# Patient Record
Sex: FEMALE | Race: OTHER | HISPANIC OR LATINO | Employment: FULL TIME | URBAN - METROPOLITAN AREA
[De-identification: names, ages, dates, MRNs, and addresses within clinical notes are randomized per-mention and may not be internally consistent; named-entity substitution may affect disease eponyms.]

---

## 2017-01-11 ENCOUNTER — HOSPITAL ENCOUNTER (OUTPATIENT)
Dept: RADIOLOGY | Facility: HOSPITAL | Age: 67
Discharge: HOME | End: 2017-01-11
Attending: INTERNAL MEDICINE | Admitting: RADIOLOGY
Payer: COMMERCIAL

## 2017-01-11 DIAGNOSIS — E04.1 NONTOXIC SINGLE THYROID NODULE: ICD-10-CM

## 2017-01-11 PROCEDURE — 76942 ECHO GUIDE FOR BIOPSY: CPT

## 2017-01-11 PROCEDURE — 88172 CYTP DX EVAL FNA 1ST EA SITE: CPT | Performed by: INTERNAL MEDICINE

## 2017-01-11 PROCEDURE — 88173 CYTOPATH EVAL FNA REPORT: CPT | Performed by: INTERNAL MEDICINE

## 2017-01-11 PROCEDURE — 10022 HB FNA W/IMAGE: CPT

## 2017-01-11 RX ORDER — LIDOCAINE HYDROCHLORIDE 10 MG/ML
2 INJECTION, SOLUTION INFILTRATION; PERINEURAL ONCE
Status: COMPLETED | OUTPATIENT
Start: 2017-01-11 | End: 2017-01-11

## 2017-01-11 RX ADMIN — LIDOCAINE HYDROCHLORIDE 2 ML: 10 INJECTION, SOLUTION INFILTRATION; PERINEURAL at 09:25

## 2017-01-13 ENCOUNTER — GENERIC CONVERSION - ENCOUNTER (OUTPATIENT)
Dept: OTHER | Facility: OTHER | Age: 67
End: 2017-01-13

## 2017-01-18 ENCOUNTER — HOSPITAL ENCOUNTER (OUTPATIENT)
Dept: RADIOLOGY | Facility: HOSPITAL | Age: 67
Discharge: HOME | End: 2017-01-18
Attending: INTERNAL MEDICINE
Payer: COMMERCIAL

## 2017-01-18 DIAGNOSIS — Z12.31 VISIT FOR SCREENING MAMMOGRAM: ICD-10-CM

## 2017-01-18 PROCEDURE — G0202 SCR MAMMO BI INCL CAD: HCPCS

## 2017-02-02 ENCOUNTER — ANESTHESIA (OUTPATIENT)
Dept: GASTROENTEROLOGY | Facility: HOSPITAL | Age: 67
End: 2017-02-02
Payer: COMMERCIAL

## 2017-02-02 ENCOUNTER — ANESTHESIA EVENT (OUTPATIENT)
Dept: GASTROENTEROLOGY | Facility: HOSPITAL | Age: 67
End: 2017-02-02
Payer: COMMERCIAL

## 2017-02-02 ENCOUNTER — HOSPITAL ENCOUNTER (OUTPATIENT)
Facility: HOSPITAL | Age: 67
Setting detail: OUTPATIENT SURGERY
Discharge: HOME | End: 2017-02-02
Attending: INTERNAL MEDICINE | Admitting: INTERNAL MEDICINE
Payer: COMMERCIAL

## 2017-02-02 VITALS
OXYGEN SATURATION: 96 % | RESPIRATION RATE: 18 BRPM | HEART RATE: 67 BPM | SYSTOLIC BLOOD PRESSURE: 99 MMHG | DIASTOLIC BLOOD PRESSURE: 51 MMHG | TEMPERATURE: 97.9 F

## 2017-02-02 DIAGNOSIS — E34.2 ECTOPIC HORMONE SECRETION, NOT ELSEWHERE CLASSIFIED: ICD-10-CM

## 2017-02-02 PROCEDURE — 88305 TISSUE EXAM BY PATHOLOGIST: CPT | Performed by: INTERNAL MEDICINE

## 2017-02-02 PROCEDURE — 88342 IMHCHEM/IMCYTCHM 1ST ANTB: CPT | Performed by: INTERNAL MEDICINE

## 2017-02-02 RX ORDER — ALBUTEROL SULFATE 2.5 MG/3ML
2.5 SOLUTION RESPIRATORY (INHALATION) ONCE AS NEEDED
Status: CANCELLED | OUTPATIENT
Start: 2017-02-02

## 2017-02-02 RX ORDER — SODIUM CHLORIDE 9 MG/ML
50 INJECTION, SOLUTION INTRAVENOUS CONTINUOUS
Status: DISCONTINUED | OUTPATIENT
Start: 2017-02-02 | End: 2017-02-02 | Stop reason: HOSPADM

## 2017-02-02 RX ORDER — PROPOFOL 10 MG/ML
INJECTION, EMULSION INTRAVENOUS CONTINUOUS PRN
Status: DISCONTINUED | OUTPATIENT
Start: 2017-02-02 | End: 2017-02-02 | Stop reason: SURG

## 2017-02-02 RX ORDER — MEPERIDINE HYDROCHLORIDE 25 MG/ML
12.5 INJECTION INTRAMUSCULAR; INTRAVENOUS; SUBCUTANEOUS AS NEEDED
Status: CANCELLED | OUTPATIENT
Start: 2017-02-02

## 2017-02-02 RX ORDER — SODIUM CHLORIDE, SODIUM LACTATE, POTASSIUM CHLORIDE, CALCIUM CHLORIDE 600; 310; 30; 20 MG/100ML; MG/100ML; MG/100ML; MG/100ML
125 INJECTION, SOLUTION INTRAVENOUS CONTINUOUS
Status: CANCELLED | OUTPATIENT
Start: 2017-02-02

## 2017-02-02 RX ADMIN — SODIUM CHLORIDE 50 ML/HR: 0.9 INJECTION, SOLUTION INTRAVENOUS at 13:44

## 2017-02-02 RX ADMIN — PROPOFOL 160 MCG/KG/MIN: 10 INJECTION, EMULSION INTRAVENOUS at 14:04

## 2017-02-16 ENCOUNTER — GENERIC CONVERSION - ENCOUNTER (OUTPATIENT)
Dept: OTHER | Facility: OTHER | Age: 67
End: 2017-02-16

## 2017-04-23 ENCOUNTER — HOSPITAL ENCOUNTER (OUTPATIENT)
Dept: RADIOLOGY | Age: 67
Discharge: HOME | End: 2017-04-23
Attending: PHYSICIAN ASSISTANT | Admitting: FAMILY MEDICINE
Payer: COMMERCIAL

## 2017-04-23 ENCOUNTER — APPOINTMENT (OUTPATIENT)
Dept: URGENT CARE | Age: 67
End: 2017-04-23
Payer: COMMERCIAL

## 2017-04-23 ENCOUNTER — TRANSCRIBE ORDERS (OUTPATIENT)
Dept: URGENT CARE | Age: 67
End: 2017-04-23

## 2017-04-23 DIAGNOSIS — T14.90XA INJURY: Primary | ICD-10-CM

## 2017-04-23 DIAGNOSIS — T14.90XA INJURY: ICD-10-CM

## 2017-04-23 PROCEDURE — S9088 SERVICES PROVIDED IN URGENT: HCPCS

## 2017-04-23 PROCEDURE — 99213 OFFICE O/P EST LOW 20 MIN: CPT

## 2017-04-23 PROCEDURE — S9088 SERVICES PROVIDED IN URGENT: HCPCS | Performed by: FAMILY MEDICINE

## 2017-04-23 PROCEDURE — 73590 X-RAY EXAM OF LOWER LEG: CPT

## 2017-04-23 PROCEDURE — 73610 X-RAY EXAM OF ANKLE: CPT

## 2017-04-23 PROCEDURE — 73630 X-RAY EXAM OF FOOT: CPT

## 2017-07-03 ENCOUNTER — ALLSCRIPTS OFFICE VISIT (OUTPATIENT)
Dept: OTHER | Facility: OTHER | Age: 67
End: 2017-07-03

## 2017-07-03 DIAGNOSIS — Z12.31 ENCOUNTER FOR SCREENING MAMMOGRAM FOR MALIGNANT NEOPLASM OF BREAST: ICD-10-CM

## 2017-07-26 ENCOUNTER — APPOINTMENT (OUTPATIENT)
Dept: LAB | Facility: HOSPITAL | Age: 67
End: 2017-07-26
Payer: COMMERCIAL

## 2017-07-26 ENCOUNTER — APPOINTMENT (OUTPATIENT)
Dept: URGENT CARE | Age: 67
End: 2017-07-26
Payer: OTHER MISCELLANEOUS

## 2017-07-26 ENCOUNTER — TRANSCRIBE ORDERS (OUTPATIENT)
Dept: LAB | Facility: HOSPITAL | Age: 67
End: 2017-07-26

## 2017-07-26 DIAGNOSIS — Z00.8 HEALTH EXAMINATION IN POPULATION SURVEY: ICD-10-CM

## 2017-07-26 DIAGNOSIS — Z00.8 HEALTH EXAMINATION IN POPULATION SURVEY: Primary | ICD-10-CM

## 2017-07-26 LAB
CHOLEST SERPL-MCNC: 192 MG/DL (ref 50–200)
EST. AVERAGE GLUCOSE BLD GHB EST-MCNC: 114 MG/DL
HBA1C MFR BLD: 5.6 % (ref 4.2–6.3)
HDLC SERPL-MCNC: 61 MG/DL (ref 40–60)
LDLC SERPL CALC-MCNC: 103 MG/DL (ref 0–100)
TRIGL SERPL-MCNC: 138 MG/DL

## 2017-07-26 PROCEDURE — G0382 LEV 3 HOSP TYPE B ED VISIT: HCPCS | Performed by: PREVENTIVE MEDICINE

## 2017-07-26 PROCEDURE — 80061 LIPID PANEL: CPT

## 2017-07-26 PROCEDURE — 99283 EMERGENCY DEPT VISIT LOW MDM: CPT | Performed by: PREVENTIVE MEDICINE

## 2017-07-26 PROCEDURE — 83036 HEMOGLOBIN GLYCOSYLATED A1C: CPT

## 2017-07-26 PROCEDURE — 36415 COLL VENOUS BLD VENIPUNCTURE: CPT

## 2017-07-31 ENCOUNTER — APPOINTMENT (OUTPATIENT)
Dept: URGENT CARE | Age: 67
End: 2017-07-31
Payer: OTHER MISCELLANEOUS

## 2017-07-31 PROCEDURE — 99213 OFFICE O/P EST LOW 20 MIN: CPT | Performed by: PREVENTIVE MEDICINE

## 2017-12-14 ENCOUNTER — ALLSCRIPTS OFFICE VISIT (OUTPATIENT)
Dept: OTHER | Facility: OTHER | Age: 67
End: 2017-12-14

## 2017-12-14 DIAGNOSIS — E04.1 NONTOXIC SINGLE THYROID NODULE: ICD-10-CM

## 2017-12-16 NOTE — PROGRESS NOTES
Assessment  1  Thyroid nodule (241 0) (E04 1)    Plan  Thyroid nodule    · (1) T4, FREE; Status:Active; Requested for:69Ndk2817;    Perform:[facility]; Due:69Rnn4998; Ordered; For:Thyroid nodule; Ordered By:James Mckeon;   · (1) TSH; Status:Active; Requested for:82Ytc2595;    Perform:[facility]; Due:50Rjh4421; Ordered; For:Thyroid nodule; Ordered By:James Mckeon;   · US THYROID; Status:Active; Requested for:67Aad1217;    Perform:[facility]; Due:64Hip2734; Last Updated By:Tabatha Carter; 12/14/2017 1:30:13 PM;Ordered; For:Thyroid nodule; Ordered By:James Mckeon;   · Follow-up visit in 1 year Evaluation and Treatment  Follow-up  Status: Complete  Done:70Qvd9126   Ordered; For: Thyroid nodule; Ordered By: Raffi Stoddard Performed:  Due: 63NOD8673; Last Updated By: Coreen Seth; 12/14/2017 1:26:32 PM    Discussion/Summary  repeat thyroid ultrasound to monitor for any changes in size or characteristics of thyroid nodule , repeat thyroid function tests Counseling Documentation With Imm: The patient was counseled regarding diagnostic results,-- instructions for management,-- risk factor reductions,-- impressions,-- risks and benefits of treatment options,-- importance of compliance with treatment  Patient's Capacity to Self-Care: Patient is able to Self-Care  Medication SE Review and Pt Understands Tx: The treatment plan was reviewed with the patient/guardian  The patient/guardian understands and agrees with the treatment plan      Chief Complaint  Chief Complaint Free Text Note Form: FOllow up  History of Present Illness  Thyroid Nodule: The patient is being seen for a follow-up of a thyroid nodule  Nodule characteristics: located in the right lobe, benign  Symptoms:  weight gain, but-- no neck mass,-- no neck pain,-- no dysphonia,-- no dysphagia,-- no dyspnea,-- no fatigue,-- no palpitations,-- no anxiety,-- no irritability-- and-- no tremor   The patient is not currently being treated for this problem  Pertinent medical history:  no radiation exposure  Family history:  no thyroid cancer  Review of Systems  ROS Reviewed:   ROS reviewed  Endo Adult ROS Female Established v2 Update - [facility]:  Constitutional/General: recent weight gain,-- no recent weight loss,-- no poor energy/fatigue,-- no increased energy level,-- no insomnia/sleep problems,-- no fever-- and-- no feeling weak  Breasts: no nipple discharge  Heart: no high blood pressure,-- no chest pain/tightness,-- no rapid/racing heart rate-- and-- no palpitations  Genitourinary - Urinary: no frequent urination,-- no excess urination-- and-- no urinating during the night  Eyes: no blurred vision,-- no double vision,-- no bulging eyes,-- no gritty/scratchy eyes-- and-- no excessive tearing  Mouth / Throat: no hoarseness-- and-- no difficulty swallowing  Neck: no lumps,-- no swollen glands,-- no neck pain,-- no neck stiffness-- and-- no enlarged thyroid  Respiratory: no wheezing,-- no asthma-- and-- no persistent cough  Musculoskeletal: no muscle aches/pain,-- joint aches/pain-- and-- no muscle weakness  Skin & Hair: no dry skin,-- no acne,-- the hair texture was not oily,-- no hair loss-- and-- no excessive hair growth  Gastrointestinal: no constipation,-- no diarrhea,-- no waking at night to drink-- and-- no stomach ache  Neurological: no blackouts,-- no weakness-- and-- no tremors  Reproductive:  frequency of period is not applicable  -- duration of period is not applicable  -- Date of last menstruation is not applicable  -- regular periods are not applicable  -- discomfort with periods is not applicable  -- excessive bleeding during period is not applicable  -- mood swings are not applicable    Endocrine: no feeling hot frequently,-- feeling cold frequently,-- no shifts between feeling hot and cold,-- cold hands or feet,-- no excessive sweating,-- no thyroid problems,-- no blood sugar problems,-- no excessive thirst,-- no excessive hunger,-- no change in shoe size,-- no nausea or vomiting-- and-- no shaky hands  Active Problems  1  Abdominal pain (789 00) (R10 9)   2  Abnormal weight loss (783 21) (R63 4)   3  Elevated LFTs (790 6) (R79 89)   4  Encounter for annual routine gynecological examination (V72 31) (Z01 419)   5  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)   6  Nausea (787 02) (R11 0)   7  Orthopedic aftercare (V54 9) (Z47 89)   8  Thyroid nodule (241 0) (E04 1)   9  Trigger thumb of left hand (727 03) (M65 312)   10  Visit for screening mammogram (V76 12) (Z12 31)    Past Medical History  1  History of Arthritis (V13 4)   2  History of gastroesophageal reflux (GERD) (V12 79) (Z87 19)   3  Orthopedic aftercare (V54 9) (Z47 89)  Active Problems And Past Medical History Reviewed: The active problems and past medical history were reviewed and updated today  Surgical History  1  History of Hysterectomy  Surgical History Reviewed: The surgical history was reviewed and updated today  Family History  Father    1  Family history of malignant neoplasm of prostate (V16 42) (Z80 45)  Sister    2  Family history of malignant neoplasm of breast (V16 3) (Z80 3)  Family History    3  Family history of Arthritis (V17 7)   4  Family history of Brain Cancer (V16 8)   5  Family history of Breast Cancer (V16 3)   6  Family history of Osteoporosis (V17 81)   7  Family history of Prostate Cancer (T09 17)  Family History Reviewed: The family history was reviewed and updated today  Social History   · Denied: History of Being A Social Drinker   · Daily Tea Consumption (1  Cups/Day)   · Former smoker (V15 82) (X12 333)   · Denied: History of Tobacco Use  Social History Reviewed: The social history was reviewed and updated today  Current Meds   1  Omega 3-6-9 Complex Oral Capsule Recorded  Medication List Reviewed: The medication list was reviewed and updated today  Allergies  1  Latex Gloves MISC   2  Shellfish-derived Products  3  Seafood    Vitals  Vital Signs    Recorded: 23Pjy3693 01:03PM   Heart Rate 76   Systolic 681   Diastolic 70   Height 5 ft    Weight 196 lb 3 04 oz   BMI Calculated 38 32   BSA Calculated 1 84     Physical Exam   Constitutional  General appearance: No acute distress, well appearing and well nourished  Eyes  Conjunctiva and lids: No swelling, erythema, or discharge  Pupils: Equal, round and reactive to light  The sclera are anicteric  Extraocular movements are intact  Ears, Nose, Mouth, and Throat  External inspection of ears, nose and lips: Normal    Exam of Head: The head is atraumatic and normocephalic  Neck: The neck is supple  The thyroid is normal in size with no palpable nodules  Pulmonary  Auscultation of lungs: Clear to auscultation bilaterally with normal chest expansion  Cardiovascular  Examination of extremities for edema and/or varicosities: Normal    Examination of pulses: Dorsalis pedal pulses are +2 and equal bilaterally  Examination of carotids: No bruit   Abdomen  Abdomen: Abdomen is soft, non-tender with normal bowel sounds  Lymphatic  Palpation of lymph nodes: No supraclavicular or suboccipital lymphadenopathy  Musculoskeletal  Gait and station: Normal    Inspection/palpation of joints, bones, and muscles: Muscle bulk and tone is normal    Skin  Skin and subcutaneous tissue: Normal skin temperature and color  Neurologic  Motor Strength: Strength is 5/5 bilaterally     Psychiatric  Orientation to person, place and time: Normal    Mood and affect: Affect and attention span are normal        Future Appointments    Date/Time Provider Specialty Site   12/17/2018 01:00 PM Barry Garzon, [facility]     Signatures   Electronically signed by : HUEY Lewis ; Dec 14 2017  9:24PM EST                       (Author)

## 2018-01-03 ENCOUNTER — APPOINTMENT (OUTPATIENT)
Dept: LAB | Facility: HOSPITAL | Age: 68
End: 2018-01-03
Attending: INTERNAL MEDICINE
Payer: COMMERCIAL

## 2018-01-03 ENCOUNTER — GENERIC CONVERSION - ENCOUNTER (OUTPATIENT)
Dept: OTHER | Facility: OTHER | Age: 68
End: 2018-01-03

## 2018-01-03 ENCOUNTER — HOSPITAL ENCOUNTER (OUTPATIENT)
Dept: RADIOLOGY | Facility: HOSPITAL | Age: 68
Discharge: HOME | End: 2018-01-03
Attending: INTERNAL MEDICINE
Payer: COMMERCIAL

## 2018-01-03 ENCOUNTER — TRANSCRIBE ORDERS (OUTPATIENT)
Dept: LAB | Facility: HOSPITAL | Age: 68
End: 2018-01-03

## 2018-01-03 DIAGNOSIS — E04.1 NONTOXIC SINGLE THYROID NODULE: ICD-10-CM

## 2018-01-03 LAB
T4 FREE SERPL-MCNC: 0.97 NG/DL (ref 0.76–1.46)
TSH SERPL DL<=0.05 MIU/L-ACNC: 2.14 UIU/ML (ref 0.36–3.74)

## 2018-01-03 PROCEDURE — 76536 US EXAM OF HEAD AND NECK: CPT

## 2018-01-03 PROCEDURE — 84443 ASSAY THYROID STIM HORMONE: CPT

## 2018-01-03 PROCEDURE — 84439 ASSAY OF FREE THYROXINE: CPT

## 2018-01-03 PROCEDURE — 36415 COLL VENOUS BLD VENIPUNCTURE: CPT

## 2018-01-05 ENCOUNTER — GENERIC CONVERSION - ENCOUNTER (OUTPATIENT)
Dept: OTHER | Facility: OTHER | Age: 68
End: 2018-01-05

## 2018-01-10 NOTE — RESULT NOTES
Verified Results  (1) TISSUE EXAM 06ZSN3783 02:34PM Natasha Sage     Test Name Result Flag Reference   LAB AP CASE REPORT (Report)     Surgical Pathology Report             Case: [id number]                   Authorizing Provider: Richelle Pearson MD       Collected:      02/02/2017 1434        Ordering Location:   [address] [place] [address]   Received:      02/02/2017 [facility]                               Pathologist:      Kandice Manning DO                               Specimen:  Stomach, Gastric body; gastritis   LAB AP FINAL DIAGNOSIS (Report)     A  Stomach, body, biopsy:  - Chronic inactive antral-oxyntic gastritis  - No H  pylori organisms identified on H&E and immunohistochemical stains  Appropriate positive and negative controls obtained  Interpretation performed at [facility]  Electronically signed by Kandice Manning DO on 2/6/2017 at 10:07 AM   LAB AP SURGICAL ADDITIONAL INFORMATION (Report)     These tests were developed and their performance characteristics   determined by [facility] or [employer]  They may not be cleared or approved by the [place]  Food and   Drug Administration  The FDA has determined that such clearance or   approval is not necessary  These tests are used for clinical purposes  They should not be regarded as investigational or for research  This   laboratory has been approved by [facility] 88, designated as a high-complexity   laboratory and is qualified to perform these tests  LAB AP GROSS DESCRIPTION (Report)     A  The specimen is received in formalin, labeled with the patient's name   and hospital number, and is designated gastric body, gastritis   The   specimen consists of 3 rubbery tan tissue fragments measuring from 0 1 up   to 0 2 cm in greatest dimension  Entirely submitted  One cassette      Note: The estimated total formalin fixation time based upon information provided by the submitting clinician and the standard processing schedule   is 14 0 hours  SRS       Discussion/Summary   Biopsies were unremarkable  Follow up with us in office if pain is persistent  thanks

## 2018-01-12 NOTE — RESULT NOTES
Message   thyroid nodule negative for malignancy - repeat thyroid ultraosund in 6 months      Verified Results  (1) FINE NEEDLE ASPIRATION 98TDJ0602 08:58AM Francesca Monte     Test Name Result Flag Reference   LAB AP CASE REPORT (Report)     Non-gynecologic Cytology             Case: [id number]                  Authorizing Provider: Lisa Galvan MD     Collected:      01/11/2017 0858        Ordering Location:   [address] [place] [address]   [facility]      [facility]                              Pathologist:      Paco Quiñonez MD                              Specimens:  A) - Thyroid, Right, mid pole                                     B) - Thyroid, Right, mid pole slides   LAB AP CYTO FINAL DIAGNOSIS (Report)     A & B  Thyroid, right, mid pole (Thin prep and smear preparations):  Negative for malignancy (Cammal Category II) - See note  Benign follicular epithelial cells, some with oncocytic change (Hurthle   cells), accompanied by histiocytes and abundant colloid, consistent with a   benign follicular nodule  Satisfactory for evaluation  Note: As reported in the [facility] for Reporting Thyroid   Cytopathology*, the diagnostic category of benign/negative for   malignancy carries 0% to 3% risk of malignancy being found in subsequent   resections (in the few studies of patients with benign FNA results that   were followed long-term, a falsenegative rate of <1% was reported), with   the usual management being clinical follow-up supplemented by   ultrasonography (US) as indicated  Repeat FNA may be indicated for nodules   showing significant growth or developing US abnormalities  Ultimately,   clinical/imaging correlation for this patient is needed in arriving at the   actual management plan    *The Cammal System for Reporting Thyroid Cytopathology, Rob Kate, 2010 (1st ed )    Interpretation performed at [facility] [address] [facility]   [id number]  Electronically signed by Elvis Camarena MD on 1/12/2017 at 1:37 PM   LAB AP INTRAOPERATIVE CONSULTATION      Thyroid right mid: Adequate, defer    Dr Olegario Grant   LAB AP GROSS DESCRIPTION      A  Thyroid, Right, right mid pole: 20 ml slightly bloody received in   CytoLyt    B  Thyroid, Right mid pole, : 6 slides received (3 diff quik/ 3 alcohol   fixed)   LAB AP NONGYN ADDITIONAL INFORMATION (Report)     [employer] FDA approved ,  and ThinPrep Imaging System are   utilized with strict adherence to the 's instruction manual to   prepare gynecologic and non-gynecologic cytology specimens for the   production of ThinPrep slides as well as for gynecologic ThinPrep imaging  These processes have been validated by our laboratory and/or by the     These tests were developed and their performance characteristics   determined by [facility] or [employer]  They may not be cleared or approved by the [place]  Food and   Drug Administration  The FDA has determined that such clearance or   approval is not necessary  These tests are used for clinical purposes  They should not be regarded as investigational or for research  This   laboratory has been approved by CLIA 88, designated as a high-complexity   laboratory and is qualified to perform these tests     LAB AP CLINICAL INFORMATION      Thyroid right mid: Size: 2 34 x 1 5 x 1 94 cm Margins: smooth Echogenicity: solid/cystic Microcalcs: absent Flow: intranodular/peripheral Size change: minimal Suspicion level: N/A Hx of Hashimoto's Thyroiditis: no

## 2018-01-13 VITALS
BODY MASS INDEX: 36.32 KG/M2 | DIASTOLIC BLOOD PRESSURE: 70 MMHG | HEIGHT: 60 IN | WEIGHT: 185 LBS | SYSTOLIC BLOOD PRESSURE: 110 MMHG

## 2018-01-15 NOTE — RESULT NOTES
Verified Results  (1) HEPATIC FUNCTION PANEL [id number] 08:26AM Belkis Frazier Order Number: [id number]     Test Name Result Flag Reference   ALBUMIN 3 4 g/dL L 3 5-5 0   - Patient Instructions: This is a non fasting blood test  Please drink two glasses of water the morning of test     - Patient Instructions:  This is a non fasting blood test  Please drink two glasses of water the morning of test    ALK PHOSPHATAS 90 U/L     ALT (SGPT) 26 U/L  12-78   AST(SGOT) 18 U/L  5-45   BILI, DIRECT 0 09 mg/dL  0 00-0 20   BILI, TOTAL 0 42 mg/dL  0 20-1 00   TOTAL PROTEIN 7 3 g/dL  6 4-8 2       Discussion/Summary   liver enzymes normal

## 2018-01-16 NOTE — RESULT NOTES
Message   Please schedule for EGD an endoscopic ultrasound  The patient is scheduled with Dr Ezekiel Siegel in February however I would like to see the patient on my schedule if it is okay with the patient  Thank you     Verified Results  (1) ROSHAN SCREEN W/REFLEX TO TITER/PATTERN 28LEZ6487 08:27AM Maximilian Diaz Order Number: [id number]     Test Name Result Flag Reference   ROSHAN SCREEN  Negative  Negative     (1) VASOACTIVE INTESTINAL PEPTIDE (VIP) 78SMJ5219 08:27AM Maximilian Diaz Order Number: [id number]     Test Name Result Flag Reference   VASOACTIVE INTESTINAL POLYPEPTIDE PLASMA 144 7 pg/mL H 0 0 - 58 8   Results for this test are for research purposes only by the assay's    The performance characteristics of this product have  not been established  Results should not be used as a diagnostic  procedure without confirmation of the diagnosis by another medically  established diagnostic product or procedure  Performed at:  [address]  [id number]  : Tammi Lind MD, Phone:  [phone]     (1) CHROMOGRANIN A 57KMQ8828 08:27AM Maximilian Diaz Order Number: [id number]     Test Name Result Flag Reference   CHROMOGRANIN A 2 nmol/L  0 - 5   Chromogranin A performed by [employer] methodology  Results for this test are designated to be for research purposes  only by the assay's   The performance characteristics  of this product have not been established  Results for this test  should not be used as absolute evidence of presence or absence of  malignant disease without confirmation of the diagnosis by another  medically established diagnostic product or procedure  Values  obtained with different assay methods or kits cannot be used  interchangeably      Performed at:  [address]  [id number]  : Tammi Lind MD, Phone:  [phone]     (1) SMOOTH MUSCLE ANTIBODY 50OSB8533 08:26AM Melisa Garcia Order Number: [id number]     Test Name Result Flag Reference   SMOOTH MUS AB 5 Units  0 - 19   Negative                     0 - 19                   Weak positive               20 - 30                   Moderate to strong positive     >30   Actin Antibodies are found in 52-85% of patients with   autoimmune hepatitis or chronic active hepatitis and   in 22% of patients with primary biliary cirrhosis  Performed at:  [facility] [place] [address]  [id number]  : Gustavo Fierro MD, Phone:  [phone]     [address] [place] [address] 20ZPJ5925 08:01AM Melisa Kalallen Order Number: [id number]    - Patient Instructions: To schedule this appointment, please contact Central Scheduling at [phone]  Test Name Result Flag Reference   US ABDOMEN LIMITED (Report)     RIGHT UPPER QUADRANT ULTRASOUND     INDICATION: Abdominal pain for 1 month  COMPARISON: Ultrasound 10/24/2016, CT chest, abdomen and pelvis 10/24/2016     TECHNIQUE:  Real-time ultrasound of the right upper quadrant was performed with a curvilinear transducer with both volumetric sweeps and still imaging techniques  FINDINGS:     PANCREAS: Somewhat difficult to visualize and heterogeneous although no focal lesions detected  AORTA AND IVC: Visualized portions are normal for patient age  LIVER:   Size: Within normal range  The liver measures 13 9 cm in the midclavicular line  Contour: Surface contour is smooth  Parenchyma: Echogenicity and echotexture are within normal limits  No evidence of suspicious mass  The main portal vein is patent and hepatopetal       BILIARY:   The gallbladder is normal in caliber  No wall thickening or pericholecystic fluid  No stones or sludge identified  No sonographic Ruiz's sign  No intrahepatic biliary dilatation  CBD measures 5 mm  No choledocholithiasis       KIDNEY:    Right kidney measures 11 4 x 4 5 cm  Within normal limits  ASCITES:  None  IMPRESSION:     Limited evaluation of the pancreas  Otherwise, unremarkable right upper quadrant ultrasound  Workstation performed: [id number]     Signed by:   Gil Husain DO   11/19/16       Plan  Excessive VIP secretion    · U/S Endoscopic, limited to Esophagus; Status:Hold For - Scheduling; Requested  for:26Woc2103;     Discussion/Summary   Essentially all blood work from November was normal except for one of the hormone levels which has been high in the past as well  She had an MRI done in 2014 in this regard which was negative  However due to the persistent elevation I would also recommend doing an endoscopy and endoscopic ultrasound to visualize the pancreas and make sure there is no abnormality seen over there  We will follow-up with her in the office after the procedure

## 2018-01-17 NOTE — RESULT NOTES
Message   she has 3 nodule - one of then rt sided meets criteria for Biopsy   please let pt know and set up     Verified Results  Benny SLADE  56  01:13PM Rosskatherine Lemus Order Number: [id number]    - Patient Instructions: To schedule this appointment, please contact Central Scheduling at [phone]  Test Name Result Flag Reference   US THYROID (Report)     THYROID ULTRASOUND     INDICATION: Nontoxic single thyroid nodule  Thyroid nodules seen on previous CT imaging  COMPARISON: No prior ultrasound, CT chest and abdomen 10/24/2016     TECHNIQUE:  Ultrasound of the thyroid was performed with a high frequency linear transducer in transverse and sagittal planes including volumetric imaging sweeps as well as traditional still imaging technique  FINDINGS:   Mildly heterogeneous parenchymal echotexture  Right gland: 4 3 x 1 9 x 1 8 cm  Complex solid and cystic midpole nodule measuring 2 2 x 1 4 x 1 6 cm  Left gland: 4 4 x 1 3 x 1 5 cm  6 x 3 x 4 mm upper pole colloid cyst    In the lower pole there is a predominantly cystic nodule measuring 1 0 x 0 4 x 0 8 cm with a small soft tissue component  Isthmus: The isthmus is 0 2 cm in AP dimension  IMPRESSION:      Complex solid and cystic right midpole nodule measuring 2 2 cm for which needle sampling is recommended  ##sigslh##sigslh       Workstation performed: [id number]     Signed by:   Natali Bonds DO   12/21/16       Plan  Thyroid nodule    · Biopsy Thyroid FNA Using Ultrasound Guidance, 69446; Status:Active;  Requested  for:85Ond9303;

## 2018-01-22 VITALS
BODY MASS INDEX: 38.52 KG/M2 | HEART RATE: 76 BPM | DIASTOLIC BLOOD PRESSURE: 70 MMHG | WEIGHT: 196.19 LBS | SYSTOLIC BLOOD PRESSURE: 108 MMHG | HEIGHT: 60 IN

## 2018-01-23 NOTE — RESULT NOTES
Discussion/Summary   Normal thyroid function tests     Verified Results  (1) TSH [id number] 08:21AM Ernestine Sean Order Number: [id number]     Test Name Result Flag Reference   TSH 2 140 uIU/mL  0 358-3 740   Patients undergoing fluorescein dye angiography may retain small amounts of fluorescein in the body for 48-72 hours post procedure  Samples containing fluorescein can produce falsely depressed TSH values  If the patient had this procedure,a specimen should be resubmitted post fluorescein clearance  The recommended reference ranges for TSH during pregnancy are as follows:  First trimester 0 1 to 2 5 uIU/mL  Second trimester  0 2 to 3 0 uIU/mL  Third trimester 0 3 to 3 0 uIU/m     (1) T4, FREE 32UVO5925 08:21AM Sanjay Joss    Order Number: [id number]     Test Name Result Flag Reference   T4,FREE 0 97 ng/dL  0 76-1 46   Specimen collection should occur prior to Sulfasalazine administration due to the potential for falsely elevated results

## 2018-01-23 NOTE — RESULT NOTES
Discussion/Summary   Stable     Verified Results  [address] [place] [address] [id number] 12:36PM Santiago Carl Order Number: [id number]    - Patient Instructions: To schedule this appointment, please contact Central Scheduling at [phone]  Test Name Result Flag Reference   US THYROID (Report)     THYROID ULTRASOUND     INDICATION: Follow-up thyroid nodules  COMPARISON: 1/11/2017, 12/19/2016  TECHNIQUE:  Ultrasound of the thyroid was performed with a high frequency linear transducer in transverse and sagittal planes including volumetric imaging sweeps as well as traditional still imaging technique  FINDINGS: Normal homogeneous smooth echotexture  Right lobe: 4 1 x 1 8 x 1 7 cm  Left lobe: 4 2 x 1 2 x 1 7 cm  Isthmus: 0 2 cm  Nodule #1   Right midpole nodule measuring 1 9 x 1 1 x 1 7 cm  Given differences in measuring technique, no significant change from prior  COMPOSITION: 1 point, mixed cystic and solid  ECHOGENICITY: 1 point, hyperechoic or isoechoic  SHAPE: 0 points, wider-than-tall  MARGIN: 0 points, ill-defined  ECHOGENIC FOCI: 0 points, none or large comet-tail artifacts  TI-RADS Score: TR 2 (2 points), Not suspious  No FNA  This nodule has had a previous benign biopsy, as it is stable, no further sampling recommended at this time  Further surveillance at 2 year intervals could be considered to confirm continued    stability for a period of greater than 5 years  Nodule #2   RIGHT upper pole nodule measuring 0 5 x 0 3 x 0 4 cm  Unchanged from prior  COMPOSITION: 2 points, solid or almost completely solid   ECHOGENICITY: 2 points, hypoechoic  SHAPE: 0 points, wider-than-tall  MARGIN: 0 points, ill-defined  ECHOGENIC FOCI: 0 points, none or large comet-tail artifacts  TI-RADS Score: TR 4 (4-6 points), Moderately suspicious  FNA if > 1 5 cm  Follow if > 1cm  Nodule #3   LEFT upper pole nodule measuring 0 7 x 0 3 x 0 5 cm  Unchanged from prior  COMPOSITION: 0 points, cystic or almost completely cystic  ECHOGENICITY: 0 points, anechoic  SHAPE: 0 points, wider-than-tall  MARGIN: 0 points, smooth  ECHOGENIC FOCI: 0 points, none or large comet-tail artifacts  TI-RADS Score: TR 1 (0 points), Benign  No FNA  Nodule #4   LEFT lower pole nodule measuring 1 0 x 0 5 x 0 8 cm  Unchanged from prior  COMPOSITION: 0 points, cystic or almost completely cystic  ECHOGENICITY: 0 points, anechoic  SHAPE: 0 points, wider-than-tall  MARGIN: 0 points, smooth  ECHOGENIC FOCI: 0 points, none or large comet-tail artifacts  TI-RADS Score: TR 1 (0 points), Benign  No FNA  IMPRESSION:   Stable thyroid nodules  No nodule meets current ACR criteria for requiring biopsy but followup ultrasound is recommended in 1 year  Reference: ACR Thyroid Imaging, Reporting and Data System (TI-RADS): White Paper of the [employer]   J AM Galdino Radiol 4400;[id number]  (additional recommendations based on American Thyroid Association 2015 guidelines )       Workstation performed: [id number]     Signed by:   Austin Gentile MD   1/5/18

## 2018-02-12 ENCOUNTER — OFFICE VISIT (OUTPATIENT)
Dept: GASTROENTEROLOGY | Facility: CLINIC | Age: 68
End: 2018-02-12
Payer: COMMERCIAL

## 2018-02-12 VITALS
HEIGHT: 65 IN | SYSTOLIC BLOOD PRESSURE: 122 MMHG | TEMPERATURE: 97.5 F | DIASTOLIC BLOOD PRESSURE: 77 MMHG | WEIGHT: 200.8 LBS | BODY MASS INDEX: 33.45 KG/M2 | HEART RATE: 76 BPM

## 2018-02-12 DIAGNOSIS — R10.13 EPIGASTRIC PAIN: Primary | ICD-10-CM

## 2018-02-12 DIAGNOSIS — R79.89 ELEVATED LFTS: ICD-10-CM

## 2018-02-12 DIAGNOSIS — E34.2 EXCESSIVE VIP SECRETION: ICD-10-CM

## 2018-02-12 DIAGNOSIS — K21.9 GASTROESOPHAGEAL REFLUX DISEASE WITHOUT ESOPHAGITIS: ICD-10-CM

## 2018-02-12 PROCEDURE — 99214 OFFICE O/P EST MOD 30 MIN: CPT | Performed by: PHYSICIAN ASSISTANT

## 2018-02-12 RX ORDER — ERGOCALCIFEROL 1.25 MG/1
50000 CAPSULE ORAL
COMMUNITY
Start: 2018-01-26 | End: 2019-09-25 | Stop reason: ALTCHOICE

## 2018-02-12 RX ORDER — RANITIDINE 150 MG/1
150 CAPSULE ORAL EVERY EVENING
Qty: 30 CAPSULE | Refills: 5 | Status: SHIPPED | OUTPATIENT
Start: 2018-02-12 | End: 2019-09-25 | Stop reason: ALTCHOICE

## 2018-02-12 RX ORDER — CLOBETASOL PROPIONATE 0.5 MG/G
OINTMENT TOPICAL
COMMUNITY
Start: 2018-01-26 | End: 2019-09-25 | Stop reason: ALTCHOICE

## 2018-02-12 RX ORDER — LORAZEPAM 0.5 MG
2 TABLET ORAL DAILY
COMMUNITY
End: 2019-02-09 | Stop reason: HOSPADM

## 2018-02-12 RX ORDER — DICYCLOMINE HYDROCHLORIDE 10 MG/1
10 CAPSULE ORAL 4 TIMES DAILY PRN
Qty: 30 CAPSULE | Refills: 0 | Status: SHIPPED | OUTPATIENT
Start: 2018-02-12 | End: 2018-12-17 | Stop reason: ALTCHOICE

## 2018-02-12 NOTE — PROGRESS NOTES
[facility] - Outpatient Follow-up Note  Mora Calixto 74 y o  female MRN: [id number]  Encounter: [id number]          ASSESSMENT AND PLAN:      1  Epigastric/chest pain   -She reports a tight/pressure-like chest and epigastric pain that mostly seems to happen in the evenings and resolves with a GI cocktail  -s/p EGD and EUS which were largely unremarkable, biopsies were negative for H  Pylori    -Her symptoms are most consistent with reflux  Recommend antireflux precautions including dietary changes, reviewed this with her today    -She has not yet tried Zantac, recommend trial of this    -Follow up in 3 months  2  Elevated LFTs   -Unclear etiology, autoimmune markers negative  -RUQ u/s was unremarkable    -Her LFTs are now WNL  Recheck in 3 months  3  Excessive VIP secretion   -She denies any symptoms of diarrhea or flushing at this time    -Recent EGD was unremarkable    -Will discuss plan with Dr Martin Hawkins    4  Abdominal pain   -she reports that she continues to have intermittent abdominal pain unrelated to food or a bowel movement, less than once per week  -RUQ U/S unremarkable    -We discussed the risks and benefits of a colonoscopy to evaluate for source of her pain however she would prefer to hold off at this time    -Trial of Bentyl   -Recommend follow up in 3 months to see how she is doing     ______________________________________________________________________    SUBJECTIVE:    abd pain moves unrelated to food or bms  Feels tight/pressure  Chest tightness occ too  About once per week  No change in bms, no bleeding  Took omeprazole in the past, felt this gave her abdominal cramping/diarrhea  Zantac no change in pain        REVIEW OF SYSTEMS IS OTHERWISE NEGATIVE        Historical Information   Past Medical History:   Diagnosis Date    Abdominal pain     right mid abd    GERD (gastroesophageal reflux disease)     Plantar fasciitis     right foot    Thyroid nodule enlarged- biopsy benign     Past Surgical History:   Procedure Laterality Date    HYSTERECTOMY      MT EDG US EXAM SURGICAL ALTER STOM DUODENUM/JEJUNUM N/A 2/2/2017    Procedure: RADIAL ENDOSCOPIC U/S;  Surgeon: Jair Solitario MD;  [facility]; Service: Gastroenterology    MT PART EXCIS PLANTAR FASCIA Right 10/20/2016    Procedure:  OPEN RELEASE FASCIA PLANTAR ,CUTTING BOTTOM OF ARCH,INSTEP ;  Surgeon: Terrance Bender DPM;  [facility];  Service: Podiatry    THUMB ARTHROSCOPY       Social History   History   Alcohol Use No     History   Drug Use No     History   Smoking Status    Never Smoker   Smokeless Tobacco    Never Used     History reviewed  No pertinent family history  Meds/Allergies       Current Outpatient Prescriptions:     clobetasol (TEMOVATE) 0 05 % ointment    ergocalciferol (VITAMIN D2) 50,000 units    Omega-3-6-9 CAPS    Allergies   Allergen Reactions    Celebrex [Celecoxib]     Latex     Shellfish Allergy     Shellfish-Derived Products            Objective     Blood pressure 122/77, pulse 76, temperature 97 5 °F (36 4 °C), temperature source Tympanic, height 5' 5" (1 651 m), weight 91 1 kg (200 lb 12 8 oz)  PHYSICAL EXAM:      General Appearance:   Alert, cooperative, no distress   HEENT:   Normocephalic, atraumatic, anicteric      Neck:  Supple, symmetrical, trachea midline   Lungs:   Clear to auscultation bilaterally; no rales, rhonchi or wheezing; respirations unlabored    Heart[de-identified]   Regular rate and rhythm; no murmur, rub, or gallop  Abdomen:   Soft, non-tender, non-distended; normal bowel sounds; no masses, no organomegaly    Genitalia:   Deferred    Rectal:   Deferred    Extremities:  No cyanosis, clubbing or edema    Pulses:  2+ and symmetric    Skin:  No jaundice, rashes, or lesions    Lymph nodes:  No palpable cervical lymphadenopathy        Lab Results:   No visits with results within 1 Day(s) from this visit     Latest known visit with results is: Appointment on 01/03/2018   Component Date Value    TSH 3RD GENERATON 01/03/2018 2 140     Free T4 01/03/2018 0 97

## 2018-03-29 ENCOUNTER — EVALUATION (OUTPATIENT)
Dept: PHYSICAL THERAPY | Facility: CLINIC | Age: 68
End: 2018-03-29
Payer: COMMERCIAL

## 2018-03-29 DIAGNOSIS — H83.2X3 VESTIBULAR HYPOFUNCTION, BILATERAL: Primary | ICD-10-CM

## 2018-03-29 PROCEDURE — G8979 MOBILITY GOAL STATUS: HCPCS | Performed by: PHYSICAL THERAPIST

## 2018-03-29 PROCEDURE — G8978 MOBILITY CURRENT STATUS: HCPCS | Performed by: PHYSICAL THERAPIST

## 2018-03-29 PROCEDURE — 97112 NEUROMUSCULAR REEDUCATION: CPT | Performed by: PHYSICAL THERAPIST

## 2018-03-29 PROCEDURE — 97162 PT EVAL MOD COMPLEX 30 MIN: CPT | Performed by: PHYSICAL THERAPIST

## 2018-03-29 NOTE — PROGRESS NOTES
PT Evaluation     Today's date: 3/30/2018  Patient name: Sil Mcmahon  : 1950  MRN: [id number]  Referring provider: Lee Villa, PT  Dx:   Encounter Diagnosis   Name Primary?  Vestibular hypofunction, bilateral Yes                  Subjective Evaluation    History of Present Illness  Mechanism of injury: Has been feeling a little off for the past few months, believes she may have gotten water in her ear  dizziness with laying down, sometimes with nausea, yesterday felt like she was drunk  Didn't go to the doctor advised by co-worker who is PT to come to PT for direct access  Every once and while gets "woozy" when looking up and reaching for something  Time constraints - shifts will be changing in the hospital     Continues to drive and work  Pain  No pain reported          Objective  PT/OT Neuro Exam      Dysequilibrium: Yes  Lightheadedness: No  Vertigo: No  Rocking or Swaying: Yes         Oscillopsia: No  Diplopia: No  Motion sickness: No  Floating, Swimming, Disconnected: No    Exacerbation Factors:  Bending over: No  Turning Head: No  Rolling in bed: Yes  Walking: No  Looking up: Yes  Supine to/from sitting: Yes - had been an issue, not as much now  Optokinetic movement: No  Walking in busy environment: No    Duration of Symptoms: Seconds  Frequency of symptoms: 3x/week     Concurrent Complaints:  Tinnitus:No  Aural Fullness: Yes  Known hearing loss:No  Nausea, Vomiting: Yes - nausea only, no vomiting  Altered Vision: No  Poor Concentration: No  Memory Loss: No  Peripheral Neuropathy:No  Cervical Pain: No   Headache: No      PHYSICAL FINDINGS:  Cervical ROM: WFL t/o all planes of motion  Flexion:  Extension:  Right rotation:  Left rotation:  Right lateral flexion:  Left lateral flexion:    MVBI: Denied symptoms B/L    Oculomotor ROM : WNL  Resting nystagmus: No  Gaze holding nystagmus No   Smooth pursuit Normal    Vertical Saccades:Normal  Horizontal Saccades:Normal  Convergence: Normal    Cover/Uncover/Crosscover Test: NT    Head thrust (room light): B/L abnormal  VOR x 1 - WNL  VOR cx -WNL    Dynamic Visual Acuity: NT    MCTSIB  30s eyes open firm surface   30s eyes closed form surface  30s eyes open foam surface  30s eyes closed foam surface      DHI: 8  0-30 mild , 30-60 moderate,  severe disability      Positional testing: Right Left   [facility] (-) (-)   Roll test: (-) (-)         Assessment    Assessment details: Demonstrates decreased ability to maintain static balance with use of vestibular cues  With VOR insufficiency bilaterally as per head thrust  Negative testing for positional vertigo at this time  Patient issued hep for VOR and balance activities, demonstrate good understanding, assess at NV and progress as able  Goals  ST  Patient will report a reduction in frequency of symptoms to 1x/week or less within 4 weeks  2  Patient will report increased ability to look up without dizziness symptoms within 4 weeks   3  Patient will demonstrate DGI of 24/24 within 4 weeks  4   Patient will be able to maintain static balance during 4th condition of mCTSIB for 30s within 4 weeks     Plan  Patient would benefit from: skilled PT  Planned therapy interventions: balance, neuromuscular re-education, graded exercise, home exercise program and patient education  Other planned therapy interventions: VBRT  Frequency: 1x week  Duration in weeks: 4  Treatment plan discussed with: patient  Plan details: 1x/week due to busy work schedule           Precautions: (-)  Daily Treatment Diary     Manual                                                     Exercise Diary  18       VOR x 1  Standing  H 30" ea  V 30" ea       FTEO - level  HT  HN  30" ea       FTEC - level  30"                                                                                                                                                   Modalities

## 2018-03-31 DIAGNOSIS — R10.9 ABDOMINAL PAIN, UNSPECIFIED ABDOMINAL LOCATION: Primary | ICD-10-CM

## 2018-04-02 ENCOUNTER — APPOINTMENT (OUTPATIENT)
Dept: PHYSICAL THERAPY | Facility: CLINIC | Age: 68
End: 2018-04-02
Payer: COMMERCIAL

## 2018-04-05 ENCOUNTER — APPOINTMENT (OUTPATIENT)
Dept: PHYSICAL THERAPY | Facility: CLINIC | Age: 68
End: 2018-04-05
Payer: COMMERCIAL

## 2018-04-11 ENCOUNTER — OFFICE VISIT (OUTPATIENT)
Dept: PHYSICAL THERAPY | Facility: CLINIC | Age: 68
End: 2018-04-11
Payer: COMMERCIAL

## 2018-04-11 DIAGNOSIS — H83.2X3 VESTIBULAR HYPOFUNCTION, BILATERAL: Primary | ICD-10-CM

## 2018-04-11 PROCEDURE — 97112 NEUROMUSCULAR REEDUCATION: CPT | Performed by: PHYSICAL THERAPIST

## 2018-04-12 NOTE — PROGRESS NOTES
Daily Note     Today's date: 2018  Patient name: Yazmin Longo  : 1950  MRN: [id number]  Referring provider: Mary Kate Walters PT  Dx:   Encounter Diagnosis     ICD-10-CM    1  Vestibular hypofunction, bilateral H83 2X3                   Subjective: Pt reports having no dizziness or unsteadiness at this time  Has done exercises t home  Wants to make today last visit  Objective: See treatment diary below      Assessment: Re-tested balance and positional testing this session with no abnormalities  Pt will be placed on a 30 day hold as all goals have been met  ST  Patient will report a reduction in frequency of symptoms to 1x/week or less within 4 weeks - MET  2  Patient will report increased ability to look up without dizziness symptoms within 4 weeks - MET  3  Patient will demonstrate DGI of 24/24 within 4 weeks - MET  4   Patient will be able to maintain static balance during 4th condition of mCTSIB for 30s within 4 weeks - MET    DGI 24/24  MCTSIB - 30 sec condition 4  Plan: On hold

## 2018-04-21 ENCOUNTER — OFFICE VISIT (OUTPATIENT)
Dept: URGENT CARE | Facility: MEDICAL CENTER | Age: 68
End: 2018-04-21
Payer: COMMERCIAL

## 2018-04-21 VITALS
OXYGEN SATURATION: 98 % | TEMPERATURE: 97.8 F | DIASTOLIC BLOOD PRESSURE: 70 MMHG | HEIGHT: 65 IN | WEIGHT: 190 LBS | BODY MASS INDEX: 31.65 KG/M2 | SYSTOLIC BLOOD PRESSURE: 112 MMHG | RESPIRATION RATE: 18 BRPM | HEART RATE: 78 BPM

## 2018-04-21 DIAGNOSIS — J20.9 ACUTE BRONCHITIS, UNSPECIFIED ORGANISM: Primary | ICD-10-CM

## 2018-04-21 PROCEDURE — S9088 SERVICES PROVIDED IN URGENT: HCPCS | Performed by: PHYSICIAN ASSISTANT

## 2018-04-21 PROCEDURE — 94640 AIRWAY INHALATION TREATMENT: CPT | Performed by: PHYSICIAN ASSISTANT

## 2018-04-21 PROCEDURE — 99213 OFFICE O/P EST LOW 20 MIN: CPT | Performed by: PHYSICIAN ASSISTANT

## 2018-04-21 RX ORDER — PREDNISONE 10 MG/1
TABLET ORAL
Qty: 18 TABLET | Refills: 0 | Status: SHIPPED | OUTPATIENT
Start: 2018-04-21 | End: 2018-12-17 | Stop reason: ALTCHOICE

## 2018-04-21 RX ORDER — BENZONATATE 200 MG/1
200 CAPSULE ORAL 3 TIMES DAILY PRN
Qty: 20 CAPSULE | Refills: 0 | Status: SHIPPED | OUTPATIENT
Start: 2018-04-21 | End: 2018-12-17 | Stop reason: ALTCHOICE

## 2018-04-21 RX ORDER — ALBUTEROL SULFATE 90 UG/1
2 AEROSOL, METERED RESPIRATORY (INHALATION) EVERY 6 HOURS PRN
Qty: 1 INHALER | Refills: 0 | Status: SHIPPED | OUTPATIENT
Start: 2018-04-21 | End: 2018-12-17 | Stop reason: ALTCHOICE

## 2018-04-21 RX ORDER — ALBUTEROL SULFATE 2.5 MG/3ML
2.5 SOLUTION RESPIRATORY (INHALATION) ONCE
Status: COMPLETED | OUTPATIENT
Start: 2018-04-21 | End: 2018-04-21

## 2018-04-21 RX ADMIN — ALBUTEROL SULFATE 2.5 MG: 2.5 SOLUTION RESPIRATORY (INHALATION) at 14:06

## 2018-04-21 RX ADMIN — Medication 0.5 MG: at 14:07

## 2018-04-21 NOTE — PROGRESS NOTES
[facility] [facility]        NAME: Geronimo Smith is a 74 y o  female  : 1950    MRN: [id number]  DATE: 2018  TIME: 5:10 PM    Assessment and Plan   Acute bronchitis, unspecified organism [J20 9]  1  Acute bronchitis, unspecified organism  predniSONE 10 mg tablet    benzonatate (TESSALON) 200 MG capsule    albuterol (PROVENTIL HFA,VENTOLIN HFA) 90 mcg/act inhaler    albuterol inhalation solution 2 5 mg    ipratropium (ATROVENT) 0 02 % inhalation solution 0 5 mg         Patient Instructions       Follow up with PCP in 3-5 days  Proceed to  ER if symptoms worsen  Chief Complaint     Chief Complaint   Patient presents with    Cold Like Symptoms     Pt c/o cough, burning chest pain when coughing, and body aches  Pt is concerned because she is now experiencing SOB upon exertion since yesterday  History of Present Illness       Cough   This is a new problem  The current episode started in the past 7 days  The problem has been gradually worsening  The problem occurs every few minutes  The cough is non-productive  Associated symptoms include chest pain, shortness of breath and wheezing  Pertinent negatives include no chills, ear congestion, ear pain, fever, headaches, heartburn, hemoptysis, myalgias, nasal congestion, postnasal drip, rash, rhinorrhea, sore throat, sweats or weight loss  Nothing aggravates the symptoms  She has tried a beta-agonist inhaler for the symptoms  Patient States that her breathing improved after the DuoNeb  Review of Systems   Review of Systems   Constitutional: Negative for chills, fever and weight loss  HENT: Negative for ear pain, postnasal drip, rhinorrhea and sore throat  Respiratory: Positive for cough, shortness of breath and wheezing  Negative for hemoptysis  Cardiovascular: Positive for chest pain  Gastrointestinal: Negative for heartburn  Musculoskeletal: Negative for myalgias  Skin: Negative for rash     Neurological: Negative for headaches  All other systems reviewed and are negative          Current Medications       Current Outpatient Prescriptions:     albuterol (PROVENTIL HFA,VENTOLIN HFA) 90 mcg/act inhaler, Inhale 2 puffs every 6 (six) hours as needed for wheezing, Disp: 1 Inhaler, Rfl: 0    benzonatate (TESSALON) 200 MG capsule, Take 1 capsule (200 mg total) by mouth 3 (three) times a day as needed for cough, Disp: 20 capsule, Rfl: 0    clobetasol (TEMOVATE) 0 05 % ointment, , Disp: , Rfl:     dicyclomine (BENTYL) 10 mg capsule, Take 1 capsule (10 mg total) by mouth 4 (four) times a day as needed (abdominal pain), Disp: 30 capsule, Rfl: 0    ergocalciferol (VITAMIN D2) 50,000 units, , Disp: , Rfl:     Omega-3-6-9 CAPS, Take by mouth, Disp: , Rfl:     predniSONE 10 mg tablet, 4 x 3 days, 3 x 1, 2 x 1, 1 x 1, Disp: 18 tablet, Rfl: 0    ranitidine (ZANTAC) 150 MG capsule, Take 1 capsule (150 mg total) by mouth every evening, Disp: 30 capsule, Rfl: 5    Current Facility-Administered Medications:     ipratropium (ATROVENT) 0 02 % inhalation solution 0 5 mg, 0 5 mg, Nebulization, 4x Daily, Sunitha Augusta PA-JUAN, 0 5 mg at 04/21/18 1407    Current Allergies     Allergies as of 04/21/2018 - Reviewed 04/21/2018   Allergen Reaction Noted    Celebrex [celecoxib]  10/19/2016    Latex  07/17/2013    Shellfish allergy  10/31/2013    Shellfish-derived products  07/17/2013            The following portions of the patient's history were reviewed and updated as appropriate: allergies, current medications, past family history, past medical history, past social history, past surgical history and problem list      Past Medical History:   Diagnosis Date    Abdominal pain     right mid abd    GERD (gastroesophageal reflux disease)     Plantar fasciitis     right foot    Thyroid nodule     enlarged- biopsy benign       Past Surgical History:   Procedure Laterality Date    HYSTERECTOMY      NE EDG US EXAM SURGICAL ALTER STOM DUODENUM/JEJUNUM N/A 2/2/2017    Procedure: RADIAL ENDOSCOPIC U/S;  Surgeon: Niles Osborn MD;  [facility]; Service: Gastroenterology    OR PART EXCIS PLANTAR FASCIA Right 10/20/2016    Procedure:  OPEN RELEASE FASCIA PLANTAR ,CUTTING BOTTOM OF ARCH,INSTEP ;  Surgeon: Regulo Lyles DPM;  [facility];  Service: Podiatry    THUMB ARTHROSCOPY         Family History   Problem Relation Age of Onset    Prostate cancer Father     Breast cancer Sister          Medications have been verified  Objective   /70   Pulse 78   Temp 97 8 °F (36 6 °C)   Resp 18   Ht 5' 5" (1 651 m)   Wt 86 2 kg (190 lb)   SpO2 98%   BMI 31 62 kg/m²        Physical Exam     Physical Exam   Constitutional: She appears well-developed and well-nourished  HENT:   Head: Normocephalic and atraumatic  Right Ear: External ear normal    Left Ear: External ear normal    Nose: Nose normal    Cardiovascular: Normal rate, regular rhythm and normal heart sounds  Pulmonary/Chest: Effort normal    Lymphadenopathy:     She has no cervical adenopathy  Nursing note and vitals reviewed  Lungs scattered wheezes movement way up on both sides with scattered rhonchi at right base

## 2018-04-21 NOTE — PATIENT INSTRUCTIONS
Acute Bronchitis   WHAT YOU NEED TO KNOW:   Acute bronchitis is swelling and irritation in the air passages of your lungs  This irritation may cause you to cough or have other breathing problems  Acute bronchitis often starts because of another illness, such as a cold or the flu  The illness spreads from your nose and throat to your windpipe and airways  Bronchitis is often called a chest cold  Acute bronchitis lasts about 3 to 6 weeks and is usually not a serious illness  Your cough can last for several weeks  DISCHARGE INSTRUCTIONS:   Return to the emergency department if:   · You cough up blood  · Your lips or fingernails turn blue  · You feel like you are not getting enough air when you breathe  Contact your healthcare provider if:   · You have a fever  · Your breathing problems do not go away or get worse  · Your cough does not get better within 4 weeks  · You have questions or concerns about your condition or care  Self-care:   · Get more rest   Rest helps your body to heal  Slowly start to do more each day  Rest when you feel it is needed  · Avoid irritants in the air  Avoid chemicals, fumes, and dust  Wear a face mask if you must work around dust or fumes  Stay inside on days when air pollution levels are high  If you have allergies, stay inside when pollen counts are high  Do not use aerosol products, such as spray-on deodorant, bug spray, and hair spray  · Do not smoke or be around others who smoke  Nicotine and other chemicals in cigarettes and cigars damages the cilia that move mucus out of your lungs  Ask your healthcare provider for information if you currently smoke and need help to quit  E-cigarettes or smokeless tobacco still contain nicotine  Talk to your healthcare provider before you use these products  · Drink liquids as directed  Liquids help keep your air passages moist and help you cough up mucus   You may need to drink more liquids when you have acute bronchitis  Ask how much liquid to drink each day and which liquids are best for you  · Use a humidifier or vaporizer  Use a cool mist humidifier or a vaporizer to increase air moisture in your home  This may make it easier for you to breathe and help decrease your cough  Decrease risk for acute bronchitis:   · Get the vaccinations you need  Ask your healthcare provider if you should get vaccinated against the flu or pneumonia  · Prevent the spread of germs  You can decrease your risk of acute bronchitis and other illnesses by doing the following:     [facility] your hands often with soap and water  Carry germ-killing hand lotion or gel with you  You can use the lotion or gel to clean your hands when soap and water are not available  ¨ Do not touch your eyes, nose, or mouth unless you have washed your hands first     ¨ Always cover your mouth when you cough to prevent the spread of germs  It is best to cough into a tissue or your shirt sleeve instead of into your hand  Ask those around you cover their mouths when they cough  ¨ Try to avoid people who have a cold or the flu  If you are sick, stay away from others as much as possible  Medicines: Your healthcare provider may  give you any of the following:  · Ibuprofen or acetaminophen  are medicines that help lower your fever  They are available without a doctor's order  Ask your healthcare provider which medicine is right for you  Ask how much to take and how often to take it  Follow directions  These medicines can cause stomach bleeding if not taken correctly  Ibuprofen can cause kidney damage  Do not take ibuprofen if you have kidney disease, an ulcer, or allergies to aspirin  Acetaminophen can cause liver damage  Do not take more than 4,000 milligrams in 24 hours  · Decongestants  help loosen mucus in your lungs and make it easier to cough up  This can help you breathe easier  · Cough suppressants  decrease your urge to cough   If your cough produces mucus, do not take a cough suppressant unless your healthcare provider tells you to  Your healthcare provider may suggest that you take a cough suppressant at night so you can rest     · Inhalers  may be given  Your healthcare provider may give you one or more inhalers to help you breathe easier and cough less  An inhaler gives your medicine to open your airways  Ask your healthcare provider to show you how to use your inhaler correctly  · Take your medicine as directed  Contact your healthcare provider if you think your medicine is not helping or if you have side effects  Tell him of her if you are allergic to any medicine  Keep a list of the medicines, vitamins, and herbs you take  Include the amounts, and when and why you take them  Bring the list or the pill bottles to follow-up visits  Carry your medicine list with you in case of an emergency  Follow up with your healthcare provider as directed:  Write down questions you have so you will remember to ask them during your follow-up visits  © 2017 [employer] Information is for End User's use only and may not be sold, redistributed or otherwise used for commercial purposes  All illustrations and images included in CareNotes® are the copyrighted property of [employer]  or Frank Ackerman  The above information is an  only  It is not intended as medical advice for individual conditions or treatments  Talk to your doctor, nurse or pharmacist before following any medical regimen to see if it is safe and effective for you

## 2018-05-06 ENCOUNTER — OFFICE VISIT (OUTPATIENT)
Dept: URGENT CARE | Age: 68
End: 2018-05-06
Payer: COMMERCIAL

## 2018-05-06 VITALS
OXYGEN SATURATION: 98 % | HEART RATE: 80 BPM | BODY MASS INDEX: 28.14 KG/M2 | DIASTOLIC BLOOD PRESSURE: 88 MMHG | RESPIRATION RATE: 20 BRPM | TEMPERATURE: 97.4 F | WEIGHT: 190 LBS | HEIGHT: 69 IN | SYSTOLIC BLOOD PRESSURE: 130 MMHG

## 2018-05-06 DIAGNOSIS — S39.012A STRAIN OF LUMBAR REGION, INITIAL ENCOUNTER: Primary | ICD-10-CM

## 2018-05-06 LAB
SL AMB  POCT GLUCOSE, UA: NORMAL
SL AMB LEUKOCYTE ESTERASE,UA: NORMAL
SL AMB POCT BILIRUBIN,UA: NORMAL
SL AMB POCT BLOOD,UA: NORMAL
SL AMB POCT CLARITY,UA: CLEAR
SL AMB POCT COLOR,UA: YELLOW
SL AMB POCT KETONES,UA: NORMAL
SL AMB POCT NITRITE,UA: NORMAL
SL AMB POCT PH,UA: 7
SL AMB POCT SPECIFIC GRAVITY,UA: 1.01
SL AMB POCT URINE PROTEIN: NORMAL
SL AMB POCT UROBILINOGEN: 0.2

## 2018-05-06 PROCEDURE — S9088 SERVICES PROVIDED IN URGENT: HCPCS | Performed by: NURSE PRACTITIONER

## 2018-05-06 PROCEDURE — 99213 OFFICE O/P EST LOW 20 MIN: CPT | Performed by: NURSE PRACTITIONER

## 2018-05-06 RX ORDER — LIDOCAINE 50 MG/G
1 PATCH TOPICAL DAILY
Qty: 10 PATCH | Refills: 0 | Status: SHIPPED | OUTPATIENT
Start: 2018-05-06 | End: 2018-12-17 | Stop reason: ALTCHOICE

## 2018-05-06 RX ORDER — CYCLOBENZAPRINE HCL 10 MG
10 TABLET ORAL 3 TIMES DAILY PRN
Qty: 15 TABLET | Refills: 0 | Status: SHIPPED | OUTPATIENT
Start: 2018-05-06 | End: 2019-01-10 | Stop reason: HOSPADM

## 2018-05-06 NOTE — PATIENT INSTRUCTIONS
Rest   Warm compresses 3 times per day for 20 minutes  Flexeril as prescribed, do not use prior to work or driving  Lidocaine patches on 12 hours and off 12 hours  You can also continue Motrin  Alternate with Tylenol  Follow up with PCP if no improvement  Go to ER with worsening symptoms or no improvement  Urine dip negative for blood, small leukocytes likely contaminated sample

## 2018-05-06 NOTE — PROGRESS NOTES
[address]        NAME: Kamilla Finn is a 74 y o  female  : 1950    MRN: [id number]  DATE: May 6, 2018  TIME: 12:26 PM    Assessment and Plan   Strain of lumbar region, initial encounter [S39 012A]  1  Strain of lumbar region, initial encounter  lidocaine (LIDODERM) 5 %    cyclobenzaprine (FLEXERIL) 10 mg tablet    POCT urine dip         Patient Instructions     Patient Instructions   Rest   Warm compresses 3 times per day for 20 minutes  Flexeril as prescribed, do not use prior to work or driving  Lidocaine patches on 12 hours and off 12 hours  You can also continue Motrin  Alternate with Tylenol  Follow up with PCP if no improvement  Go to ER with worsening symptoms or no improvement  Chief Complaint     Chief Complaint   Patient presents with    Back Pain     Been having issues with lower back pain for about 3 week saw family doctor who gave her muscle relaxant which is not helping         History of Present Illness   Kamilla Finn presents to the clinic c/o    This is a 74year old female here today with low back pain  She states pain has started about 2-3 weeks ago and now has gradually got worse  She was seen by her PCP on 2018  She was started on skelaxon which she has gotten no relief from  Pain is located on the left side of back  She has some cramping in her abd  No urinary symptoms  No fever, body aches or chills  She has been using Motrin for pain  She denies numbness or tingling down her leg  No loss of bowel or bladder  Review of Systems   Review of Systems   Constitutional: Negative  HENT: Negative  Respiratory: Negative  Cardiovascular: Negative  Gastrointestinal: Negative  Genitourinary: Negative  Musculoskeletal: Positive for arthralgias and back pain  Neurological: Negative for numbness  Psychiatric/Behavioral: Negative            Current Medications     Long-Term Prescriptions   Medication Sig Dispense Refill    clobetasol (TEMOVATE) 0 05 % ointment       cyclobenzaprine (FLEXERIL) 10 mg tablet Take 1 tablet (10 mg total) by mouth 3 (three) times a day as needed for muscle spasms for up to 5 days 15 tablet 0    dicyclomine (BENTYL) 10 mg capsule Take 1 capsule (10 mg total) by mouth 4 (four) times a day as needed (abdominal pain) 30 capsule 0    ergocalciferol (VITAMIN D2) 50,000 units       lidocaine (LIDODERM) 5 % Place 1 patch on the skin daily Remove & Discard patch within 12 hours or as directed by MD 10 patch 0    ranitidine (ZANTAC) 150 MG capsule Take 1 capsule (150 mg total) by mouth every evening 30 capsule 5       Current Allergies     Allergies as of 05/06/2018 - Reviewed 05/06/2018   Allergen Reaction Noted    Celebrex [celecoxib]  10/19/2016    Latex  07/17/2013    Shellfish allergy  10/31/2013    Shellfish-derived products  07/17/2013            The following portions of the patient's history were reviewed and updated as appropriate: allergies, current medications, past family history, past medical history, past social history, past surgical history and problem list     Objective   /88 (BP Location: Left arm, Patient Position: Sitting, Cuff Size: Standard)   Pulse 80   Temp (!) 97 4 °F (36 3 °C) (Temporal)   Resp 20   Ht 5' 9" (1 753 m)   Wt 86 2 kg (190 lb)   SpO2 98%   BMI 28 06 kg/m²        Physical Exam     Physical Exam   Constitutional: She appears well-developed and well-nourished  Neck: Normal range of motion  Neck supple  Cardiovascular: Normal rate, regular rhythm and normal heart sounds  Pulmonary/Chest: Effort normal and breath sounds normal    Abdominal:   Negative CVA   Musculoskeletal:   TTP over the left para lumbar region  Decreased ROM due to pain  Neurological: She is alert  Skin: Skin is warm  Psychiatric: She has a normal mood and affect  Her behavior is normal    Nursing note and vitals reviewed      Urine dipstick shows negative for all components, negative for nitrites, red blood cells, bacteria, glucose, protein, ketones, urobilinogen   Small leukocytes

## 2018-06-19 ENCOUNTER — APPOINTMENT (OUTPATIENT)
Dept: LAB | Facility: HOSPITAL | Age: 68
End: 2018-06-19
Payer: COMMERCIAL

## 2018-06-19 ENCOUNTER — TRANSCRIBE ORDERS (OUTPATIENT)
Dept: LAB | Facility: HOSPITAL | Age: 68
End: 2018-06-19

## 2018-06-19 DIAGNOSIS — Z00.8 HEALTH EXAMINATION IN POPULATION SURVEY: ICD-10-CM

## 2018-06-19 DIAGNOSIS — Z11.1 SCREENING EXAMINATION FOR PULMONARY TUBERCULOSIS: ICD-10-CM

## 2018-06-19 DIAGNOSIS — Z11.1 SCREENING EXAMINATION FOR PULMONARY TUBERCULOSIS: Primary | ICD-10-CM

## 2018-06-19 LAB
CHOLEST SERPL-MCNC: 184 MG/DL (ref 50–200)
EST. AVERAGE GLUCOSE BLD GHB EST-MCNC: 117 MG/DL
HBA1C MFR BLD: 5.7 % (ref 4.2–6.3)
HDLC SERPL-MCNC: 53 MG/DL (ref 40–60)
LDLC SERPL CALC-MCNC: 98 MG/DL (ref 0–100)
NONHDLC SERPL-MCNC: 131 MG/DL
TRIGL SERPL-MCNC: 163 MG/DL

## 2018-06-19 PROCEDURE — 36415 COLL VENOUS BLD VENIPUNCTURE: CPT

## 2018-06-19 PROCEDURE — 83036 HEMOGLOBIN GLYCOSYLATED A1C: CPT

## 2018-06-19 PROCEDURE — 86480 TB TEST CELL IMMUN MEASURE: CPT

## 2018-06-19 PROCEDURE — 80061 LIPID PANEL: CPT

## 2018-06-21 LAB
ANNOTATION COMMENT IMP: NORMAL
GAMMA INTERFERON BACKGROUND BLD IA-ACNC: 0.16 IU/ML
M TB IFN-G BLD-IMP: NEGATIVE
M TB IFN-G CD4+ BCKGRND COR BLD-ACNC: 0.01 IU/ML
M TB IFN-G CD4+ T-CELLS BLD-ACNC: 0.17 IU/ML
MITOGEN IGNF BLD-ACNC: 7.38 IU/ML
QUANTIFERON-TB GOLD IN TUBE: NORMAL
SERVICE CMNT-IMP: NORMAL

## 2018-09-11 ENCOUNTER — TRANSCRIBE ORDERS (OUTPATIENT)
Dept: LAB | Facility: HOSPITAL | Age: 68
End: 2018-09-11

## 2018-09-11 ENCOUNTER — APPOINTMENT (OUTPATIENT)
Dept: LAB | Facility: HOSPITAL | Age: 68
End: 2018-09-11
Payer: COMMERCIAL

## 2018-09-11 DIAGNOSIS — Z11.1 SCREENING EXAMINATION FOR PULMONARY TUBERCULOSIS: ICD-10-CM

## 2018-09-11 DIAGNOSIS — Z11.1 SCREENING EXAMINATION FOR PULMONARY TUBERCULOSIS: Primary | ICD-10-CM

## 2018-09-11 PROCEDURE — 36415 COLL VENOUS BLD VENIPUNCTURE: CPT

## 2018-09-11 PROCEDURE — 86480 TB TEST CELL IMMUN MEASURE: CPT

## 2018-09-13 LAB
ANNOTATION COMMENT IMP: NORMAL
GAMMA INTERFERON BACKGROUND BLD IA-ACNC: 0.08 IU/ML
M TB IFN-G BLD-IMP: NEGATIVE
M TB IFN-G CD4+ BCKGRND COR BLD-ACNC: <0.01 IU/ML
M TB IFN-G CD4+ T-CELLS BLD-ACNC: 0.07 IU/ML
MITOGEN IGNF BLD-ACNC: 8.28 IU/ML
QUANTIFERON-TB GOLD IN TUBE: NORMAL
SERVICE CMNT-IMP: NORMAL

## 2018-10-23 ENCOUNTER — TRANSCRIBE ORDERS (OUTPATIENT)
Dept: ADMINISTRATIVE | Facility: HOSPITAL | Age: 68
End: 2018-10-23

## 2018-10-23 DIAGNOSIS — Z12.31 VISIT FOR SCREENING MAMMOGRAM: Primary | ICD-10-CM

## 2018-10-23 DIAGNOSIS — M85.80 OSTEOPENIA, UNSPECIFIED LOCATION: ICD-10-CM

## 2018-12-17 ENCOUNTER — OFFICE VISIT (OUTPATIENT)
Dept: ENDOCRINOLOGY | Facility: CLINIC | Age: 68
End: 2018-12-17
Payer: COMMERCIAL

## 2018-12-17 VITALS
HEART RATE: 78 BPM | DIASTOLIC BLOOD PRESSURE: 76 MMHG | SYSTOLIC BLOOD PRESSURE: 112 MMHG | WEIGHT: 201 LBS | BODY MASS INDEX: 33.49 KG/M2 | HEIGHT: 65 IN

## 2018-12-17 DIAGNOSIS — E04.1 THYROID NODULE: Primary | ICD-10-CM

## 2018-12-17 PROCEDURE — 99214 OFFICE O/P EST MOD 30 MIN: CPT | Performed by: PHYSICIAN ASSISTANT

## 2018-12-17 NOTE — PROGRESS NOTES
Established Patient Progress Note       Chief Complaint   Patient presents with    Thyroid Problem        History of Present Illness:     Heather Snow is a 74 y o  female with a history of thyroid nodules diagnosed on 2016 incidentally on CT Scan  She denies dysphagia  Occasionally gets hoarse  No FH thyroid CA  No personal history Radiation therapy to head/neck  She reports having a hard time losing weight  Will lose a few pounds but then gains it back  Patient Active Problem List   Diagnosis    Plantar fasciitis of right foot    Atypical chest pain    Epigastric pain    Elevated LFTs    Nausea    Excessive VIP secretion    Thyroid nodule      Past Medical History:   Diagnosis Date    Abdominal pain     right mid abd    GERD (gastroesophageal reflux disease)     Plantar fasciitis     right foot    Thyroid nodule     enlarged- biopsy benign      Past Surgical History:   Procedure Laterality Date    HYSTERECTOMY      GA EDG US EXAM SURGICAL ALTER STOM DUODENUM/JEJUNUM N/A 2/2/2017    Procedure: RADIAL ENDOSCOPIC U/S;  Surgeon: Sebastien Joiner MD;  [facility];   Service: Gastroenterology    GA PART EXCIS PLANTAR FASCIA Right 10/20/2016    Procedure:  OPEN RELEASE FASCIA PLANTAR ,CUTTING BOTTOM OF ARCH,INSTEP ;  Surgeon: Bruno Pak DPM;  [facility];  Service: Podiatry    THUMB ARTHROSCOPY        Family History   Problem Relation Age of Onset    Prostate cancer Father     Breast cancer Sister      Social History   Substance Use Topics    Smoking status: Never Smoker    Smokeless tobacco: Never Used    Alcohol use No     Allergies   Allergen Reactions    Celebrex [Celecoxib]      Patient denies, unsure why this is in her file    Latex     Shellfish Allergy     Shellfish-Derived Products        Current Outpatient Prescriptions:     clobetasol (TEMOVATE) 0 05 % ointment, , Disp: , Rfl:     cyclobenzaprine (FLEXERIL) 10 mg tablet, Take 1 tablet (10 mg total) by mouth 3 (three) times a day as needed for muscle spasms for up to 5 days, Disp: 15 tablet, Rfl: 0    ergocalciferol (VITAMIN D2) 50,000 units, , Disp: , Rfl:     Omega-3-6-9 CAPS, Take by mouth, Disp: , Rfl:     ranitidine (ZANTAC) 150 MG capsule, Take 1 capsule (150 mg total) by mouth every evening, Disp: 30 capsule, Rfl: 5    Current Facility-Administered Medications:     ipratropium (ATROVENT) 0 02 % inhalation solution 0 5 mg, 0 5 mg, Nebulization, 4x Daily, Berna Padgett PA-C, 0 5 mg at 04/21/18 1407    Review of Systems   Constitutional: Negative for activity change, appetite change, chills, diaphoresis, fatigue, fever and unexpected weight change  HENT: Negative for trouble swallowing and voice change  Eyes: Negative for visual disturbance  Respiratory: Negative for shortness of breath  Cardiovascular: Negative for chest pain and palpitations  Gastrointestinal: Negative for abdominal pain, constipation and diarrhea  Endocrine: Negative for cold intolerance, heat intolerance, polydipsia, polyphagia and polyuria  Genitourinary: Negative for frequency and menstrual problem  Musculoskeletal: Negative for arthralgias and myalgias  Skin: Negative for rash  Allergic/Immunologic: Negative for food allergies  Neurological: Negative for dizziness and tremors  Hematological: Negative for adenopathy  Psychiatric/Behavioral: Negative for sleep disturbance  All other systems reviewed and are negative  Physical Exam:  Body mass index is 33 45 kg/m²  /76   Pulse 78   Ht 5' 5" (1 651 m)   Wt 91 2 kg (201 lb)   BMI 33 45 kg/m²    Wt Readings from Last 3 Encounters:   12/17/18 91 2 kg (201 lb)   05/06/18 86 2 kg (190 lb)   04/21/18 86 2 kg (190 lb)       Physical Exam   Constitutional: She is oriented to person, place, and time  She appears well-developed and well-nourished  No distress  HENT:   Head: Normocephalic and atraumatic     Eyes: Pupils are equal, round, and reactive to light  Conjunctivae are normal    Neck: Normal range of motion  Neck supple  No thyromegaly present  Cardiovascular: Normal rate, regular rhythm and normal heart sounds  Pulmonary/Chest: Effort normal and breath sounds normal  No respiratory distress  She has no wheezes  She has no rales  Abdominal: Soft  Bowel sounds are normal  She exhibits no distension  There is no tenderness  Musculoskeletal: Normal range of motion  She exhibits no edema  Neurological: She is alert and oriented to person, place, and time  Skin: Skin is warm and dry  Psychiatric: She has a normal mood and affect  Vitals reviewed  Labs:       Lab Results   Component Value Date    CREATININE 0 67 10/25/2016    CREATININE 0 83 10/24/2016    CREATININE 0 68 10/07/2016    BUN 7 10/25/2016     07/10/2014    K 4 0 10/25/2016     10/25/2016    CO2 28 10/25/2016     eGFR   Date Value Ref Range Status   10/25/2016 >60 0 ml/min/1 73sq m Final       Lab Results   Component Value Date    CHOL 196 08/11/2015    HDL 53 06/19/2018    TRIG 163 (H) 06/19/2018       Lab Results   Component Value Date    ALT 26 11/17/2016    AST 18 11/17/2016    ALKPHOS 90 11/17/2016    BILITOT 0 54 07/10/2014       Lab Results   Component Value Date    FREET4 0 97 01/03/2018         Impression & Plan:    Problem List Items Addressed This Visit     Thyroid nodule - Primary     Ordered ultrasound to monitor nodules for stability and TSH/Free T4 to re-eval thyroid function  Relevant Orders    TSH, 3rd generation    T4, free    US thyroid          Orders Placed This Encounter   Procedures    US thyroid     Standing Status:   Future     Standing Expiration Date:   12/17/2019     Scheduling Instructions:      No prep required  Please bring your physician order, insurance cards, a form of photo ID and a list of your medications with you  Arrive 15 minutes prior to your appointment time in order to register              To schedule this appointment, please contact central scheduling at [phone]     Order Specific Question:   Reason for Exam:     Answer:   Thyroid disorder    TSH, 3rd generation     This is a patient instruction: This test is non-fasting  Please drink two glasses of water morning of bloodwork  Standing Status:   Future     Standing Expiration Date:   6/17/2019    T4, free     Standing Status:   Future     Standing Expiration Date:   6/17/2019       There are no Patient Instructions on file for this visit  Discussed with the patient and all questioned fully answered  She will call me if any problems arise  Follow-up appointment in 12 months       Counseled patient on diagnostic results, prognosis, risk and benefit of treatment options, instruction for management, importance of treatment compliance, Risk  factor reduction and impressions      Martinez Vazquez PA-C

## 2018-12-18 ENCOUNTER — HOSPITAL ENCOUNTER (OUTPATIENT)
Dept: RADIOLOGY | Age: 68
Discharge: HOME | End: 2018-12-18
Payer: COMMERCIAL

## 2018-12-18 VITALS — HEIGHT: 65 IN | BODY MASS INDEX: 33.49 KG/M2 | WEIGHT: 201 LBS

## 2018-12-18 DIAGNOSIS — M85.80 OSTEOPENIA, UNSPECIFIED LOCATION: ICD-10-CM

## 2018-12-18 DIAGNOSIS — Z12.31 VISIT FOR SCREENING MAMMOGRAM: ICD-10-CM

## 2018-12-18 PROCEDURE — 77067 SCR MAMMO BI INCL CAD: CPT

## 2018-12-18 PROCEDURE — 77080 DXA BONE DENSITY AXIAL: CPT

## 2018-12-26 ENCOUNTER — HOSPITAL ENCOUNTER (OUTPATIENT)
Dept: MAMMOGRAPHY | Facility: CLINIC | Age: 68
Discharge: HOME | End: 2018-12-26
Payer: COMMERCIAL

## 2018-12-26 ENCOUNTER — HOSPITAL ENCOUNTER (OUTPATIENT)
Dept: ULTRASOUND IMAGING | Facility: CLINIC | Age: 68
Discharge: HOME | End: 2018-12-26
Payer: COMMERCIAL

## 2018-12-26 DIAGNOSIS — R92.8 ABNORMAL MAMMOGRAM: ICD-10-CM

## 2018-12-26 PROCEDURE — 76642 ULTRASOUND BREAST LIMITED: CPT

## 2018-12-26 PROCEDURE — G0279 TOMOSYNTHESIS, MAMMO: HCPCS

## 2018-12-26 PROCEDURE — 77065 DX MAMMO INCL CAD UNI: CPT

## 2018-12-26 NOTE — PROGRESS NOTES
Met with patient and Dr Reid Damon regarding recommendation for;      _____ RIGHT ___x___LEFT      __x___Ultrasound guided  ______Stereotactic  Breast biopsy  __x___Verbalized understanding        Blood thinners:  _____yes __x___no    Date stopped: ____n/a_______    Biopsy teaching sheet given:  ____x___yes ______no

## 2019-01-02 ENCOUNTER — HOSPITAL ENCOUNTER (OUTPATIENT)
Dept: ULTRASOUND IMAGING | Facility: CLINIC | Age: 69
Discharge: HOME | End: 2019-01-02
Admitting: RADIOLOGY
Payer: COMMERCIAL

## 2019-01-02 ENCOUNTER — HOSPITAL ENCOUNTER (OUTPATIENT)
Dept: MAMMOGRAPHY | Facility: CLINIC | Age: 69
Discharge: HOME | End: 2019-01-02

## 2019-01-02 VITALS — HEART RATE: 60 BPM | SYSTOLIC BLOOD PRESSURE: 108 MMHG | DIASTOLIC BLOOD PRESSURE: 60 MMHG

## 2019-01-02 DIAGNOSIS — R92.8 ABNORMAL ULTRASOUND OF BREAST: ICD-10-CM

## 2019-01-02 DIAGNOSIS — R92.8 ABNORMAL MAMMOGRAM: ICD-10-CM

## 2019-01-02 PROCEDURE — 88361 TUMOR IMMUNOHISTOCHEM/COMPUT: CPT | Performed by: PATHOLOGY

## 2019-01-02 PROCEDURE — 88342 IMHCHEM/IMCYTCHM 1ST ANTB: CPT | Performed by: PATHOLOGY

## 2019-01-02 PROCEDURE — 88305 TISSUE EXAM BY PATHOLOGIST: CPT | Performed by: PATHOLOGY

## 2019-01-02 PROCEDURE — 19083 BX BREAST 1ST LESION US IMAG: CPT

## 2019-01-02 PROCEDURE — 88341 IMHCHEM/IMCYTCHM EA ADD ANTB: CPT | Performed by: PATHOLOGY

## 2019-01-02 RX ORDER — LIDOCAINE HYDROCHLORIDE 10 MG/ML
4 INJECTION, SOLUTION INFILTRATION; PERINEURAL ONCE
Status: COMPLETED | OUTPATIENT
Start: 2019-01-02 | End: 2019-01-02

## 2019-01-02 RX ADMIN — LIDOCAINE HYDROCHLORIDE 4 ML: 10 INJECTION, SOLUTION INFILTRATION; PERINEURAL at 11:34

## 2019-01-02 NOTE — PROGRESS NOTES
Procedure type:    __x___ultrasound guided _____stereotactic    Breast:    __x___Left _____Right    Location: 8:00 12cm from nipple    Needle: 12g Kannan    # of passes: 3    Clip: Ultraclip-heart    Performed by: Dr Mcdermott Single held for 5 minutes by: Kendal Morales Strips:    __x___yes _____no    Band aid:    __x___yes_____no    Tape and guaze:    _____yes __x___no    Tolerated procedure:    __x___yes _____no

## 2019-01-02 NOTE — DISCHARGE INSTR - OTHER ORDERS
POST LARGE CORE BREAST BIOPSY PATIENT INFORMATION      1  Place an ice pack inside your bra over the top of the dressing every hour for 20 minutes (20 minutes on, 60 minutes off)  Do this until bedtime  2  Do not shower or bathe until the following morning  3  You may bathe your breast carefully with the steri-strips in place  Be careful    Not to loosen them  The steri-strips will fall off in 3-5 days  4  You may have mild discomfort, and you may have some bruising where the   Needle entered the skin  This should clear within 5-7 days  5  If you need medicine for discomfort, take acetaminophen products such as   Tylenol  You may also take Advil or Motrin products  6  Do not participate in strenuous activities such as-tennis, aerobics, skiing,  Weight lifting, etc  for 24 hours  Refrain from swimming/soaking for 72 hours  7  Wearing a bra for sleeping may be more comfortable for the first 24-48 hours  8  Watch for continued bleeding, pain or fever over 101; please call with any questions or concerns  For procedures done at the [facility]  [address] call:  Daniele Richardson RN at [phone]  Elis Cardenas RN at [phone]                    *After 4 PM call the Interventional Radiology Department                    [phone] and ask to speak with the nurse on call  For procedures done at the [address] [place] [address] call:         Kaitlyn Preston RN at   *After 4 PM call the Interventional Radiology Department   [phone] and ask to speak with the nurse on call  For procedures done at [address] [place] [address] call: The Radiology Nurse at [phone]  *After 4 PM call your physician, or go to the Emergency Department  9          The final results of your biopsy are usually available within one week

## 2019-01-03 ENCOUNTER — APPOINTMENT (OUTPATIENT)
Dept: LAB | Facility: HOSPITAL | Age: 69
End: 2019-01-03
Payer: COMMERCIAL

## 2019-01-03 ENCOUNTER — HOSPITAL ENCOUNTER (OUTPATIENT)
Dept: ULTRASOUND IMAGING | Facility: HOSPITAL | Age: 69
Discharge: HOME | End: 2019-01-03
Payer: COMMERCIAL

## 2019-01-03 DIAGNOSIS — E04.1 THYROID NODULE: ICD-10-CM

## 2019-01-03 LAB
T4 FREE SERPL-MCNC: 0.92 NG/DL (ref 0.76–1.46)
TSH SERPL DL<=0.05 MIU/L-ACNC: 1.95 UIU/ML (ref 0.36–3.74)

## 2019-01-03 PROCEDURE — 84439 ASSAY OF FREE THYROXINE: CPT

## 2019-01-03 PROCEDURE — 84443 ASSAY THYROID STIM HORMONE: CPT

## 2019-01-03 PROCEDURE — 36415 COLL VENOUS BLD VENIPUNCTURE: CPT

## 2019-01-03 PROCEDURE — 76536 US EXAM OF HEAD AND NECK: CPT

## 2019-01-03 NOTE — PROGRESS NOTES
Post procedure call completed on 1/3/19 at 1024    Bleeding: _____yes __x___no    Pain: _____yes ___x___no    Redness/Swelling: ______yes ___x___no    Band aid removed: ___x__yes _____no    Steri-Strips intact: ___x___yes _____no

## 2019-01-04 ENCOUNTER — TELEPHONE (OUTPATIENT)
Dept: ENDOCRINOLOGY | Facility: CLINIC | Age: 69
End: 2019-01-04

## 2019-01-04 ENCOUNTER — TELEPHONE (OUTPATIENT)
Dept: MAMMOGRAPHY | Facility: CLINIC | Age: 69
End: 2019-01-04

## 2019-01-04 NOTE — TELEPHONE ENCOUNTER
----- Message from Osmel Augustin PA-C sent at 1/3/2019  2:05 PM EST -----  Thyroid function is normal

## 2019-01-07 NOTE — PROGRESS NOTES
Patient Past Medical History:  Eczema          Allergies:  Celebrex, Latex, Shellfish        Past Breast History:     Previous Biopsy:   Yes___x___ No________      Breast: Right____ Left__x____       Malignant______ Benign___x____      Treatments if applicable        Present Breast History:     Right__________    Left______x_______     Density____x_____    Calcifications____________   Palpable______________      Patient notified of results on:  1/4/19 by Dr Ezekiel Marcelino    Date of Appointment with Surgeon 1/10/19 with Dr Doron Gupta

## 2019-01-08 ENCOUNTER — TELEPHONE (OUTPATIENT)
Dept: ENDOCRINOLOGY | Facility: CLINIC | Age: 69
End: 2019-01-08

## 2019-01-08 NOTE — TELEPHONE ENCOUNTER
----- Message from Radha Baird PA-C sent at 1/7/2019  3:18 PM EST -----  Thyroid nodule is smaller than on prior ultrasound

## 2019-01-09 ENCOUNTER — TELEPHONE (OUTPATIENT)
Dept: HEMATOLOGY ONCOLOGY | Facility: CLINIC | Age: 69
End: 2019-01-09

## 2019-01-10 ENCOUNTER — CONSULT (OUTPATIENT)
Dept: SURGICAL ONCOLOGY | Facility: CLINIC | Age: 69
End: 2019-01-10
Payer: COMMERCIAL

## 2019-01-10 ENCOUNTER — DOCUMENTATION (OUTPATIENT)
Dept: HEMATOLOGY ONCOLOGY | Facility: CLINIC | Age: 69
End: 2019-01-10

## 2019-01-10 VITALS
HEART RATE: 67 BPM | SYSTOLIC BLOOD PRESSURE: 108 MMHG | TEMPERATURE: 98.2 F | WEIGHT: 202 LBS | HEIGHT: 65 IN | RESPIRATION RATE: 14 BRPM | DIASTOLIC BLOOD PRESSURE: 70 MMHG | BODY MASS INDEX: 33.66 KG/M2

## 2019-01-10 DIAGNOSIS — Z80.3 FAMILY HISTORY OF BREAST CANCER IN SISTER: ICD-10-CM

## 2019-01-10 DIAGNOSIS — C50.312 MALIGNANT NEOPLASM OF LOWER-INNER QUADRANT OF LEFT BREAST IN FEMALE, ESTROGEN RECEPTOR POSITIVE (HCC): Primary | ICD-10-CM

## 2019-01-10 DIAGNOSIS — Z17.0 MALIGNANT NEOPLASM OF LOWER-INNER QUADRANT OF LEFT BREAST IN FEMALE, ESTROGEN RECEPTOR POSITIVE (HCC): Primary | ICD-10-CM

## 2019-01-10 PROBLEM — C50.919 BREAST CANCER (HCC): Status: ACTIVE | Noted: 2019-01-02

## 2019-01-10 PROCEDURE — 99245 OFF/OP CONSLTJ NEW/EST HI 55: CPT | Performed by: SURGERY

## 2019-01-10 NOTE — TELEPHONE ENCOUNTER
Breast Nurse Navigator    Called patient, no answer, left voicemail message with my reason for call and role of Breast Nurse Navigator  Left my contact information and requested return call  Awaiting return call

## 2019-01-10 NOTE — TELEPHONE ENCOUNTER
Received return call from patient, discussed reason for call as well as my role as breast nurse navigator throughout treatment into survivorship  Made aware of additional cancer support services available  Patient will be coming to see Dr Emperatriz Dacosta today accompanied by family  Patient does have my contact information and availability  Patient aware and agreeable to having me present during todays visit with Dr Emperatriz Dacosta

## 2019-01-10 NOTE — PROGRESS NOTES
Oncology Navigator Visit  Breast Nurse Navigator    Met patient during scheduled office visit with Dr Lashell Wan  Spoke to patient yesterday  During visit explained Breast Nurse Navigator role in more depth as well as cancer support services available  Patient feeling overwhelmed with information received today and needing to make a decision on type of surgery   Daughter asking questions and supportive  Patient would like MSW support  Informed that I will reach out to  Markel Burger, who will follow up with her  Patient denies any additional needs at this time  Provided with contact information card and availability  Patient agreeable to navigation and will call me as needed  I will follow up with patient once she has made a treatment decision and scheduled for surgery

## 2019-01-10 NOTE — LETTER
January 10, 2019     Sanjay Caldwell  [address] [place] [address]    Patient: Evelyn Avalos   YOB: 1950   Date of Visit: 1/10/2019       Dear Dr Otto Dickerson:    Thank you for referring Evelyn Avalos to me for evaluation  Below are my notes for this consultation  If you have questions, please do not hesitate to call me  I look forward to following your patient along with you  Sincerely,        Navin Norton MD        CC: No Recipients  Navin Norton MD  1/10/2019  4:12 PM  Sign at close encounter    Breast Consultation-Surgical Oncology     [address]    Name:  Evelyn Avalos  YOB: 1950  MRN:  [id number]    Assessment/Plan   Diagnoses and all orders for this visit:    Malignant neoplasm of lower-inner quadrant of left breast in female, estrogen receptor positive ([address] )    Family history of breast cancer in sister          HPI: Evelyn Avalos is a 79y o  year old female who presents with a left breast cancer  Pt denies any breast symptoms  Pt had her screening mammogram which led to diagnostic breast imaging  She then had a left breast biopsy on 19 whichrevealed a left breast cancer  Pt reports her biopsy site is dry and intact  Surgical treatment to date consisted of n/a  Oncology History:     No history exists         Pertinent reproductive history:  Age at menarche:  17  OB History      Para Term  AB Living    2 2 2          SAB TAB Ectopic Multiple Live Births                     Obstetric Comments    Menarche : 17  Age at first pregnancy 24  Hx of BCP   Hysterectomy        Age at first live birth:  24  Age at menopause:  unk  Hysterectomy/Oophrectomy:  Yes  Hormone replacement therapy:  No  Birth control pills:  Yes    Problem List:   Patient Active Problem List   Diagnosis    Plantar fasciitis of right foot    Atypical chest pain    Epigastric pain    Elevated LFTs    Nausea    Excessive VIP secretion    Thyroid nodule     Past Medical History:   Diagnosis Date    Abdominal pain     right mid abd    Breast cancer ([address] ) 01/02/2019    left breast invasive ductal carcinoma    GERD (gastroesophageal reflux disease)     Plantar fasciitis     right foot    Thyroid nodule     enlarged- biopsy benign     Past Surgical History:   Procedure Laterality Date    BREAST BIOPSY Left 01/02/2019    invasive ductal carcinoma    HYSTERECTOMY      age 41    OOPHORECTOMY Bilateral     age 41    MD [address] N/A 2/2/2017    Procedure: RADIAL ENDOSCOPIC U/S;  Surgeon: Rob Ortiz MD;  [facility]; Service: Gastroenterology    MD PART EXCIS PLANTAR FASCIA Right 10/20/2016    Procedure:  OPEN RELEASE FASCIA PLANTAR ,CUTTING BOTTOM OF ARCH,INSTEP ;  Surgeon: Garret Amaya DPM;  [facility];  Service: Podiatry    THUMB ARTHROSCOPY      US GUIDED BREAST BIOPSY LEFT COMPLETE Left 1/2/2019     Family History   Problem Relation Age of Onset    Brain cancer Mother 67    Prostate cancer Father 66    Breast cancer Sister 54    Breast cancer additional onset Sister 72    Breast cancer Sister 72    Prostate cancer Brother 62     Social History     Social History    Marital status: Single     Spouse name: N/A    Number of children: N/A    Years of education: N/A     Occupational History    Not on file       Social History Main Topics    Smoking status: Never Smoker    Smokeless tobacco: Never Used    Alcohol use Yes      Comment: social    Drug use: No    Sexual activity: Not on file     Other Topics Concern    Not on file     Social History Narrative    No narrative on file     Current Outpatient Prescriptions   Medication Sig Dispense Refill    clobetasol (TEMOVATE) 0 05 % ointment       ergocalciferol (VITAMIN D2) 50,000 units       Multiple Vitamins-Minerals (MULTIVITAL PO) Take by mouth      Omega-3-6-9 CAPS Take by mouth      ranitidine (ZANTAC) 150 MG capsule Take 1 capsule (150 mg total) by mouth every evening 30 capsule 5     Current Facility-Administered Medications   Medication Dose Route Frequency Provider Last Rate Last Dose    ipratropium (ATROVENT) 0 02 % inhalation solution 0 5 mg  0 5 mg Nebulization 4x Daily Han Hard, PA-C   0 5 mg at 04/21/18 1407     Allergies   Allergen Reactions    Latex Anaphylaxis    Shellfish Allergy Anaphylaxis    Shellfish-Derived Products Anaphylaxis         The following portions of the patient's history were reviewed and updated as appropriate: allergies, current medications, past family history, past medical history, past social history, past surgical history and problem list     Review of Systems:  Review of Systems   Constitutional: Negative  Negative for appetite change and fever  Eyes: Negative  Respiratory: Negative for shortness of breath  Cardiovascular: Negative  Gastrointestinal: Negative  Endocrine: Negative  Genitourinary: Negative  Musculoskeletal: Negative  Negative for arthralgias and myalgias  Skin: Negative  Allergic/Immunologic: Negative  Neurological: Negative  Hematological: Negative  Negative for adenopathy  Does not bruise/bleed easily  Psychiatric/Behavioral: Negative  Physical Exam:  Physical Exam   Constitutional: She is oriented to person, place, and time  She appears well-developed and well-nourished  HENT:   Head: Normocephalic and atraumatic  Cardiovascular: Normal heart sounds  Pulmonary/Chest: Breath sounds normal  Right breast exhibits no inverted nipple, no mass, no nipple discharge, no skin change and no tenderness  Left breast exhibits skin change (resolving ecchymosis lower inner left breast)  Left breast exhibits no inverted nipple, no mass, no nipple discharge and no tenderness  Abdominal: Soft     Lymphadenopathy:        Right axillary: No pectoral and no lateral adenopathy present  Left axillary: No pectoral and no lateral adenopathy present  Right: No supraclavicular adenopathy present  Left: No supraclavicular adenopathy present  Neurological: She is alert and oriented to person, place, and time  Psychiatric: She has a normal mood and affect  Laboratory:  19 left breast core    Pathology revealed: invasive ductal carcinoma    Histologic grade: high grade     Angiolymphatic invasion:  absent    Tumor node status: clinically Negative    Hormone receptor status:  Estrogen/progesterone receptor positive, HER2 Keo negative      Imagin18  Bilateral screening mammogram  B0 (2) left breast asymmetry, right breast was benign  18  3D left diagnostic mammogram/US  B4  (2) 12 mm solid mass at eight o'clock of the left breast  19  Left breast biopsy          Discussion/Summary:  72-year-old female who presents with a newly diagnosed carcinoma of the left breast   This is ER/AK positive and HER2 Keo negative  I discussed the multimodality treatment of breast cancer with her to include surgery, radiation and medical therapy  She is a good candidate for breast conservation  I reviewed the procedure of a needle localized lumpectomy of the left breast along with lymphatic mapping and sentinel node biopsy  She would require adjuvant radiation therapy as well as medical therapy  At a minimum, she would need anti hormone therapy  The patient asked about doing bilateral mastectomy  She states that her one sister had breast cancer and had a lumpectomy  The cancer later returned and she was counseled on and had a bilateral mastectomy  I inquired if the sister had any genetic testing performed  Seng Monreal is not aware of this  She will discuss this further with her sister  If her sister was a gene carrier, then Seng Monreal should have testing as well   If her sister did not have testing, the patient would still like to pursue this herself  She will call us once she has further conversation with her family  Further recommendations will be made at that time

## 2019-01-10 NOTE — PROGRESS NOTES
Breast Consultation-Surgical Oncology     [facility]  [facility] [address] [address]    Name:  Trenton Chung  YOB: 1950  MRN:  [id number]    Assessment/Plan   Diagnoses and all orders for this visit:    Malignant neoplasm of lower-inner quadrant of left breast in female, estrogen receptor positive ([address] )    Family history of breast cancer in sister          HPI: Trenton Chung is a 79y o  year old female who presents with a left breast cancer  Pt denies any breast symptoms  Pt had her screening mammogram which led to diagnostic breast imaging  She then had a left breast biopsy on 19 whichrevealed a left breast cancer  Pt reports her biopsy site is dry and intact  Surgical treatment to date consisted of n/a  Oncology History:     No history exists         Pertinent reproductive history:  Age at menarche:  17  OB History      Para Term  AB Living    2 2 2          SAB TAB Ectopic Multiple Live Births                     Obstetric Comments    Menarche : 17  Age at first pregnancy 24  Hx of BCP   Hysterectomy        Age at first live birth:  24  Age at menopause:  unk  Hysterectomy/Oophrectomy:  Yes  Hormone replacement therapy:  No  Birth control pills:  Yes    Problem List:   Patient Active Problem List   Diagnosis    Plantar fasciitis of right foot    Atypical chest pain    Epigastric pain    Elevated LFTs    Nausea    Excessive VIP secretion    Thyroid nodule     Past Medical History:   Diagnosis Date    Abdominal pain     right mid abd    Breast cancer ([address] ) 2019    left breast invasive ductal carcinoma    GERD (gastroesophageal reflux disease)     Plantar fasciitis     right foot    Thyroid nodule     enlarged- biopsy benign     Past Surgical History:   Procedure Laterality Date    BREAST BIOPSY Left 2019    invasive ductal carcinoma    HYSTERECTOMY      age 39    OOPHORECTOMY Bilateral     age 41    [place] N/A 2/2/2017    Procedure: RADIAL ENDOSCOPIC U/S;  Surgeon: Franklyn Lang MD;  [facility]; Service: Gastroenterology    GA PART EXCIS PLANTAR FASCIA Right 10/20/2016    Procedure:  OPEN RELEASE FASCIA PLANTAR ,CUTTING BOTTOM OF ARCH,INSTEP ;  Surgeon: Migdalia Mena DPM;  Location: [facility];  Service: Podiatry    THUMB ARTHROSCOPY      US GUIDED BREAST BIOPSY LEFT COMPLETE Left 1/2/2019     Family History   Problem Relation Age of Onset    Brain cancer Mother 67    Prostate cancer Father 66    Breast cancer Sister 54    Breast cancer additional onset Sister 72    Breast cancer Sister 72    Prostate cancer Brother 62     Social History     Social History    Marital status: Single     Spouse name: N/A    Number of children: N/A    Years of education: N/A     Occupational History    Not on file       Social History Main Topics    Smoking status: Never Smoker    Smokeless tobacco: Never Used    Alcohol use Yes      Comment: social    Drug use: No    Sexual activity: Not on file     Other Topics Concern    Not on file     Social History Narrative    No narrative on file     Current Outpatient Prescriptions   Medication Sig Dispense Refill    clobetasol (TEMOVATE) 0 05 % ointment       ergocalciferol (VITAMIN D2) 50,000 units       Multiple Vitamins-Minerals (MULTIVITAL PO) Take by mouth      Omega-3-6-9 CAPS Take by mouth      ranitidine (ZANTAC) 150 MG capsule Take 1 capsule (150 mg total) by mouth every evening 30 capsule 5     Current Facility-Administered Medications   Medication Dose Route Frequency Provider Last Rate Last Dose    ipratropium (ATROVENT) 0 02 % inhalation solution 0 5 mg  0 5 mg Nebulization 4x Daily Niles Cavanaugh PA-C   0 5 mg at 04/21/18 4807     Allergies   Allergen Reactions    Latex Anaphylaxis    Shellfish Allergy Anaphylaxis    Shellfish-Derived Products Anaphylaxis The following portions of the patient's history were reviewed and updated as appropriate: allergies, current medications, past family history, past medical history, past social history, past surgical history and problem list     Review of Systems:  Review of Systems   Constitutional: Negative  Negative for appetite change and fever  Eyes: Negative  Respiratory: Negative for shortness of breath  Cardiovascular: Negative  Gastrointestinal: Negative  Endocrine: Negative  Genitourinary: Negative  Musculoskeletal: Negative  Negative for arthralgias and myalgias  Skin: Negative  Allergic/Immunologic: Negative  Neurological: Negative  Hematological: Negative  Negative for adenopathy  Does not bruise/bleed easily  Psychiatric/Behavioral: Negative  Physical Exam:  Physical Exam   Constitutional: She is oriented to person, place, and time  She appears well-developed and well-nourished  HENT:   Head: Normocephalic and atraumatic  Cardiovascular: Normal heart sounds  Pulmonary/Chest: Breath sounds normal  Right breast exhibits no inverted nipple, no mass, no nipple discharge, no skin change and no tenderness  Left breast exhibits skin change (resolving ecchymosis lower inner left breast)  Left breast exhibits no inverted nipple, no mass, no nipple discharge and no tenderness  Abdominal: Soft  Lymphadenopathy:        Right axillary: No pectoral and no lateral adenopathy present  Left axillary: No pectoral and no lateral adenopathy present  Right: No supraclavicular adenopathy present  Left: No supraclavicular adenopathy present  Neurological: She is alert and oriented to person, place, and time  Psychiatric: She has a normal mood and affect         Laboratory:  1/2/19 left breast core    Pathology revealed: invasive ductal carcinoma    Histologic grade: high grade     Angiolymphatic invasion:  absent    Tumor node status: clinically Negative    Hormone receptor status:  Estrogen/progesterone receptor positive, HER2 Keo negative      Imagin18  Bilateral screening mammogram  B0 (2) left breast asymmetry, right breast was benign  18  3D left diagnostic mammogram/US  B4  (2) 12 mm solid mass at eight o'clock of the left breast  19  Left breast biopsy          Discussion/Summary:  63-year-old female who presents with a newly diagnosed carcinoma of the left breast   This is ER/ND positive and HER2 Keo negative  I discussed the multimodality treatment of breast cancer with her to include surgery, radiation and medical therapy  She is a good candidate for breast conservation  I reviewed the procedure of a needle localized lumpectomy of the left breast along with lymphatic mapping and sentinel node biopsy  She would require adjuvant radiation therapy as well as medical therapy  At a minimum, she would need anti hormone therapy  The patient asked about doing bilateral mastectomy  She states that her one sister had breast cancer and had a lumpectomy  The cancer later returned and she was counseled on and had a bilateral mastectomy  I inquired if the sister had any genetic testing performed  Melhcor Leonard is not aware of this  She will discuss this further with her sister  If her sister was a gene carrier, then Melchor Leonard should have testing as well  If her sister did not have testing, the patient would still like to pursue this herself  She will call us once she has further conversation with her family  Further recommendations will be made at that time

## 2019-01-11 ENCOUNTER — TELEPHONE (OUTPATIENT)
Dept: INFUSION CENTER | Facility: CLINIC | Age: 69
End: 2019-01-11

## 2019-01-11 ENCOUNTER — TELEPHONE (OUTPATIENT)
Dept: HEMATOLOGY ONCOLOGY | Facility: CLINIC | Age: 69
End: 2019-01-11

## 2019-01-11 DIAGNOSIS — Z17.0 MALIGNANT NEOPLASM OF LOWER-INNER QUADRANT OF LEFT BREAST IN FEMALE, ESTROGEN RECEPTOR POSITIVE (HCC): Primary | ICD-10-CM

## 2019-01-11 DIAGNOSIS — C50.312 MALIGNANT NEOPLASM OF LOWER-INNER QUADRANT OF LEFT BREAST IN FEMALE, ESTROGEN RECEPTOR POSITIVE (HCC): Primary | ICD-10-CM

## 2019-01-11 DIAGNOSIS — Z80.3 FAMILY HISTORY OF BREAST CANCER IN SISTER: ICD-10-CM

## 2019-01-11 NOTE — TELEPHONE ENCOUNTER
Received a call from patient reporting that she spoke to her sister, who did have genetic testing done, and testing was negative  Patient would like to be set up to get genetic testing done as she and her two sisters have been diagnosed with breast cancer and she is concerned for her daughter and it would give her peace of mind  Advised patient that she would need to be scheduled to come into the [place] office as Dr Yulisa Molina explained yesterday to watch genetics video, ask any questions she may have to genetic counselor, check for insurance approval then proceed with testing  Patient agreeable to come to [facility] office  Patient also feels she would like a mastectomy versus lumpectomy and would like to be set up to see a plastic surgeon to discuss reconstruction, she asked to be scheduled with Dr Miya Mcclellan, stating a good friend recommended him  Informed patient that I will update Dr Yulisa Molina and Marin Sipmson on her requests, and someone will return call with appointment dates and times

## 2019-01-14 ENCOUNTER — TELEPHONE (OUTPATIENT)
Dept: SURGICAL ONCOLOGY | Facility: CLINIC | Age: 69
End: 2019-01-14

## 2019-01-23 ENCOUNTER — OFFICE VISIT (OUTPATIENT)
Dept: SURGICAL ONCOLOGY | Facility: CLINIC | Age: 69
End: 2019-01-23
Payer: COMMERCIAL

## 2019-01-23 VITALS
WEIGHT: 199.4 LBS | HEIGHT: 65 IN | HEART RATE: 71 BPM | TEMPERATURE: 97.6 F | BODY MASS INDEX: 33.22 KG/M2 | SYSTOLIC BLOOD PRESSURE: 110 MMHG | DIASTOLIC BLOOD PRESSURE: 70 MMHG

## 2019-01-23 DIAGNOSIS — Z17.0 MALIGNANT NEOPLASM OF LOWER-INNER QUADRANT OF LEFT BREAST IN FEMALE, ESTROGEN RECEPTOR POSITIVE (HCC): ICD-10-CM

## 2019-01-23 DIAGNOSIS — C50.312 MALIGNANT NEOPLASM OF LOWER-INNER QUADRANT OF LEFT BREAST IN FEMALE, ESTROGEN RECEPTOR POSITIVE (HCC): ICD-10-CM

## 2019-01-23 DIAGNOSIS — Z13.71 BRCA NEGATIVE: Primary | ICD-10-CM

## 2019-01-23 PROCEDURE — 99215 OFFICE O/P EST HI 40 MIN: CPT | Performed by: SURGERY

## 2019-01-23 NOTE — PROGRESS NOTES
Surgical Oncology Follow Up       [address] [facility]  [facility] Cecy Núñez  [address] [place] [id number]    Ariana Elena  1950  [id number]  [address]  [facility]  [facility] [address]    Chief Complaint   Patient presents with    Follow-up     discuss surgery        Assessment/Plan   Diagnoses and all orders for this visit:    BRCA negative    Malignant neoplasm of lower-inner quadrant of left breast in female, estrogen receptor positive ([facility] )        Advance Care Planning/Advance Directives:  Discussed disease status, cancer treatment plans and/or cancer treatment goals with the patient  Oncology History:     No history exists  History of Present Illness:   Left breast cancer  -Interval History:  Genetic testing and consult with Plastic surgery    Review of Systems:  Review of Systems   Constitutional: Negative  Negative for appetite change and fever  Eyes: Negative  Respiratory: Negative for shortness of breath  Cardiovascular: Negative  Gastrointestinal: Negative  Endocrine: Negative  Genitourinary: Negative  Musculoskeletal: Negative  Negative for arthralgias and myalgias  Skin: Negative  Allergic/Immunologic: Negative  Neurological: Negative  Hematological: Negative  Negative for adenopathy  Does not bruise/bleed easily  Psychiatric/Behavioral: Negative          Patient Active Problem List   Diagnosis    Plantar fasciitis of right foot    Atypical chest pain    Epigastric pain    Elevated LFTs    Nausea    Excessive VIP secretion    Thyroid nodule    Malignant neoplasm of lower-inner quadrant of left breast in female, estrogen receptor positive ([facility] )    Family history of breast cancer in sister    BRCA negative     Past Medical History:   Diagnosis Date    Abdominal pain     right mid abd    Breast cancer ([facility] ) 01/02/2019    left breast invasive ductal carcinoma    GERD (gastroesophageal reflux disease)     Plantar fasciitis     right foot    Thyroid nodule     enlarged- biopsy benign     Past Surgical History:   Procedure Laterality Date    BREAST BIOPSY Left 01/02/2019    invasive ductal carcinoma    HYSTERECTOMY      age 41    OOPHORECTOMY Bilateral     age 41     [address] STOM DUODENUM/JEJUNUM N/A 2/2/2017    Procedure: RADIAL ENDOSCOPIC U/S;  Surgeon: Iman Burnette MD;  [facility]; Service: Gastroenterology    CA PART EXCIS PLANTAR FASCIA Right 10/20/2016    Procedure:  OPEN RELEASE FASCIA PLANTAR ,CUTTING BOTTOM OF ARCH,INSTEP ;  Surgeon: Debbie Maldonado DPM;  [facility];  Service: Podiatry    THUMB ARTHROSCOPY      US GUIDED BREAST BIOPSY LEFT COMPLETE Left 1/2/2019     Family History   Problem Relation Age of Onset    Brain cancer Mother 67    Prostate cancer Father 66    Breast cancer Sister 54    Breast cancer additional onset Sister 72    Breast cancer Sister 72    Prostate cancer Brother 62     Social History     Social History    Marital status: Single     Spouse name: N/A    Number of children: N/A    Years of education: N/A     Occupational History    Not on file       Social History Main Topics    Smoking status: Never Smoker    Smokeless tobacco: Never Used    Alcohol use Yes      Comment: social    Drug use: No    Sexual activity: Not on file     Other Topics Concern    Not on file     Social History Narrative    No narrative on file       Current Outpatient Prescriptions:     clobetasol (TEMOVATE) 0 05 % ointment, , Disp: , Rfl:     ergocalciferol (VITAMIN D2) 50,000 units, , Disp: , Rfl:     Multiple Vitamins-Minerals (MULTIVITAL PO), Take by mouth, Disp: , Rfl:     Omega-3-6-9 CAPS, Take by mouth, Disp: , Rfl:     ranitidine (ZANTAC) 150 MG capsule, Take 1 capsule (150 mg total) by mouth every evening, Disp: 30 capsule, Rfl: 5    Current Facility-Administered Medications:    ipratropium (ATROVENT) 0 02 % inhalation solution 0 5 mg, 0 5 mg, Nebulization, 4x Daily, Han Del Angel PA-C, 0 5 mg at 04/21/18 1407  Allergies   Allergen Reactions    Latex Anaphylaxis    Shellfish Allergy Anaphylaxis    Shellfish-Derived Products Anaphylaxis       The following portions of the patient's history were reviewed and updated as appropriate: allergies, current medications, past family history, past medical history, past social history, past surgical history and problem list         Vitals:    01/23/19 1043   BP: 110/70   Pulse: 71   Temp: 97 6 °F (36 4 °C)       Physical Exam   Constitutional: She is oriented to person, place, and time  She appears well-developed and well-nourished  Cardiovascular: Normal heart sounds  Pulmonary/Chest: Breath sounds normal    Neurological: She is alert and oriented to person, place, and time  Psychiatric: She has a normal mood and affect  Results:  Labs:  Stat genetic panel is negative; additional testing pending      I reviewed the above laboratory data  Discussion/Summary:  53-year-old female with a left-sided breast carcinoma  Her stat genetic panel was negative  She met with Dr Ashish Meyer from Plastic surgery  She would like to proceed with a double mastectomy  She is concerned about recurrence given her family history and experience  I did inform her that based on the current information this does not and appear to be inherited  She does feel strongly about doing the double mastectomy  I therefore counseled her on left mastectomy with lymphatic mapping and sentinel node biopsy as well as a prophylactic mastectomy of the right breast   She will plan for expander/implant reconstruction  All of her questions were answered today  Consent was signed in the office

## 2019-01-24 ENCOUNTER — ANESTHESIA EVENT (OUTPATIENT)
Dept: PERIOP | Facility: HOSPITAL | Age: 69
DRG: 581 | End: 2019-01-24
Payer: COMMERCIAL

## 2019-01-24 ENCOUNTER — APPOINTMENT (OUTPATIENT)
Dept: LAB | Facility: HOSPITAL | Age: 69
End: 2019-01-24
Attending: SURGERY
Payer: COMMERCIAL

## 2019-01-24 ENCOUNTER — HOSPITAL ENCOUNTER (OUTPATIENT)
Dept: NON INVASIVE DIAGNOSTICS | Facility: HOSPITAL | Age: 69
Discharge: HOME | End: 2019-01-24
Attending: SURGERY
Payer: COMMERCIAL

## 2019-01-24 ENCOUNTER — HOSPITAL ENCOUNTER (OUTPATIENT)
Dept: RADIOLOGY | Facility: HOSPITAL | Age: 69
Discharge: HOME | End: 2019-01-24
Attending: SURGERY
Payer: COMMERCIAL

## 2019-01-24 ENCOUNTER — APPOINTMENT (OUTPATIENT)
Dept: PREADMISSION TESTING | Facility: HOSPITAL | Age: 69
End: 2019-01-24
Payer: COMMERCIAL

## 2019-01-24 DIAGNOSIS — Z17.0 MALIGNANT NEOPLASM OF LOWER-INNER QUADRANT OF LEFT BREAST IN FEMALE, ESTROGEN RECEPTOR POSITIVE (HCC): ICD-10-CM

## 2019-01-24 DIAGNOSIS — Z13.71 BRCA NEGATIVE: ICD-10-CM

## 2019-01-24 DIAGNOSIS — C50.312 MALIGNANT NEOPLASM OF LOWER-INNER QUADRANT OF LEFT BREAST IN FEMALE, ESTROGEN RECEPTOR POSITIVE (HCC): ICD-10-CM

## 2019-01-24 LAB
ALBUMIN SERPL BCP-MCNC: 3.6 G/DL (ref 3.5–5)
ALP SERPL-CCNC: 81 U/L (ref 46–116)
ALT SERPL W P-5'-P-CCNC: 24 U/L (ref 12–78)
ANION GAP SERPL CALCULATED.3IONS-SCNC: 9 MMOL/L (ref 4–13)
APTT PPP: 33 SECONDS (ref 26–38)
AST SERPL W P-5'-P-CCNC: 18 U/L (ref 5–45)
ATRIAL RATE: 65 BPM
BASOPHILS # BLD AUTO: 0.05 THOUSANDS/ΜL (ref 0–0.1)
BASOPHILS NFR BLD AUTO: 1 % (ref 0–1)
BILIRUB SERPL-MCNC: 0.39 MG/DL (ref 0.2–1)
BILIRUB UR QL STRIP: NEGATIVE
BUN SERPL-MCNC: 14 MG/DL (ref 5–25)
CALCIUM SERPL-MCNC: 9.3 MG/DL (ref 8.3–10.1)
CHLORIDE SERPL-SCNC: 105 MMOL/L (ref 100–108)
CLARITY UR: CLEAR
CO2 SERPL-SCNC: 27 MMOL/L (ref 21–32)
COLOR UR: YELLOW
CREAT SERPL-MCNC: 0.75 MG/DL (ref 0.6–1.3)
EOSINOPHIL # BLD AUTO: 0.14 THOUSAND/ΜL (ref 0–0.61)
EOSINOPHIL NFR BLD AUTO: 3 % (ref 0–6)
ERYTHROCYTE [DISTWIDTH] IN BLOOD BY AUTOMATED COUNT: 12.9 % (ref 11.6–15.1)
GFR SERPL CREATININE-BSD FRML MDRD: 83 ML/MIN/1.73SQ M
GLUCOSE SERPL-MCNC: 91 MG/DL (ref 65–140)
GLUCOSE UR STRIP-MCNC: NEGATIVE MG/DL
HCT VFR BLD AUTO: 47.3 % (ref 34.8–46.1)
HGB BLD-MCNC: 14.8 G/DL (ref 11.5–15.4)
HGB UR QL STRIP.AUTO: NEGATIVE
IMM GRANULOCYTES # BLD AUTO: 0.01 THOUSAND/UL (ref 0–0.2)
IMM GRANULOCYTES NFR BLD AUTO: 0 % (ref 0–2)
INR PPP: 0.96 (ref 0.86–1.17)
KETONES UR STRIP-MCNC: NEGATIVE MG/DL
LEUKOCYTE ESTERASE UR QL STRIP: NEGATIVE
LYMPHOCYTES # BLD AUTO: 2.02 THOUSANDS/ΜL (ref 0.6–4.47)
LYMPHOCYTES NFR BLD AUTO: 42 % (ref 14–44)
MCH RBC QN AUTO: 27.6 PG (ref 26.8–34.3)
MCHC RBC AUTO-ENTMCNC: 31.3 G/DL (ref 31.4–37.4)
MCV RBC AUTO: 88 FL (ref 82–98)
MONOCYTES # BLD AUTO: 0.36 THOUSAND/ΜL (ref 0.17–1.22)
MONOCYTES NFR BLD AUTO: 8 % (ref 4–12)
NEUTROPHILS # BLD AUTO: 2.23 THOUSANDS/ΜL (ref 1.85–7.62)
NEUTS SEG NFR BLD AUTO: 46 % (ref 43–75)
NITRITE UR QL STRIP: NEGATIVE
NRBC BLD AUTO-RTO: 0 /100 WBCS
P AXIS: 25 DEGREES
PH UR STRIP.AUTO: 5.5 [PH] (ref 4.5–8)
PLATELET # BLD AUTO: 211 THOUSANDS/UL (ref 149–390)
PMV BLD AUTO: 10.3 FL (ref 8.9–12.7)
POTASSIUM SERPL-SCNC: 4 MMOL/L (ref 3.5–5.3)
PR INTERVAL: 176 MS
PROT SERPL-MCNC: 7.4 G/DL (ref 6.4–8.2)
PROT UR STRIP-MCNC: NEGATIVE MG/DL
PROTHROMBIN TIME: 12.9 SECONDS (ref 11.8–14.2)
QRS AXIS: 14 DEGREES
QRSD INTERVAL: 78 MS
QT INTERVAL: 392 MS
QTC INTERVAL: 407 MS
RBC # BLD AUTO: 5.37 MILLION/UL (ref 3.81–5.12)
SODIUM SERPL-SCNC: 141 MMOL/L (ref 136–145)
SP GR UR STRIP.AUTO: 1.02 (ref 1–1.03)
T WAVE AXIS: 6 DEGREES
UROBILINOGEN UR QL STRIP.AUTO: 0.2 E.U./DL
VENTRICULAR RATE: 65 BPM
WBC # BLD AUTO: 4.81 THOUSAND/UL (ref 4.31–10.16)

## 2019-01-24 PROCEDURE — 71046 X-RAY EXAM CHEST 2 VIEWS: CPT

## 2019-01-24 PROCEDURE — 36415 COLL VENOUS BLD VENIPUNCTURE: CPT

## 2019-01-24 PROCEDURE — 93010 ELECTROCARDIOGRAM REPORT: CPT | Performed by: INTERNAL MEDICINE

## 2019-01-24 PROCEDURE — 85025 COMPLETE CBC W/AUTO DIFF WBC: CPT

## 2019-01-24 PROCEDURE — 93005 ELECTROCARDIOGRAM TRACING: CPT

## 2019-01-24 PROCEDURE — 85610 PROTHROMBIN TIME: CPT

## 2019-01-24 PROCEDURE — 85730 THROMBOPLASTIN TIME PARTIAL: CPT

## 2019-01-24 PROCEDURE — 80053 COMPREHEN METABOLIC PANEL: CPT

## 2019-01-24 PROCEDURE — 81003 URINALYSIS AUTO W/O SCOPE: CPT | Performed by: SURGERY

## 2019-01-24 NOTE — PRE-PROCEDURE INSTRUCTIONS
Pre-Surgery Instructions:   Medication Instructions    clobetasol (TEMOVATE) 0 05 % ointment Patient was instructed by Physician and understands   ergocalciferol (VITAMIN D2) 50,000 units Patient was instructed by Physician and understands   Multiple Vitamins-Minerals (MULTIVITAL PO) Patient was instructed by Physician and understands   Omega-3-6-9 CAPS Patient was instructed by Physician and understands   ranitidine (ZANTAC) 150 MG capsule Patient was instructed by Physician and understands  Seen by Dr Camilla Figueredo  Instructed has no medications to be taken morning of surgery  Stop NSAIDs, vitamins, and supplements 1 week before surgery  instructed re chlorhexidine showers per hospital policy

## 2019-01-24 NOTE — ANESTHESIA PREPROCEDURE EVALUATION
Review of Systems/Medical History  Patient summary reviewed    No history of anesthetic complications     Cardiovascular  Negative cardio ROS Exercise tolerance (METS): >4,     Pulmonary  Negative pulmonary ROS        GI/Hepatic    GERD well controlled,             Endo/Other  History of thyroid disease (thyroid nodule) ,      GYN    Hysterectomy, Breast cancer        Hematology  Negative hematology ROS      Musculoskeletal  Negative musculoskeletal ROS        Neurology  Negative neurology ROS      Psychology   Negative psychology ROS              Physical Exam    Airway    Mallampati score: II  TM Distance: >3 FB  Neck ROM: full     Dental       Cardiovascular  Comment: Negative ROS, Rhythm: regular, Rate: normal,     Pulmonary  Breath sounds clear to auscultation,     Other Findings  Missing upper and lower back teeth      Anesthesia Plan  ASA Score- 2     Anesthesia Type- general with ASA Monitors  Additional Monitors:   Airway Plan: ETT  Plan Factors-  Patient did not smoke on day of surgery  Induction- intravenous  Postoperative Plan- Plan for postoperative opioid use  Informed Consent- Anesthetic plan and risks discussed with patient

## 2019-02-05 ENCOUNTER — TELEPHONE (OUTPATIENT)
Dept: HEMATOLOGY ONCOLOGY | Facility: CLINIC | Age: 69
End: 2019-02-05

## 2019-02-05 NOTE — TELEPHONE ENCOUNTER
Breast Nurse Navigator    Follow up call prior to surgery scheduled for 02/08/2019  Patient reporting she is as ready she is going to be  Has concerns with finances and how she will pay as she has only 2 full weeks of PTO then short term disability and is unsure how it will affect her as far as paying bills later  Discussed available support available if and when needed with bill pay assistance, Smiley Ellis can assist, patient stated well lets see how it goes  Otherwise patient with no questions, or additional concerns or needs at this time  Patient is aware to call me as needed and that I will call her for post op follow up    Will reassess for support needs ongoing

## 2019-02-08 ENCOUNTER — HOSPITAL ENCOUNTER (INPATIENT)
Facility: HOSPITAL | Age: 69
LOS: 1 days | Discharge: HOME HEALTH | DRG: 581 | End: 2019-02-09
Attending: SURGERY | Admitting: PLASTIC SURGERY
Payer: COMMERCIAL

## 2019-02-08 ENCOUNTER — HOSPITAL ENCOUNTER (OUTPATIENT)
Dept: NUCLEAR MEDICINE | Facility: HOSPITAL | Age: 69
Discharge: HOME | End: 2019-02-08
Attending: SURGERY
Payer: COMMERCIAL

## 2019-02-08 ENCOUNTER — ANESTHESIA (OUTPATIENT)
Dept: PERIOP | Facility: HOSPITAL | Age: 69
DRG: 581 | End: 2019-02-08
Payer: COMMERCIAL

## 2019-02-08 DIAGNOSIS — Z17.0 MALIGNANT NEOPLASM OF LOWER-INNER QUADRANT OF LEFT BREAST IN FEMALE, ESTROGEN RECEPTOR POSITIVE (HCC): ICD-10-CM

## 2019-02-08 DIAGNOSIS — J20.9 ACUTE BRONCHITIS, UNSPECIFIED ORGANISM: ICD-10-CM

## 2019-02-08 DIAGNOSIS — C50.312 MALIGNANT NEOPLASM OF LOWER-INNER QUADRANT OF LEFT BREAST IN FEMALE, ESTROGEN RECEPTOR POSITIVE (HCC): ICD-10-CM

## 2019-02-08 DIAGNOSIS — Z13.71 BRCA NEGATIVE: ICD-10-CM

## 2019-02-08 PROCEDURE — 88363 XM ARCHIVE TISSUE MOLEC ANAL: CPT | Performed by: PATHOLOGY

## 2019-02-08 PROCEDURE — 88307 TISSUE EXAM BY PATHOLOGIST: CPT | Performed by: PATHOLOGY

## 2019-02-08 PROCEDURE — A9541 TC99M SULFUR COLLOID: HCPCS

## 2019-02-08 PROCEDURE — 88342 IMHCHEM/IMCYTCHM 1ST ANTB: CPT | Performed by: PATHOLOGY

## 2019-02-08 PROCEDURE — 19303 MAST SIMPLE COMPLETE: CPT | Performed by: SURGERY

## 2019-02-08 PROCEDURE — 38525 BIOPSY/REMOVAL LYMPH NODES: CPT | Performed by: SURGERY

## 2019-02-08 PROCEDURE — 19303 MAST SIMPLE COMPLETE: CPT | Performed by: PHYSICIAN ASSISTANT

## 2019-02-08 PROCEDURE — 0HHV0NZ INSERTION OF TISSUE EXPANDER INTO BILATERAL BREAST, OPEN APPROACH: ICD-10-PCS | Performed by: SURGERY

## 2019-02-08 PROCEDURE — 78195 LYMPH SYSTEM IMAGING: CPT

## 2019-02-08 PROCEDURE — C1781 MESH (IMPLANTABLE): HCPCS | Performed by: SURGERY

## 2019-02-08 PROCEDURE — 38900 IO MAP OF SENT LYMPH NODE: CPT | Performed by: SURGERY

## 2019-02-08 PROCEDURE — 0HTV0ZZ RESECTION OF BILATERAL BREAST, OPEN APPROACH: ICD-10-PCS | Performed by: PLASTIC SURGERY

## 2019-02-08 PROCEDURE — 07B60ZX EXCISION OF LEFT AXILLARY LYMPHATIC, OPEN APPROACH, DIAGNOSTIC: ICD-10-PCS | Performed by: PLASTIC SURGERY

## 2019-02-08 PROCEDURE — C1789 PROSTHESIS, BREAST, IMP: HCPCS | Performed by: SURGERY

## 2019-02-08 DEVICE — SMOOTH, ULTRA HIGH PROFILE, SUTURE TABS, INTEGRAL INJECTION DOME, 650CC
Type: IMPLANTABLE DEVICE | Site: BREAST | Status: FUNCTIONAL
Brand: ARTOURA BREAST TISSUE EXPANDER

## 2019-02-08 DEVICE — IMPLANTABLE DEVICE: Type: IMPLANTABLE DEVICE | Site: BREAST | Status: FUNCTIONAL

## 2019-02-08 RX ORDER — BUPIVACAINE HYDROCHLORIDE AND EPINEPHRINE 2.5; 5 MG/ML; UG/ML
INJECTION, SOLUTION EPIDURAL; INFILTRATION; INTRACAUDAL; PERINEURAL AS NEEDED
Status: DISCONTINUED | OUTPATIENT
Start: 2019-02-08 | End: 2019-02-08 | Stop reason: HOSPADM

## 2019-02-08 RX ORDER — SODIUM CHLORIDE 9 MG/ML
125 INJECTION, SOLUTION INTRAVENOUS CONTINUOUS
Status: DISCONTINUED | OUTPATIENT
Start: 2019-02-08 | End: 2019-02-09 | Stop reason: HOSPADM

## 2019-02-08 RX ORDER — SODIUM CHLORIDE, SODIUM LACTATE, POTASSIUM CHLORIDE, CALCIUM CHLORIDE 600; 310; 30; 20 MG/100ML; MG/100ML; MG/100ML; MG/100ML
100 INJECTION, SOLUTION INTRAVENOUS CONTINUOUS
Status: DISCONTINUED | OUTPATIENT
Start: 2019-02-08 | End: 2019-02-09 | Stop reason: HOSPADM

## 2019-02-08 RX ORDER — SCOLOPAMINE TRANSDERMAL SYSTEM 1 MG/1
1 PATCH, EXTENDED RELEASE TRANSDERMAL ONCE AS NEEDED
Status: DISCONTINUED | OUTPATIENT
Start: 2019-02-08 | End: 2019-02-08

## 2019-02-08 RX ORDER — PROPOFOL 10 MG/ML
INJECTION, EMULSION INTRAVENOUS AS NEEDED
Status: DISCONTINUED | OUTPATIENT
Start: 2019-02-08 | End: 2019-02-08 | Stop reason: SURG

## 2019-02-08 RX ORDER — HYDROMORPHONE HYDROCHLORIDE 2 MG/ML
INJECTION, SOLUTION INTRAMUSCULAR; INTRAVENOUS; SUBCUTANEOUS AS NEEDED
Status: DISCONTINUED | OUTPATIENT
Start: 2019-02-08 | End: 2019-02-08 | Stop reason: SURG

## 2019-02-08 RX ORDER — MIDAZOLAM HYDROCHLORIDE 1 MG/ML
INJECTION INTRAMUSCULAR; INTRAVENOUS AS NEEDED
Status: DISCONTINUED | OUTPATIENT
Start: 2019-02-08 | End: 2019-02-08 | Stop reason: SURG

## 2019-02-08 RX ORDER — FENTANYL CITRATE 50 UG/ML
INJECTION, SOLUTION INTRAMUSCULAR; INTRAVENOUS AS NEEDED
Status: DISCONTINUED | OUTPATIENT
Start: 2019-02-08 | End: 2019-02-08 | Stop reason: SURG

## 2019-02-08 RX ORDER — DIPHENHYDRAMINE HCL 25 MG
50 TABLET ORAL EVERY 6 HOURS PRN
Status: DISCONTINUED | OUTPATIENT
Start: 2019-02-08 | End: 2019-02-09 | Stop reason: HOSPADM

## 2019-02-08 RX ORDER — NEOSTIGMINE METHYLSULFATE 1 MG/ML
INJECTION INTRAVENOUS AS NEEDED
Status: DISCONTINUED | OUTPATIENT
Start: 2019-02-08 | End: 2019-02-08 | Stop reason: SURG

## 2019-02-08 RX ORDER — ONDANSETRON 2 MG/ML
4 INJECTION INTRAMUSCULAR; INTRAVENOUS ONCE AS NEEDED
Status: DISCONTINUED | OUTPATIENT
Start: 2019-02-08 | End: 2019-02-08 | Stop reason: HOSPADM

## 2019-02-08 RX ORDER — DOCUSATE SODIUM 100 MG/1
100 CAPSULE, LIQUID FILLED ORAL 2 TIMES DAILY
Status: DISCONTINUED | OUTPATIENT
Start: 2019-02-08 | End: 2019-02-08 | Stop reason: SDUPTHER

## 2019-02-08 RX ORDER — GLYCOPYRROLATE 0.2 MG/ML
INJECTION INTRAMUSCULAR; INTRAVENOUS AS NEEDED
Status: DISCONTINUED | OUTPATIENT
Start: 2019-02-08 | End: 2019-02-08 | Stop reason: SURG

## 2019-02-08 RX ORDER — ONDANSETRON 2 MG/ML
INJECTION INTRAMUSCULAR; INTRAVENOUS AS NEEDED
Status: DISCONTINUED | OUTPATIENT
Start: 2019-02-08 | End: 2019-02-08 | Stop reason: SURG

## 2019-02-08 RX ORDER — MORPHINE SULFATE 4 MG/ML
3 INJECTION, SOLUTION INTRAMUSCULAR; INTRAVENOUS
Status: DISCONTINUED | OUTPATIENT
Start: 2019-02-08 | End: 2019-02-09

## 2019-02-08 RX ORDER — ROCURONIUM BROMIDE 10 MG/ML
INJECTION, SOLUTION INTRAVENOUS AS NEEDED
Status: DISCONTINUED | OUTPATIENT
Start: 2019-02-08 | End: 2019-02-08 | Stop reason: SURG

## 2019-02-08 RX ORDER — DIPHENHYDRAMINE HYDROCHLORIDE 50 MG/ML
INJECTION INTRAMUSCULAR; INTRAVENOUS AS NEEDED
Status: DISCONTINUED | OUTPATIENT
Start: 2019-02-08 | End: 2019-02-08 | Stop reason: SURG

## 2019-02-08 RX ORDER — ACETAMINOPHEN 325 MG/1
650 TABLET ORAL EVERY 4 HOURS PRN
Status: DISCONTINUED | OUTPATIENT
Start: 2019-02-08 | End: 2019-02-09 | Stop reason: HOSPADM

## 2019-02-08 RX ORDER — DEXAMETHASONE SODIUM PHOSPHATE 4 MG/ML
INJECTION, SOLUTION INTRA-ARTICULAR; INTRALESIONAL; INTRAMUSCULAR; INTRAVENOUS; SOFT TISSUE AS NEEDED
Status: DISCONTINUED | OUTPATIENT
Start: 2019-02-08 | End: 2019-02-08 | Stop reason: SURG

## 2019-02-08 RX ORDER — FAMOTIDINE 20 MG/1
40 TABLET, FILM COATED ORAL
Status: DISCONTINUED | OUTPATIENT
Start: 2019-02-08 | End: 2019-02-09 | Stop reason: HOSPADM

## 2019-02-08 RX ORDER — PROMETHAZINE HYDROCHLORIDE 25 MG/1
25 TABLET ORAL EVERY 6 HOURS PRN
Status: DISCONTINUED | OUTPATIENT
Start: 2019-02-08 | End: 2019-02-09 | Stop reason: HOSPADM

## 2019-02-08 RX ORDER — ISOSULFAN BLUE 50 MG/5ML
INJECTION, SOLUTION SUBCUTANEOUS AS NEEDED
Status: DISCONTINUED | OUTPATIENT
Start: 2019-02-08 | End: 2019-02-08 | Stop reason: HOSPADM

## 2019-02-08 RX ORDER — ONDANSETRON 2 MG/ML
4 INJECTION INTRAMUSCULAR; INTRAVENOUS EVERY 8 HOURS PRN
Status: DISCONTINUED | OUTPATIENT
Start: 2019-02-08 | End: 2019-02-09 | Stop reason: HOSPADM

## 2019-02-08 RX ORDER — OXYCODONE HYDROCHLORIDE AND ACETAMINOPHEN 5; 325 MG/1; MG/1
2 TABLET ORAL EVERY 4 HOURS PRN
Status: DISCONTINUED | OUTPATIENT
Start: 2019-02-08 | End: 2019-02-09 | Stop reason: HOSPADM

## 2019-02-08 RX ORDER — SODIUM CHLORIDE, SODIUM LACTATE, POTASSIUM CHLORIDE, CALCIUM CHLORIDE 600; 310; 30; 20 MG/100ML; MG/100ML; MG/100ML; MG/100ML
INJECTION, SOLUTION INTRAVENOUS CONTINUOUS PRN
Status: DISCONTINUED | OUTPATIENT
Start: 2019-02-08 | End: 2019-02-08 | Stop reason: SURG

## 2019-02-08 RX ORDER — DOCUSATE SODIUM 100 MG/1
100 CAPSULE, LIQUID FILLED ORAL 2 TIMES DAILY
Status: DISCONTINUED | OUTPATIENT
Start: 2019-02-08 | End: 2019-02-09 | Stop reason: HOSPADM

## 2019-02-08 RX ORDER — FENTANYL CITRATE/PF 50 MCG/ML
50 SYRINGE (ML) INJECTION
Status: DISCONTINUED | OUTPATIENT
Start: 2019-02-08 | End: 2019-02-08 | Stop reason: HOSPADM

## 2019-02-08 RX ADMIN — SODIUM CHLORIDE: 0.9 INJECTION, SOLUTION INTRAVENOUS at 14:00

## 2019-02-08 RX ADMIN — DOCUSATE SODIUM 100 MG: 100 CAPSULE, LIQUID FILLED ORAL at 17:32

## 2019-02-08 RX ADMIN — Medication 2000 MG: at 13:12

## 2019-02-08 RX ADMIN — ONDANSETRON 4 MG: 2 INJECTION INTRAMUSCULAR; INTRAVENOUS at 09:10

## 2019-02-08 RX ADMIN — ROCURONIUM BROMIDE 50 MG: 10 INJECTION INTRAVENOUS at 09:07

## 2019-02-08 RX ADMIN — FENTANYL CITRATE 50 MCG: 50 INJECTION, SOLUTION INTRAMUSCULAR; INTRAVENOUS at 15:00

## 2019-02-08 RX ADMIN — SODIUM CHLORIDE 125 ML/HR: 0.9 INJECTION, SOLUTION INTRAVENOUS at 06:51

## 2019-02-08 RX ADMIN — FAMOTIDINE 40 MG: 20 TABLET ORAL at 23:14

## 2019-02-08 RX ADMIN — ROCURONIUM BROMIDE 10 MG: 10 INJECTION INTRAVENOUS at 11:10

## 2019-02-08 RX ADMIN — HYDROMORPHONE HYDROCHLORIDE 0.5 MG: 2 INJECTION, SOLUTION INTRAMUSCULAR; INTRAVENOUS; SUBCUTANEOUS at 10:40

## 2019-02-08 RX ADMIN — NEOSTIGMINE METHYLSULFATE 3 MG: 1 INJECTION INTRAVENOUS at 14:10

## 2019-02-08 RX ADMIN — MIDAZOLAM 2 MG: 1 INJECTION INTRAMUSCULAR; INTRAVENOUS at 08:53

## 2019-02-08 RX ADMIN — SODIUM CHLORIDE, SODIUM LACTATE, POTASSIUM CHLORIDE, AND CALCIUM CHLORIDE: .6; .31; .03; .02 INJECTION, SOLUTION INTRAVENOUS at 11:26

## 2019-02-08 RX ADMIN — SODIUM CHLORIDE, SODIUM LACTATE, POTASSIUM CHLORIDE, AND CALCIUM CHLORIDE 100 ML/HR: .6; .31; .03; .02 INJECTION, SOLUTION INTRAVENOUS at 16:22

## 2019-02-08 RX ADMIN — ACETAMINOPHEN 650 MG: 325 TABLET, FILM COATED ORAL at 17:32

## 2019-02-08 RX ADMIN — ROCURONIUM BROMIDE 10 MG: 10 INJECTION INTRAVENOUS at 11:40

## 2019-02-08 RX ADMIN — FENTANYL CITRATE 100 MCG: 50 INJECTION, SOLUTION INTRAMUSCULAR; INTRAVENOUS at 09:07

## 2019-02-08 RX ADMIN — LIDOCAINE HYDROCHLORIDE 50 MG: 20 INJECTION, SOLUTION INTRAVENOUS at 09:07

## 2019-02-08 RX ADMIN — DEXAMETHASONE SODIUM PHOSPHATE 4 MG: 4 INJECTION, SOLUTION INTRAMUSCULAR; INTRAVENOUS at 09:10

## 2019-02-08 RX ADMIN — HYDROMORPHONE HYDROCHLORIDE 0.5 MG: 2 INJECTION, SOLUTION INTRAMUSCULAR; INTRAVENOUS; SUBCUTANEOUS at 11:21

## 2019-02-08 RX ADMIN — ROCURONIUM BROMIDE 10 MG: 10 INJECTION INTRAVENOUS at 11:26

## 2019-02-08 RX ADMIN — Medication 2000 MG: at 09:12

## 2019-02-08 RX ADMIN — FENTANYL CITRATE 50 MCG: 50 INJECTION, SOLUTION INTRAMUSCULAR; INTRAVENOUS at 10:27

## 2019-02-08 RX ADMIN — ROCURONIUM BROMIDE 10 MG: 10 INJECTION INTRAVENOUS at 10:44

## 2019-02-08 RX ADMIN — CEFAZOLIN SODIUM 2000 MG: 10 INJECTION, POWDER, FOR SOLUTION INTRAVENOUS at 20:10

## 2019-02-08 RX ADMIN — HYDROMORPHONE HYDROCHLORIDE 0.5 MG: 2 INJECTION, SOLUTION INTRAMUSCULAR; INTRAVENOUS; SUBCUTANEOUS at 12:33

## 2019-02-08 RX ADMIN — DIPHENHYDRAMINE HYDROCHLORIDE 12.5 MG: 50 INJECTION INTRAMUSCULAR; INTRAVENOUS at 09:19

## 2019-02-08 RX ADMIN — ROCURONIUM BROMIDE 10 MG: 10 INJECTION INTRAVENOUS at 10:27

## 2019-02-08 RX ADMIN — PROPOFOL 200 MG: 10 INJECTION, EMULSION INTRAVENOUS at 09:07

## 2019-02-08 RX ADMIN — FENTANYL CITRATE 50 MCG: 50 INJECTION, SOLUTION INTRAMUSCULAR; INTRAVENOUS at 14:21

## 2019-02-08 RX ADMIN — ONDANSETRON 4 MG: 2 INJECTION INTRAMUSCULAR; INTRAVENOUS at 20:06

## 2019-02-08 RX ADMIN — GLYCOPYRROLATE 0.6 MG: 0.2 INJECTION, SOLUTION INTRAMUSCULAR; INTRAVENOUS at 14:10

## 2019-02-08 RX ADMIN — FENTANYL CITRATE 50 MCG: 50 INJECTION, SOLUTION INTRAMUSCULAR; INTRAVENOUS at 13:03

## 2019-02-08 RX ADMIN — HYDROMORPHONE HYDROCHLORIDE 0.5 MG: 2 INJECTION, SOLUTION INTRAMUSCULAR; INTRAVENOUS; SUBCUTANEOUS at 09:55

## 2019-02-08 RX ADMIN — SCOPALAMINE 1 PATCH: 1 PATCH, EXTENDED RELEASE TRANSDERMAL at 08:41

## 2019-02-08 RX ADMIN — SODIUM CHLORIDE, SODIUM LACTATE, POTASSIUM CHLORIDE, AND CALCIUM CHLORIDE: .6; .31; .03; .02 INJECTION, SOLUTION INTRAVENOUS at 09:12

## 2019-02-08 RX ADMIN — OXYCODONE HYDROCHLORIDE AND ACETAMINOPHEN 2 TABLET: 5; 325 TABLET ORAL at 23:14

## 2019-02-08 RX ADMIN — FENTANYL CITRATE 50 MCG: 50 INJECTION, SOLUTION INTRAMUSCULAR; INTRAVENOUS at 10:51

## 2019-02-08 RX ADMIN — ONDANSETRON 4 MG: 2 INJECTION INTRAMUSCULAR; INTRAVENOUS at 13:40

## 2019-02-08 RX ADMIN — FENTANYL CITRATE 50 MCG: 50 INJECTION, SOLUTION INTRAMUSCULAR; INTRAVENOUS at 14:50

## 2019-02-08 RX ADMIN — HYDROMORPHONE HYDROCHLORIDE 0.5 MG: 2 INJECTION, SOLUTION INTRAMUSCULAR; INTRAVENOUS; SUBCUTANEOUS at 14:22

## 2019-02-08 RX ADMIN — ENOXAPARIN SODIUM 40 MG: 40 INJECTION SUBCUTANEOUS at 07:44

## 2019-02-08 NOTE — OP NOTE
OPERATIVE REPORT  PATIENT NAME: Mora Calixto    :  1950  MRN: [id number]  Pt Location: [facility]    SURGERY DATE: 2019    Surgeon(s) and Role:  Panel 1:     * Adali Lee MD - Primary     * Juan Barrett PA-C - Assisting    Panel 2:     * Raphael Song MD - Primary    Preop Diagnosis:  BRCA negative [Z13 71]  Malignant neoplasm of lower-inner quadrant of left breast in female, estrogen receptor positive ([address] ) [C50 312, Z17 0]    Post-Op Diagnosis Codes:     * BRCA negative [Z13 71]     * Malignant neoplasm of lower-inner quadrant of left breast in female, estrogen receptor positive ([address] ) [C50 312, Z17 0]    Procedure(s) (LRB):  LEFT breast mastectomy; RIGHT breast prophylactic mastectomy (N/A)  BIOPSY LYMPH NODE SENTINEL (Left)  INSERTION/PLACEMENT TISSUE EXPANDER (EXCHANGE) (Bilateral)  RECONSTRUCTION BREAST W/ IMPLANT (Bilateral)    Specimen(s):  ID Type Source Tests Collected by Time Destination   1 : RIGHT BREAST TISSUE  SUTURE  SHORT  SUPERIOR  LONG  LATERAL Tissue Breast, Right TISSUE EXAM Adali Lee MD 2019 1019    2 : LOWER INNER MARGIN SUTURE TRUE MARGIN Tissue Breast, Left TISSUE EXAM Adali Lee MD 2019 1113    3 : LEFT BREAST MASS Tissue Breast, Left TISSUE EXAM Adali Lee MD 2019 1114    4 : LEFT BREAST TISSUE SHORT STITCH - SUPERIOR  LONG SUTRE LATERAL Tissue Breast, Left TISSUE EXAM Adali Lee MD 2019 1129    5 : LEFT SENTINEL NODE # 1 Tissue Axillary lymph node TISSUE EXAM Adali Lee MD 2019 1130    6 : LEFT AXILLARY SENTINEL NODE # 2 Tissue Axillary lymph node TISSUE EXAM Adali Lee MD 2019 1134    7 : left sentinel node # 3 Tissue Axillary lymph node TISSUE EXAM Adali Lee MD 2019 1137        Estimated Blood Loss:   50 mL    Drains:  Closed/Suction Drain Right;Medial Breast 15 Fr  (Active)   Number of days: 0       Closed/Suction Drain Right;Lateral Breast 15 Fr   (Active)   Number of days: 0       Closed/Suction Drain Left;Medial Breast 15 Fr  (Active)   Number of days: 0       Closed/Suction Drain Left;Lateral Breast Bulb 15 Fr  (Active)   Number of days: 0       [REMOVED] Urethral Catheter Non-latex 16 Fr  (Removed)   Number of days: 0       Anesthesia Type:   General    Operative Indications:  BRCA negative [Z13 71]  Malignant neoplasm of lower-inner quadrant of left breast in female, estrogen receptor positive ([address] ) [C50 312, Z17 0]      Operative Findings:      Complications:   None    Procedure and Technique:  The patient was marked while standing prior to surgery  The patient was brought to the operating room and placed supine on the operating room table  Time out procedure was performed, SCDs were applied and IV antibiotics were given  The patient underwent bilateral mastectomy and will be dictated separately  The chest was re-prepped and draped using standard surgical technique  Attention was turned to the right breast  The wound was examined and excellent hemostasis was obtained  The Inframammary fold was reconstructed using an internal jonatan flap  A 2-0 PDS suture was used to suture the deep dermis of the marked area of the inframammary fold  The skin was elevated 1 cm and secured to rib periosteum, this was performed across the length of the inframammary fold for and total flap plus elevation of 14cmsq  The superior extent of the subpectoral dissection was determined by the height of the expander  A piece of flexhd was rehydrated according to protocol  This was cut to the shape of the inframammary fold  The dermal element side was placed facing the skin  The flexhd was inset to the inframammary fold using 2-0 PDS in hoizontal mattress technique  A 15 Greek round lakeisha drain was tunneled laterally and brought out through the lateral chest  This was secured with a 2-0 nylon suture  Excellent hemostasis was achieved   The pocket was irrigated with antibiotic solution and allowed to dwell for 5 minutes  Attention was turned to the left breast  The wound was examined and excellent hemostasis was obtained  The Inframammary fold was reconstructed using an internal jonatan flap  A 2-0 PDS suture was used to suture the deep dermis of the marked area of the inframammary fold  The skin was elevated 1 cm and secured to rib periosteum, this was performed across the length of the inframammary fold for and total flap plus elevation of 14cmsq  The superior extent of the subpectoral dissection was determined by the height of the expander  A piece of flexhd was rehydrated according to protocol  This was cut to the shape of the inframammary fold  The dermal element side was placed facing the skin  The flexhd was inset to the inframammary fold using 2-0 PDS in hoizontal mattress technique  2 15 Indonesian round lakeisha drains were tunneled laterally and brought out through the lateral chest  These were secured with a 3-0 nylon suture  Excellent hemostasis was achieved  The pocket was irrigated with antibiotic solution and allowed to dwell for 5 minutes  The skin was re prepped, all surgical team members changed their gloves  [place] artour ultra high profile 650ml expanders were prepared  All air was removed from the devices  350 ml of saline was injected into each device  The expanders were placed into the breast pockets  The expander tabs were inset to the inframammary folds using 2-0 PDS suture  Flex HD was placed to cover the entire anterior surface of the implants  The flex HD was inset using 2-0 and 3-0 PDS suture in interrupted technique  The skin was closed using 3-0 vicryl and 3-0 PDS suture in interrupted deep dermal technique  The superficial skin was closed using 3-0 monocryl suture in running subcuticular technique  The wounds were cleaned and dried  Benzoin, steri strips and skin glue were applied  Biopatches were placed around the drain sites  Sterile gauze and a surgical bra was placed      The patient tolerated the procedure well and was taken to the recovery room in stable condition         I was present for the entire procedure and A qualified resident physician was not available    Patient Disposition:  hemodynamically stable and extubated and stable    SIGNATURE: Rad Randolph MD  DATE: February 8, 2019  TIME: 2:24 PM

## 2019-02-08 NOTE — ADDENDUM NOTE
Addendum  created 02/08/19 1450 by Trell Koo, DO    Anesthesia Attestations filed, Anesthesia Staff edited

## 2019-02-08 NOTE — H&P (VIEW-ONLY)
Surgical Oncology Follow Up       [facility] [address] [address]    Halley Tavares  1950  [id number]  [facility] [address] [address]    Chief Complaint   Patient presents with    Follow-up     discuss surgery        Assessment/Plan   Diagnoses and all orders for this visit:    BRCA negative    Malignant neoplasm of lower-inner quadrant of left breast in female, estrogen receptor positive ([facility] [address] )        Advance Care Planning/Advance Directives:  Discussed disease status, cancer treatment plans and/or cancer treatment goals with the patient  Oncology History:     No history exists  History of Present Illness:   Left breast cancer  -Interval History:  Genetic testing and consult with Plastic surgery    Review of Systems:  Review of Systems   Constitutional: Negative  Negative for appetite change and fever  Eyes: Negative  Respiratory: Negative for shortness of breath  Cardiovascular: Negative  Gastrointestinal: Negative  Endocrine: Negative  Genitourinary: Negative  Musculoskeletal: Negative  Negative for arthralgias and myalgias  Skin: Negative  Allergic/Immunologic: Negative  Neurological: Negative  Hematological: Negative  Negative for adenopathy  Does not bruise/bleed easily  Psychiatric/Behavioral: Negative          Patient Active Problem List   Diagnosis    Plantar fasciitis of right foot    Atypical chest pain    Epigastric pain    Elevated LFTs    Nausea    Excessive VIP secretion    Thyroid nodule    Malignant neoplasm of lower-inner quadrant of left breast in female, estrogen receptor positive ([facility] [address] )    Family history of breast cancer in sister    BRCA negative     Past Medical History:   Diagnosis Date    Abdominal pain     right mid abd    Breast cancer ([address] ) 01/02/2019    left breast invasive ductal carcinoma    GERD (gastroesophageal reflux disease)     Plantar fasciitis     right foot    Thyroid nodule     enlarged- biopsy benign     Past Surgical History:   Procedure Laterality Date    BREAST BIOPSY Left 01/02/2019    invasive ductal carcinoma    HYSTERECTOMY      age 41    OOPHORECTOMY Bilateral     age 41     [address] STOM DUODENUM/JEJUNUM N/A 2/2/2017    Procedure: RADIAL ENDOSCOPIC U/S;  Surgeon: Michelle Johnson MD;  [facility]: [facility]; Service: Gastroenterology    ID PART EXCIS PLANTAR FASCIA Right 10/20/2016    Procedure:  OPEN RELEASE FASCIA PLANTAR ,CUTTING BOTTOM OF ARCH,INSTEP ;  Surgeon: Leyda Ibrahim DPM;  Location: [facility];  Service: Podiatry    THUMB ARTHROSCOPY      US GUIDED BREAST BIOPSY LEFT COMPLETE Left 1/2/2019     Family History   Problem Relation Age of Onset    Brain cancer Mother 67    Prostate cancer Father 66    Breast cancer Sister 54    Breast cancer additional onset Sister 72    Breast cancer Sister 72    Prostate cancer Brother 62     Social History     Social History    Marital status: Single     Spouse name: N/A    Number of children: N/A    Years of education: N/A     Occupational History    Not on file       Social History Main Topics    Smoking status: Never Smoker    Smokeless tobacco: Never Used    Alcohol use Yes      Comment: social    Drug use: No    Sexual activity: Not on file     Other Topics Concern    Not on file     Social History Narrative    No narrative on file       Current Outpatient Prescriptions:     clobetasol (TEMOVATE) 0 05 % ointment, , Disp: , Rfl:     ergocalciferol (VITAMIN D2) 50,000 units, , Disp: , Rfl:     Multiple Vitamins-Minerals (MULTIVITAL PO), Take by mouth, Disp: , Rfl:     Omega-3-6-9 CAPS, Take by mouth, Disp: , Rfl:     ranitidine (ZANTAC) 150 MG capsule, Take 1 capsule (150 mg total) by mouth every evening, Disp: 30 capsule, Rfl: 5    Current Facility-Administered Medications:    ipratropium (ATROVENT) 0 02 % inhalation solution 0 5 mg, 0 5 mg, Nebulization, 4x Daily, Buddy Mccarthy PA-C, 0 5 mg at 04/21/18 1407  Allergies   Allergen Reactions    Latex Anaphylaxis    Shellfish Allergy Anaphylaxis    Shellfish-Derived Products Anaphylaxis       The following portions of the patient's history were reviewed and updated as appropriate: allergies, current medications, past family history, past medical history, past social history, past surgical history and problem list         Vitals:    01/23/19 1043   BP: 110/70   Pulse: 71   Temp: 97 6 °F (36 4 °C)       Physical Exam   Constitutional: She is oriented to person, place, and time  She appears well-developed and well-nourished  Cardiovascular: Normal heart sounds  Pulmonary/Chest: Breath sounds normal    Neurological: She is alert and oriented to person, place, and time  Psychiatric: She has a normal mood and affect  Results:  Labs:  Stat genetic panel is negative; additional testing pending      I reviewed the above laboratory data  Discussion/Summary:  73-year-old female with a left-sided breast carcinoma  Her stat genetic panel was negative  She met with Dr Joycie Nyhan from Plastic surgery  She would like to proceed with a double mastectomy  She is concerned about recurrence given her family history and experience  I did inform her that based on the current information this does not and appear to be inherited  She does feel strongly about doing the double mastectomy  I therefore counseled her on left mastectomy with lymphatic mapping and sentinel node biopsy as well as a prophylactic mastectomy of the right breast   She will plan for expander/implant reconstruction  All of her questions were answered today  Consent was signed in the office

## 2019-02-08 NOTE — ANESTHESIA POSTPROCEDURE EVALUATION
Post-Op Assessment Note      CV Status:  Stable    Mental Status:  Alert and awake    Hydration Status:  Euvolemic    PONV Controlled:  Controlled    Airway Patency:  Patent    Post Op Vitals Reviewed: Yes          Staff: Anesthesiologist           /76 (02/08/19 1445)    Temp      Pulse 88 (02/08/19 1445)   Resp 20 (02/08/19 1445)    SpO2 98 % (02/08/19 1445)

## 2019-02-08 NOTE — OP NOTE
OPERATIVE REPORT  PATIENT NAME: Mavis Beavers    :  1950  MRN: [id number]  Pt Location: [facility] ROOM 07    SURGERY DATE: 2019    Surgeon(s) and Role:  Panel 1:     * Tyrell James MD - Primary     * Chanel Ramsay PA-C - Assisting        Preop Diagnosis:  BRCA negative [Z13 71]  Malignant neoplasm of lower-inner quadrant of left breast in female, estrogen receptor positive ([address] ) [C50 312, Z17 0]    Post-Op Diagnosis Codes:     * BRCA negative [Z13 71]     * Malignant neoplasm of lower-inner quadrant of left breast in female, estrogen receptor positive ([address] ) [C50 312, Z17 0]    Procedure(s) (LRB):  LEFT breast mastectomy; RIGHT breast prophylactic mastectomy (N/A)  BIOPSY LYMPH NODE SENTINEL (Left)  Injection of blue dye   Use of gamma probe    Specimen(s):  ID Type Source Tests Collected by Time Destination   1 : RIGHT BREAST TISSUE  SUTURE  SHORT  SUPERIOR  LONG  LATERAL Tissue Breast, Right TISSUE EXAM Tyrell James MD 2019 1019    2 : lower inner margin Tissue Breast, Left TISSUE EXAM Tyrell James MD 2019 1113    3 : LEFT BREAST MASS Tissue Breast, Left TISSUE EXAM Tyrell James MD 2019 1114    4 : left breast Tissue Breast, Left TISSUE EXAM Tyrell James MD 2019 1129    5 : LEFT SENTINEL NODE # 1 Tissue Axillary lymph node TISSUE EXAM Tyrell James MD 2019 1130    6 : LEFT AXILLARY SENTINEL NODE # 2 Tissue Axillary lymph node TISSUE EXAM Tyrell James MD 2019 1134    7 : left sentinel node # 3 Tissue Axillary lymph node TISSUE EXAM Tyrell James MD 2019 1137        Estimated Blood Loss:   Minimal    Drains:  Urethral Catheter Non-latex 16 Fr   (Active)   Number of days: 0       Anesthesia Type:   General    Operative Indications:  BRCA negative [Z13 71]  Malignant neoplasm of lower-inner quadrant of left breast in female, estrogen receptor positive ([address] ) [C50 312, Z17 0]      Operative Findings:  n/a    Complications:   None    Procedure and Technique:  Mavis denise a 65-year-old female who presented to my office with a carcinoma of the left breast   This was a unifocal lesion in the lower inner left breast   She had genetic testing secondary to her family history  This was negative  She opted for bilateral mastectomy  She was counseled on the sentinel node on the left side  She met with Dr Britt Aponte to discuss reconstruction and decided to proceed with expander/implant reconstruction  She presented the day of surgery to the Radiology suite and underwent lymphoscintigraphy of the left breast   From there she went to the operating room  She was given general anesthesia  She had preoperative antibiotics  5 cc of Lymphazurin blue dye was injected into the left subareolar plexus for lymphatic mapping  She was prepped and draped in the usual standard fashion  Time-out was performed  Attention was turned to the right breast   An elliptical incision was created in the central portion of the breast to include the nipple-areolar complex  Electrocautery was used to dissect her flaps to the level of the clavicle superiorly, to the medial sternal border medially, to the inframammary fold inferior, and to the edge of the pectoralis and serratus muscles lateral   The breast was excised from the underlying pectoralis including the pectoralis fascia  A hemoclip was used within the axillary tail  The specimen was marked with a short stitch superior and a long stitch lateral and submitted to pathology in formalin  Her wound was irrigated and hemostasis was achieved  Attention was then turned to the left breast   A similar elliptical incision was created in the central portion of the breast to include the nipple-areolar complex    Electrocautery was again used to dissect our flaps to the level of the clavicle superiorly, to the medial sternal border medial, to the inframammary fold inferior, and to the edge of the pectoralis and serratus muscles lateral   The breast was then excised from the underlying pectoralis including the pectoralis fascia  This was marked with a short stitch superior and a long stitch lateral and submitted to pathology in formalin  In the medial aspect in the lower portion of the flap, was an an area of palpable nodularity  This could be biopsy site reaction  However given the location of the tumor, this was excised as well and submitted with a suture on the true margin  Also given the location, an additional margin was excised in the entire lower inner aspect of the flap overlying the area of the palpable nodularity  At the pectoralis edge, the clavipectoral fascia was incised  There was a blue lymphatic channel that traced to an adjacent lymph node that was radioactive in nature  This was excised and submitted to pathology in formalin as sentinel node 1, left axilla  Deep in the mid axilla, there were also 2 adjacent radioactive nodes  These were both excised as well and submitted to pathology in formalin as sentinel nodes 2 and 3, left axilla  Following the removal of the additional lymph nodes, there were no remaining blue-stained, radioactive or palpable nodes  Her wound was then irrigated and hemostasis was achieved  Patient then remained intubated in the operating room under stable condition for the remaining portion of her procedure which will be dictated by Dr Nicolas Ferreira     A physician assistant was required during the procedure for retraction, tissue handling,dissection and suturing      Patient Disposition:  hemodynamically stable    SIGNATURE: Livan Christina MD  DATE: February 8, 2019  TIME: 11:44 AM

## 2019-02-08 NOTE — PLAN OF CARE
Problem: Potential for Falls  Goal: Patient will remain free of falls  INTERVENTIONS:  - Assess patient frequently for physical needs  -  Identify cognitive and physical deficits and behaviors that affect risk of falls    -  [place] fall precautions as indicated by assessment   - Educate patient/family on patient safety including physical limitations  - Instruct patient to call for assistance with activity based on assessment  - Modify environment to reduce risk of injury  - Consider OT/PT consult to assist with strengthening/mobility   Outcome: Progressing

## 2019-02-09 VITALS
TEMPERATURE: 98.1 F | SYSTOLIC BLOOD PRESSURE: 94 MMHG | OXYGEN SATURATION: 97 % | RESPIRATION RATE: 18 BRPM | HEIGHT: 65 IN | BODY MASS INDEX: 32.89 KG/M2 | WEIGHT: 197.4 LBS | DIASTOLIC BLOOD PRESSURE: 51 MMHG | HEART RATE: 58 BPM

## 2019-02-09 RX ORDER — HYDROMORPHONE HCL/PF 1 MG/ML
0.5 SYRINGE (ML) INJECTION
Status: DISCONTINUED | OUTPATIENT
Start: 2019-02-09 | End: 2019-02-09 | Stop reason: HOSPADM

## 2019-02-09 RX ADMIN — OXYCODONE HYDROCHLORIDE AND ACETAMINOPHEN 2 TABLET: 5; 325 TABLET ORAL at 09:24

## 2019-02-09 RX ADMIN — HYDROMORPHONE HYDROCHLORIDE 0.5 MG: 1 INJECTION, SOLUTION INTRAMUSCULAR; INTRAVENOUS; SUBCUTANEOUS at 01:41

## 2019-02-09 RX ADMIN — HYDROMORPHONE HYDROCHLORIDE 0.5 MG: 1 INJECTION, SOLUTION INTRAMUSCULAR; INTRAVENOUS; SUBCUTANEOUS at 05:36

## 2019-02-09 RX ADMIN — SODIUM CHLORIDE, SODIUM LACTATE, POTASSIUM CHLORIDE, AND CALCIUM CHLORIDE 100 ML/HR: .6; .31; .03; .02 INJECTION, SOLUTION INTRAVENOUS at 01:45

## 2019-02-09 RX ADMIN — PROMETHAZINE HYDROCHLORIDE 25 MG: 25 TABLET ORAL at 13:19

## 2019-02-09 RX ADMIN — SODIUM CHLORIDE, SODIUM LACTATE, POTASSIUM CHLORIDE, AND CALCIUM CHLORIDE 100 ML/HR: .6; .31; .03; .02 INJECTION, SOLUTION INTRAVENOUS at 11:26

## 2019-02-09 RX ADMIN — CEFAZOLIN SODIUM 2000 MG: 10 INJECTION, POWDER, FOR SOLUTION INTRAVENOUS at 05:37

## 2019-02-09 RX ADMIN — CEFAZOLIN SODIUM 2000 MG: 10 INJECTION, POWDER, FOR SOLUTION INTRAVENOUS at 12:00

## 2019-02-09 RX ADMIN — ONDANSETRON 4 MG: 2 INJECTION INTRAMUSCULAR; INTRAVENOUS at 01:42

## 2019-02-09 RX ADMIN — ENOXAPARIN SODIUM 40 MG: 40 INJECTION SUBCUTANEOUS at 09:25

## 2019-02-09 RX ADMIN — ACETAMINOPHEN 650 MG: 325 TABLET, FILM COATED ORAL at 05:36

## 2019-02-09 RX ADMIN — ACETAMINOPHEN 650 MG: 325 TABLET, FILM COATED ORAL at 01:26

## 2019-02-09 RX ADMIN — ONDANSETRON 4 MG: 2 INJECTION INTRAMUSCULAR; INTRAVENOUS at 09:25

## 2019-02-09 RX ADMIN — ACETAMINOPHEN 650 MG: 325 TABLET, FILM COATED ORAL at 09:25

## 2019-02-09 NOTE — RESPIRATORY THERAPY NOTE
Went to administer scheduled breathing treatment for patient  Patient stated that she does not take breathing treatments and does not want any during her hospital stay  Will discontinue treatments       Audrey Dallas, RT

## 2019-02-09 NOTE — PROGRESS NOTES
Progress Note - General Surgery   Monica Flaherty 79 y o  female MRN: [id number]  Unit/Bed#: E2 -01 Encounter: [id number]        Subjective/Objective   Chief Complaint:     Subjective: Pain controlled, ambulating, voiding, tolerating po    Objective: incisions cdi, skin well perfused  No hematoma, or seroma    Assessment:  B/l mastectomy / te recon    Plan:  Instructions explained  F/u as outpatient        Blood pressure 94/51, pulse 58, temperature 98 1 °F (36 7 °C), temperature source Tympanic, resp  rate 18, height 5' 5" (1 651 m), weight 89 5 kg (197 lb 6 4 oz), SpO2 97 %  ,Body mass index is 32 85 kg/m²        Intake/Output Summary (Last 24 hours) at 02/09/19 1440  Last data filed at 02/09/19 1301   Gross per 24 hour   Intake                0 ml   Output             1905 ml   Net            -1905 ml       Invasive Devices     Peripheral Intravenous Line            Peripheral IV 10/24/16 Right Antecubital 838 days    Peripheral IV 02/08/19 Right Hand 1 day          Drain            Closed/Suction Drain Left;Lateral Breast Bulb 15 Fr  1 day    Closed/Suction Drain Left;Medial Breast 15 Fr  1 day    Closed/Suction Drain Right;Lateral Breast 15 Fr  1 day    Closed/Suction Drain Right;Medial Breast 15 Fr  1 day                      Mao Jorgensen MD  2/9/2019  2:40 PM

## 2019-02-09 NOTE — UTILIZATION REVIEW
Initial Clinical Review    Age/Sex: 79 y o  female     Surgery Date:    2/8/2019    Procedure: Preop Diagnosis:  BRCA negative [Z13 71]  Malignant neoplasm of lower-inner quadrant of left breast in female, estrogen receptor positive ([facility] [address] ) [C50 312, Z17 0]     Post-Op Diagnosis Codes:     * BRCA negative [Z13 71]     * Malignant neoplasm of lower-inner quadrant of left breast in female, estrogen receptor positive ([facility] [address] ) [C50 312, Z17 0]     Procedure(s) (LRB):  LEFT breast mastectomy; RIGHT breast prophylactic mastectomy (N/A)  BIOPSY LYMPH NODE SENTINEL (Left)  Injection of blue dye   Use of gamma probe  INSERTION/PLACEMENT TISSUE EXPANDER (EXCHANGE) (Bilateral)  RECONSTRUCTION BREAST W/ IMPLANT (Bilateral)       Anesthesia:    general    Admission Orders: Date/Time/Statement: 2/8/19 @ 1437   Orders Placed This Encounter   Procedures    Inpatient Admission     Standing Status:   Standing     Number of Occurrences:   1     Order Specific Question:   Admitting Physician     Answer:   Devante Pearson [[id number]]     Order Specific Question:   Level of Care     Answer:   Med Surg [16]     Order Specific Question:   Estimated length of stay     Answer:   Inpatient Only Surgery     Vital Signs: BP 94/51 (BP Location: Right arm)   Pulse 58   Temp 98 1 °F (36 7 °C) (Tympanic)   Resp 18   Ht 5' 5" (1 651 m)   Wt 89 5 kg (197 lb 6 4 oz)   SpO2 97%   BMI 32 85 kg/m²   Diet:        Diet Orders            Start     Ordered    02/08/19 9655  Diet Hector/CHO Controlled; Consistent Carbohydrate Diet Level 2 (5 carb servings/75 grams CHO/meal)  Diet effective now     Question Answer Comment   Diet Type Hector/CHO Controlled    Hector/CHO Controlled Consistent Carbohydrate Diet Level 2 (5 carb servings/75 grams CHO/meal)    RD to adjust diet per protocol?  Yes        02/08/19 4106        Mobility:   As sara    DVT Prophylaxis:    SCD'S    Pain Control:      SQ lovenox  Daily  IV  Ancef  Q 8 hrs  pepcid  Daily  atrovent inhalation  QID  IVF 100/hr  IV  Dilaudid  Prn  (  x2  2/9 thus far)  IV  zofran PRN   (  x1  2/8 and  X 2  2/9  Thus far)      Network Utilization Review Department  Phone: [phone]; Fax [phone]  [email or web address]  ATTENTION: Please call with any questions or concerns to [phone]  and carefully listen to the prompts so that you are directed to the right person  Send all requests for admission clinical reviews, approved or denied determinations and any other requests to fax [phone]   All voicemails are confidential

## 2019-02-09 NOTE — SOCIAL WORK
CM notified that pt needing VNA  Referral sent to [facility] and was accepted  RN notified

## 2019-02-09 NOTE — DISCHARGE INSTRUCTIONS
[address], [address] [facility], [address] [facility], [facility], [address] [facility] /[facility]  [facility]       No heavy lifting >20 pounds    Do not raise hands above head    Consider using button up shirts so you don't have to raise your hands above your head to put the shirt on    Wear the surgical bra at all times except the shower   If the bra is tight and is leaving marks on the skin unclasp the bottom clasps of the bra    No ice or heating pack to chest    Ok to shower    Measure drain output three times per day    Keep the drains secured below the level of the breast  Securing at the waist level is best    No bathing    Leave the transparent and [place] dressing over the drain site in place until you're seen in the office    Apply antibiotic ointment to drain site three times/day if the transparent and [place] dressing falls off    Do not lay on stomach    Use a necklace or lanyard in the shower to support drains    No smoking    No pushing or pulling    Call the office for an appointment in 5-10 days - [phone]

## 2019-02-09 NOTE — DISCHARGE SUMMARY
Discharge Summary - Medical Gladstone Litten 79 y o  female MRN: [id number]    [facility] [place] [address] MED SURG Room / Bed: [facility] /E2 -* Encounter: [id number]    BRIEF OVERVIEW  Admitting Provider: Marcell Penny MD  Discharge Provider: Mary Kate Barfield MD  Primary Care Physician at Discharge: Jeffery Ang [phone]    Discharge To: Home      Admission Date: 2/8/2019     Discharge Date: No discharge date for patient encounter  Code Status: Level 1 - Full Code  Advance Directive and Living Will: <no information>  Power of :        Primary Discharge Diagnosis  Principal Problem:    Malignant neoplasm of lower-inner quadrant of left breast in female, estrogen receptor positive ([facility] [address] )  Active Problems:    BRCA negative  Resolved Problems:    * No resolved hospital problems   *        Discharge Disposition: [address] [place] [address]    Presenting Problem/History of Present Illness  Malignant neoplasm of lower-inner quadrant of left breast in female, estrogen receptor positive [facility])      Discharge Condition: stable    Patient tolerated the procedure well, recovered and was discharged home in stable condition    Marcell Penny MD  2/9/2019  2:43 PM

## 2019-02-09 NOTE — NURSING NOTE
Pt discharged home with VNA  Has 4 RADAMES drains  Educated on how to use and empty these drains  IV discontinued and intact  No prescriptions were given to the pt, because they were already filled presurgery  No further questions at this time

## 2019-02-13 ENCOUNTER — TELEPHONE (OUTPATIENT)
Dept: SURGICAL ONCOLOGY | Facility: CLINIC | Age: 69
End: 2019-02-13

## 2019-02-13 NOTE — UTILIZATION REVIEW
Notification of Discharge  This is a Notification of Discharge from our facility [address]  Please be advised that this patient has been discharge from our facility  Below you will find the admission and discharge date and time including the patients disposition  PRESENTATION DATE: 2/8/2019  6:18 AM  IP ADMISSION DATE: 2/8/19 1437  DISCHARGE DATE: 2/9/2019  3:47 PM  DISPOSITION: Home with [address] Utilization Review Department  Phone: [phone]; Fax [phone]  [email or web address]  ATTENTION: Please call with any questions or concerns to [phone]  and carefully listen to the prompts so that you are directed to the right person  Send all requests for admission clinical reviews, approved or denied determinations and any other requests to fax [phone]   All voicemails are confidential

## 2019-02-13 NOTE — TELEPHONE ENCOUNTER
Breast Nurse Navigator    Called patient to follow up on post op status and assess for any questions concerns or needs, no answer, left voicemail message requesting return call  Left contact information and availability, awaiting return call

## 2019-02-13 NOTE — TELEPHONE ENCOUNTER
Breast Nurse Navigator       Spoke to patient, she is reporting surgical sites and drains x 4 with no issues or concerns  Is having moderate to sometimes severe pain to mid to upper rib cage area, well controlled overall with pain medication (Vicodin) and rest, also took Celebrex x 1  Patient also reporting constipation which she is managing with stool softener twice a day  Had a small bowel movement today  Instructed if stool softener not effective if using 2 tabs twice a day can add in a dose of Miralax with stool softener and maintain bowel regimen as needed until no longer taking narcotic pain med, eating better or bowel movements return to normal frequency and consistency  Patient reports VNA coming again on Friday to check incision and drains  Patient asking question about reconstruction notes on discharge instruction regarding using donated skin graft that she is unsure about, per patient VNA nurse has already call Dr Sylvia Coffman to request he clarify this with patient  Patient does have upcoming appointment with Dr Sylvia Coffman, advised to discuss during office visit  Scheduled for and aware of post op appointment with Dr Marquez Robins on 02/26/2019  Pt denies any needs or additional questions at this time  She does have my contact information and aware to call me as needed  I will follow up with patient as needed

## 2019-02-26 ENCOUNTER — OFFICE VISIT (OUTPATIENT)
Dept: SURGICAL ONCOLOGY | Facility: CLINIC | Age: 69
End: 2019-02-26

## 2019-02-26 VITALS
DIASTOLIC BLOOD PRESSURE: 70 MMHG | SYSTOLIC BLOOD PRESSURE: 100 MMHG | HEART RATE: 80 BPM | RESPIRATION RATE: 14 BRPM | TEMPERATURE: 97.8 F | WEIGHT: 190.6 LBS | HEIGHT: 65 IN | BODY MASS INDEX: 31.75 KG/M2

## 2019-02-26 DIAGNOSIS — Z17.0 MALIGNANT NEOPLASM OF LOWER-INNER QUADRANT OF LEFT BREAST IN FEMALE, ESTROGEN RECEPTOR POSITIVE (HCC): Primary | ICD-10-CM

## 2019-02-26 DIAGNOSIS — C50.312 MALIGNANT NEOPLASM OF LOWER-INNER QUADRANT OF LEFT BREAST IN FEMALE, ESTROGEN RECEPTOR POSITIVE (HCC): Primary | ICD-10-CM

## 2019-02-26 PROCEDURE — 99024 POSTOP FOLLOW-UP VISIT: CPT | Performed by: SURGERY

## 2019-02-26 RX ORDER — HYDROCODONE BITARTRATE AND ACETAMINOPHEN 5; 325 MG/1; MG/1
TABLET ORAL AS NEEDED
Status: ON HOLD | COMMUNITY
Start: 2019-01-31 | End: 2019-09-27 | Stop reason: ALTCHOICE

## 2019-02-26 RX ORDER — CELECOXIB 200 MG/1
CAPSULE ORAL DAILY PRN
COMMUNITY
Start: 2019-01-31 | End: 2020-07-31

## 2019-02-26 NOTE — PROGRESS NOTES
79 y o  female is here today s/p bilateral mastectomy  She reports no concerns  Physical Exam   Constitutional: She is oriented to person, place, and time  She appears well-developed and well-nourished  Pulmonary/Chest:   Bilateral mastectomy sites healing well with no signs of infection       Neurological: She is alert and oriented to person, place, and time  Psychiatric: She has a normal mood and affect  Data:       Stagin 2 cm invasive ductal  Tumor grade three  LVI absent  Margins clean  Estrogen receptor and progesterone receptor status positive  HER2 status and test method negative    Lymph node assessment/status 0/3      Neoadjuvant therapy: n/a  Stage: IA      Diagnoses and all orders for this visit:    Malignant neoplasm of lower-inner quadrant of left breast in female, estrogen receptor positive [facility]  -     Ambulatory referral to Hematology / Oncology; Future  -     MISCELLANEOUS LAB TEST; Future    Other orders  -     celecoxib (CeleBREX) 200 mg capsule  -     HYDROcodone-acetaminophen (NORCO) 5-325 mg per tablet  -     mupirocin (BACTROBAN) 2 % ointment        Assessment/Plan:  51-year-old female status post bilateral mastectomy for a left-sided carcinoma  This is a stage IA  I discussed these findings with her  We also discussed the role of adjuvant medical therapy  She is requesting to see Dr Jonnie Goel from Medical Oncology  I did review the role of genomic testing  She would like to proceed with this  I will order an Oncotype Dx for her  I will make the arrangements for her consult with Dr Jonnie Goel  I will otherwise see her again in three months for a clinical exam or sooner should the need arise  She continues to follow with Dr Teddy Hodge from plastics as well

## 2019-03-28 ENCOUNTER — DOCUMENTATION (OUTPATIENT)
Dept: INFUSION CENTER | Facility: HOSPITAL | Age: 69
End: 2019-03-28

## 2019-03-28 ENCOUNTER — CONSULT (OUTPATIENT)
Dept: HEMATOLOGY ONCOLOGY | Facility: CLINIC | Age: 69
End: 2019-03-28
Payer: COMMERCIAL

## 2019-03-28 VITALS
TEMPERATURE: 98.7 F | SYSTOLIC BLOOD PRESSURE: 112 MMHG | BODY MASS INDEX: 32.22 KG/M2 | RESPIRATION RATE: 18 BRPM | DIASTOLIC BLOOD PRESSURE: 74 MMHG | OXYGEN SATURATION: 97 % | WEIGHT: 193.4 LBS | HEART RATE: 72 BPM | HEIGHT: 65 IN

## 2019-03-28 DIAGNOSIS — C50.312 MALIGNANT NEOPLASM OF LOWER-INNER QUADRANT OF LEFT BREAST IN FEMALE, ESTROGEN RECEPTOR POSITIVE (HCC): ICD-10-CM

## 2019-03-28 DIAGNOSIS — Z17.0 MALIGNANT NEOPLASM OF LOWER-INNER QUADRANT OF LEFT BREAST IN FEMALE, ESTROGEN RECEPTOR POSITIVE (HCC): Primary | ICD-10-CM

## 2019-03-28 DIAGNOSIS — T45.1X5A CHEMOTHERAPY-INDUCED NAUSEA: ICD-10-CM

## 2019-03-28 DIAGNOSIS — Z17.0 MALIGNANT NEOPLASM OF LOWER-INNER QUADRANT OF LEFT BREAST IN FEMALE, ESTROGEN RECEPTOR POSITIVE (HCC): ICD-10-CM

## 2019-03-28 DIAGNOSIS — R11.0 CHEMOTHERAPY-INDUCED NAUSEA: ICD-10-CM

## 2019-03-28 DIAGNOSIS — C50.312 MALIGNANT NEOPLASM OF LOWER-INNER QUADRANT OF LEFT BREAST IN FEMALE, ESTROGEN RECEPTOR POSITIVE (HCC): Primary | ICD-10-CM

## 2019-03-28 PROCEDURE — 99245 OFF/OP CONSLTJ NEW/EST HI 55: CPT | Performed by: INTERNAL MEDICINE

## 2019-03-28 RX ORDER — DEXAMETHASONE 4 MG/1
4 TABLET ORAL 2 TIMES DAILY WITH MEALS
Qty: 40 TABLET | Refills: 1 | Status: SHIPPED | OUTPATIENT
Start: 2019-03-28 | End: 2019-09-25 | Stop reason: ALTCHOICE

## 2019-03-28 RX ORDER — ONDANSETRON HYDROCHLORIDE 8 MG/1
8 TABLET, FILM COATED ORAL EVERY 8 HOURS PRN
Qty: 30 TABLET | Refills: 2 | Status: SHIPPED | OUTPATIENT
Start: 2019-03-28 | End: 2019-09-25 | Stop reason: ALTCHOICE

## 2019-03-28 NOTE — SOCIAL WORK
MSW met with pt and her daughter in the Med Onc office on this day  The pt completed a Distress Thermometer and self scored a 5 to indicate her level of stress  The pt marked off insurance/financial, fears, nervousness and sadness as her psychosocial stressors  The pt marked off 3 out of 21 physical stressors  The pt is currently on short term disability through her work and plans to receive 2 chemo treatments while still out on disability  The pt will have to return to work when she has her remaining two treatments and she is hopeful that this will go well for her  The pt lives with her daughter and grandson and they appear to have a close, loving relationship  The pt shared that her 10year old grandson has had a positive effect on her emotional status through this journey  Pt did become tearful as she spoke about her impending hair loss  MSW offered significant emotional support and acknowledged the emotional journey this can and will be for her  MSW provided resources for [employer] and the [facility]  [facility]  Pt was planning on going to the [facility] on this day  MSW provided contact information and encouraged the pt to call as needed  This MSW will plan to meet the pt at her first chemo treatment and answered her questions with regards to what she can bring on her first day of chemo

## 2019-03-28 NOTE — LETTER
March 28, 2019     Kenyetta Schneider MD  [address]  [address] [facility],[address]    Patient: Magy Sharif   YOB: 1950   Date of Visit: 3/28/2019       Dear Dr Gutierrez Im: Thank you for referring Magy Sharif to me for evaluation  Below are my notes for this consultation  If you have questions, please do not hesitate to call me  I look forward to following your patient along with you  Sincerely,        Jumana Mei MD        CC: MD Jumana Brink MD  3/28/2019 11:11 AM  Sign at close encounter  Consultation - [facility] 79 y o  female MRN: [id number]  Unit/Bed#:  Encounter: [id number]    Referring physician:  Dr Matt Gil  Reason for Consult:  Left breast cancer  HPI: Magy Sharif is a 79y o  year old female  She is here with her daughter   She has no symptoms  On routine mammography she was found to have abnormality in left breast   On 01/02/2019 patient had ultrasound-guided biopsy of left breast abnormality in the lower inner quadrant of left breast that showed invasive left breast cancer, ER 90-95%, WI 40-45% and HER-2 0 (negative)  On 02/08/2019 patient had bilateral mastectomies and sentinel lymph node sampling  Tumor size 1 2 cm  Grade 3  Positive lymphovascular invasion  Neg  3 sentinel lymph nodes  Oncotype DX 25  Patient is recovering from reconstruction surgery  Patient's father and 2 sisters had breast cancers  Patient states she tested negative for genetic mutation, BRCA  Patient already had GRETCHEN and BSO several years ago for fibroids      ROS:  03/28/19 Reviewed 13 systems:  Presently no headaches, seizures, dizziness, diplopia, dysphagia, hoarseness, chest pain, palpitations, shortness of breath, cough, hemoptysis, abdominal pain, nausea, vomiting, change in bowel habits, melena, hematuria, fever, chills, bleeding, bone pains, skin rash, weight loss, arthritic symptoms,  tiredness , weakness, numbness, claudication and gait problem  No frequent infections  Not unusually sensitive to heat or cold  No swelling of the ankles  No swollen glands  Patient is anxious  No GYN symptoms Other symptoms are in HPI        Historical Information   Past Medical History:   Diagnosis Date    Abdominal pain     right mid abd    Breast cancer ([address] ) 01/02/2019    left breast invasive ductal carcinoma    GERD (gastroesophageal reflux disease)     Plantar fasciitis     right foot    Thyroid nodule     enlarged- biopsy benign    Vitamin D deficiency      Past Surgical History:   Procedure Laterality Date    BREAST BIOPSY Left 01/02/2019    invasive ductal carcinoma    COLONOSCOPY      HYSTERECTOMY      age 41    OOPHORECTOMY Bilateral     age 41    MT BIOPSY/EXCISION, LYMPH NODE(S) Left 2/8/2019    Procedure: BIOPSY LYMPH NODE SENTINEL;  Surgeon: Nanette Calvillo MD;  [facility];  Service: Surgical Oncology    MT BREAST RECONSTRUC W TISS EXPANDR Bilateral 2/8/2019    Procedure: INSERTION/PLACEMENT TISSUE EXPANDER (EXCHANGE); Surgeon: Veronica Dorsey MD;  [facility];  Service: Plastics    MT EDG US EXAM SURGICAL ALTER STOM DUODENUM/JEJUNUM N/A 2/2/2017    Procedure: RADIAL ENDOSCOPIC U/S;  Surgeon: Evelin Poole MD;  [facility];   Service: Gastroenterology    MT IMPLNT BIO IMPLNT FOR SOFT TISSUE REINFORCEMENT Bilateral 2/8/2019    Procedure: RECONSTRUCTION BREAST W/ IMPLANT;  Surgeon: Veronica Dorsey MD;  [facility];  Service: Plastics    MT MASTECTOMY, SIMPLE, COMPLETE N/A 2/8/2019    Procedure: LEFT breast mastectomy; RIGHT breast prophylactic mastectomy;  Surgeon: Nanette Calvillo MD;  [facility];  Service: Surgical Oncology    MT PART EXCIS PLANTAR FASCIA Right 10/20/2016    Procedure:  OPEN RELEASE FASCIA PLANTAR ,CUTTING BOTTOM OF ARCH,INSTEP ;  Surgeon: Sydnee Lara DPM;  [facility];  Service: Podiatry    THUMB ARTHROSCOPY      US GUIDED BREAST BIOPSY LEFT COMPLETE Left 1/2/2019     Social History   Social History     Substance and Sexual Activity   Alcohol Use Yes    Comment: social     Social History     Substance and Sexual Activity   Drug Use No     Social History     Tobacco Use   Smoking Status Never Smoker   Smokeless Tobacco Never Used     Family History:   Family History   Problem Relation Age of Onset    Brain cancer Mother 67    Prostate cancer Father 66    Breast cancer Sister 54    Breast cancer additional onset Sister 72    Breast cancer Sister 72    Prostate cancer Brother 62         Current Outpatient Medications:     celecoxib (CeleBREX) 200 mg capsule, daily as needed , Disp: , Rfl:     clobetasol (TEMOVATE) 0 05 % ointment, , Disp: , Rfl:     ergocalciferol (VITAMIN D2) 50,000 units, 50,000 Units every 28 days  , Disp: , Rfl:     HYDROcodone-acetaminophen (NORCO) 5-325 mg per tablet, as needed , Disp: , Rfl:     Multiple Vitamins-Minerals (MULTIVITAL PO), Take by mouth, Disp: , Rfl:     mupirocin (BACTROBAN) 2 % ointment, , Disp: , Rfl:     ranitidine (ZANTAC) 150 MG capsule, Take 1 capsule (150 mg total) by mouth every evening, Disp: 30 capsule, Rfl: 5    Current Facility-Administered Medications:     ipratropium (ATROVENT) 0 02 % inhalation solution 0 5 mg, 0 5 mg, Nebulization, 4x Daily, Radha Andrade PA-C, 0 5 mg at 04/21/18 1407    Allergies   Allergen Reactions    Latex Anaphylaxis    Shellfish Allergy Anaphylaxis    Shellfish-Derived Products Anaphylaxis     @ ROS@  Physical Exam:  Vitals:    03/28/19 0951   BP: 112/74   BP Location: Right arm   Patient Position: Sitting   Cuff Size: Adult   Pulse: 72   Resp: 18   Temp: 98 7 °F (37 1 °C)   SpO2: 97%   Weight: 87 7 kg (193 lb 6 4 oz)   Height: 5' 5" (1 651 m)     Alert, oriented, not in distress, no icterus, no oral thrush, no palpable neck mass, clear lung fields, regular heart rate, abdomen  soft and non tender, no palpable abdominal mass, no ascites, no edema of ankles, no calf tenderness, no focal neurological deficit, no skin rash, no palpable lymphadenopathy in the neck and axillary areas, good arterial pulses, no clubbing  Patient is anxious  Reconstructed both breasts  No lymphedema  Performance status 0  Lab Results: I have reviewed all pertinent labs  LABS:  Results for orders placed or performed during the hospital encounter of 02/08/19   Tissue Exam   Result Value Ref Range    Case Report       Surgical Pathology Report                         Case: [id number]                                   Authorizing Provider:  Bello Cade MD           Collected:           02/08/2019 1019              Ordering Location:     [address] [place] [address]        Received:            02/08/2019 [address] [place] [address] Operating Room                                                     Pathologist:           Rae Freeman MD                                                              Specimens:   A) - Breast, Right, RIGHT BREAST TISSUE  SUTURE  SHORT  SUPERIOR  LONG  LATERAL                     B) - Breast, Left, LOWER INNER MARGIN SUTURE TRUE MARGIN                                            C) - Breast, Left, LEFT BREAST MASS                                                                 D) - Breast, Left, LEFT BREAST TISSUE SHORT STITCH - SUPERIOR  LONG SUTRE LATERAL                   E) - Axillary lymph node, LEFT SENTINEL NODE # 1                                                     F) - Axillary lymph node, LEFT AXILLARY SENTINEL NODE # 2                                           G) - Axillary lymph node, left sentinel node # 3                                           Final Diagnosis       A  Breast, Right, right breast tissue  , suture short superior , long lateral , mastectomy  -Fibrocystic changes with microcalcifications  -Sclerosing adenosis and sclerotic ducts  -Benign overlying skin  -No evidence of malignancy seen    B   Breast, Left, left breast lower inner margin , suture true margin ;excision:  -Few foci of ductal carcinoma in situ, solid type, range from 2- 5 mm  -The final inked surgical margin is negative for tumor by 2 mm  C  Breast, Left, LEFT BREAST MASS ,resection:  - Invasive mammary carcinoma, NOS/ invasive ductal carcinoma,1 2 x 0 8 x 0 7 cm    -Few foci of ductal carcinoma in situ, low to intermediate nuclear grade, cribriform and solid types with calcifications, represent ~10% of total tumor  -Changes consistent with previous biopsy site    D  Breast, Left, LEFT BREAST TISSUE SHORT STITCH - SUPERIOR  LONG SUTRE LATERAL:  -Fibrocystic changes with microcalcifications  -Sclerosing adenosis and sclerotic ducts with microcalcifications  -Benign overlying skin and  Nipple:  -No evidence of malignancy seen    E  Axillary lymph node,  "left sentinel node 1 lymphadenectomy:    -Single lymph node, negative for metastatic carcinoma by three -leveled H& E and Pankeratin (MCK) stained slides (0/1)  F  Axillary lymph node, LEFT AXILLARY SENTINEL NODE # 2 lymphadenectomy:    -Single lymph node, negative for metastatic carcinoma by three -leveled H& E and Pankeratin (MCK) stained slides (0/1)  G  Axillary lymph node, left sentinel node # 3 lymphadenectomy:    -Single lymph node, negative for metastatic carcinoma by three -leveled H& E and Pankeratin (MCK) stained slides (0/1)  Tumor staging protocol:  INVASIVE CARCINOMA OF THE BREAST TUMOR STAGING SUMMARY (includes this case and prior biopsy [id number])  1  Specimen Identification     - Procedure:       Left mastectomy and separately submitted additional margin      - Lymph Node Sampling:  three lymph nodes submitted in specimens E-G     - Specimen Laterality:  Left Breast     - Tumor Site:               Lower Medial area of left female breast   2  Invasive Tumor:            - Histologic Type:       Invasive mammary carcinoma, NOS/ ductal carcinoma     - Tumor Size (pT1c)  tumor measures  1 2 x 0 8 x 0 7 cm      -  Grade (G3): Warren Histologic Score; Grade  III of III  * Glandular (Acinar) / Tubular Differentiation: 3 of 3       * Nuclear Pleomorphism:                                3 of 3       * Mitotic Rate:                                                 2 of 3     - Tumor Focality: single tumor mass     - Macroscopic and Microscopic Extent of Tumor:       -- Skin:                   not involved by tumor       -- Nipple:               not involved by tumor       -- Skeletal muscle: not submitted for evaluation  3  Lymph-vascular invasion:   present  4  Dermal lymph-vascular invasion:  not identified  5  In situ tumor     - Ductal carcinoma in Situ (DCIS):  present       * Size (extent):  Multiple foci of ductal carcinoma in situ, around 10% of total tumor       * Architectural Pattern(s):  cribriform and solid types       * Nuclear Grade:              low to intermediate nuclear grade       * Necrosis:                       Present, focal     - Lobular Carcinoma In Situ (LCIS): not identified   6  Margins:     - Invasive Carcinoma:         - All surgical resection margins are negative for  Invasive Carcinoma by more than 1 0 cm        -The final separately submitted additional margin ( specimen B)  is negative for  Invasive Carcinoma  - DCIS:         -All surgical resection margins are negative for  DCIS by 2 mm (specimen B)   7  Lymph nodes (pN0):     - Number of lymph nodes with macrometastases (>2 mm): 0     - Number of lymph nodes with micrometastases (>0 2 mm to 2 mm and/or >200 cells): 0     - Number of lymph nodes with isolated tumor cells (<0 2 mm and <200 cells): 0     - Size of largest metastatic deposit (mm):  N/A     - Extranodal extension:  N/A     - Number of lymph nodes examined: 3     - Number of sentinel lymph nodes examined: 3     - Method of evaluation of sentinel lymph nodes:  routine   8   Treatment Effect, Response to Presurgical (Neoadjuvant) Therapy:     - In the Breast: N/A     - In the Lymph nodes: N/A   9  Additional Pathologic Findings:      -Fibrocystic changes with microcalcifications      -Sclerosing adenosis and sclerotic ducts with microcalcifications  10  Microcalcifications: present, associated with DCIS and foci of  fibrocystic changes   11  Ancillary Studies:        -  ER/OK/Her-2/kamran: reported in the patient previous core biopsy ( [id number]) as follows:        ER:           90-95%         Positive        OK:           40-45%         Positive       Her-2/kamran   0                  Negative     - Repeat HER2 testing (2013 ASCO/CAP Recommendations): Not indicated  - Best representative tumor block: C2       -- Sufficient tumor present for          Agendia Mammaprint/Blueprint (1 cm2 of invasive tumor in aggregate): Yes  MI Profile/Foundation One (at least 5 x 5 mm of tumor): Yes  12  Clinical History: Invasive mammary carcinoma, NOS/ invasive ductal carcinoma  ([id number])   13  8th ED AJCC Stage:  at least Stage IA ( pT1c, pN0 , pMx, G3)  Note       Intradepartmental consultation is in agreement  Case discussed with Dr Meena Lopez on 2/13/2019 at 2:20 PM and a copy of this report was faxed to his office at the same time  Interpretation performed at [address]      Additional Information       All controls performed with the immunohistochemical stains reported above reacted appropriately  These tests were developed and their performance characteristics determined by [facility] or [facility]  They may not be cleared or approved by the [place]  Food and Drug Administration  The FDA has determined that such clearance or approval is not necessary  These tests are used for clinical purposes  They should not be regarded as investigational or for research   This laboratory has been approved by CLIA 88, designated as a high-complexity laboratory and is qualified to perform these tests         Gross Description       A  The specimen is received in formalin, labeled with the patient's name and hospital number, and is designated "right breast tissue  The specimen consists of a mastectomy measuring 21 x 18 x 6 cm  The specimen is marked by 2 sutures which according to the requisition slip short is superior and long is lateral  The specimen is inked as follows:  upper outer quadrant-yellow, upper inner quadrant-blue, lower inner quadrant-orange, lower outer quadrant-green, posterior-black  An unremarkable tan ellipse of skin measuring 11 x 3 cm is present which contains a 3 2 cm nipple-areolar complex containing a 1 4 cm not inverted nipple  The breast is sectioned revealing yellow fatty and white fibrous cut surfaces  No discrete lesions are seen or palpated  No lymph nodes are identified  Representative sections  Ten cassettes  One:  Random section of skin  Two:  Nipple/areola  3-4:  Random sections, upper outer quadrant  5-6:  Random sections, lower outer quadrant  7-8:  Random sections, upper inner quadrant  9-10:  Random sections, lower inner quadrant    B  The specimen is received in formalin, labeled with the patient's name and hospital number, and is designated "left breast lower inner margin  The specimen consists of a fibrofatty tissue fragment measuring 8 x 7 4 x 1 6 cm in greatest dimension  The specimen is marked by 1 suture which according to the requisition slip indicates the true margin  The true margin is inked blue and the remaining surfaces are inked black  The specimen is sectioned revealing unremarkable yellow fatty cut surfaces throughout the entire specimen  Entirely submitted  Thirty-nine cassettes  Sequentially submitted  One piece in each cassette  C  The specimen is received in formalin, labeled with the patient's name and hospital number, and is designated "left breast mass    The specimen consists of a fibrofatty tissue fragment measuring 4 8 x 3 7 x 1 5 cm  The specimen is marked by 1 suture which according to the requisition slip indicates the true margin  The true margin is inked blue and the remaining surfaces are inked black  The specimen is sectioned revealing a well-circumscribed tan lesion measuring 1 2 x 0 8 x 0 7 cm  A gray metallic V shaped biopsy marker is identified within the lesion  The lesion extends to within less than 1 mm of the true margin  The lesion is entirely submitted for histologic examination in 4 cassettes, sequentially, with both inked surfaces present in each cassette  D  The specimen is received in formalin, labeled with the patient's name and hospital number, and is designated "left breast tissue  The specimen consists of a mastectomy measuring 24 x 19 x 6 cm  The specimen is marked by 2 sutures which according to the requisition slip short is superior and long is lateral  The specimen is inked as follows:  upper outer quadrant-yellow, upper inner quadrant-blue, lower inner quadrant-orange, lower outer quadrant-green, posterior-black  An unremarkable tan ellipse of skin measuring 10 3 x 3 4 cm is present which contains a 3 2 cm nipple-areolar complex containing a 1 2 cm not inverted nipple  The breast is sectioned revealing yellow fatty and white fibrous cut surfaces  Focal areas of vague nodularity are identified  No distinct/discrete measurable lesions are seen  Representative sections  Thirteen cassettes  One:  Random section of skin  Two:  Nipple/areola  3-4:  Random sections, upper outer quadrant  5-7:  Random sections, lower outer quadrant to include areas of vague nodularity  8-9:  Random sections, upper inner quadrant  10-13:  Random sections, lower inner quadrant to include areas of vague nodularity    E  The specimen is received in formalin, labeled with the patient's name and hospital number, and is designated "left sentinel node 1    The specimen consists of a fibrofatty tissue fragment measuring 1 6 cm  Examination of the fragment reveals a single tan density suggestive of a lymph node measuring 0 6 cm  The density is bisected and submitted for histologic examination in 1 cassette  F  The specimen is received in formalin, labeled with the patient's name and hospital number, and is designated "left axillary sentinel node 2  The specimen consists of a fibrofatty tissue fragment measuring 4 7 cm  Examination of the fragment reveals 2 densities suggestive of lymph nodes measuring 1 2 and 2 3 cm  The densities are submitted for histologic examination in 3 cassettes as follows:  1-2:  1 bisected  3:1 bisected    G  The specimen is received in formalin, labeled with the patient's name and hospital number, and is designated "left sentinel node 3  The specimen consists of a fibrofatty tissue fragment measuring 3 2 cm  Examination of the fragment reveals a single tan density suggestive of a lymph node measuring 1 6 cm  The density is bisected and submitted for histologic examination in 2 cassettes  Note: The estimated total formalin fixation time based upon information provided by the submitting clinician and the standard processing schedule is 57 0 hours  MCrites                         Imaging Studies: I have personally reviewed pertinent reports  Pathology, and Other Studies: I have personally reviewed pertinent reports  Assessment and Plan:  Invasive Left breast cancer, 1 2 cm, grade 3, strongly positive ER, positive NE, negative her 2, positive lymphovascular invasion, negative 3 sentinel lymph nodes, and Oncotype score 25  Patient would need adjuvant hormonal therapy aromatase inhibitor  Question of chemotherapy  We discussed patient charateristics  We discussed cancer characteristics  Disturbing features are grade 3, lymphovascular invasion and right at the border Oncotype score of 25   Oncotype score of 26 and higher are generally considered candidate for adjuvant chemotherapy  Her benefit from chemotherapy will be weak but the inclination is to give her 4 cycles of Taxotere plus Cytoxan with Neulasta because of above worrisome features  They know that I did communicate with a breast cancer expert and he felt the same way, inclination to give TC with patient's full understanding that  benefit is weak  Patient and her daughter understood all that  Patient would like to have chemotherapy  Myself and my nurse discussed chemotherapy medications with them in very much detail especially toxicities  Patient is planning to sign informed consent and get started  All discussed in very much detail  Questions answered  Discussed the importance of eating healthy foods, staying active and health screening tests  Goal is cure from breast cancer  Patient is capable of self-care  Patient voiced understanding and agreement in the discussion  Counseling / Coordination of Care:  85 minutes    Greater than 50% of total time was spent with the patient and / or family counseling and / or coordination of care

## 2019-03-28 NOTE — PROGRESS NOTES
Consultation - [address] [facility] David Lawrence 79 y o  female MRN: [id number]  Unit/Bed#:  Encounter: [id number]    Referring physician:  Dr Adam Wayne  Reason for Consult:  Left breast cancer  HPI: Jacqueline Goodman is a 79y o  year old female  She is here with her daughter   She has no symptoms  On routine mammography she was found to have abnormality in left breast   On 01/02/2019 patient had ultrasound-guided biopsy of left breast abnormality in the lower inner quadrant of left breast that showed invasive left breast cancer, ER 90-95%, ID 40-45% and HER-2 0 (negative)  On 02/08/2019 patient had bilateral mastectomies and sentinel lymph node sampling  Tumor size 1 2 cm  Grade 3  Positive lymphovascular invasion  Neg  3 sentinel lymph nodes  Oncotype DX 25  Patient is recovering from reconstruction surgery  Patient's father and 2 sisters had breast cancers  Patient states she tested negative for genetic mutation, BRCA  Patient already had GRETCHEN and BSO several years ago for fibroids  ROS:  03/28/19 Reviewed 13 systems:  Presently no headaches, seizures, dizziness, diplopia, dysphagia, hoarseness, chest pain, palpitations, shortness of breath, cough, hemoptysis, abdominal pain, nausea, vomiting, change in bowel habits, melena, hematuria, fever, chills, bleeding, bone pains, skin rash, weight loss, arthritic symptoms,  tiredness , weakness, numbness, claudication and gait problem  No frequent infections  Not unusually sensitive to heat or cold  No swelling of the ankles  No swollen glands  Patient is anxious    No GYN symptoms Other symptoms are in HPI        Historical Information   Past Medical History:   Diagnosis Date    Abdominal pain     right mid abd    Breast cancer ([address] ) 01/02/2019    left breast invasive ductal carcinoma    GERD (gastroesophageal reflux disease)     Plantar fasciitis     right foot    Thyroid nodule     enlarged- biopsy benign    Vitamin D deficiency      Past Surgical History:   Procedure Laterality Date    BREAST BIOPSY Left 01/02/2019    invasive ductal carcinoma    COLONOSCOPY      HYSTERECTOMY      age 41    OOPHORECTOMY Bilateral     age 41    DE BIOPSY/EXCISION, LYMPH NODE(S) Left 2/8/2019    Procedure: BIOPSY LYMPH NODE SENTINEL;  Surgeon: Mary Kate Barfield MD;  [facility];  Service: Surgical Oncology    DE BREAST RECONSTRUC W TISS EXPANDR Bilateral 2/8/2019    Procedure: INSERTION/PLACEMENT TISSUE EXPANDER (EXCHANGE); Surgeon: Marcell Penny MD;  [facility];  Service: Plastics    DE EDG US EXAM SURGICAL ALTER STOM DUODENUM/JEJUNUM N/A 2/2/2017    Procedure: RADIAL ENDOSCOPIC U/S;  Surgeon: Shobha Rm MD;  [facility]: [facility];   Service: Gastroenterology    DE IMPLNT BIO IMPLNT FOR SOFT TISSUE REINFORCEMENT Bilateral 2/8/2019    Procedure: RECONSTRUCTION BREAST W/ IMPLANT;  Surgeon: Marcell Penny MD;  [facility];  Service: Plastics    DE MASTECTOMY, SIMPLE, COMPLETE N/A 2/8/2019    Procedure: LEFT breast mastectomy; RIGHT breast prophylactic mastectomy;  Surgeon: Mary Kate Barfield MD;  [facility];  Service: Surgical Oncology    DE PART EXCIS PLANTAR FASCIA Right 10/20/2016    Procedure:  OPEN RELEASE FASCIA PLANTAR ,CUTTING BOTTOM OF ARCH,INSTEP ;  Surgeon: Parth Kinsey DPM;  [facility];  Service: Podiatry    THUMB ARTHROSCOPY      US GUIDED BREAST BIOPSY LEFT COMPLETE Left 1/2/2019     Social History   Social History     Substance and Sexual Activity   Alcohol Use Yes    Comment: social     Social History     Substance and Sexual Activity   Drug Use No     Social History     Tobacco Use   Smoking Status Never Smoker   Smokeless Tobacco Never Used     Family History:   Family History   Problem Relation Age of Onset    Brain cancer Mother 67    Prostate cancer Father 66    Breast cancer Sister 54    Breast cancer additional onset Sister 72    Breast cancer Sister 72    Prostate cancer Brother 62         Current Outpatient Medications:     celecoxib (CeleBREX) 200 mg capsule, daily as needed , Disp: , Rfl:     clobetasol (TEMOVATE) 0 05 % ointment, , Disp: , Rfl:     ergocalciferol (VITAMIN D2) 50,000 units, 50,000 Units every 28 days  , Disp: , Rfl:     HYDROcodone-acetaminophen (NORCO) 5-325 mg per tablet, as needed , Disp: , Rfl:     Multiple Vitamins-Minerals (MULTIVITAL PO), Take by mouth, Disp: , Rfl:     mupirocin (BACTROBAN) 2 % ointment, , Disp: , Rfl:     ranitidine (ZANTAC) 150 MG capsule, Take 1 capsule (150 mg total) by mouth every evening, Disp: 30 capsule, Rfl: 5    Current Facility-Administered Medications:     ipratropium (ATROVENT) 0 02 % inhalation solution 0 5 mg, 0 5 mg, Nebulization, 4x Daily, Jared Leventhal, PA-C, 0 5 mg at 04/21/18 1407    Allergies   Allergen Reactions    Latex Anaphylaxis    Shellfish Allergy Anaphylaxis    Shellfish-Derived Products Anaphylaxis     @ ROS@  Physical Exam:  Vitals:    03/28/19 0951   BP: 112/74   BP Location: Right arm   Patient Position: Sitting   Cuff Size: Adult   Pulse: 72   Resp: 18   Temp: 98 7 °F (37 1 °C)   SpO2: 97%   Weight: 87 7 kg (193 lb 6 4 oz)   Height: 5' 5" (1 651 m)     Alert, oriented, not in distress, no icterus, no oral thrush, no palpable neck mass, clear lung fields, regular heart rate, abdomen  soft and non tender, no palpable abdominal mass, no ascites, no edema of ankles, no calf tenderness, no focal neurological deficit, no skin rash, no palpable lymphadenopathy in the neck and axillary areas, good arterial pulses, no clubbing  Patient is anxious  Reconstructed both breasts  No lymphedema  Performance status 0  Lab Results: I have reviewed all pertinent labs    LABS:  Results for orders placed or performed during the hospital encounter of 02/08/19   Tissue Exam   Result Value Ref Range    Case Report       Surgical Pathology Report                         Case: [id number]                                   Authorizing Provider:  Lisa Rodriguez MD           Collected:           02/08/2019 1019              Ordering Location:     [facility]        Received:            02/08/2019 1001 [facility] Operating Room                                                     Pathologist:           Erik Salter MD                                                              Specimens:   A) - Breast, Right, RIGHT BREAST TISSUE  SUTURE  SHORT  SUPERIOR  LONG  LATERAL                     B) - Breast, Left, LOWER INNER MARGIN SUTURE TRUE MARGIN                                            C) - Breast, Left, LEFT BREAST MASS                                                                 D) - Breast, Left, LEFT BREAST TISSUE SHORT STITCH - SUPERIOR  LONG SUTRE LATERAL                   E) - Axillary lymph node, LEFT SENTINEL NODE # 1                                                     F) - Axillary lymph node, LEFT AXILLARY SENTINEL NODE # 2                                           G) - Axillary lymph node, left sentinel node # 3                                           Final Diagnosis       A  Breast, Right, right breast tissue  , suture short superior , long lateral , mastectomy  -Fibrocystic changes with microcalcifications  -Sclerosing adenosis and sclerotic ducts  -Benign overlying skin  -No evidence of malignancy seen    B  Breast, Left, left breast lower inner margin , suture true margin ;excision:  -Few foci of ductal carcinoma in situ, solid type, range from 2- 5 mm  -The final inked surgical margin is negative for tumor by 2 mm  C  Breast, Left, LEFT BREAST MASS ,resection:  - Invasive mammary carcinoma, NOS/ invasive ductal carcinoma,1 2 x 0 8 x 0 7 cm    -Few foci of ductal carcinoma in situ, low to intermediate nuclear grade, cribriform and solid types with calcifications, represent ~10% of total tumor  -Changes consistent with previous biopsy site    D   Breast, Left, LEFT BREAST TISSUE SHORT STITCH - SUPERIOR  LONG SUTRE LATERAL:  -Fibrocystic changes with microcalcifications  -Sclerosing adenosis and sclerotic ducts with microcalcifications  -Benign overlying skin and  Nipple:  -No evidence of malignancy seen    E  Axillary lymph node,  "left sentinel node 1 lymphadenectomy:    -Single lymph node, negative for metastatic carcinoma by three -leveled H& E and Pankeratin (MCK) stained slides (0/1)  F  Axillary lymph node, LEFT AXILLARY SENTINEL NODE # 2 lymphadenectomy:    -Single lymph node, negative for metastatic carcinoma by three -leveled H& E and Pankeratin (MCK) stained slides (0/1)  G  Axillary lymph node, left sentinel node # 3 lymphadenectomy:    -Single lymph node, negative for metastatic carcinoma by three -leveled H& E and Pankeratin (MCK) stained slides (0/1)  Tumor staging protocol:  INVASIVE CARCINOMA OF THE BREAST TUMOR STAGING SUMMARY (includes this case and prior biopsy [id number])  1  Specimen Identification     - Procedure:       Left mastectomy and separately submitted additional margin      - Lymph Node Sampling:  three lymph nodes submitted in specimens E-G     - Specimen Laterality:  Left Breast     - Tumor Site:               Lower Medial area of left female breast   2  Invasive Tumor:            - Histologic Type: Invasive mammary carcinoma, NOS/ ductal carcinoma     - Tumor Size (pT1c)  tumor measures  1 2 x 0 8 x 0 7 cm      -  Grade (G3): Whitharral Histologic Score; Grade  III of III  * Glandular (Acinar) / Tubular Differentiation: 3 of 3       * Nuclear Pleomorphism:                                3 of 3       * Mitotic Rate:                                                 2 of 3     - Tumor Focality: single tumor mass     - Macroscopic and Microscopic Extent of Tumor:       -- Skin:                   not involved by tumor       -- Nipple:               not involved by tumor       -- Skeletal muscle: not submitted for evaluation  3   Lymph-vascular invasion:   present  4  Dermal lymph-vascular invasion:  not identified  5  In situ tumor     - Ductal carcinoma in Situ (DCIS):  present       * Size (extent):  Multiple foci of ductal carcinoma in situ, around 10% of total tumor       * Architectural Pattern(s):  cribriform and solid types       * Nuclear Grade:              low to intermediate nuclear grade       * Necrosis:                       Present, focal     - Lobular Carcinoma In Situ (LCIS): not identified   6  Margins:     - Invasive Carcinoma:         - All surgical resection margins are negative for  Invasive Carcinoma by more than 1 0 cm        -The final separately submitted additional margin ( specimen B)  is negative for  Invasive Carcinoma  - DCIS:         -All surgical resection margins are negative for  DCIS by 2 mm (specimen B)   7  Lymph nodes (pN0):     - Number of lymph nodes with macrometastases (>2 mm): 0     - Number of lymph nodes with micrometastases (>0 2 mm to 2 mm and/or >200 cells): 0     - Number of lymph nodes with isolated tumor cells (<0 2 mm and <200 cells): 0     - Size of largest metastatic deposit (mm):  N/A     - Extranodal extension:  N/A     - Number of lymph nodes examined: 3     - Number of sentinel lymph nodes examined: 3     - Method of evaluation of sentinel lymph nodes:  routine   8  Treatment Effect, Response to Presurgical (Neoadjuvant) Therapy:     - In the Breast: N/A     - In the Lymph nodes: N/A   9  Additional Pathologic Findings:      -Fibrocystic changes with microcalcifications      -Sclerosing adenosis and sclerotic ducts with microcalcifications  10  Microcalcifications: present, associated with DCIS and foci of  fibrocystic changes   11   Ancillary Studies:        -  ER/IN/Her-2/kamran: reported in the patient previous core biopsy ( [id number]) as follows:        ER:           90-95%         Positive        IN:           40-45%         Positive       Her-2/kamran   0                  Negative     - Repeat HER2 testing (2013 ASCO/CAP Recommendations): Not indicated  - Best representative tumor block: C2       -- Sufficient tumor present for          Agendia Mammaprint/Blueprint (1 cm2 of invasive tumor in aggregate): Yes  MI Profile/Foundation One (at least 5 x 5 mm of tumor): Yes  12  Clinical History: Invasive mammary carcinoma, NOS/ invasive ductal carcinoma  ([id number])   13  8th ED AJCC Stage:  at least Stage IA ( pT1c, pN0 , pMx, G3)  Note       Intradepartmental consultation is in agreement  Case discussed with Dr César Bejarano on 2/13/2019 at 2:20 PM and a copy of this report was faxed to his office at the same time  Interpretation performed at [address] [facility]      Additional Information       All controls performed with the immunohistochemical stains reported above reacted appropriately  These tests were developed and their performance characteristics determined by [facility] or [facility]  They may not be cleared or approved by the [place]  Food and Drug Administration  The FDA has determined that such clearance or approval is not necessary  These tests are used for clinical purposes  They should not be regarded as investigational or for research  This laboratory has been approved by [facility], designated as a high-complexity laboratory and is qualified to perform these tests  Gross Description       A  The specimen is received in formalin, labeled with the patient's name and hospital number, and is designated "right breast tissue  The specimen consists of a mastectomy measuring 21 x 18 x 6 cm  The specimen is marked by 2 sutures which according to the requisition slip short is superior and long is lateral  The specimen is inked as follows:  upper outer quadrant-yellow, upper inner quadrant-blue, lower inner quadrant-orange, lower outer quadrant-green, posterior-black    An unremarkable tan ellipse of skin measuring 11 x 3 cm is present which contains a 3 2 cm nipple-areolar complex containing a 1 4 cm not inverted nipple  The breast is sectioned revealing yellow fatty and white fibrous cut surfaces  No discrete lesions are seen or palpated  No lymph nodes are identified  Representative sections  Ten cassettes  One:  Random section of skin  Two:  Nipple/areola  3-4:  Random sections, upper outer quadrant  5-6:  Random sections, lower outer quadrant  7-8:  Random sections, upper inner quadrant  9-10:  Random sections, lower inner quadrant    B  The specimen is received in formalin, labeled with the patient's name and hospital number, and is designated "left breast lower inner margin  The specimen consists of a fibrofatty tissue fragment measuring 8 x 7 4 x 1 6 cm in greatest dimension  The specimen is marked by 1 suture which according to the requisition slip indicates the true margin  The true margin is inked blue and the remaining surfaces are inked black  The specimen is sectioned revealing unremarkable yellow fatty cut surfaces throughout the entire specimen  Entirely submitted  Thirty-nine cassettes  Sequentially submitted  One piece in each cassette  C  The specimen is received in formalin, labeled with the patient's name and hospital number, and is designated "left breast mass  The specimen consists of a fibrofatty tissue fragment measuring 4 8 x 3 7 x 1 5 cm  The specimen is marked by 1 suture which according to the requisition slip indicates the true margin  The true margin is inked blue and the remaining surfaces are inked black  The specimen is sectioned revealing a well-circumscribed tan lesion measuring 1 2 x 0 8 x 0 7 cm  A gray metallic V shaped biopsy marker is identified within the lesion  The lesion extends to within less than 1 mm of the true margin    The lesion is entirely submitted for histologic examination in 4 cassettes, sequentially, with both inked surfaces present in each cassette  D  The specimen is received in formalin, labeled with the patient's name and hospital number, and is designated "left breast tissue  The specimen consists of a mastectomy measuring 24 x 19 x 6 cm  The specimen is marked by 2 sutures which according to the requisition slip short is superior and long is lateral  The specimen is inked as follows:  upper outer quadrant-yellow, upper inner quadrant-blue, lower inner quadrant-orange, lower outer quadrant-green, posterior-black  An unremarkable tan ellipse of skin measuring 10 3 x 3 4 cm is present which contains a 3 2 cm nipple-areolar complex containing a 1 2 cm not inverted nipple  The breast is sectioned revealing yellow fatty and white fibrous cut surfaces  Focal areas of vague nodularity are identified  No distinct/discrete measurable lesions are seen  Representative sections  Thirteen cassettes  One:  Random section of skin  Two:  Nipple/areola  3-4:  Random sections, upper outer quadrant  5-7:  Random sections, lower outer quadrant to include areas of vague nodularity  8-9:  Random sections, upper inner quadrant  10-13:  Random sections, lower inner quadrant to include areas of vague nodularity    E  The specimen is received in formalin, labeled with the patient's name and hospital number, and is designated "left sentinel node 1  The specimen consists of a fibrofatty tissue fragment measuring 1 6 cm  Examination of the fragment reveals a single tan density suggestive of a lymph node measuring 0 6 cm  The density is bisected and submitted for histologic examination in 1 cassette  F  The specimen is received in formalin, labeled with the patient's name and hospital number, and is designated "left axillary sentinel node 2  The specimen consists of a fibrofatty tissue fragment measuring 4 7 cm  Examination of the fragment reveals 2 densities suggestive of lymph nodes measuring 1 2 and 2 3 cm    The densities are submitted for histologic examination in 3 cassettes as follows:  1-2:  1 bisected  3:1 bisected    G  The specimen is received in formalin, labeled with the patient's name and hospital number, and is designated "left sentinel node 3  The specimen consists of a fibrofatty tissue fragment measuring 3 2 cm  Examination of the fragment reveals a single tan density suggestive of a lymph node measuring 1 6 cm  The density is bisected and submitted for histologic examination in 2 cassettes  Note: The estimated total formalin fixation time based upon information provided by the submitting clinician and the standard processing schedule is 57 0 hours  MCrites                         Imaging Studies: I have personally reviewed pertinent reports  Pathology, and Other Studies: I have personally reviewed pertinent reports  Assessment and Plan:  Invasive Left breast cancer, 1 2 cm, grade 3, strongly positive ER, positive CT, negative her 2, positive lymphovascular invasion, negative 3 sentinel lymph nodes, and Oncotype score 25  Patient would need adjuvant hormonal therapy aromatase inhibitor  Question of chemotherapy  We discussed patient charateristics  We discussed cancer characteristics  Disturbing features are grade 3, lymphovascular invasion and right at the border Oncotype score of 25  Oncotype score of 26 and higher are generally considered candidate for adjuvant chemotherapy  Her benefit from chemotherapy will be weak but the inclination is to give her 4 cycles of Taxotere plus Cytoxan with Neulasta because of above worrisome features  They know that I did communicate with a breast cancer expert and he felt the same way, inclination to give TC with patient's full understanding that  benefit is weak  Patient and her daughter understood all that  Patient would like to have chemotherapy  Myself and my nurse discussed chemotherapy medications with them in very much detail especially toxicities    Patient is planning to sign informed consent and get started  All discussed in very much detail  Questions answered  Discussed the importance of eating healthy foods, staying active and health screening tests  Goal is cure from breast cancer  Patient is capable of self-care  Patient voiced understanding and agreement in the discussion  Counseling / Coordination of Care:  85 minutes    Greater than 50% of total time was spent with the patient and / or family counseling and / or coordination of care

## 2019-04-02 ENCOUNTER — TELEPHONE (OUTPATIENT)
Dept: SURGICAL ONCOLOGY | Facility: CLINIC | Age: 69
End: 2019-04-02

## 2019-04-02 ENCOUNTER — TELEPHONE (OUTPATIENT)
Dept: HEMATOLOGY ONCOLOGY | Facility: CLINIC | Age: 69
End: 2019-04-02

## 2019-04-08 ENCOUNTER — LAB REQUISITION (OUTPATIENT)
Dept: LAB | Facility: HOSPITAL | Age: 69
End: 2019-04-08
Payer: COMMERCIAL

## 2019-04-08 ENCOUNTER — APPOINTMENT (OUTPATIENT)
Dept: LAB | Facility: HOSPITAL | Age: 69
End: 2019-04-08
Attending: INTERNAL MEDICINE
Payer: COMMERCIAL

## 2019-04-08 DIAGNOSIS — C50.312 MALIGNANT NEOPLASM OF LOWER-INNER QUADRANT OF LEFT FEMALE BREAST (HCC): ICD-10-CM

## 2019-04-08 LAB
ALBUMIN SERPL BCP-MCNC: 3.4 G/DL (ref 3.5–5)
ALP SERPL-CCNC: 92 U/L (ref 46–116)
ALT SERPL W P-5'-P-CCNC: 22 U/L (ref 12–78)
ANION GAP SERPL CALCULATED.3IONS-SCNC: 5 MMOL/L (ref 4–13)
AST SERPL W P-5'-P-CCNC: 17 U/L (ref 5–45)
BASOPHILS # BLD AUTO: 0.03 THOUSANDS/ΜL (ref 0–0.1)
BASOPHILS NFR BLD AUTO: 1 % (ref 0–1)
BILIRUB SERPL-MCNC: 0.48 MG/DL (ref 0.2–1)
BUN SERPL-MCNC: 12 MG/DL (ref 5–25)
CALCIUM SERPL-MCNC: 9.1 MG/DL (ref 8.3–10.1)
CHLORIDE SERPL-SCNC: 103 MMOL/L (ref 100–108)
CO2 SERPL-SCNC: 29 MMOL/L (ref 21–32)
CREAT SERPL-MCNC: 0.68 MG/DL (ref 0.6–1.3)
EOSINOPHIL # BLD AUTO: 0.22 THOUSAND/ΜL (ref 0–0.61)
EOSINOPHIL NFR BLD AUTO: 4 % (ref 0–6)
ERYTHROCYTE [DISTWIDTH] IN BLOOD BY AUTOMATED COUNT: 12.8 % (ref 11.6–15.1)
GFR SERPL CREATININE-BSD FRML MDRD: 91 ML/MIN/1.73SQ M
GLUCOSE P FAST SERPL-MCNC: 83 MG/DL (ref 65–99)
HCT VFR BLD AUTO: 45.1 % (ref 34.8–46.1)
HGB BLD-MCNC: 14.4 G/DL (ref 11.5–15.4)
IMM GRANULOCYTES # BLD AUTO: 0.01 THOUSAND/UL (ref 0–0.2)
IMM GRANULOCYTES NFR BLD AUTO: 0 % (ref 0–2)
LYMPHOCYTES # BLD AUTO: 2.27 THOUSANDS/ΜL (ref 0.6–4.47)
LYMPHOCYTES NFR BLD AUTO: 45 % (ref 14–44)
MCH RBC QN AUTO: 27.9 PG (ref 26.8–34.3)
MCHC RBC AUTO-ENTMCNC: 31.9 G/DL (ref 31.4–37.4)
MCV RBC AUTO: 87 FL (ref 82–98)
MONOCYTES # BLD AUTO: 0.42 THOUSAND/ΜL (ref 0.17–1.22)
MONOCYTES NFR BLD AUTO: 8 % (ref 4–12)
NEUTROPHILS # BLD AUTO: 2.16 THOUSANDS/ΜL (ref 1.85–7.62)
NEUTS SEG NFR BLD AUTO: 42 % (ref 43–75)
NRBC BLD AUTO-RTO: 0 /100 WBCS
PLATELET # BLD AUTO: 213 THOUSANDS/UL (ref 149–390)
PMV BLD AUTO: 10.3 FL (ref 8.9–12.7)
POTASSIUM SERPL-SCNC: 3.9 MMOL/L (ref 3.5–5.3)
PROT SERPL-MCNC: 7.4 G/DL (ref 6.4–8.2)
RBC # BLD AUTO: 5.17 MILLION/UL (ref 3.81–5.12)
SCAN RESULT: NORMAL
SODIUM SERPL-SCNC: 137 MMOL/L (ref 136–145)
WBC # BLD AUTO: 5.11 THOUSAND/UL (ref 4.31–10.16)

## 2019-04-08 PROCEDURE — 36415 COLL VENOUS BLD VENIPUNCTURE: CPT | Performed by: INTERNAL MEDICINE

## 2019-04-08 PROCEDURE — 80053 COMPREHEN METABOLIC PANEL: CPT | Performed by: INTERNAL MEDICINE

## 2019-04-08 PROCEDURE — 85025 COMPLETE CBC W/AUTO DIFF WBC: CPT | Performed by: INTERNAL MEDICINE

## 2019-04-10 RX ORDER — SODIUM CHLORIDE 9 MG/ML
20 INJECTION, SOLUTION INTRAVENOUS ONCE
Status: COMPLETED | OUTPATIENT
Start: 2019-04-11 | End: 2019-04-11

## 2019-04-11 ENCOUNTER — HOSPITAL ENCOUNTER (OUTPATIENT)
Dept: INFUSION CENTER | Facility: HOSPITAL | Age: 69
Discharge: HOME | End: 2019-04-11
Payer: COMMERCIAL

## 2019-04-11 ENCOUNTER — TELEPHONE (OUTPATIENT)
Dept: HEMATOLOGY ONCOLOGY | Facility: CLINIC | Age: 69
End: 2019-04-11

## 2019-04-11 VITALS
DIASTOLIC BLOOD PRESSURE: 62 MMHG | BODY MASS INDEX: 32.54 KG/M2 | TEMPERATURE: 97 F | WEIGHT: 195.33 LBS | HEIGHT: 65 IN | SYSTOLIC BLOOD PRESSURE: 108 MMHG | HEART RATE: 72 BPM | RESPIRATION RATE: 18 BRPM

## 2019-04-11 PROCEDURE — 96417 CHEMO IV INFUS EACH ADDL SEQ: CPT

## 2019-04-11 PROCEDURE — 96375 TX/PRO/DX INJ NEW DRUG ADDON: CPT

## 2019-04-11 PROCEDURE — 96377 APPLICATON ON-BODY INJECTOR: CPT

## 2019-04-11 PROCEDURE — 96413 CHEMO IV INFUSION 1 HR: CPT

## 2019-04-11 PROCEDURE — 96367 TX/PROPH/DG ADDL SEQ IV INF: CPT

## 2019-04-11 RX ORDER — DIPHENHYDRAMINE HYDROCHLORIDE 50 MG/ML
50 INJECTION INTRAMUSCULAR; INTRAVENOUS ONCE
Status: COMPLETED | OUTPATIENT
Start: 2019-04-11 | End: 2019-04-11

## 2019-04-11 RX ORDER — RANITIDINE 25 MG/ML
50 INJECTION, SOLUTION INTRAMUSCULAR; INTRAVENOUS ONCE
Status: COMPLETED | OUTPATIENT
Start: 2019-04-11 | End: 2019-04-11

## 2019-04-11 RX ADMIN — CYCLOPHOSPHAMIDE 1170 MG: 1 INJECTION, POWDER, FOR SOLUTION INTRAVENOUS; ORAL at 14:19

## 2019-04-11 RX ADMIN — ONDANSETRON 16 MG: 2 INJECTION INTRAMUSCULAR; INTRAVENOUS at 11:52

## 2019-04-11 RX ADMIN — SODIUM CHLORIDE 20 ML/HR: 0.9 INJECTION, SOLUTION INTRAVENOUS at 11:42

## 2019-04-11 RX ADMIN — DIPHENHYDRAMINE HYDROCHLORIDE 50 MG: 50 INJECTION, SOLUTION INTRAMUSCULAR; INTRAVENOUS at 12:41

## 2019-04-11 RX ADMIN — DOCETAXEL 146 MG: 20 INJECTION, SOLUTION, CONCENTRATE INTRAVENOUS at 12:22

## 2019-04-11 RX ADMIN — HYDROCORTISONE SODIUM SUCCINATE 100 MG: 100 INJECTION, POWDER, FOR SOLUTION INTRAMUSCULAR; INTRAVENOUS at 12:39

## 2019-04-11 RX ADMIN — PEGFILGRASTIM 6 MG: KIT SUBCUTANEOUS at 15:08

## 2019-04-11 RX ADMIN — RANITIDINE HYDROCHLORIDE 50 MG: 25 INJECTION INTRAMUSCULAR; INTRAVENOUS at 12:43

## 2019-04-11 NOTE — PROGRESS NOTES
Neulasta ON PRo reviewed with pt --- appropriate questions asked and answered  Pt verb understanding of indications and when to remove the cassette  Neulasta On Pro applied to pts YADIEL    AVs provided  Pt dcd stable and ambulatory return appts confirmed

## 2019-04-11 NOTE — PROGRESS NOTES
Pt offers no c/o -- feels well  Labs are within parameters to proceed  Call bell within reach    Will continue to monitor

## 2019-04-11 NOTE — PROGRESS NOTES
1238 - pt complained of " my lower back is hurting" - Taxoter infusion stopped immediately, NSS open wide - vs obtained  Hypersensitivity protocol initiated   See [facility]

## 2019-04-11 NOTE — PROGRESS NOTES
Call to Dr Jaime [facility] to report pt reaction - orders to follow -- pt states sx have resolved

## 2019-04-12 ENCOUNTER — TELEPHONE (OUTPATIENT)
Dept: HEMATOLOGY ONCOLOGY | Facility: CLINIC | Age: 69
End: 2019-04-12

## 2019-04-15 ENCOUNTER — TELEPHONE (OUTPATIENT)
Dept: HEMATOLOGY ONCOLOGY | Facility: CLINIC | Age: 69
End: 2019-04-15

## 2019-04-16 ENCOUNTER — TELEPHONE (OUTPATIENT)
Dept: HEMATOLOGY ONCOLOGY | Facility: CLINIC | Age: 69
End: 2019-04-16

## 2019-04-17 ENCOUNTER — APPOINTMENT (EMERGENCY)
Dept: CT IMAGING | Facility: HOSPITAL | Age: 69
End: 2019-04-17
Payer: COMMERCIAL

## 2019-04-17 ENCOUNTER — APPOINTMENT (EMERGENCY)
Dept: RADIOLOGY | Facility: HOSPITAL | Age: 69
End: 2019-04-17
Payer: COMMERCIAL

## 2019-04-17 ENCOUNTER — HOSPITAL ENCOUNTER (EMERGENCY)
Facility: HOSPITAL | Age: 69
Discharge: HOME | End: 2019-04-17
Attending: EMERGENCY MEDICINE | Admitting: EMERGENCY MEDICINE
Payer: COMMERCIAL

## 2019-04-17 VITALS
SYSTOLIC BLOOD PRESSURE: 112 MMHG | HEART RATE: 89 BPM | RESPIRATION RATE: 15 BRPM | OXYGEN SATURATION: 96 % | TEMPERATURE: 98.1 F | DIASTOLIC BLOOD PRESSURE: 64 MMHG

## 2019-04-17 DIAGNOSIS — R51.9 SINUS HEADACHE: ICD-10-CM

## 2019-04-17 DIAGNOSIS — K59.00 CONSTIPATION: ICD-10-CM

## 2019-04-17 DIAGNOSIS — J01.90 ACUTE SINUSITIS: ICD-10-CM

## 2019-04-17 DIAGNOSIS — G89.3 CANCER ASSOCIATED PAIN: Primary | ICD-10-CM

## 2019-04-17 LAB
ALBUMIN SERPL BCP-MCNC: 3.2 G/DL (ref 3.5–5)
ALP SERPL-CCNC: 80 U/L (ref 46–116)
ALT SERPL W P-5'-P-CCNC: 33 U/L (ref 12–78)
ANION GAP SERPL CALCULATED.3IONS-SCNC: 8 MMOL/L (ref 4–13)
APTT PPP: 30 SECONDS (ref 26–38)
AST SERPL W P-5'-P-CCNC: 44 U/L (ref 5–45)
BACTERIA UR QL AUTO: ABNORMAL /HPF
BASOPHILS # BLD MANUAL: 0 THOUSAND/UL (ref 0–0.1)
BASOPHILS NFR MAR MANUAL: 0 % (ref 0–1)
BILIRUB SERPL-MCNC: 0.51 MG/DL (ref 0.2–1)
BILIRUB UR QL STRIP: NEGATIVE
BUN SERPL-MCNC: 11 MG/DL (ref 5–25)
CALCIUM SERPL-MCNC: 9.6 MG/DL (ref 8.3–10.1)
CHLORIDE SERPL-SCNC: 103 MMOL/L (ref 100–108)
CLARITY UR: CLEAR
CO2 SERPL-SCNC: 26 MMOL/L (ref 21–32)
COLOR UR: YELLOW
CREAT SERPL-MCNC: 0.63 MG/DL (ref 0.6–1.3)
EOSINOPHIL # BLD MANUAL: 0.28 THOUSAND/UL (ref 0–0.4)
EOSINOPHIL NFR BLD MANUAL: 4 % (ref 0–6)
ERYTHROCYTE [DISTWIDTH] IN BLOOD BY AUTOMATED COUNT: 12.6 % (ref 11.6–15.1)
GFR SERPL CREATININE-BSD FRML MDRD: 94 ML/MIN/1.73SQ M
GLUCOSE SERPL-MCNC: 94 MG/DL (ref 65–140)
GLUCOSE UR STRIP-MCNC: NEGATIVE MG/DL
HCT VFR BLD AUTO: 43.3 % (ref 34.8–46.1)
HGB BLD-MCNC: 13.8 G/DL (ref 11.5–15.4)
HGB UR QL STRIP.AUTO: ABNORMAL
INR PPP: 1.01 (ref 0.86–1.17)
KETONES UR STRIP-MCNC: NEGATIVE MG/DL
LACTATE SERPL-SCNC: 1 MMOL/L (ref 0.5–2)
LEUKOCYTE ESTERASE UR QL STRIP: NEGATIVE
LIPASE SERPL-CCNC: 114 U/L (ref 73–393)
LYMPHOCYTES # BLD AUTO: 3.32 THOUSAND/UL (ref 0.6–4.47)
LYMPHOCYTES # BLD AUTO: 47 % (ref 14–44)
MCH RBC QN AUTO: 27.9 PG (ref 26.8–34.3)
MCHC RBC AUTO-ENTMCNC: 31.9 G/DL (ref 31.4–37.4)
MCV RBC AUTO: 88 FL (ref 82–98)
MONOCYTES # BLD AUTO: 1.2 THOUSAND/UL (ref 0–1.22)
MONOCYTES NFR BLD: 17 % (ref 4–12)
MYELOCYTES NFR BLD MANUAL: 3 % (ref 0–1)
NEUTROPHILS # BLD MANUAL: 1.77 THOUSAND/UL (ref 1.85–7.62)
NEUTS BAND NFR BLD MANUAL: 8 % (ref 0–8)
NEUTS SEG NFR BLD AUTO: 17 % (ref 43–75)
NITRITE UR QL STRIP: NEGATIVE
NON-SQ EPI CELLS URNS QL MICRO: ABNORMAL /HPF
NRBC BLD AUTO-RTO: 0 /100 WBCS
PH UR STRIP.AUTO: 6 [PH] (ref 4.5–8)
PLATELET # BLD AUTO: 163 THOUSANDS/UL (ref 149–390)
PLATELET BLD QL SMEAR: ADEQUATE
PMV BLD AUTO: 10.3 FL (ref 8.9–12.7)
POTASSIUM SERPL-SCNC: 5.6 MMOL/L (ref 3.5–5.3)
PROT SERPL-MCNC: 7.3 G/DL (ref 6.4–8.2)
PROT UR STRIP-MCNC: NEGATIVE MG/DL
PROTHROMBIN TIME: 13.4 SECONDS (ref 11.8–14.2)
RBC # BLD AUTO: 4.95 MILLION/UL (ref 3.81–5.12)
RBC #/AREA URNS AUTO: ABNORMAL /HPF
RBC MORPH BLD: NORMAL
SODIUM SERPL-SCNC: 137 MMOL/L (ref 136–145)
SP GR UR STRIP.AUTO: 1.01 (ref 1–1.03)
TOTAL CELLS COUNTED SPEC: 100
TROPONIN I SERPL-MCNC: <0.02 NG/ML
UROBILINOGEN UR QL STRIP.AUTO: 0.2 E.U./DL
VARIANT LYMPHS # BLD AUTO: 4 %
WBC # BLD AUTO: 7.07 THOUSAND/UL (ref 4.31–10.16)
WBC #/AREA URNS AUTO: ABNORMAL /HPF

## 2019-04-17 PROCEDURE — 81001 URINALYSIS AUTO W/SCOPE: CPT

## 2019-04-17 PROCEDURE — 85730 THROMBOPLASTIN TIME PARTIAL: CPT | Performed by: EMERGENCY MEDICINE

## 2019-04-17 PROCEDURE — 96374 THER/PROPH/DIAG INJ IV PUSH: CPT

## 2019-04-17 PROCEDURE — 36415 COLL VENOUS BLD VENIPUNCTURE: CPT | Performed by: EMERGENCY MEDICINE

## 2019-04-17 PROCEDURE — 71046 X-RAY EXAM CHEST 2 VIEWS: CPT

## 2019-04-17 PROCEDURE — 85610 PROTHROMBIN TIME: CPT | Performed by: EMERGENCY MEDICINE

## 2019-04-17 PROCEDURE — 80053 COMPREHEN METABOLIC PANEL: CPT | Performed by: EMERGENCY MEDICINE

## 2019-04-17 PROCEDURE — 93005 ELECTROCARDIOGRAM TRACING: CPT

## 2019-04-17 PROCEDURE — 85027 COMPLETE CBC AUTOMATED: CPT | Performed by: EMERGENCY MEDICINE

## 2019-04-17 PROCEDURE — 87040 BLOOD CULTURE FOR BACTERIA: CPT | Performed by: EMERGENCY MEDICINE

## 2019-04-17 PROCEDURE — 99284 EMERGENCY DEPT VISIT MOD MDM: CPT

## 2019-04-17 PROCEDURE — 96375 TX/PRO/DX INJ NEW DRUG ADDON: CPT

## 2019-04-17 PROCEDURE — 84484 ASSAY OF TROPONIN QUANT: CPT | Performed by: EMERGENCY MEDICINE

## 2019-04-17 PROCEDURE — 83690 ASSAY OF LIPASE: CPT | Performed by: EMERGENCY MEDICINE

## 2019-04-17 PROCEDURE — 85007 BL SMEAR W/DIFF WBC COUNT: CPT | Performed by: EMERGENCY MEDICINE

## 2019-04-17 PROCEDURE — 74177 CT ABD & PELVIS W/CONTRAST: CPT

## 2019-04-17 PROCEDURE — 96361 HYDRATE IV INFUSION ADD-ON: CPT

## 2019-04-17 PROCEDURE — 70450 CT HEAD/BRAIN W/O DYE: CPT

## 2019-04-17 PROCEDURE — 83605 ASSAY OF LACTIC ACID: CPT | Performed by: EMERGENCY MEDICINE

## 2019-04-17 PROCEDURE — 99284 EMERGENCY DEPT VISIT MOD MDM: CPT | Performed by: EMERGENCY MEDICINE

## 2019-04-17 RX ORDER — METOCLOPRAMIDE 10 MG/1
10 TABLET ORAL EVERY 6 HOURS
Qty: 15 TABLET | Refills: 0 | Status: SHIPPED | OUTPATIENT
Start: 2019-04-17 | End: 2019-09-25 | Stop reason: ALTCHOICE

## 2019-04-17 RX ORDER — MAGNESIUM CARB/ALUMINUM HYDROX 105-160MG
296 TABLET,CHEWABLE ORAL ONCE
Status: COMPLETED | OUTPATIENT
Start: 2019-04-17 | End: 2019-04-17

## 2019-04-17 RX ORDER — KETOROLAC TROMETHAMINE 30 MG/ML
15 INJECTION, SOLUTION INTRAMUSCULAR; INTRAVENOUS ONCE
Status: COMPLETED | OUTPATIENT
Start: 2019-04-17 | End: 2019-04-17

## 2019-04-17 RX ORDER — DIPHENHYDRAMINE HYDROCHLORIDE 50 MG/ML
25 INJECTION INTRAMUSCULAR; INTRAVENOUS ONCE
Status: COMPLETED | OUTPATIENT
Start: 2019-04-17 | End: 2019-04-17

## 2019-04-17 RX ORDER — HYDROCODONE BITARTRATE AND ACETAMINOPHEN 5; 325 MG/1; MG/1
1 TABLET ORAL EVERY 6 HOURS PRN
Qty: 15 TABLET | Refills: 0 | Status: SHIPPED | OUTPATIENT
Start: 2019-04-17 | End: 2019-04-27

## 2019-04-17 RX ORDER — AZITHROMYCIN 250 MG/1
TABLET, FILM COATED ORAL
Qty: 6 TABLET | Refills: 0 | Status: SHIPPED | OUTPATIENT
Start: 2019-04-17 | End: 2019-04-21

## 2019-04-17 RX ORDER — HYDROCODONE BITARTRATE AND ACETAMINOPHEN 5; 325 MG/1; MG/1
1 TABLET ORAL ONCE
Status: DISCONTINUED | OUTPATIENT
Start: 2019-04-17 | End: 2019-04-18 | Stop reason: HOSPADM

## 2019-04-17 RX ADMIN — SODIUM CHLORIDE 1000 ML: 0.9 INJECTION, SOLUTION INTRAVENOUS at 18:29

## 2019-04-17 RX ADMIN — IOHEXOL 100 ML: 350 INJECTION, SOLUTION INTRAVENOUS at 19:58

## 2019-04-17 RX ADMIN — KETOROLAC TROMETHAMINE 15 MG: 30 INJECTION, SOLUTION INTRAMUSCULAR; INTRAVENOUS at 18:29

## 2019-04-17 RX ADMIN — MAGNESIUM CITRATE 296 ML: 1.75 LIQUID ORAL at 22:00

## 2019-04-17 RX ADMIN — DIPHENHYDRAMINE HYDROCHLORIDE 25 MG: 50 INJECTION, SOLUTION INTRAMUSCULAR; INTRAVENOUS at 19:52

## 2019-04-18 LAB
ATRIAL RATE: 90 BPM
P AXIS: 38 DEGREES
PR INTERVAL: 162 MS
QRS AXIS: 43 DEGREES
QRSD INTERVAL: 74 MS
QT INTERVAL: 332 MS
QTC INTERVAL: 406 MS
T WAVE AXIS: 11 DEGREES
VENTRICULAR RATE: 90 BPM

## 2019-04-18 PROCEDURE — 93010 ELECTROCARDIOGRAM REPORT: CPT | Performed by: INTERNAL MEDICINE

## 2019-04-19 ENCOUNTER — TELEPHONE (OUTPATIENT)
Dept: SURGICAL ONCOLOGY | Facility: CLINIC | Age: 69
End: 2019-04-19

## 2019-04-22 ENCOUNTER — TELEPHONE (OUTPATIENT)
Dept: HEMATOLOGY ONCOLOGY | Facility: CLINIC | Age: 69
End: 2019-04-22

## 2019-04-22 LAB
BACTERIA BLD CULT: NORMAL
BACTERIA BLD CULT: NORMAL

## 2019-04-23 ENCOUNTER — TELEPHONE (OUTPATIENT)
Dept: SURGICAL ONCOLOGY | Facility: CLINIC | Age: 69
End: 2019-04-23

## 2019-04-25 ENCOUNTER — TELEPHONE (OUTPATIENT)
Dept: HEMATOLOGY ONCOLOGY | Facility: CLINIC | Age: 69
End: 2019-04-25

## 2019-04-29 ENCOUNTER — TELEPHONE (OUTPATIENT)
Dept: HEMATOLOGY ONCOLOGY | Facility: CLINIC | Age: 69
End: 2019-04-29

## 2019-04-29 ENCOUNTER — APPOINTMENT (OUTPATIENT)
Dept: LAB | Facility: HOSPITAL | Age: 69
End: 2019-04-29
Attending: INTERNAL MEDICINE
Payer: COMMERCIAL

## 2019-04-29 ENCOUNTER — OFFICE VISIT (OUTPATIENT)
Dept: HEMATOLOGY ONCOLOGY | Facility: CLINIC | Age: 69
End: 2019-04-29
Payer: COMMERCIAL

## 2019-04-29 VITALS
TEMPERATURE: 97.4 F | WEIGHT: 195.6 LBS | RESPIRATION RATE: 18 BRPM | HEART RATE: 77 BPM | OXYGEN SATURATION: 97 % | DIASTOLIC BLOOD PRESSURE: 60 MMHG | HEIGHT: 65 IN | SYSTOLIC BLOOD PRESSURE: 96 MMHG | BODY MASS INDEX: 32.59 KG/M2

## 2019-04-29 DIAGNOSIS — Z17.0 MALIGNANT NEOPLASM OF LOWER-INNER QUADRANT OF LEFT BREAST IN FEMALE, ESTROGEN RECEPTOR POSITIVE (HCC): Primary | ICD-10-CM

## 2019-04-29 DIAGNOSIS — C50.312 MALIGNANT NEOPLASM OF LOWER-INNER QUADRANT OF LEFT BREAST IN FEMALE, ESTROGEN RECEPTOR POSITIVE (HCC): Primary | ICD-10-CM

## 2019-04-29 PROCEDURE — 99214 OFFICE O/P EST MOD 30 MIN: CPT | Performed by: INTERNAL MEDICINE

## 2019-05-01 RX ORDER — ACETAMINOPHEN 325 MG/1
650 TABLET ORAL ONCE
Status: COMPLETED | OUTPATIENT
Start: 2019-05-02 | End: 2019-05-02

## 2019-05-01 RX ORDER — SODIUM CHLORIDE 9 MG/ML
20 INJECTION, SOLUTION INTRAVENOUS ONCE
Status: COMPLETED | OUTPATIENT
Start: 2019-05-02 | End: 2019-05-02

## 2019-05-02 ENCOUNTER — HOSPITAL ENCOUNTER (OUTPATIENT)
Dept: INFUSION CENTER | Facility: HOSPITAL | Age: 69
Discharge: HOME | End: 2019-05-02
Payer: COMMERCIAL

## 2019-05-02 VITALS
RESPIRATION RATE: 16 BRPM | HEIGHT: 64 IN | BODY MASS INDEX: 33.12 KG/M2 | WEIGHT: 194 LBS | TEMPERATURE: 98.3 F | HEART RATE: 80 BPM | SYSTOLIC BLOOD PRESSURE: 108 MMHG | DIASTOLIC BLOOD PRESSURE: 72 MMHG

## 2019-05-02 PROCEDURE — 96367 TX/PROPH/DG ADDL SEQ IV INF: CPT

## 2019-05-02 PROCEDURE — 96413 CHEMO IV INFUSION 1 HR: CPT

## 2019-05-02 PROCEDURE — 96417 CHEMO IV INFUS EACH ADDL SEQ: CPT

## 2019-05-02 RX ADMIN — CYCLOPHOSPHAMIDE 1176 MG: 1 INJECTION, POWDER, FOR SOLUTION INTRAVENOUS; ORAL at 14:09

## 2019-05-02 RX ADMIN — ACETAMINOPHEN 650 MG: 325 TABLET, FILM COATED ORAL at 11:37

## 2019-05-02 RX ADMIN — DOCETAXEL 147 MG: 20 INJECTION, SOLUTION, CONCENTRATE INTRAVENOUS at 12:55

## 2019-05-02 RX ADMIN — DIPHENHYDRAMINE HYDROCHLORIDE 12.5 MG: 50 INJECTION, SOLUTION INTRAMUSCULAR; INTRAVENOUS at 12:09

## 2019-05-02 RX ADMIN — SODIUM CHLORIDE 20 ML/HR: 0.9 INJECTION, SOLUTION INTRAVENOUS at 11:37

## 2019-05-02 RX ADMIN — DEXAMETHASONE SODIUM PHOSPHATE: 10 INJECTION, SOLUTION INTRAMUSCULAR; INTRAVENOUS at 11:37

## 2019-05-02 NOTE — PLAN OF CARE
Problem: Potential for Falls  Goal: Patient will remain free of falls  Description  INTERVENTIONS:  - Assess patient frequently for physical needs  -  Identify cognitive and physical deficits and behaviors that affect risk of falls    -  [place] fall precautions as indicated by assessment   - Educate patient/family on patient safety including physical limitations  - Instruct patient to call for assistance with activity based on assessment  - Modify environment to reduce risk of injury  - Consider OT/PT consult to assist with strengthening/mobility  Outcome: Progressing

## 2019-05-03 ENCOUNTER — HOSPITAL ENCOUNTER (OUTPATIENT)
Dept: INFUSION CENTER | Facility: HOSPITAL | Age: 69
Discharge: HOME | End: 2019-05-03
Payer: COMMERCIAL

## 2019-05-03 VITALS — TEMPERATURE: 98.2 F

## 2019-05-03 PROCEDURE — 96372 THER/PROPH/DIAG INJ SC/IM: CPT

## 2019-05-03 RX ADMIN — PEGFILGRASTIM 4 MG: 6 INJECTION SUBCUTANEOUS at 14:45

## 2019-05-03 NOTE — PLAN OF CARE
Problem: Potential for Falls  Goal: Patient will remain free of falls  Description  INTERVENTIONS:  - Assess patient frequently for physical needs  -  Identify cognitive and physical deficits and behaviors that affect risk of falls    -  [place] fall precautions as indicated by assessment   - Educate patient/family on patient safety including physical limitations  - Instruct patient to call for assistance with activity based on assessment  - Modify environment to reduce risk of injury  - Consider OT/PT consult to assist with strengthening/mobility  Outcome: Progressing

## 2019-05-06 ENCOUNTER — TELEPHONE (OUTPATIENT)
Dept: SURGICAL ONCOLOGY | Facility: CLINIC | Age: 69
End: 2019-05-06

## 2019-05-13 ENCOUNTER — TELEPHONE (OUTPATIENT)
Dept: HEMATOLOGY ONCOLOGY | Facility: CLINIC | Age: 69
End: 2019-05-13

## 2019-05-17 ENCOUNTER — TELEPHONE (OUTPATIENT)
Dept: HEMATOLOGY ONCOLOGY | Facility: CLINIC | Age: 69
End: 2019-05-17

## 2019-05-17 ENCOUNTER — OFFICE VISIT (OUTPATIENT)
Dept: HEMATOLOGY ONCOLOGY | Facility: CLINIC | Age: 69
End: 2019-05-17
Payer: COMMERCIAL

## 2019-05-17 ENCOUNTER — DOCUMENTATION (OUTPATIENT)
Dept: HEMATOLOGY ONCOLOGY | Facility: CLINIC | Age: 69
End: 2019-05-17

## 2019-05-17 VITALS
HEART RATE: 89 BPM | BODY MASS INDEX: 32.65 KG/M2 | DIASTOLIC BLOOD PRESSURE: 64 MMHG | WEIGHT: 196 LBS | TEMPERATURE: 99.6 F | HEIGHT: 65 IN | SYSTOLIC BLOOD PRESSURE: 106 MMHG | RESPIRATION RATE: 20 BRPM

## 2019-05-17 DIAGNOSIS — L73.9 FOLLICULITIS: ICD-10-CM

## 2019-05-17 DIAGNOSIS — C50.312 MALIGNANT NEOPLASM OF LOWER-INNER QUADRANT OF LEFT BREAST IN FEMALE, ESTROGEN RECEPTOR POSITIVE (HCC): Primary | ICD-10-CM

## 2019-05-17 DIAGNOSIS — Z17.0 MALIGNANT NEOPLASM OF LOWER-INNER QUADRANT OF LEFT BREAST IN FEMALE, ESTROGEN RECEPTOR POSITIVE (HCC): Primary | ICD-10-CM

## 2019-05-17 PROCEDURE — 99214 OFFICE O/P EST MOD 30 MIN: CPT | Performed by: INTERNAL MEDICINE

## 2019-05-17 RX ORDER — CEPHALEXIN 500 MG/1
500 CAPSULE ORAL EVERY 8 HOURS SCHEDULED
Qty: 30 CAPSULE | Refills: 0 | Status: SHIPPED | OUTPATIENT
Start: 2019-05-17 | End: 2019-05-27

## 2019-05-18 DIAGNOSIS — C50.312 MALIGNANT NEOPLASM OF LOWER-INNER QUADRANT OF LEFT BREAST IN FEMALE, ESTROGEN RECEPTOR POSITIVE (HCC): ICD-10-CM

## 2019-05-18 DIAGNOSIS — Z17.0 MALIGNANT NEOPLASM OF LOWER-INNER QUADRANT OF LEFT BREAST IN FEMALE, ESTROGEN RECEPTOR POSITIVE (HCC): ICD-10-CM

## 2019-05-18 RX ORDER — SODIUM CHLORIDE 9 MG/ML
20 INJECTION, SOLUTION INTRAVENOUS ONCE
Status: CANCELLED | OUTPATIENT
Start: 2019-06-13

## 2019-05-18 RX ORDER — ACETAMINOPHEN 325 MG/1
650 TABLET ORAL EVERY 6 HOURS PRN
Status: CANCELLED
Start: 2019-06-13

## 2019-05-18 RX ORDER — SODIUM CHLORIDE 9 MG/ML
20 INJECTION, SOLUTION INTRAVENOUS ONCE
Status: CANCELLED | OUTPATIENT
Start: 2019-05-23

## 2019-05-18 RX ORDER — ACETAMINOPHEN 325 MG/1
650 TABLET ORAL EVERY 6 HOURS PRN
Status: CANCELLED
Start: 2019-05-23

## 2019-05-21 ENCOUNTER — APPOINTMENT (OUTPATIENT)
Dept: LAB | Facility: HOSPITAL | Age: 69
End: 2019-05-21
Attending: INTERNAL MEDICINE
Payer: COMMERCIAL

## 2019-05-21 DIAGNOSIS — C50.312 MALIGNANT NEOPLASM OF LOWER-INNER QUADRANT OF LEFT BREAST IN FEMALE, ESTROGEN RECEPTOR POSITIVE (HCC): ICD-10-CM

## 2019-05-21 DIAGNOSIS — Z17.0 MALIGNANT NEOPLASM OF LOWER-INNER QUADRANT OF LEFT BREAST IN FEMALE, ESTROGEN RECEPTOR POSITIVE (HCC): ICD-10-CM

## 2019-05-22 RX ORDER — ACETAMINOPHEN 325 MG/1
650 TABLET ORAL ONCE
Status: CANCELLED
Start: 2019-05-23

## 2019-05-23 ENCOUNTER — HOSPITAL ENCOUNTER (OUTPATIENT)
Dept: INFUSION CENTER | Facility: HOSPITAL | Age: 69
Discharge: HOME | End: 2019-05-23
Payer: COMMERCIAL

## 2019-05-23 VITALS
SYSTOLIC BLOOD PRESSURE: 108 MMHG | HEIGHT: 65 IN | WEIGHT: 197 LBS | HEART RATE: 75 BPM | TEMPERATURE: 97.5 F | DIASTOLIC BLOOD PRESSURE: 60 MMHG | RESPIRATION RATE: 20 BRPM | BODY MASS INDEX: 32.82 KG/M2

## 2019-05-23 DIAGNOSIS — C50.312 MALIGNANT NEOPLASM OF LOWER-INNER QUADRANT OF LEFT BREAST IN FEMALE, ESTROGEN RECEPTOR POSITIVE (HCC): Primary | ICD-10-CM

## 2019-05-23 DIAGNOSIS — Z17.0 MALIGNANT NEOPLASM OF LOWER-INNER QUADRANT OF LEFT BREAST IN FEMALE, ESTROGEN RECEPTOR POSITIVE (HCC): Primary | ICD-10-CM

## 2019-05-23 PROBLEM — D70.1 CHEMOTHERAPY INDUCED NEUTROPENIA (HCC): Status: ACTIVE | Noted: 2019-05-23

## 2019-05-23 PROBLEM — T45.1X5A CHEMOTHERAPY INDUCED NEUTROPENIA (HCC): Status: ACTIVE | Noted: 2019-05-23

## 2019-05-23 PROCEDURE — 96417 CHEMO IV INFUS EACH ADDL SEQ: CPT

## 2019-05-23 PROCEDURE — 96413 CHEMO IV INFUSION 1 HR: CPT

## 2019-05-23 PROCEDURE — 96367 TX/PROPH/DG ADDL SEQ IV INF: CPT

## 2019-05-23 RX ORDER — SODIUM CHLORIDE 9 MG/ML
20 INJECTION, SOLUTION INTRAVENOUS ONCE
Status: COMPLETED | OUTPATIENT
Start: 2019-05-23 | End: 2019-05-23

## 2019-05-23 RX ORDER — ACETAMINOPHEN 325 MG/1
650 TABLET ORAL ONCE
Status: COMPLETED | OUTPATIENT
Start: 2019-05-23 | End: 2019-05-23

## 2019-05-23 RX ADMIN — SODIUM CHLORIDE 20 ML/HR: 0.9 INJECTION, SOLUTION INTRAVENOUS at 09:23

## 2019-05-23 RX ADMIN — DIPHENHYDRAMINE HYDROCHLORIDE 12.5 MG: 50 INJECTION, SOLUTION INTRAMUSCULAR; INTRAVENOUS at 09:23

## 2019-05-23 RX ADMIN — ACETAMINOPHEN 650 MG: 325 TABLET ORAL at 09:23

## 2019-05-23 RX ADMIN — DEXAMETHASONE SODIUM PHOSPHATE: 10 INJECTION, SOLUTION INTRAMUSCULAR; INTRAVENOUS at 09:47

## 2019-05-23 RX ADMIN — CYCLOPHOSPHAMIDE 1176 MG: 1 INJECTION, POWDER, FOR SOLUTION INTRAVENOUS; ORAL at 11:33

## 2019-05-23 RX ADMIN — DOCETAXEL 147 MG: 20 INJECTION, SOLUTION, CONCENTRATE INTRAVENOUS at 10:22

## 2019-05-23 NOTE — PLAN OF CARE
Problem: Potential for Falls  Goal: Patient will remain free of falls  Description  INTERVENTIONS:  - Assess patient frequently for physical needs  -  Identify cognitive and physical deficits and behaviors that affect risk of falls    -  [place] fall precautions as indicated by assessment   - Educate patient/family on patient safety including physical limitations  - Instruct patient to call for assistance with activity based on assessment  - Modify environment to reduce risk of injury  - Consider OT/PT consult to assist with strengthening/mobility  Outcome: Progressing

## 2019-05-23 NOTE — PROGRESS NOTES
Pt tolerated treatment today without incident  Declined AVS but is aware to return tomorrow at 12 for Neulasta injection

## 2019-05-24 ENCOUNTER — HOSPITAL ENCOUNTER (OUTPATIENT)
Dept: INFUSION CENTER | Facility: HOSPITAL | Age: 69
Discharge: HOME | End: 2019-05-24
Payer: COMMERCIAL

## 2019-05-24 VITALS — TEMPERATURE: 97.2 F

## 2019-05-24 DIAGNOSIS — D70.1 CHEMOTHERAPY INDUCED NEUTROPENIA (HCC): Primary | ICD-10-CM

## 2019-05-24 DIAGNOSIS — C50.312 MALIGNANT NEOPLASM OF LOWER-INNER QUADRANT OF LEFT BREAST IN FEMALE, ESTROGEN RECEPTOR POSITIVE (HCC): ICD-10-CM

## 2019-05-24 DIAGNOSIS — Z17.0 MALIGNANT NEOPLASM OF LOWER-INNER QUADRANT OF LEFT BREAST IN FEMALE, ESTROGEN RECEPTOR POSITIVE (HCC): ICD-10-CM

## 2019-05-24 DIAGNOSIS — T45.1X5A CHEMOTHERAPY INDUCED NEUTROPENIA (HCC): Primary | ICD-10-CM

## 2019-05-24 PROCEDURE — 96372 THER/PROPH/DIAG INJ SC/IM: CPT

## 2019-05-24 RX ADMIN — PEGFILGRASTIM 4 MG: 6 INJECTION SUBCUTANEOUS at 12:24

## 2019-05-24 NOTE — PLAN OF CARE
Problem: SAFETY ADULT  Goal: Patient will remain free of falls  Description  INTERVENTIONS:  - Assess patient frequently for physical needs  -  Identify cognitive and physical deficits and behaviors that affect risk of falls    -  [place] fall precautions as indicated by assessment   - Educate patient/family on patient safety including physical limitations  - Instruct patient to call for assistance with activity based on assessment  - Modify environment to reduce risk of injury  - Consider OT/PT consult to assist with strengthening/mobility  Outcome: Progressing

## 2019-06-10 ENCOUNTER — OFFICE VISIT (OUTPATIENT)
Dept: HEMATOLOGY ONCOLOGY | Facility: CLINIC | Age: 69
End: 2019-06-10
Payer: COMMERCIAL

## 2019-06-10 ENCOUNTER — OFFICE VISIT (OUTPATIENT)
Dept: SURGICAL ONCOLOGY | Facility: CLINIC | Age: 69
End: 2019-06-10
Payer: COMMERCIAL

## 2019-06-10 VITALS
WEIGHT: 199.2 LBS | TEMPERATURE: 97.6 F | DIASTOLIC BLOOD PRESSURE: 64 MMHG | OXYGEN SATURATION: 98 % | BODY MASS INDEX: 33.19 KG/M2 | HEART RATE: 83 BPM | HEIGHT: 65 IN | SYSTOLIC BLOOD PRESSURE: 98 MMHG | RESPIRATION RATE: 16 BRPM

## 2019-06-10 VITALS
DIASTOLIC BLOOD PRESSURE: 68 MMHG | BODY MASS INDEX: 32.96 KG/M2 | RESPIRATION RATE: 16 BRPM | HEIGHT: 65 IN | SYSTOLIC BLOOD PRESSURE: 100 MMHG | HEART RATE: 95 BPM | WEIGHT: 197.8 LBS | TEMPERATURE: 98.1 F

## 2019-06-10 DIAGNOSIS — C50.312 MALIGNANT NEOPLASM OF LOWER-INNER QUADRANT OF LEFT BREAST IN FEMALE, ESTROGEN RECEPTOR POSITIVE (HCC): Primary | ICD-10-CM

## 2019-06-10 DIAGNOSIS — Z92.21 HX ANTINEOPLASTIC CHEMOTHERAPY: ICD-10-CM

## 2019-06-10 DIAGNOSIS — Z17.0 MALIGNANT NEOPLASM OF LOWER-INNER QUADRANT OF LEFT BREAST IN FEMALE, ESTROGEN RECEPTOR POSITIVE (HCC): Primary | ICD-10-CM

## 2019-06-10 DIAGNOSIS — L73.9 FOLLICULITIS: ICD-10-CM

## 2019-06-10 DIAGNOSIS — Z13.71 BRCA NEGATIVE: ICD-10-CM

## 2019-06-10 PROCEDURE — 99214 OFFICE O/P EST MOD 30 MIN: CPT | Performed by: SURGERY

## 2019-06-10 PROCEDURE — 99215 OFFICE O/P EST HI 40 MIN: CPT | Performed by: INTERNAL MEDICINE

## 2019-06-11 ENCOUNTER — LAB (OUTPATIENT)
Dept: LAB | Facility: HOSPITAL | Age: 69
End: 2019-06-11
Attending: INTERNAL MEDICINE
Payer: COMMERCIAL

## 2019-06-11 DIAGNOSIS — C50.312 MALIGNANT NEOPLASM OF LOWER-INNER QUADRANT OF LEFT BREAST IN FEMALE, ESTROGEN RECEPTOR POSITIVE (HCC): ICD-10-CM

## 2019-06-11 DIAGNOSIS — Z17.0 MALIGNANT NEOPLASM OF LOWER-INNER QUADRANT OF LEFT BREAST IN FEMALE, ESTROGEN RECEPTOR POSITIVE (HCC): ICD-10-CM

## 2019-06-11 LAB
ALBUMIN SERPL BCP-MCNC: 3.2 G/DL (ref 3.5–5)
ALP SERPL-CCNC: 77 U/L (ref 46–116)
ALT SERPL W P-5'-P-CCNC: 33 U/L (ref 12–78)
ANION GAP SERPL CALCULATED.3IONS-SCNC: 9 MMOL/L (ref 4–13)
AST SERPL W P-5'-P-CCNC: 24 U/L (ref 5–45)
BASOPHILS # BLD AUTO: 0.07 THOUSANDS/ΜL (ref 0–0.1)
BASOPHILS NFR BLD AUTO: 1 % (ref 0–1)
BILIRUB SERPL-MCNC: 0.35 MG/DL (ref 0.2–1)
BUN SERPL-MCNC: 13 MG/DL (ref 5–25)
CALCIUM SERPL-MCNC: 9.4 MG/DL (ref 8.3–10.1)
CHLORIDE SERPL-SCNC: 107 MMOL/L (ref 100–108)
CO2 SERPL-SCNC: 27 MMOL/L (ref 21–32)
CREAT SERPL-MCNC: 0.68 MG/DL (ref 0.6–1.3)
EOSINOPHIL # BLD AUTO: 0.03 THOUSAND/ΜL (ref 0–0.61)
EOSINOPHIL NFR BLD AUTO: 1 % (ref 0–6)
ERYTHROCYTE [DISTWIDTH] IN BLOOD BY AUTOMATED COUNT: 15.9 % (ref 11.6–15.1)
GFR SERPL CREATININE-BSD FRML MDRD: 91 ML/MIN/1.73SQ M
GLUCOSE P FAST SERPL-MCNC: 91 MG/DL (ref 65–99)
HCT VFR BLD AUTO: 42.1 % (ref 34.8–46.1)
HGB BLD-MCNC: 13.3 G/DL (ref 11.5–15.4)
IMM GRANULOCYTES # BLD AUTO: 0.02 THOUSAND/UL (ref 0–0.2)
IMM GRANULOCYTES NFR BLD AUTO: 0 % (ref 0–2)
LYMPHOCYTES # BLD AUTO: 1.87 THOUSANDS/ΜL (ref 0.6–4.47)
LYMPHOCYTES NFR BLD AUTO: 33 % (ref 14–44)
MCH RBC QN AUTO: 28.4 PG (ref 26.8–34.3)
MCHC RBC AUTO-ENTMCNC: 31.6 G/DL (ref 31.4–37.4)
MCV RBC AUTO: 90 FL (ref 82–98)
MONOCYTES # BLD AUTO: 0.46 THOUSAND/ΜL (ref 0.17–1.22)
MONOCYTES NFR BLD AUTO: 8 % (ref 4–12)
NEUTROPHILS # BLD AUTO: 3.17 THOUSANDS/ΜL (ref 1.85–7.62)
NEUTS SEG NFR BLD AUTO: 57 % (ref 43–75)
NRBC BLD AUTO-RTO: 0 /100 WBCS
PLATELET # BLD AUTO: 206 THOUSANDS/UL (ref 149–390)
PMV BLD AUTO: 10 FL (ref 8.9–12.7)
POTASSIUM SERPL-SCNC: 4 MMOL/L (ref 3.5–5.3)
PROT SERPL-MCNC: 6.6 G/DL (ref 6.4–8.2)
RBC # BLD AUTO: 4.69 MILLION/UL (ref 3.81–5.12)
SODIUM SERPL-SCNC: 143 MMOL/L (ref 136–145)
WBC # BLD AUTO: 5.62 THOUSAND/UL (ref 4.31–10.16)

## 2019-06-11 PROCEDURE — 36415 COLL VENOUS BLD VENIPUNCTURE: CPT

## 2019-06-11 PROCEDURE — 85025 COMPLETE CBC W/AUTO DIFF WBC: CPT

## 2019-06-11 PROCEDURE — 80053 COMPREHEN METABOLIC PANEL: CPT

## 2019-06-11 RX ORDER — ACETAMINOPHEN 325 MG/1
650 TABLET ORAL ONCE
Status: CANCELLED
Start: 2019-06-13

## 2019-06-13 ENCOUNTER — HOSPITAL ENCOUNTER (OUTPATIENT)
Dept: INFUSION CENTER | Facility: HOSPITAL | Age: 69
Discharge: HOME | End: 2019-06-13
Payer: COMMERCIAL

## 2019-06-13 VITALS
RESPIRATION RATE: 18 BRPM | BODY MASS INDEX: 32.91 KG/M2 | WEIGHT: 197.53 LBS | DIASTOLIC BLOOD PRESSURE: 68 MMHG | HEIGHT: 65 IN | SYSTOLIC BLOOD PRESSURE: 134 MMHG | HEART RATE: 86 BPM | TEMPERATURE: 97.2 F

## 2019-06-13 DIAGNOSIS — C50.312 MALIGNANT NEOPLASM OF LOWER-INNER QUADRANT OF LEFT BREAST IN FEMALE, ESTROGEN RECEPTOR POSITIVE (HCC): Primary | ICD-10-CM

## 2019-06-13 DIAGNOSIS — Z17.0 MALIGNANT NEOPLASM OF LOWER-INNER QUADRANT OF LEFT BREAST IN FEMALE, ESTROGEN RECEPTOR POSITIVE (HCC): Primary | ICD-10-CM

## 2019-06-13 DIAGNOSIS — T45.1X5A CHEMOTHERAPY INDUCED NEUTROPENIA (HCC): ICD-10-CM

## 2019-06-13 DIAGNOSIS — D70.1 CHEMOTHERAPY INDUCED NEUTROPENIA (HCC): ICD-10-CM

## 2019-06-13 PROCEDURE — 96417 CHEMO IV INFUS EACH ADDL SEQ: CPT

## 2019-06-13 PROCEDURE — 96413 CHEMO IV INFUSION 1 HR: CPT

## 2019-06-13 PROCEDURE — 96367 TX/PROPH/DG ADDL SEQ IV INF: CPT

## 2019-06-13 RX ORDER — ACETAMINOPHEN 325 MG/1
650 TABLET ORAL ONCE
Status: COMPLETED | OUTPATIENT
Start: 2019-06-13 | End: 2019-06-13

## 2019-06-13 RX ORDER — SODIUM CHLORIDE 9 MG/ML
20 INJECTION, SOLUTION INTRAVENOUS ONCE
Status: COMPLETED | OUTPATIENT
Start: 2019-06-13 | End: 2019-06-13

## 2019-06-13 RX ADMIN — SODIUM CHLORIDE 20 ML/HR: 0.9 INJECTION, SOLUTION INTRAVENOUS at 09:21

## 2019-06-13 RX ADMIN — DEXAMETHASONE SODIUM PHOSPHATE: 10 INJECTION, SOLUTION INTRAMUSCULAR; INTRAVENOUS at 09:46

## 2019-06-13 RX ADMIN — CYCLOPHOSPHAMIDE 1176 MG: 1 INJECTION, POWDER, FOR SOLUTION INTRAVENOUS; ORAL at 11:25

## 2019-06-13 RX ADMIN — ACETAMINOPHEN 650 MG: 325 TABLET ORAL at 09:22

## 2019-06-13 RX ADMIN — DOCETAXEL 147 MG: 20 INJECTION, SOLUTION, CONCENTRATE INTRAVENOUS at 10:17

## 2019-06-13 RX ADMIN — DIPHENHYDRAMINE HYDROCHLORIDE 12.5 MG: 50 INJECTION, SOLUTION INTRAMUSCULAR; INTRAVENOUS at 09:22

## 2019-06-13 NOTE — PLAN OF CARE
Problem: Potential for Falls  Goal: Patient will remain free of falls  Description  INTERVENTIONS:  - Assess patient frequently for physical needs  -  Identify cognitive and physical deficits and behaviors that affect risk of falls    -  [place] fall precautions as indicated by assessment   - Educate patient/family on patient safety including physical limitations  - Instruct patient to call for assistance with activity based on assessment  - Modify environment to reduce risk of injury  - Consider OT/PT consult to assist with strengthening/mobility  Outcome: Progressing

## 2019-06-14 ENCOUNTER — HOSPITAL ENCOUNTER (OUTPATIENT)
Dept: INFUSION CENTER | Facility: HOSPITAL | Age: 69
Discharge: HOME | End: 2019-06-14
Attending: INTERNAL MEDICINE
Payer: COMMERCIAL

## 2019-06-14 VITALS — TEMPERATURE: 98 F

## 2019-06-14 DIAGNOSIS — Z17.0 MALIGNANT NEOPLASM OF LOWER-INNER QUADRANT OF LEFT BREAST IN FEMALE, ESTROGEN RECEPTOR POSITIVE (HCC): Primary | ICD-10-CM

## 2019-06-14 DIAGNOSIS — D70.1 CHEMOTHERAPY INDUCED NEUTROPENIA (HCC): ICD-10-CM

## 2019-06-14 DIAGNOSIS — C50.312 MALIGNANT NEOPLASM OF LOWER-INNER QUADRANT OF LEFT BREAST IN FEMALE, ESTROGEN RECEPTOR POSITIVE (HCC): Primary | ICD-10-CM

## 2019-06-14 DIAGNOSIS — T45.1X5A CHEMOTHERAPY INDUCED NEUTROPENIA (HCC): ICD-10-CM

## 2019-06-14 PROCEDURE — 96372 THER/PROPH/DIAG INJ SC/IM: CPT

## 2019-06-14 RX ADMIN — PEGFILGRASTIM 4 MG: 6 INJECTION SUBCUTANEOUS at 12:24

## 2019-06-18 ENCOUNTER — TELEPHONE (OUTPATIENT)
Dept: SURGICAL ONCOLOGY | Facility: CLINIC | Age: 69
End: 2019-06-18

## 2019-06-20 ENCOUNTER — TELEPHONE (OUTPATIENT)
Dept: HEMATOLOGY ONCOLOGY | Facility: CLINIC | Age: 69
End: 2019-06-20

## 2019-06-20 ENCOUNTER — TELEPHONE (OUTPATIENT)
Dept: SURGICAL ONCOLOGY | Facility: CLINIC | Age: 69
End: 2019-06-20

## 2019-06-21 ENCOUNTER — TELEPHONE (OUTPATIENT)
Dept: HEMATOLOGY ONCOLOGY | Facility: CLINIC | Age: 69
End: 2019-06-21

## 2019-06-28 ENCOUNTER — TELEPHONE (OUTPATIENT)
Dept: HEMATOLOGY ONCOLOGY | Facility: CLINIC | Age: 69
End: 2019-06-28

## 2019-06-28 DIAGNOSIS — C50.312 MALIGNANT NEOPLASM OF LOWER-INNER QUADRANT OF LEFT BREAST IN FEMALE, ESTROGEN RECEPTOR POSITIVE (HCC): Primary | ICD-10-CM

## 2019-06-28 DIAGNOSIS — Z17.0 MALIGNANT NEOPLASM OF LOWER-INNER QUADRANT OF LEFT BREAST IN FEMALE, ESTROGEN RECEPTOR POSITIVE (HCC): Primary | ICD-10-CM

## 2019-06-28 RX ORDER — LETROZOLE 2.5 MG/1
2.5 TABLET, FILM COATED ORAL DAILY
Qty: 30 TABLET | Refills: 2 | Status: SHIPPED | OUTPATIENT
Start: 2019-06-28 | End: 2019-09-16 | Stop reason: SDUPTHER

## 2019-07-01 ENCOUNTER — TELEPHONE (OUTPATIENT)
Dept: HEMATOLOGY ONCOLOGY | Facility: CLINIC | Age: 69
End: 2019-07-01

## 2019-07-01 NOTE — TELEPHONE ENCOUNTER
Called regarding the start of the pill and requesting it be sent to  [address]  After reviewing the notes the script was signed and sent to [address] on Friday but went to the [facility]  I explained to [address] that I would have to speak with Dr Seferino Wilcox nurse or him to have it switched over to [facility] ORTHOPEDIC AND SPINE [facility] [place]  I told the patient that I would call her back once the switch is made  Patient agreed

## 2019-07-02 NOTE — PROGRESS NOTES
Spoke with patient regarding her refill being ready  Patient stated thank you and then mention she would like a call from the nurse regarding the instructions and when she is to start taking the Letrozole

## 2019-07-03 ENCOUNTER — TELEPHONE (OUTPATIENT)
Dept: HEMATOLOGY ONCOLOGY | Facility: CLINIC | Age: 69
End: 2019-07-03

## 2019-07-15 ENCOUNTER — OFFICE VISIT (OUTPATIENT)
Dept: HEMATOLOGY ONCOLOGY | Facility: CLINIC | Age: 69
End: 2019-07-15
Payer: COMMERCIAL

## 2019-07-15 VITALS
BODY MASS INDEX: 33.15 KG/M2 | RESPIRATION RATE: 16 BRPM | TEMPERATURE: 97.9 F | HEIGHT: 65 IN | DIASTOLIC BLOOD PRESSURE: 72 MMHG | WEIGHT: 199 LBS | HEART RATE: 90 BPM | SYSTOLIC BLOOD PRESSURE: 118 MMHG

## 2019-07-15 DIAGNOSIS — C50.312 MALIGNANT NEOPLASM OF LOWER-INNER QUADRANT OF LEFT BREAST IN FEMALE, ESTROGEN RECEPTOR POSITIVE (HCC): Primary | ICD-10-CM

## 2019-07-15 DIAGNOSIS — Z17.0 MALIGNANT NEOPLASM OF LOWER-INNER QUADRANT OF LEFT BREAST IN FEMALE, ESTROGEN RECEPTOR POSITIVE (HCC): Primary | ICD-10-CM

## 2019-07-15 DIAGNOSIS — R82.90 FOUL SMELLING URINE: ICD-10-CM

## 2019-07-15 PROCEDURE — 99214 OFFICE O/P EST MOD 30 MIN: CPT | Performed by: PHYSICIAN ASSISTANT

## 2019-07-15 RX ORDER — PANTOPRAZOLE SODIUM 40 MG/1
40 TABLET, DELAYED RELEASE ORAL DAILY PRN
COMMUNITY
Start: 2019-06-27 | End: 2022-05-05 | Stop reason: HOSPADM

## 2019-07-15 NOTE — PROGRESS NOTES
Hematology/Oncology Outpatient Follow-up  Claudine Encinas 79 y o  female [id number]    Date:  7/15/2019      Assessment and Plan:  1  Malignant neoplasm of lower-inner quadrant of left breast in female, estrogen receptor positive ([facility] [address] )  79year old female presents for follow up for stage 1, invasive left breast cancer, grade 3, ER/WA positive, HER-2 negative, oncotype score 25; s/p bilateral mastectomies  She has been on adjuvant Femara for about 2 weeks  She is experiencing musculoskeletal symptoms  She was taking Femara in the morning  She has switched taking it in the evening and symptoms are somewhat improved  Advised to continue taking for an additional 2 weeks  If symptoms continue she is to call the office and they will switch her to a different aromatase inhibitor  She states that she is going to try to go back to work part-time in approximately 2-3 weeks  She needing paperwork filled out regarding this she is welcome to contact our office  Follow-up in 3 months     - CBC and differential  - Comprehensive metabolic panel    2  Foul smelling urine  She states that ever since chemotherapy she has foul-smelling urine  She feels that her urine is burning the size of the skin of her inner thighs in groin region  On physical exam there did not appear to be any rash or she states that this is itching sometimes  Suggested to apply a topical Benadryl cream or a very small amount of hydrocortisone cream and not to get hydrocortisone cream near the labia area  UA to also to be performed  We will call her with result  - UA (URINE) with reflex to Microscopic    3  GERD symptoms  Patient states she has been having GERD symptoms since approximately February 2019  She will be having a EGD with Dr Lisa Gonzalez within the next 2 weeks  This is already scheduled        HPI:     Malignant neoplasm of lower-inner quadrant of left breast in female, estrogen receptor positive ([facility] [address] )    1/2/2019 Initial Diagnosis     Malignant neoplasm of lower-inner quadrant of left breast in female, estrogen receptor positive [facility])       Chemotherapy     April 2019:  Adjuvant  Taxotere and Cytoxan once every 3 weeks for 4 cycles followed by aromatase inhibitor      4/10/2019 -  Chemotherapy     pegfilgrastim (NEULASTA) subcutaneous injection 6 mg, 6 mg, Subcutaneous, Once, 4 of 4 cycles  Dose modification: 4 mg (original dose 6 mg, Cycle 3)  Administration: 4 mg (5/24/2019), 4 mg (6/14/2019)  cyclophosphamide (CYTOXAN) 1,176 mg in sodium chloride 0 9 % 250 mL IVPB, 600 mg/m2 = 1,176 mg, Intravenous, Once, 4 of 4 cycles  Administration: 1,176 mg (5/23/2019), 1,176 mg (6/13/2019)  DOCEtaxel (TAXOTERE) 147 mg in sodium chloride 0 9 % 250 mL chemo infusion, 75 mg/m2 = 147 mg, Intravenous, Once, 4 of 4 cycles  Administration: 147 mg (5/23/2019), 147 mg (6/13/2019)         Interval history:     ROS: Review of Systems   Constitutional: Positive for fever  Negative for appetite change, chills, fatigue and unexpected weight change  Respiratory: Positive for shortness of breath (with exertion)  Negative for cough  Cardiovascular: Negative for chest pain, palpitations and leg swelling  Gastrointestinal: Negative for abdominal pain, blood in stool, constipation, diarrhea, nausea and vomiting  GERD symptoms (burning, pain)   Genitourinary: Negative for difficulty urinating, dysuria and hematuria  Foul smelling urine   Musculoskeletal: Positive for arthralgias and myalgias  Skin: Positive for rash  Neurological: Negative for dizziness, weakness, light-headedness, numbness and headaches  Hematological: Negative  Psychiatric/Behavioral: Negative          Past Medical History:   Diagnosis Date    Abdominal pain     right mid abd    Chemotherapy induced neutropenia ([address] ) 5/23/2019    GERD (gastroesophageal reflux disease)     Plantar fasciitis     right foot    Thyroid nodule     enlarged- biopsy benign    Vitamin D deficiency        Past Surgical History:   Procedure Laterality Date    BREAST BIOPSY Left 01/02/2019    invasive ductal carcinoma    COLONOSCOPY      HYSTERECTOMY      age 41    OOPHORECTOMY Bilateral     age 41    IA BIOPSY/EXCISION, LYMPH NODE(S) Left 2/8/2019    Procedure: BIOPSY LYMPH NODE SENTINEL;  Surgeon: Joanna Villar MD;  [facility];  Service: Surgical Oncology    IA BREAST RECONSTRUC W TISS EXPANDR Bilateral 2/8/2019    Procedure: INSERTION/PLACEMENT TISSUE EXPANDER (EXCHANGE); Surgeon: Reynaldo Singleton MD;  [facility];  Service: Plastics    IA EDG US EXAM SURGICAL ALTER STOM DUODENUM/JEJUNUM N/A 2/2/2017    Procedure: RADIAL ENDOSCOPIC U/S;  Surgeon: Janay Herron MD;  [facility];   Service: Gastroenterology    IA IMPLNT BIO IMPLNT FOR SOFT TISSUE REINFORCEMENT Bilateral 2/8/2019    Procedure: RECONSTRUCTION BREAST W/ IMPLANT;  Surgeon: Reynaldo Singleton MD;  [facility];  Service: Plastics    IA MASTECTOMY, SIMPLE, COMPLETE N/A 2/8/2019    Procedure: LEFT breast mastectomy; RIGHT breast prophylactic mastectomy;  Surgeon: Joanna Villar MD;  [facility];  Service: Surgical Oncology    IA PART EXCIS PLANTAR FASCIA Right 10/20/2016    Procedure:  OPEN RELEASE FASCIA PLANTAR ,CUTTING BOTTOM OF ARCH,INSTEP ;  Surgeon: Carey Evans DPM;  [facility];  Service: Podiatry    THUMB ARTHROSCOPY      US GUIDED BREAST BIOPSY LEFT COMPLETE Left 1/2/2019       Social History     Socioeconomic History    Marital status: Single     Spouse name: None    Number of children: None    Years of education: None    Highest education level: None   Occupational History    None   Social Needs    Financial resource strain: None    Food insecurity:     Worry: None     Inability: None    Transportation needs:     Medical: None     Non-medical: None   Tobacco Use    Smoking status: Never Smoker    Smokeless tobacco: Never Used   Substance and Sexual Activity  Alcohol use: Yes     Comment: social    Drug use: No    Sexual activity: Never   Lifestyle    Physical activity:     Days per week: None     Minutes per session: None    Stress: None   Relationships    Social connections:     Talks on phone: None     Gets together: None     Attends [employer] service: None     Active member of club or organization: None     Attends meetings of clubs or organizations: None     Relationship status: None    Intimate partner violence:     Fear of current or ex partner: None     Emotionally abused: None     Physically abused: None     Forced sexual activity: None   Other Topics Concern    None   Social History Narrative    None       Family History   Problem Relation Age of Onset    Brain cancer Mother 67    Prostate cancer Father 66    Breast cancer Sister 54    Breast cancer additional onset Sister 72    Breast cancer Sister 72    Prostate cancer Brother 62       Allergies   Allergen Reactions    Latex Anaphylaxis    Shellfish Allergy Anaphylaxis    Shellfish-Derived Products Anaphylaxis         Current Outpatient Medications:     clobetasol (TEMOVATE) 0 05 % ointment, , Disp: , Rfl:     Cyanocobalamin (VITAMIN B 12 PO), Take by mouth, Disp: , Rfl:     HYDROcodone-acetaminophen (NORCO) 5-325 mg per tablet, as needed , Disp: , Rfl:     letrozole (FEMARA) 2 5 mg tablet, Take 1 tablet (2 5 mg total) by mouth daily, Disp: 30 tablet, Rfl: 2    Multiple Vitamins-Minerals (MULTIVITAL PO), Take by mouth, Disp: , Rfl:     pantoprazole (PROTONIX) 40 mg tablet, , Disp: , Rfl:     celecoxib (CeleBREX) 200 mg capsule, daily as needed , Disp: , Rfl:     dexamethasone (DECADRON) 4 mg tablet, Take 1 tablet (4 mg total) by mouth 2 (two) times a day with meals For 5 doses   Start day before chemo (Patient not taking: Reported on 7/15/2019), Disp: 40 tablet, Rfl: 1    ergocalciferol (VITAMIN D2) 50,000 units, 50,000 Units every 28 days  , Disp: , Rfl:     metoclopramide (REGLAN) 10 mg tablet, Take 1 tablet (10 mg total) by mouth every 6 (six) hours (Patient not taking: Reported on 7/15/2019), Disp: 15 tablet, Rfl: 0    mupirocin (BACTROBAN) 2 % ointment, , Disp: , Rfl:     ondansetron (ZOFRAN) 8 mg tablet, Take 1 tablet (8 mg total) by mouth every 8 (eight) hours as needed for nausea or vomiting (Patient not taking: Reported on 7/15/2019), Disp: 30 tablet, Rfl: 2    ranitidine (ZANTAC) 150 MG capsule, Take 1 capsule (150 mg total) by mouth every evening (Patient not taking: Reported on 7/15/2019), Disp: 30 capsule, Rfl: 5    Current Facility-Administered Medications:     ipratropium (ATROVENT) 0 02 % inhalation solution 0 5 mg, 0 5 mg, Nebulization, 4x Daily, Katelyn Segura PA-C, 0 5 mg at 04/21/18 1407      Physical Exam:  /72 (BP Location: Right arm)   Pulse 90   Temp 97 9 °F (36 6 °C) (Tympanic)   Resp 16   Ht 5' 5" (1 651 m)   Wt 90 3 kg (199 lb)   BMI 33 12 kg/m²     Physical Exam   Constitutional: She is oriented to person, place, and time  She appears well-developed and well-nourished  No distress  HENT:   Head: Normocephalic and atraumatic  Eyes: Conjunctivae are normal  No scleral icterus  Neck: Normal range of motion  Neck supple  Cardiovascular: Normal rate, regular rhythm and normal heart sounds  No murmur heard  Pulmonary/Chest: Effort normal and breath sounds normal  No respiratory distress  Abdominal: Soft  There is no tenderness  Genitourinary:   Genitourinary Comments: External area examined of groin and perineum area without evidence of rash; very slight erythema    Musculoskeletal: Normal range of motion  She exhibits no edema or tenderness  Lymphadenopathy:     She has no cervical adenopathy  Neurological: She is alert and oriented to person, place, and time  No cranial nerve deficit  Skin: Skin is warm and dry  Psychiatric: She has a normal mood and affect       Tomas Duarte RN was present as witness during physical exam today  Labs:  Lab Results   Component Value Date    WBC 5 62 06/11/2019    HGB 13 3 06/11/2019    HCT 42 1 06/11/2019    MCV 90 06/11/2019     06/11/2019     Lab Results   Component Value Date     07/10/2014    K 4 0 06/11/2019     06/11/2019    CO2 27 06/11/2019    ANIONGAP 5 07/10/2014    BUN 13 06/11/2019    CREATININE 0 68 06/11/2019    GLUCOSE 93 07/10/2014    GLUF 91 06/11/2019    CALCIUM 9 4 06/11/2019    AST 24 06/11/2019    ALT 33 06/11/2019    ALKPHOS 77 06/11/2019    PROT 7 3 07/10/2014    BILITOT 0 54 07/10/2014    EGFR 91 06/11/2019     Patient voiced understanding and agreement in the above discussion  Aware to contact our office with questions/symptoms in the interim

## 2019-07-16 ENCOUNTER — DOCUMENTATION (OUTPATIENT)
Dept: HEMATOLOGY ONCOLOGY | Facility: CLINIC | Age: 69
End: 2019-07-16

## 2019-07-16 ENCOUNTER — APPOINTMENT (OUTPATIENT)
Dept: LAB | Facility: HOSPITAL | Age: 69
End: 2019-07-16
Payer: COMMERCIAL

## 2019-07-16 LAB
ALBUMIN SERPL BCP-MCNC: 3.2 G/DL (ref 3.5–5)
ALP SERPL-CCNC: 76 U/L (ref 46–116)
ALT SERPL W P-5'-P-CCNC: 31 U/L (ref 12–78)
ANION GAP SERPL CALCULATED.3IONS-SCNC: 7 MMOL/L (ref 4–13)
AST SERPL W P-5'-P-CCNC: 25 U/L (ref 5–45)
BASOPHILS # BLD AUTO: 0.06 THOUSANDS/ΜL (ref 0–0.1)
BASOPHILS NFR BLD AUTO: 1 % (ref 0–1)
BILIRUB SERPL-MCNC: 0.39 MG/DL (ref 0.2–1)
BILIRUB UR QL STRIP: NEGATIVE
BUN SERPL-MCNC: 13 MG/DL (ref 5–25)
CALCIUM SERPL-MCNC: 9.7 MG/DL (ref 8.3–10.1)
CHLORIDE SERPL-SCNC: 106 MMOL/L (ref 100–108)
CLARITY UR: CLEAR
CO2 SERPL-SCNC: 28 MMOL/L (ref 21–32)
COLOR UR: YELLOW
CREAT SERPL-MCNC: 0.7 MG/DL (ref 0.6–1.3)
EOSINOPHIL # BLD AUTO: 0.43 THOUSAND/ΜL (ref 0–0.61)
EOSINOPHIL NFR BLD AUTO: 6 % (ref 0–6)
ERYTHROCYTE [DISTWIDTH] IN BLOOD BY AUTOMATED COUNT: 15 % (ref 11.6–15.1)
GFR SERPL CREATININE-BSD FRML MDRD: 91 ML/MIN/1.73SQ M
GLUCOSE P FAST SERPL-MCNC: 102 MG/DL (ref 65–99)
GLUCOSE UR STRIP-MCNC: NEGATIVE MG/DL
HCT VFR BLD AUTO: 42.5 % (ref 34.8–46.1)
HGB BLD-MCNC: 13.1 G/DL (ref 11.5–15.4)
HGB UR QL STRIP.AUTO: NEGATIVE
IMM GRANULOCYTES # BLD AUTO: 0.02 THOUSAND/UL (ref 0–0.2)
IMM GRANULOCYTES NFR BLD AUTO: 0 % (ref 0–2)
KETONES UR STRIP-MCNC: NEGATIVE MG/DL
LEUKOCYTE ESTERASE UR QL STRIP: NEGATIVE
LYMPHOCYTES # BLD AUTO: 1.92 THOUSANDS/ΜL (ref 0.6–4.47)
LYMPHOCYTES NFR BLD AUTO: 28 % (ref 14–44)
MCH RBC QN AUTO: 28.2 PG (ref 26.8–34.3)
MCHC RBC AUTO-ENTMCNC: 30.8 G/DL (ref 31.4–37.4)
MCV RBC AUTO: 91 FL (ref 82–98)
MONOCYTES # BLD AUTO: 0.65 THOUSAND/ΜL (ref 0.17–1.22)
MONOCYTES NFR BLD AUTO: 10 % (ref 4–12)
NEUTROPHILS # BLD AUTO: 3.67 THOUSANDS/ΜL (ref 1.85–7.62)
NEUTS SEG NFR BLD AUTO: 55 % (ref 43–75)
NITRITE UR QL STRIP: NEGATIVE
NRBC BLD AUTO-RTO: 0 /100 WBCS
PH UR STRIP.AUTO: 6 [PH]
PLATELET # BLD AUTO: 243 THOUSANDS/UL (ref 149–390)
PMV BLD AUTO: 9.7 FL (ref 8.9–12.7)
POTASSIUM SERPL-SCNC: 4.2 MMOL/L (ref 3.5–5.3)
PROT SERPL-MCNC: 6.5 G/DL (ref 6.4–8.2)
PROT UR STRIP-MCNC: NEGATIVE MG/DL
RBC # BLD AUTO: 4.65 MILLION/UL (ref 3.81–5.12)
SODIUM SERPL-SCNC: 141 MMOL/L (ref 136–145)
SP GR UR STRIP.AUTO: <=1.005 (ref 1–1.03)
UROBILINOGEN UR QL STRIP.AUTO: 0.2 E.U./DL
WBC # BLD AUTO: 6.75 THOUSAND/UL (ref 4.31–10.16)

## 2019-07-16 PROCEDURE — 80053 COMPREHEN METABOLIC PANEL: CPT | Performed by: PHYSICIAN ASSISTANT

## 2019-07-16 PROCEDURE — 81003 URINALYSIS AUTO W/O SCOPE: CPT | Performed by: PHYSICIAN ASSISTANT

## 2019-07-16 PROCEDURE — 36415 COLL VENOUS BLD VENIPUNCTURE: CPT | Performed by: PHYSICIAN ASSISTANT

## 2019-07-16 PROCEDURE — 85025 COMPLETE CBC W/AUTO DIFF WBC: CPT | Performed by: PHYSICIAN ASSISTANT

## 2019-07-19 ENCOUNTER — TELEPHONE (OUTPATIENT)
Dept: HEMATOLOGY ONCOLOGY | Facility: CLINIC | Age: 69
End: 2019-07-19

## 2019-07-19 ENCOUNTER — TELEPHONE (OUTPATIENT)
Dept: HEMATOLOGY ONCOLOGY | Facility: HOSPITAL | Age: 69
End: 2019-07-19

## 2019-07-19 NOTE — TELEPHONE ENCOUNTER
The note was given to Earl  She then forward the information to HR for the patient  the [facility] paperwork was already sent  When the patient called in she stated that she only needed the date of return and part-time on the note  I called the patient and now she needs a new note with the following information: 1  Date of return-8/9/2019  2  Full or Part-time- the patient said part time  3  Restrictions- I ask Earl to review with Berry Cadet  Once completed the notes needs to be sent to HR and a copy sent to the patient's home

## 2019-07-19 NOTE — TELEPHONE ENCOUNTER
Per Berenice Booth, I called HR to find out what type of restrictions they are looking for in the letter before we type it up  I left my name and [employer] phone number asking to give me a call back

## 2019-07-19 NOTE — TELEPHONE ENCOUNTER
I completed this paperwork on Tuesday  I gave it to Jackie Martinez, please follow up with patient  If I need to redo the paperwork, please let me know

## 2019-07-19 NOTE — TELEPHONE ENCOUNTER
Please review with ERIKA Maloney and send another note over to HR for PATSY VIDALESTL to [phone] at Fax #: [phone]

## 2019-07-19 NOTE — TELEPHONE ENCOUNTER
Patient called and stated she needed a release back to work part time written by you to go back Aug 9th she never got

## 2019-07-30 ENCOUNTER — ANESTHESIA EVENT (OUTPATIENT)
Dept: GASTROENTEROLOGY | Facility: HOSPITAL | Age: 69
End: 2019-07-30

## 2019-07-30 RX ORDER — SODIUM CHLORIDE, SODIUM LACTATE, POTASSIUM CHLORIDE, CALCIUM CHLORIDE 600; 310; 30; 20 MG/100ML; MG/100ML; MG/100ML; MG/100ML
75 INJECTION, SOLUTION INTRAVENOUS CONTINUOUS
Status: CANCELLED | OUTPATIENT
Start: 2019-07-30

## 2019-07-31 ENCOUNTER — HOSPITAL ENCOUNTER (OUTPATIENT)
Dept: GASTROENTEROLOGY | Facility: HOSPITAL | Age: 69
Setting detail: OUTPATIENT SURGERY
Discharge: HOME | End: 2019-07-31
Attending: INTERNAL MEDICINE | Admitting: INTERNAL MEDICINE
Payer: COMMERCIAL

## 2019-07-31 ENCOUNTER — ANESTHESIA (OUTPATIENT)
Dept: GASTROENTEROLOGY | Facility: HOSPITAL | Age: 69
End: 2019-07-31

## 2019-07-31 VITALS
SYSTOLIC BLOOD PRESSURE: 99 MMHG | TEMPERATURE: 97.6 F | WEIGHT: 199 LBS | DIASTOLIC BLOOD PRESSURE: 56 MMHG | HEIGHT: 65 IN | HEART RATE: 69 BPM | OXYGEN SATURATION: 94 % | RESPIRATION RATE: 16 BRPM | BODY MASS INDEX: 33.15 KG/M2

## 2019-07-31 DIAGNOSIS — R07.9 CHEST PAIN: ICD-10-CM

## 2019-07-31 DIAGNOSIS — R10.13 EPIGASTRIC PAIN: ICD-10-CM

## 2019-07-31 PROCEDURE — 88305 TISSUE EXAM BY PATHOLOGIST: CPT | Performed by: PATHOLOGY

## 2019-07-31 RX ORDER — PROPOFOL 10 MG/ML
INJECTION, EMULSION INTRAVENOUS AS NEEDED
Status: DISCONTINUED | OUTPATIENT
Start: 2019-07-31 | End: 2019-07-31 | Stop reason: SURG

## 2019-07-31 RX ORDER — SODIUM CHLORIDE 9 MG/ML
INJECTION, SOLUTION INTRAVENOUS CONTINUOUS PRN
Status: DISCONTINUED | OUTPATIENT
Start: 2019-07-31 | End: 2019-07-31 | Stop reason: SURG

## 2019-07-31 RX ORDER — SODIUM CHLORIDE 9 MG/ML
125 INJECTION, SOLUTION INTRAVENOUS CONTINUOUS
Status: DISCONTINUED | OUTPATIENT
Start: 2019-07-31 | End: 2019-08-04 | Stop reason: HOSPADM

## 2019-07-31 RX ADMIN — PROPOFOL 20 MG: 10 INJECTION, EMULSION INTRAVENOUS at 09:43

## 2019-07-31 RX ADMIN — PROPOFOL 40 MG: 10 INJECTION, EMULSION INTRAVENOUS at 09:41

## 2019-07-31 RX ADMIN — SODIUM CHLORIDE: 0.9 INJECTION, SOLUTION INTRAVENOUS at 09:31

## 2019-07-31 RX ADMIN — PROPOFOL 70 MG: 10 INJECTION, EMULSION INTRAVENOUS at 09:39

## 2019-07-31 RX ADMIN — PROPOFOL 20 MG: 10 INJECTION, EMULSION INTRAVENOUS at 09:42

## 2019-07-31 RX ADMIN — SODIUM CHLORIDE 125 ML/HR: 0.9 INJECTION, SOLUTION INTRAVENOUS at 08:54

## 2019-07-31 NOTE — ANESTHESIA POSTPROCEDURE EVALUATION
Post-Op Assessment Note    CV Status:  Stable    Pain management: adequate     Mental Status:  Alert and awake   Hydration Status:  Euvolemic   PONV Controlled:  Controlled   Airway Patency:  Patent   Post Op Vitals Reviewed: Yes      Staff: Anesthesiologist, CRNA           BP 92/52 (07/31/19 0952)    Temp      Pulse 71 (07/31/19 0952)   Resp 16 (07/31/19 0952)    SpO2 96 % (07/31/19 0952)

## 2019-07-31 NOTE — ANESTHESIA PREPROCEDURE EVALUATION
Review of Systems/Medical History  Patient summary reviewed  Chart reviewed  No history of anesthetic complications     Cardiovascular   Pulmonary       GI/Hepatic    GERD poorly controlled,             Endo/Other  History of thyroid disease (nodule) ,      GYN    Hysterectomy,   Comment: S/p mastectomy b/l with reconstruction and chemo     Hematology   Musculoskeletal       Neurology   Psychology           Physical Exam    Airway    Mallampati score: II  TM Distance: >3 FB  Neck ROM: full     Dental       Cardiovascular  Rhythm: regular, Rate: normal,     Pulmonary  Breath sounds clear to auscultation,     Other Findings        Anesthesia Plan  ASA Score- 2     Anesthesia Type- IV sedation with anesthesia with ASA Monitors  Additional Monitors:   Airway Plan:         Plan Factors-    Induction- intravenous  Postoperative Plan-     Informed Consent- Anesthetic plan and risks discussed with patient  I personally reviewed this patient with the CRNA  Discussed and agreed on the Anesthesia Plan with the CRNA  Maximo Rangel

## 2019-07-31 NOTE — H&P
H&P EXAM - Outpatient Endoscopy   Bosie Sever 79 y o  female MRN: [id number]    [facility] GI LAB PRE/POST   Encounter: [id number]        Impression:  Abdominal pain GERD    Plan:  EGD    Chief Complaint:  Abdominal pain GERD    Physical Exam:  Alert   Chest:  Clear   Heart:  Regular

## 2019-08-12 ENCOUNTER — TELEPHONE (OUTPATIENT)
Dept: HEMATOLOGY ONCOLOGY | Facility: CLINIC | Age: 69
End: 2019-08-12

## 2019-08-12 NOTE — TELEPHONE ENCOUNTER
Ms Lillian Mandel from [employer] called requesting any available treatment notes  She provided the claim number that needs to written on each page faxed over    Fax [phone]  Phone [phone]

## 2019-08-13 NOTE — TELEPHONE ENCOUNTER
I called the phone number provided and no one with the name of Ms Donnie Whiteheda worked for [employer]  I spoke with Agent Og Ro  He stated that they do not work with [facility] offices and this was an home and [employer]  Notes were not faxed over due to this reason

## 2019-08-27 ENCOUNTER — DOCUMENTATION (OUTPATIENT)
Dept: HEMATOLOGY ONCOLOGY | Facility: CLINIC | Age: 69
End: 2019-08-27

## 2019-08-27 NOTE — PROGRESS NOTES
Patient stopped in the office today stating [employer] has not rec'd the paperwork  The patient is very frustrated stating she walked the paperwork over 2X and nothing was sent  I apologized and agreed to take of it today myself  I will also call to make sure it was received by [employer] by following up with a phone call  Instructing the patient to call Maureen Álvarez later this after noon to confirm paperwork was rec'd  Patient agreed

## 2019-08-30 ENCOUNTER — TELEPHONE (OUTPATIENT)
Dept: HEMATOLOGY ONCOLOGY | Facility: CLINIC | Age: 69
End: 2019-08-30

## 2019-08-30 NOTE — TELEPHONE ENCOUNTER
Spoke with pt and we will be writing a back to work note in regards to her going back to work full time starting 9/3/19  We will fax when finished

## 2019-08-30 NOTE — TELEPHONE ENCOUNTER
Patient called and would like a note to be able to return to work on 09/03/2019  She left a fax # for you to fax the papers at [phone]

## 2019-09-12 ENCOUNTER — APPOINTMENT (OUTPATIENT)
Dept: LAB | Facility: HOSPITAL | Age: 69
End: 2019-09-12
Attending: PLASTIC SURGERY
Payer: COMMERCIAL

## 2019-09-12 ENCOUNTER — TRANSCRIBE ORDERS (OUTPATIENT)
Dept: LAB | Facility: HOSPITAL | Age: 69
End: 2019-09-12

## 2019-09-12 DIAGNOSIS — Z01.812 PRE-OPERATIVE LABORATORY EXAMINATION: ICD-10-CM

## 2019-09-12 DIAGNOSIS — Z01.810 PRE-OPERATIVE CARDIOVASCULAR EXAMINATION: ICD-10-CM

## 2019-09-12 DIAGNOSIS — Z01.812 PRE-OPERATIVE LABORATORY EXAMINATION: Primary | ICD-10-CM

## 2019-09-12 DIAGNOSIS — Z85.3 PERSONAL HISTORY OF MALIGNANT NEOPLASM OF BREAST: ICD-10-CM

## 2019-09-12 DIAGNOSIS — Z01.818 OTHER SPECIFIED PRE-OPERATIVE EXAMINATION: ICD-10-CM

## 2019-09-12 LAB
ANION GAP SERPL CALCULATED.3IONS-SCNC: 3 MMOL/L (ref 4–13)
ATRIAL RATE: 73 BPM
BASOPHILS # BLD AUTO: 0.04 THOUSANDS/ΜL (ref 0–0.1)
BASOPHILS NFR BLD AUTO: 1 % (ref 0–1)
BUN SERPL-MCNC: 10 MG/DL (ref 5–25)
CALCIUM SERPL-MCNC: 9.2 MG/DL (ref 8.3–10.1)
CHLORIDE SERPL-SCNC: 108 MMOL/L (ref 100–108)
CO2 SERPL-SCNC: 28 MMOL/L (ref 21–32)
CREAT SERPL-MCNC: 0.69 MG/DL (ref 0.6–1.3)
EOSINOPHIL # BLD AUTO: 0.19 THOUSAND/ΜL (ref 0–0.61)
EOSINOPHIL NFR BLD AUTO: 3 % (ref 0–6)
ERYTHROCYTE [DISTWIDTH] IN BLOOD BY AUTOMATED COUNT: 12.5 % (ref 11.6–15.1)
GFR SERPL CREATININE-BSD FRML MDRD: 91 ML/MIN/1.73SQ M
GLUCOSE P FAST SERPL-MCNC: 101 MG/DL (ref 65–99)
HCT VFR BLD AUTO: 43.6 % (ref 34.8–46.1)
HGB BLD-MCNC: 14 G/DL (ref 11.5–15.4)
IMM GRANULOCYTES # BLD AUTO: 0.02 THOUSAND/UL (ref 0–0.2)
IMM GRANULOCYTES NFR BLD AUTO: 0 % (ref 0–2)
LYMPHOCYTES # BLD AUTO: 2.06 THOUSANDS/ΜL (ref 0.6–4.47)
LYMPHOCYTES NFR BLD AUTO: 35 % (ref 14–44)
MCH RBC QN AUTO: 27.2 PG (ref 26.8–34.3)
MCHC RBC AUTO-ENTMCNC: 32.1 G/DL (ref 31.4–37.4)
MCV RBC AUTO: 85 FL (ref 82–98)
MONOCYTES # BLD AUTO: 0.49 THOUSAND/ΜL (ref 0.17–1.22)
MONOCYTES NFR BLD AUTO: 8 % (ref 4–12)
NEUTROPHILS # BLD AUTO: 3.08 THOUSANDS/ΜL (ref 1.85–7.62)
NEUTS SEG NFR BLD AUTO: 53 % (ref 43–75)
NRBC BLD AUTO-RTO: 0 /100 WBCS
P AXIS: 38 DEGREES
PLATELET # BLD AUTO: 196 THOUSANDS/UL (ref 149–390)
PMV BLD AUTO: 10.4 FL (ref 8.9–12.7)
POTASSIUM SERPL-SCNC: 3.8 MMOL/L (ref 3.5–5.3)
PR INTERVAL: 186 MS
QRS AXIS: 25 DEGREES
QRSD INTERVAL: 72 MS
QT INTERVAL: 370 MS
QTC INTERVAL: 407 MS
RBC # BLD AUTO: 5.14 MILLION/UL (ref 3.81–5.12)
SODIUM SERPL-SCNC: 139 MMOL/L (ref 136–145)
T WAVE AXIS: 26 DEGREES
VENTRICULAR RATE: 73 BPM
WBC # BLD AUTO: 5.88 THOUSAND/UL (ref 4.31–10.16)

## 2019-09-12 PROCEDURE — 80048 BASIC METABOLIC PNL TOTAL CA: CPT

## 2019-09-12 PROCEDURE — 36415 COLL VENOUS BLD VENIPUNCTURE: CPT

## 2019-09-12 PROCEDURE — 85025 COMPLETE CBC W/AUTO DIFF WBC: CPT

## 2019-09-12 PROCEDURE — 93005 ELECTROCARDIOGRAM TRACING: CPT

## 2019-09-12 PROCEDURE — 93010 ELECTROCARDIOGRAM REPORT: CPT | Performed by: INTERNAL MEDICINE

## 2019-09-16 ENCOUNTER — TELEPHONE (OUTPATIENT)
Dept: HEMATOLOGY ONCOLOGY | Facility: CLINIC | Age: 69
End: 2019-09-16

## 2019-09-16 DIAGNOSIS — C50.312 MALIGNANT NEOPLASM OF LOWER-INNER QUADRANT OF LEFT BREAST IN FEMALE, ESTROGEN RECEPTOR POSITIVE (HCC): ICD-10-CM

## 2019-09-16 DIAGNOSIS — Z17.0 MALIGNANT NEOPLASM OF LOWER-INNER QUADRANT OF LEFT BREAST IN FEMALE, ESTROGEN RECEPTOR POSITIVE (HCC): ICD-10-CM

## 2019-09-16 RX ORDER — LETROZOLE 2.5 MG/1
2.5 TABLET, FILM COATED ORAL DAILY
Qty: 30 TABLET | Refills: 2 | Status: SHIPPED | OUTPATIENT
Start: 2019-09-16 | End: 2019-10-21 | Stop reason: ALTCHOICE

## 2019-09-16 RX ORDER — LETROZOLE 2.5 MG/1
2.5 TABLET, FILM COATED ORAL DAILY
Qty: 30 TABLET | Refills: 2 | Status: CANCELLED | OUTPATIENT
Start: 2019-09-16

## 2019-09-25 RX ORDER — OMEGA-3-ACID ETHYL ESTERS 1 G/1
1 CAPSULE, LIQUID FILLED ORAL DAILY
COMMUNITY
End: 2019-09-27 | Stop reason: HOSPADM

## 2019-09-25 RX ORDER — TURMERIC 400 MG
1 CAPSULE ORAL DAILY
COMMUNITY
End: 2022-04-27

## 2019-09-26 ENCOUNTER — ANESTHESIA EVENT (OUTPATIENT)
Dept: PERIOP | Facility: HOSPITAL | Age: 69
End: 2019-09-26
Payer: COMMERCIAL

## 2019-09-26 ENCOUNTER — TELEPHONE (OUTPATIENT)
Dept: HEMATOLOGY ONCOLOGY | Facility: CLINIC | Age: 69
End: 2019-09-26

## 2019-09-26 NOTE — TELEPHONE ENCOUNTER
LVM informing patient that her appt on 10/21/19 needed to be R/S due to a meeting Dr Cheryl Childs has  Patient has been R/S to 10/21/19 at 3:30 pm with ERIKA Maloney at the [place] location  Instructed patient to call the office to R/S if the appt does not work for her

## 2019-09-27 ENCOUNTER — ANESTHESIA (OUTPATIENT)
Dept: PERIOP | Facility: HOSPITAL | Age: 69
End: 2019-09-27
Payer: COMMERCIAL

## 2019-09-27 ENCOUNTER — HOSPITAL ENCOUNTER (OUTPATIENT)
Facility: HOSPITAL | Age: 69
Setting detail: OUTPATIENT SURGERY
Discharge: HOME | End: 2019-09-27
Attending: PLASTIC SURGERY | Admitting: PLASTIC SURGERY
Payer: COMMERCIAL

## 2019-09-27 VITALS
TEMPERATURE: 98.6 F | OXYGEN SATURATION: 94 % | RESPIRATION RATE: 16 BRPM | WEIGHT: 192 LBS | SYSTOLIC BLOOD PRESSURE: 122 MMHG | BODY MASS INDEX: 31.99 KG/M2 | DIASTOLIC BLOOD PRESSURE: 62 MMHG | HEART RATE: 69 BPM | HEIGHT: 65 IN

## 2019-09-27 DIAGNOSIS — Z85.3 PERSONAL HISTORY OF MALIGNANT NEOPLASM OF BREAST: ICD-10-CM

## 2019-09-27 PROCEDURE — C1789 PROSTHESIS, BREAST, IMP: HCPCS | Performed by: PLASTIC SURGERY

## 2019-09-27 PROCEDURE — 88305 TISSUE EXAM BY PATHOLOGIST: CPT | Performed by: PATHOLOGY

## 2019-09-27 DEVICE — SMOOTH ROUND HIGH PROFILE GEL-FILLED BREAST IMPLANT, 700CC  SMOOTH ROUND SILICONE
Type: IMPLANTABLE DEVICE | Site: BREAST | Status: FUNCTIONAL
Brand: MENTOR MEMORYGEL BREAST IMPLANT

## 2019-09-27 RX ORDER — PROPOFOL 10 MG/ML
INJECTION, EMULSION INTRAVENOUS AS NEEDED
Status: DISCONTINUED | OUTPATIENT
Start: 2019-09-27 | End: 2019-09-27 | Stop reason: SURG

## 2019-09-27 RX ORDER — HYDROMORPHONE HCL/PF 1 MG/ML
0.5 SYRINGE (ML) INJECTION
Status: DISCONTINUED | OUTPATIENT
Start: 2019-09-27 | End: 2019-09-27 | Stop reason: HOSPADM

## 2019-09-27 RX ORDER — FENTANYL CITRATE 50 UG/ML
INJECTION, SOLUTION INTRAMUSCULAR; INTRAVENOUS AS NEEDED
Status: DISCONTINUED | OUTPATIENT
Start: 2019-09-27 | End: 2019-09-27 | Stop reason: SURG

## 2019-09-27 RX ORDER — ONDANSETRON 2 MG/ML
4 INJECTION INTRAMUSCULAR; INTRAVENOUS ONCE AS NEEDED
Status: COMPLETED | OUTPATIENT
Start: 2019-09-27 | End: 2019-09-27

## 2019-09-27 RX ORDER — GLYCOPYRROLATE 0.2 MG/ML
INJECTION INTRAMUSCULAR; INTRAVENOUS AS NEEDED
Status: DISCONTINUED | OUTPATIENT
Start: 2019-09-27 | End: 2019-09-27 | Stop reason: SURG

## 2019-09-27 RX ORDER — ROCURONIUM BROMIDE 10 MG/ML
INJECTION, SOLUTION INTRAVENOUS AS NEEDED
Status: DISCONTINUED | OUTPATIENT
Start: 2019-09-27 | End: 2019-09-27 | Stop reason: SURG

## 2019-09-27 RX ORDER — FENTANYL CITRATE/PF 50 MCG/ML
50 SYRINGE (ML) INJECTION
Status: DISCONTINUED | OUTPATIENT
Start: 2019-09-27 | End: 2019-09-27 | Stop reason: HOSPADM

## 2019-09-27 RX ORDER — ONDANSETRON 2 MG/ML
4 INJECTION INTRAMUSCULAR; INTRAVENOUS EVERY 8 HOURS PRN
Status: DISCONTINUED | OUTPATIENT
Start: 2019-09-27 | End: 2019-09-27 | Stop reason: HOSPADM

## 2019-09-27 RX ORDER — SODIUM CHLORIDE 9 MG/ML
125 INJECTION, SOLUTION INTRAVENOUS CONTINUOUS
Status: DISCONTINUED | OUTPATIENT
Start: 2019-09-27 | End: 2019-09-27 | Stop reason: HOSPADM

## 2019-09-27 RX ORDER — MIDAZOLAM HYDROCHLORIDE 1 MG/ML
INJECTION INTRAMUSCULAR; INTRAVENOUS AS NEEDED
Status: DISCONTINUED | OUTPATIENT
Start: 2019-09-27 | End: 2019-09-27 | Stop reason: SURG

## 2019-09-27 RX ORDER — CEFAZOLIN SODIUM 2 G/50ML
2000 SOLUTION INTRAVENOUS ONCE
Status: COMPLETED | OUTPATIENT
Start: 2019-09-27 | End: 2019-09-27

## 2019-09-27 RX ORDER — NEOSTIGMINE METHYLSULFATE 1 MG/ML
INJECTION INTRAVENOUS AS NEEDED
Status: DISCONTINUED | OUTPATIENT
Start: 2019-09-27 | End: 2019-09-27 | Stop reason: SURG

## 2019-09-27 RX ORDER — DEXAMETHASONE SODIUM PHOSPHATE 4 MG/ML
INJECTION, SOLUTION INTRA-ARTICULAR; INTRALESIONAL; INTRAMUSCULAR; INTRAVENOUS; SOFT TISSUE AS NEEDED
Status: DISCONTINUED | OUTPATIENT
Start: 2019-09-27 | End: 2019-09-27 | Stop reason: SURG

## 2019-09-27 RX ORDER — ONDANSETRON 2 MG/ML
INJECTION INTRAMUSCULAR; INTRAVENOUS AS NEEDED
Status: DISCONTINUED | OUTPATIENT
Start: 2019-09-27 | End: 2019-09-27 | Stop reason: SURG

## 2019-09-27 RX ORDER — OXYCODONE HYDROCHLORIDE AND ACETAMINOPHEN 5; 325 MG/1; MG/1
2 TABLET ORAL EVERY 4 HOURS PRN
Status: DISCONTINUED | OUTPATIENT
Start: 2019-09-27 | End: 2019-09-27 | Stop reason: HOSPADM

## 2019-09-27 RX ORDER — MEPERIDINE HYDROCHLORIDE 50 MG/ML
12.5 INJECTION INTRAMUSCULAR; INTRAVENOUS; SUBCUTANEOUS AS NEEDED
Status: DISCONTINUED | OUTPATIENT
Start: 2019-09-27 | End: 2019-09-27 | Stop reason: HOSPADM

## 2019-09-27 RX ORDER — LIDOCAINE HYDROCHLORIDE AND EPINEPHRINE 10; 10 MG/ML; UG/ML
INJECTION, SOLUTION INFILTRATION; PERINEURAL AS NEEDED
Status: DISCONTINUED | OUTPATIENT
Start: 2019-09-27 | End: 2019-09-27 | Stop reason: HOSPADM

## 2019-09-27 RX ORDER — ALBUTEROL SULFATE 2.5 MG/3ML
2.5 SOLUTION RESPIRATORY (INHALATION) ONCE AS NEEDED
Status: DISCONTINUED | OUTPATIENT
Start: 2019-09-27 | End: 2019-09-27 | Stop reason: HOSPADM

## 2019-09-27 RX ADMIN — CEFAZOLIN SODIUM 2000 MG: 2 SOLUTION INTRAVENOUS at 13:15

## 2019-09-27 RX ADMIN — NEOSTIGMINE METHYLSULFATE 3 MG: 1 INJECTION, SOLUTION INTRAVENOUS at 15:28

## 2019-09-27 RX ADMIN — ONDANSETRON 4 MG: 2 INJECTION INTRAMUSCULAR; INTRAVENOUS at 17:18

## 2019-09-27 RX ADMIN — GLYCOPYRROLATE 0.5 MG: 0.2 INJECTION INTRAMUSCULAR; INTRAVENOUS at 15:28

## 2019-09-27 RX ADMIN — MIDAZOLAM 2 MG: 1 INJECTION INTRAMUSCULAR; INTRAVENOUS at 13:18

## 2019-09-27 RX ADMIN — FENTANYL CITRATE 50 MCG: 50 INJECTION, SOLUTION INTRAMUSCULAR; INTRAVENOUS at 14:02

## 2019-09-27 RX ADMIN — FENTANYL CITRATE 100 MCG: 50 INJECTION, SOLUTION INTRAMUSCULAR; INTRAVENOUS at 13:26

## 2019-09-27 RX ADMIN — SODIUM CHLORIDE: 0.9 INJECTION, SOLUTION INTRAVENOUS at 15:31

## 2019-09-27 RX ADMIN — FENTANYL CITRATE 50 MCG: 50 INJECTION, SOLUTION INTRAMUSCULAR; INTRAVENOUS at 14:58

## 2019-09-27 RX ADMIN — LIDOCAINE HYDROCHLORIDE 50 MG: 20 INJECTION, SOLUTION INTRAVENOUS at 13:26

## 2019-09-27 RX ADMIN — FENTANYL CITRATE 50 MCG: 50 INJECTION, SOLUTION INTRAMUSCULAR; INTRAVENOUS at 15:34

## 2019-09-27 RX ADMIN — PROPOFOL 200 MG: 10 INJECTION, EMULSION INTRAVENOUS at 13:26

## 2019-09-27 RX ADMIN — PHENYLEPHRINE HYDROCHLORIDE 100 MCG: 10 INJECTION INTRAVENOUS at 13:49

## 2019-09-27 RX ADMIN — FENTANYL CITRATE 50 MCG: 50 INJECTION, SOLUTION INTRAMUSCULAR; INTRAVENOUS at 14:29

## 2019-09-27 RX ADMIN — ONDANSETRON 4 MG: 2 INJECTION INTRAMUSCULAR; INTRAVENOUS at 15:29

## 2019-09-27 RX ADMIN — HYDROMORPHONE HYDROCHLORIDE 0.5 MG: 1 INJECTION, SOLUTION INTRAMUSCULAR; INTRAVENOUS; SUBCUTANEOUS at 16:33

## 2019-09-27 RX ADMIN — DEXAMETHASONE SODIUM PHOSPHATE 4 MG: 4 INJECTION, SOLUTION INTRAMUSCULAR; INTRAVENOUS at 13:37

## 2019-09-27 RX ADMIN — FENTANYL CITRATE 50 MCG: 50 INJECTION INTRAMUSCULAR; INTRAVENOUS at 16:06

## 2019-09-27 RX ADMIN — SODIUM CHLORIDE 125 ML/HR: 0.9 INJECTION, SOLUTION INTRAVENOUS at 12:35

## 2019-09-27 RX ADMIN — FENTANYL CITRATE 50 MCG: 50 INJECTION INTRAMUSCULAR; INTRAVENOUS at 16:14

## 2019-09-27 RX ADMIN — ROCURONIUM BROMIDE 50 MG: 50 INJECTION, SOLUTION INTRAVENOUS at 13:26

## 2019-09-27 NOTE — ANESTHESIA PREPROCEDURE EVALUATION
Review of Systems/Medical History  Patient summary reviewed    No history of anesthetic complications     Cardiovascular  Negative cardio ROS Exercise tolerance (METS): >4,     Pulmonary  Negative pulmonary ROS        GI/Hepatic    GERD well controlled,             Endo/Other  History of thyroid disease (thyroid nodule) ,      GYN    Hysterectomy, Breast cancer        Hematology  Negative hematology ROS      Musculoskeletal  Negative musculoskeletal ROS        Neurology  Negative neurology ROS      Psychology   Negative psychology ROS              Physical Exam    Airway    Mallampati score: II  TM Distance: >3 FB  Neck ROM: full     Dental       Cardiovascular  Comment: Negative ROS, Rhythm: regular, Rate: normal, Cardiovascular exam normal    Pulmonary  Pulmonary exam normal Breath sounds clear to auscultation,     Other Findings  Missing upper and lower back teeth      Anesthesia Plan  ASA Score- 3     Anesthesia Type- general with ASA Monitors  Additional Monitors:   Airway Plan: ETT  Plan Factors-  Patient did not smoke on day of surgery  Induction- intravenous  Postoperative Plan- Plan for postoperative opioid use  Informed Consent- Anesthetic plan and risks discussed with patient

## 2019-09-27 NOTE — DISCHARGE SUMMARY
Discharge Summary - Medical Zoya Chuck 79 y o  female MRN: [id number]    Ger 38 Room / Bed: OR POOL/OR POOL Encounter: [id number]    BRIEF OVERVIEW  Admitting Provider: Felicia Hidalgo MD  Discharge Provider: Felicia Hidalgo MD  Primary Care Physician at Discharge: Nena Alejandro [phone]    Discharge To: Home      Admission Date: 9/27/2019     Discharge Date: No discharge date for patient encounter  Code Status: Prior  Advance Directive and Living Will: <no information>  Power of :        Primary Discharge Diagnosis  Active Problems:    * No active hospital problems  *  Resolved Problems:    * No resolved hospital problems   *        Discharge Disposition: [address] [place] [address]    Presenting Problem/History of Present Illness  <principal problem not specified>      Discharge Condition: stable    Patient tolerated the procedure well, recovered in PACU and was discharged home in stable condition    Felicia Hidalgo MD  9/27/2019  1:24 PM

## 2019-09-27 NOTE — DISCHARGE INSTRUCTIONS
[address], [address], [address] [facility], [facility], [address] [facility] /Y / [employer]       No heavy lifting >20 pounds    Do not raise hands above head    No exercise    Consider using button up shirts so you don't have to raise your hands above your head to put the shirt on    Wear the surgical bra at all times except the shower   If the bra is tight and is leaving marks on the skin unclasp the bottom clasps of the bra    No ice or heating pack to chest    Ok to shower    No bathing    Do not lay on stomach    No smoking    No pushing or pulling    Call the office for an appointment in 5-10 days - [phone]

## 2019-10-21 ENCOUNTER — OFFICE VISIT (OUTPATIENT)
Dept: HEMATOLOGY ONCOLOGY | Facility: CLINIC | Age: 69
End: 2019-10-21
Payer: COMMERCIAL

## 2019-10-21 VITALS
DIASTOLIC BLOOD PRESSURE: 68 MMHG | HEIGHT: 65 IN | SYSTOLIC BLOOD PRESSURE: 124 MMHG | OXYGEN SATURATION: 98 % | RESPIRATION RATE: 18 BRPM | BODY MASS INDEX: 33.02 KG/M2 | HEART RATE: 81 BPM | WEIGHT: 198.2 LBS | TEMPERATURE: 97.4 F

## 2019-10-21 DIAGNOSIS — C50.312 MALIGNANT NEOPLASM OF LOWER-INNER QUADRANT OF LEFT BREAST IN FEMALE, ESTROGEN RECEPTOR POSITIVE (HCC): Primary | ICD-10-CM

## 2019-10-21 DIAGNOSIS — Z17.0 MALIGNANT NEOPLASM OF LOWER-INNER QUADRANT OF LEFT BREAST IN FEMALE, ESTROGEN RECEPTOR POSITIVE (HCC): Primary | ICD-10-CM

## 2019-10-21 PROCEDURE — 99214 OFFICE O/P EST MOD 30 MIN: CPT | Performed by: PHYSICIAN ASSISTANT

## 2019-10-21 RX ORDER — ANASTROZOLE 1 MG/1
1 TABLET ORAL DAILY
Qty: 30 TABLET | Refills: 0 | Status: SHIPPED | OUTPATIENT
Start: 2019-10-21 | End: 2019-12-23

## 2019-10-21 NOTE — PROGRESS NOTES
Hematology/Oncology Outpatient Follow-up  Nu Nunes 79 y o  female 1950 [id number]    Date:  10/21/2019      Assessment and Plan:  1  Malignant neoplasm of lower-inner quadrant of left breast in female, estrogen receptor positive [facility])  22-year-old female presents for follow-up regarding history of stage IA breast cancer seed with bilateral mastectomy  She had a Oncotype score was 25  Therefore she received adjuvant chemotherapy and now is receiving adjuvant hormonal therapy  She is having joint pains with Femara  She switched to taking the medication at night however she still has joint pains throughout the day, however not as bad as when she was taking in the morning  We discussed trying another aromatase inhibitor  She was agreeable to this  We will try anastrozole for the next 1 month  She will call let us know how she handles the side effects of this medication  Discussed that there is under 3rd option that could be tried as well  Reviewed that we want her to be taking 1 that is having least side effects for her as she will be on this for minimum of 5 years  She was agreeable to this  - anastrozole (ARIMIDEX) 1 mg tablet; Take 1 tablet (1 mg total) by mouth daily  Dispense: 30 tablet; Refill: 0  - CBC and differential; Future  - Comprehensive metabolic panel; Future  - Cancer antigen 27 29; Future    HPI:     Malignant neoplasm of lower-inner quadrant of left breast in female, estrogen receptor positive ([facility] [address] )    1/2/2019 Initial Diagnosis     Malignant neoplasm of lower-inner quadrant of left breast in female, estrogen receptor positive [facility])       Chemotherapy     April 2019:    Adjuvant  Taxotere and Cytoxan once every 3 weeks for 4 cycles followed by aromatase inhibitor      4/11/2019 -  Chemotherapy     pegfilgrastim (NEULASTA) subcutaneous injection 6 mg, 6 mg, Subcutaneous, Once, 4 of 4 cycles  Dose modification: 4 mg (original dose 6 mg, Cycle 3)  Administration: 4 mg (5/24/2019), 4 mg (6/14/2019)  cyclophosphamide (CYTOXAN) 1,176 mg in sodium chloride 0 9 % 250 mL IVPB, 600 mg/m2 = 1,176 mg, Intravenous, Once, 4 of 4 cycles  Administration: 1,176 mg (5/23/2019), 1,176 mg (6/13/2019)  DOCEtaxel (TAXOTERE) 147 mg in sodium chloride 0 9 % 250 mL chemo infusion, 75 mg/m2 = 147 mg, Intravenous, Once, 4 of 4 cycles  Administration: 147 mg (5/23/2019), 147 mg (6/13/2019)         Interval history:  Patient had expander removal and implant placed  She has follow-up with Plastic surgery this week  She has been experiencing joint pains with Femara  ROS: Review of Systems   Constitutional: Positive for fatigue  Negative for appetite change, chills, fever and unexpected weight change  Respiratory: Negative for cough and shortness of breath  Cardiovascular: Negative for chest pain, palpitations and leg swelling  Gastrointestinal: Negative for abdominal pain, blood in stool, constipation, diarrhea, nausea and vomiting  Genitourinary: Negative for difficulty urinating and dysuria  Musculoskeletal: Positive for arthralgias  Skin: Negative  Neurological: Negative for dizziness, light-headedness, numbness and headaches  Hematological: Negative  Psychiatric/Behavioral: Negative          Past Medical History:   Diagnosis Date    Back pain     Cancer ([address] )     left breast    Chemotherapy induced neutropenia (HCC) 5/23/2019    GERD (gastroesophageal reflux disease)     Vitamin D deficiency     Wears glasses        Past Surgical History:   Procedure Laterality Date    BREAST BIOPSY Left 01/02/2019    invasive ductal carcinoma    COLONOSCOPY      HYSTERECTOMY      age 41    OOPHORECTOMY Bilateral     age 41    CA BIOPSY/EXCISION, LYMPH NODE(S) Left 2/8/2019    Procedure: BIOPSY LYMPH NODE SENTINEL;  Surgeon: Amie Pires MD;  [facility];  Service: Surgical Oncology    CA BREAST RECONSTRUC W TISS EXPANDR Bilateral 2/8/2019    Procedure: INSERTION/PLACEMENT TISSUE EXPANDER (EXCHANGE); Surgeon: Keely Palmer MD;  [facility];  Service: Plastics    OR DELAY BREAST PROS AFTER BREAST SURG Bilateral 9/27/2019    Procedure: IMPLANT EXPANDER EXCHANGE;  Surgeon: Keely Palmer MD;  [facility];  Service: Plastics    OR EDG US EXAM SURGICAL ALTER STOM DUODENUM/JEJUNUM N/A 2/2/2017    Procedure: RADIAL ENDOSCOPIC U/S;  Surgeon: Ashwini Fuentes MD;  [facility];   Service: Gastroenterology    OR IMPLNT BIO IMPLNT FOR SOFT TISSUE REINFORCEMENT Bilateral 2/8/2019    Procedure: RECONSTRUCTION BREAST W/ IMPLANT;  Surgeon: Keely Palmer MD;  [facility];  Service: Plastics    OR MASTECTOMY, SIMPLE, COMPLETE N/A 2/8/2019    Procedure: LEFT breast mastectomy; RIGHT breast prophylactic mastectomy;  Surgeon: Michell Crooks MD;  [facility];  Service: Surgical Oncology    OR PART EXCIS PLANTAR FASCIA Right 10/20/2016    Procedure:  OPEN RELEASE FASCIA PLANTAR ,CUTTING BOTTOM OF ARCH,INSTEP ;  Surgeon: Gutierrez Vizcarra DPM;  [facility];  Service: Podiatry    OR REMOVAL OF BREAST CAPSULE Bilateral 9/27/2019    Procedure: CAPSULECTOMY;  Surgeon: Keely Palmer MD;  [facility];  Service: Plastics    THUMB ARTHROSCOPY      US GUIDED BREAST BIOPSY LEFT COMPLETE Left 1/2/2019       Social History     Socioeconomic History    Marital status: Single     Spouse name: None    Number of children: None    Years of education: None    Highest education level: None   Occupational History    None   Social Needs    Financial resource strain: None    Food insecurity:     Worry: None     Inability: None    Transportation needs:     Medical: None     Non-medical: None   Tobacco Use    Smoking status: Never Smoker    Smokeless tobacco: Never Used   Substance and Sexual Activity    Alcohol use: Not Currently    Drug use: No    Sexual activity: None   Lifestyle    Physical activity:     Days per week: None     Minutes per session: None    Stress: None Relationships    Social connections:     Talks on phone: None     Gets together: None     Attends Buddhism service: None     Active member of club or organization: None     Attends meetings of clubs or organizations: None     Relationship status: None    Intimate partner violence:     Fear of current or ex partner: None     Emotionally abused: None     Physically abused: None     Forced sexual activity: None   Other Topics Concern    None   Social History Narrative    None       Family History   Problem Relation Age of Onset    Brain cancer Mother 67    Prostate cancer Father 66    Breast cancer Sister 54    Breast cancer additional onset Sister 72    Breast cancer Sister 72    Prostate cancer Brother 62       Allergies   Allergen Reactions    Latex Anaphylaxis    Shellfish Allergy Anaphylaxis    Shellfish-Derived Products Anaphylaxis         Current Outpatient Medications:     CALCIUM PO, Take 1,200 mg by mouth daily, Disp: , Rfl:     celecoxib (CeleBREX) 200 mg capsule, daily as needed , Disp: , Rfl:     Cholecalciferol (VITAMIN D PO), Take 1 25 mg by mouth every 30 (thirty) days, Disp: , Rfl:     Cyanocobalamin (VITAMIN B 12 PO), Take 500 mcg by mouth daily , Disp: , Rfl:     Multiple Vitamins-Minerals (MULTIVITAL PO), Take by mouth, Disp: , Rfl:     pantoprazole (PROTONIX) 40 mg tablet, Take 40 mg by mouth as needed , Disp: , Rfl:     Turmeric 400 MG CAPS, Take 1 capsule by mouth daily, Disp: , Rfl:     anastrozole (ARIMIDEX) 1 mg tablet, Take 1 tablet (1 mg total) by mouth daily, Disp: 30 tablet, Rfl: 0      Physical Exam:  /68 (BP Location: Right arm, Patient Position: Sitting, Cuff Size: Adult)   Pulse 81   Temp (!) 97 4 °F (36 3 °C)   Resp 18   Ht 5' 5" (1 651 m)   Wt 89 9 kg (198 lb 3 2 oz)   SpO2 98%   BMI 32 98 kg/m²     Physical Exam   Constitutional: She is oriented to person, place, and time  She appears well-developed and well-nourished  No distress     HENT: Head: Normocephalic and atraumatic  Eyes: Conjunctivae are normal  No scleral icterus  Neck: Normal range of motion  Neck supple  Cardiovascular: Normal rate, regular rhythm and normal heart sounds  No murmur heard  Pulmonary/Chest: Effort normal and breath sounds normal  No respiratory distress  Abdominal: Soft  There is no tenderness  Musculoskeletal: Normal range of motion  She exhibits no edema or tenderness  Lymphadenopathy:     She has no cervical adenopathy  Neurological: She is alert and oriented to person, place, and time  No cranial nerve deficit  Skin: Skin is warm and dry  Psychiatric: She has a normal mood and affect  Labs:  Lab Results   Component Value Date    WBC 5 88 09/12/2019    HGB 14 0 09/12/2019    HCT 43 6 09/12/2019    MCV 85 09/12/2019     09/12/2019     Lab Results   Component Value Date     07/10/2014    K 3 8 09/12/2019     09/12/2019    CO2 28 09/12/2019    ANIONGAP 5 07/10/2014    BUN 10 09/12/2019    CREATININE 0 69 09/12/2019    GLUCOSE 93 07/10/2014    GLUF 101 (H) 09/12/2019    CALCIUM 9 2 09/12/2019    AST 25 07/16/2019    ALT 31 07/16/2019    ALKPHOS 76 07/16/2019    PROT 7 3 07/10/2014    BILITOT 0 54 07/10/2014    EGFR 91 09/12/2019     Patient voiced understanding and agreement in the above discussion  Aware to contact our office with questions/symptoms in the interim  This note has been generated by voice recognition software system  Therefore, there may be spelling, grammar, and or syntax errors  Please contact if questions arise

## 2019-11-10 ENCOUNTER — OFFICE VISIT (OUTPATIENT)
Dept: URGENT CARE | Age: 69
End: 2019-11-10
Payer: COMMERCIAL

## 2019-11-10 VITALS
HEIGHT: 65 IN | HEART RATE: 72 BPM | TEMPERATURE: 97.2 F | WEIGHT: 195 LBS | BODY MASS INDEX: 32.49 KG/M2 | DIASTOLIC BLOOD PRESSURE: 82 MMHG | SYSTOLIC BLOOD PRESSURE: 120 MMHG

## 2019-11-10 DIAGNOSIS — R31.9 URINARY TRACT INFECTION WITH HEMATURIA, SITE UNSPECIFIED: Primary | ICD-10-CM

## 2019-11-10 DIAGNOSIS — R30.0 DYSURIA: ICD-10-CM

## 2019-11-10 DIAGNOSIS — N39.0 URINARY TRACT INFECTION WITH HEMATURIA, SITE UNSPECIFIED: Primary | ICD-10-CM

## 2019-11-10 LAB
SL AMB  POCT GLUCOSE, UA: NEGATIVE
SL AMB LEUKOCYTE ESTERASE,UA: ABNORMAL
SL AMB POCT BILIRUBIN,UA: NEGATIVE
SL AMB POCT BLOOD,UA: ABNORMAL
SL AMB POCT CLARITY,UA: ABNORMAL
SL AMB POCT COLOR,UA: ABNORMAL
SL AMB POCT KETONES,UA: NEGATIVE
SL AMB POCT NITRITE,UA: NEGATIVE
SL AMB POCT PH,UA: 5
SL AMB POCT SPECIFIC GRAVITY,UA: 1
SL AMB POCT URINE PROTEIN: NEGATIVE
SL AMB POCT UROBILINOGEN: 0.2

## 2019-11-10 PROCEDURE — 87186 SC STD MICRODIL/AGAR DIL: CPT | Performed by: PHYSICIAN ASSISTANT

## 2019-11-10 PROCEDURE — 81002 URINALYSIS NONAUTO W/O SCOPE: CPT | Performed by: PHYSICIAN ASSISTANT

## 2019-11-10 PROCEDURE — 99213 OFFICE O/P EST LOW 20 MIN: CPT | Performed by: PHYSICIAN ASSISTANT

## 2019-11-10 PROCEDURE — 87077 CULTURE AEROBIC IDENTIFY: CPT | Performed by: PHYSICIAN ASSISTANT

## 2019-11-10 PROCEDURE — S9088 SERVICES PROVIDED IN URGENT: HCPCS | Performed by: PHYSICIAN ASSISTANT

## 2019-11-10 PROCEDURE — 87086 URINE CULTURE/COLONY COUNT: CPT | Performed by: PHYSICIAN ASSISTANT

## 2019-11-10 RX ORDER — SULFAMETHOXAZOLE AND TRIMETHOPRIM 800; 160 MG/1; MG/1
1 TABLET ORAL EVERY 12 HOURS SCHEDULED
Qty: 14 TABLET | Refills: 0 | Status: SHIPPED | OUTPATIENT
Start: 2019-11-10 | End: 2019-11-17

## 2019-11-10 RX ORDER — ADAPALENE 3 MG/G
0.1 GEL TOPICAL
Refills: 2 | COMMUNITY
Start: 2019-10-31 | End: 2020-07-31

## 2019-11-10 NOTE — PROGRESS NOTES
[address]        NAME: Stephenie Gold is a 79 y o  female  : 1950    MRN: [id number]  DATE: November 10, 2019  TIME: 9:34 AM    Assessment and Plan   Urinary tract infection with hematuria, site unspecified [N39 0, R31 9]  1  Urinary tract infection with hematuria, site unspecified  Urine culture    sulfamethoxazole-trimethoprim (BACTRIM DS) 800-160 mg per tablet   2  Dysuria  POCT urine dip         Patient Instructions       Follow up with PCP in 3-5 days  Proceed to  ER if symptoms worsen  Chief Complaint     Chief Complaint   Patient presents with    Possible UTI         History of Present Illness       Patient here for evaluation of urinary burning, pressure, frequency  Patient's symptoms started on Monday  If and progressing through the week  She states her last UTI was many years ago  Review of Systems   Review of Systems   Constitutional: Negative  Gastrointestinal: Negative  Genitourinary: Positive for dysuria, frequency and urgency  Negative for decreased urine volume, difficulty urinating, flank pain, hematuria, pelvic pain and vaginal pain           Current Medications       Current Outpatient Medications:     Adapalene 0 3 % gel, , Disp: , Rfl: 2    anastrozole (ARIMIDEX) 1 mg tablet, Take 1 tablet (1 mg total) by mouth daily, Disp: 30 tablet, Rfl: 0    CALCIUM PO, Take 1,200 mg by mouth daily, Disp: , Rfl:     celecoxib (CeleBREX) 200 mg capsule, daily as needed , Disp: , Rfl:     Cholecalciferol (VITAMIN D PO), Take 1 25 mg by mouth every 30 (thirty) days, Disp: , Rfl:     Cyanocobalamin (VITAMIN B 12 PO), Take 500 mcg by mouth daily , Disp: , Rfl:     Multiple Vitamins-Minerals (MULTIVITAL PO), Take by mouth, Disp: , Rfl:     pantoprazole (PROTONIX) 40 mg tablet, Take 40 mg by mouth as needed , Disp: , Rfl:     sulfamethoxazole-trimethoprim (BACTRIM DS) 800-160 mg per tablet, Take 1 tablet by mouth every 12 (twelve) hours for 7 days, Disp: 14 tablet, Rfl: 0    Turmeric 400 MG CAPS, Take 1 capsule by mouth daily, Disp: , Rfl:     Current Allergies     Allergies as of 11/10/2019 - Reviewed 11/10/2019   Allergen Reaction Noted    Latex Anaphylaxis 07/17/2013    Shellfish allergy Anaphylaxis 10/31/2013    Shellfish-derived products Anaphylaxis 07/17/2013            The following portions of the patient's history were reviewed and updated as appropriate: allergies, current medications, past family history, past medical history, past social history, past surgical history and problem list      Past Medical History:   Diagnosis Date    Back pain     Cancer ([facility] [address] )     left breast    Chemotherapy induced neutropenia ([facility] [address] ) 5/23/2019    GERD (gastroesophageal reflux disease)     Vitamin D deficiency     Wears glasses        Past Surgical History:   Procedure Laterality Date    BREAST BIOPSY Left 01/02/2019    invasive ductal carcinoma    COLONOSCOPY      HYSTERECTOMY      age 41    OOPHORECTOMY Bilateral     age 41    NM BIOPSY/EXCISION, LYMPH NODE(S) Left 2/8/2019    Procedure: BIOPSY LYMPH NODE SENTINEL;  Surgeon: Giles Chambers MD;  [facility];  Service: Surgical Oncology    NM BREAST RECONSTRUC W TISS EXPANDR Bilateral 2/8/2019    Procedure: INSERTION/PLACEMENT TISSUE EXPANDER (EXCHANGE); Surgeon: Remberto Malik MD;  [facility];  Service: Plastics    NM DELAY BREAST PROS AFTER BREAST SURG Bilateral 9/27/2019    Procedure: IMPLANT EXPANDER EXCHANGE;  Surgeon: Remberto Malik MD;  [facility];  Service: Plastics    NM EDG US EXAM SURGICAL ALTER STOM DUODENUM/JEJUNUM N/A 2/2/2017    Procedure: RADIAL ENDOSCOPIC U/S;  Surgeon: Kristen Geller MD;  [facility];   Service: Gastroenterology    NM IMPLNT BIO IMPLNT FOR SOFT TISSUE REINFORCEMENT Bilateral 2/8/2019    Procedure: RECONSTRUCTION BREAST W/ IMPLANT;  Surgeon: Remberto Malik MD;  [facility];  Service: Plastics    NM MASTECTOMY, SIMPLE, COMPLETE N/A 2/8/2019    Procedure: LEFT breast mastectomy; RIGHT breast prophylactic mastectomy;  Surgeon: Sari Kilpatrick MD;  [facility];  Service: Surgical Oncology    NE PART EXCIS PLANTAR FASCIA Right 10/20/2016    Procedure:  OPEN RELEASE FASCIA PLANTAR ,CUTTING BOTTOM OF ARCH,INSTEP ;  Surgeon: Lee Hutchinson DPM;  [facility];  Service: Podiatry    NE REMOVAL OF BREAST CAPSULE Bilateral 9/27/2019    Procedure: CAPSULECTOMY;  Surgeon: Marianela Joseph MD;  [facility];  Service: Plastics    THUMB ARTHROSCOPY      US GUIDED BREAST BIOPSY LEFT COMPLETE Left 1/2/2019       Family History   Problem Relation Age of Onset    Brain cancer Mother 67    Prostate cancer Father 66    Breast cancer Sister 54    Breast cancer additional onset Sister 72    Breast cancer Sister 72    Prostate cancer Brother 62         Medications have been verified  Objective   /82 (BP Location: Right arm, Patient Position: Sitting, Cuff Size: Standard)   Pulse 72   Temp (!) 97 2 °F (36 2 °C) (Temporal)   Ht 5' 5" (1 651 m)   Wt 88 5 kg (195 lb)   BMI 32 45 kg/m²        Physical Exam     Physical Exam   Constitutional: She is oriented to person, place, and time  She appears well-developed and well-nourished  No distress  HENT:   Head: Normocephalic and atraumatic  Abdominal: Soft  Bowel sounds are normal  She exhibits no distension and no mass  There is no tenderness  There is no rebound, no guarding and no CVA tenderness  Neurological: She is alert and oriented to person, place, and time  Skin: Skin is warm and dry  No rash noted  She is not diaphoretic  Psychiatric: She has a normal mood and affect  Her behavior is normal  Judgment and thought content normal    Nursing note and vitals reviewed

## 2019-11-12 LAB — BACTERIA UR CULT: ABNORMAL

## 2019-12-06 ENCOUNTER — LAB REQUISITION (OUTPATIENT)
Dept: LAB | Facility: HOSPITAL | Age: 69
End: 2019-12-06
Payer: COMMERCIAL

## 2019-12-06 DIAGNOSIS — N39.0 URINARY TRACT INFECTION, SITE NOT SPECIFIED: ICD-10-CM

## 2019-12-06 PROCEDURE — 87077 CULTURE AEROBIC IDENTIFY: CPT | Performed by: INTERNAL MEDICINE

## 2019-12-06 PROCEDURE — 87186 SC STD MICRODIL/AGAR DIL: CPT | Performed by: INTERNAL MEDICINE

## 2019-12-06 PROCEDURE — 87086 URINE CULTURE/COLONY COUNT: CPT | Performed by: INTERNAL MEDICINE

## 2019-12-08 LAB — BACTERIA UR CULT: ABNORMAL

## 2019-12-18 ENCOUNTER — OFFICE VISIT (OUTPATIENT)
Dept: SURGICAL ONCOLOGY | Facility: CLINIC | Age: 69
End: 2019-12-18
Payer: COMMERCIAL

## 2019-12-18 VITALS
DIASTOLIC BLOOD PRESSURE: 60 MMHG | HEIGHT: 65 IN | HEART RATE: 93 BPM | BODY MASS INDEX: 32.92 KG/M2 | SYSTOLIC BLOOD PRESSURE: 112 MMHG | TEMPERATURE: 97.6 F | WEIGHT: 197.6 LBS | RESPIRATION RATE: 18 BRPM

## 2019-12-18 DIAGNOSIS — Z17.0 MALIGNANT NEOPLASM OF LOWER-INNER QUADRANT OF LEFT BREAST IN FEMALE, ESTROGEN RECEPTOR POSITIVE (HCC): Primary | ICD-10-CM

## 2019-12-18 DIAGNOSIS — Z79.811 AROMATASE INHIBITOR USE: ICD-10-CM

## 2019-12-18 DIAGNOSIS — C50.312 MALIGNANT NEOPLASM OF LOWER-INNER QUADRANT OF LEFT BREAST IN FEMALE, ESTROGEN RECEPTOR POSITIVE (HCC): Primary | ICD-10-CM

## 2019-12-18 PROBLEM — Z13.71 BRCA NEGATIVE: Status: RESOLVED | Noted: 2019-01-23 | Resolved: 2019-12-18

## 2019-12-18 PROBLEM — Z92.21 HX ANTINEOPLASTIC CHEMOTHERAPY: Status: RESOLVED | Noted: 2019-06-10 | Resolved: 2019-12-18

## 2019-12-18 PROCEDURE — 99214 OFFICE O/P EST MOD 30 MIN: CPT | Performed by: SURGERY

## 2019-12-18 NOTE — PROGRESS NOTES
Surgical Oncology Follow Up       [address] [facility] SURGICAL ONCOLOGY [facility] [address]    Fátima Mcguire  1950  [id number]  [address] [facility] SURGICAL ONCOLOGY [facility]    Chief Complaint   Patient presents with    Follow-up       Assessment/Plan   Diagnoses and all orders for this visit:    Malignant neoplasm of lower-inner quadrant of left breast in female, estrogen receptor positive ([facility] [address] )    Aromatase inhibitor use    Other orders  -     microfibrillar collagen powder; Apply 1 g topically as needed for wound care        Advance Care Planning/Advance Directives:  Discussed disease status, cancer treatment plans and/or cancer treatment goals with the patient  Oncology History:       Malignant neoplasm of lower-inner quadrant of left breast in female, estrogen receptor positive ([facility] [address] )    1/2/2019 Initial Diagnosis     Malignant neoplasm of lower-inner quadrant of left breast in female, estrogen receptor positive [facility])       Chemotherapy     April 2019:    Adjuvant  Taxotere and Cytoxan once every 3 weeks for 4 cycles followed by aromatase inhibitor      4/11/2019 -  Chemotherapy     pegfilgrastim (NEULASTA) subcutaneous injection 6 mg, 6 mg, Subcutaneous, Once, 4 of 4 cycles  Dose modification: 4 mg (original dose 6 mg, Cycle 3)  Administration: 4 mg (5/24/2019), 4 mg (6/14/2019)  cyclophosphamide (CYTOXAN) 1,176 mg in sodium chloride 0 9 % 250 mL IVPB, 600 mg/m2 = 1,176 mg, Intravenous, Once, 4 of 4 cycles  Administration: 1,176 mg (5/23/2019), 1,176 mg (6/13/2019)  DOCEtaxel (TAXOTERE) 147 mg in sodium chloride 0 9 % 250 mL chemo infusion, 75 mg/m2 = 147 mg, Intravenous, Once, 4 of 4 cycles  Administration: 147 mg (5/23/2019), 147 mg (6/13/2019)         History of Present Illness:  Breast cancer follow-up, has issues with joint aches but no other concerns  -Interval History:  None    Review of Systems:  Review of Systems   Constitutional: Negative  Negative for appetite change and fever  Eyes: Negative  Respiratory: Negative for shortness of breath  Cardiovascular: Negative  Gastrointestinal: Negative  Endocrine: Negative  Genitourinary: Negative  Musculoskeletal: Positive for arthralgias  Negative for myalgias  Skin: Negative  Allergic/Immunologic: Negative  Neurological: Negative  Hematological: Negative  Negative for adenopathy  Does not bruise/bleed easily  Psychiatric/Behavioral: Negative  Patient Active Problem List   Diagnosis    Plantar fasciitis of right foot    Atypical chest pain    Epigastric pain    Elevated LFTs    Nausea    Excessive VIP secretion    Thyroid nodule    Malignant neoplasm of lower-inner quadrant of left breast in female, estrogen receptor positive ([facility] [address] )    Family history of breast cancer in sister    Folliculitis    Chemotherapy induced neutropenia ([facility] [address] )    Urinary tract infection with hematuria    Dysuria    Aromatase inhibitor use     Past Medical History:   Diagnosis Date    Back pain     Chemotherapy induced neutropenia ([facility] [address] ) 5/23/2019    GERD (gastroesophageal reflux disease)     Vitamin D deficiency     Wears glasses      Past Surgical History:   Procedure Laterality Date    BREAST BIOPSY Left 01/02/2019    invasive ductal carcinoma    COLONOSCOPY      HYSTERECTOMY      age 41    OOPHORECTOMY Bilateral     age 41    CA BIOPSY/EXCISION, LYMPH NODE(S) Left 2/8/2019    Procedure: BIOPSY LYMPH NODE SENTINEL;  Surgeon: Amber Francis MD;  [facility];  Service: Surgical Oncology    CA BREAST [address] TISS EXPANDR Bilateral 2/8/2019    Procedure: INSERTION/PLACEMENT TISSUE EXPANDER (EXCHANGE);   Surgeon: Mary Ann Dubois MD;  [facility];  Service: Plastics    CA DELAY BREAST PROS AFTER BREAST SURG Bilateral 9/27/2019    Procedure: IMPLANT EXPANDER EXCHANGE;  Surgeon: Mary Ann Dubois MD;  [facility];  Service: Plastics    AR EDG US EXAM SURGICAL ALTER STOM DUODENUM/JEJUNUM N/A 2/2/2017    Procedure: RADIAL ENDOSCOPIC U/S;  Surgeon: Rani Santana MD;  [facility];   Service: Gastroenterology    AR IMPLNT BIO IMPLNT FOR SOFT TISSUE REINFORCEMENT Bilateral 2/8/2019    Procedure: RECONSTRUCTION BREAST W/ IMPLANT;  Surgeon: Sebastián Sanchez MD;  [facility];  Service: Plastics    AR MASTECTOMY, SIMPLE, COMPLETE N/A 2/8/2019    Procedure: LEFT breast mastectomy; RIGHT breast prophylactic mastectomy;  Surgeon: Rama Anthony MD;  [facility];  Service: Surgical Oncology    AR PART EXCIS PLANTAR FASCIA Right 10/20/2016    Procedure:  OPEN RELEASE FASCIA PLANTAR ,CUTTING BOTTOM OF ARCH,INSTEP ;  Surgeon: Jerome Mclain DPM;  [facility];  Service: Podiatry    AR REMOVAL OF BREAST CAPSULE Bilateral 9/27/2019    Procedure: CAPSULECTOMY;  Surgeon: Sebastián Sanchez MD;  [facility];  Service: Plastics    THUMB ARTHROSCOPY      US GUIDED BREAST BIOPSY LEFT COMPLETE Left 1/2/2019     Family History   Problem Relation Age of Onset    Brain cancer Mother 67    Prostate cancer Father 66    Breast cancer Sister 54    Breast cancer additional onset Sister 72    Breast cancer Sister 72    Prostate cancer Brother 62     Social History     Socioeconomic History    Marital status: Single     Spouse name: Not on file    Number of children: Not on file    Years of education: Not on file    Highest education level: Not on file   Occupational History    Not on file   Social Needs    Financial resource strain: Not on file    Food insecurity:     Worry: Not on file     Inability: Not on file    Transportation needs:     Medical: Not on file     Non-medical: Not on file   Tobacco Use    Smoking status: Never Smoker    Smokeless tobacco: Never Used   Substance and Sexual Activity    Alcohol use: Not Currently    Drug use: No    Sexual activity: Not on file   Lifestyle    Physical activity:     Days per week: Not on file     Minutes per session: Not on file    Stress: Not on file   Relationships    Social connections:     Talks on phone: Not on file     Gets together: Not on file     Attends Yazdanism service: Not on file     Active member of club or organization: Not on file     Attends meetings of clubs or organizations: Not on file     Relationship status: Not on file    Intimate partner violence:     Fear of current or ex partner: Not on file     Emotionally abused: Not on file     Physically abused: Not on file     Forced sexual activity: Not on file   Other Topics Concern    Not on file   Social History Narrative    Not on file       Current Outpatient Medications:     Adapalene 0 3 % gel, 0 1 % , Disp: , Rfl: 2    anastrozole (ARIMIDEX) 1 mg tablet, Take 1 tablet (1 mg total) by mouth daily, Disp: 30 tablet, Rfl: 0    CALCIUM PO, Take 1,200 mg by mouth daily, Disp: , Rfl:     Cholecalciferol (VITAMIN D PO), Take 1 25 mg by mouth every 30 (thirty) days, Disp: , Rfl:     Cyanocobalamin (VITAMIN B 12 PO), Take 500 mcg by mouth daily , Disp: , Rfl:     microfibrillar collagen powder, Apply 1 g topically as needed for wound care, Disp: , Rfl:     Multiple Vitamins-Minerals (MULTIVITAL PO), Take by mouth, Disp: , Rfl:     Turmeric 400 MG CAPS, Take 1 capsule by mouth daily, Disp: , Rfl:     celecoxib (CeleBREX) 200 mg capsule, daily as needed , Disp: , Rfl:     pantoprazole (PROTONIX) 40 mg tablet, Take 40 mg by mouth as needed , Disp: , Rfl:   Allergies   Allergen Reactions    Latex Anaphylaxis    Shellfish Allergy Anaphylaxis    Shellfish-Derived Products Anaphylaxis       The following portions of the patient's history were reviewed and updated as appropriate: allergies, current medications, past family history, past medical history, past social history, past surgical history and problem list         Vitals:    12/18/19 1534   BP: 112/60   Pulse: 93   Resp: 18 Temp: 97 6 °F (36 4 °C)       Physical Exam   Constitutional: She is oriented to person, place, and time  She appears well-developed and well-nourished  HENT:   Head: Normocephalic and atraumatic  Cardiovascular: Normal heart sounds  Pulmonary/Chest: Breath sounds normal  Right breast exhibits skin change (Mastectomy scar and implant)  Right breast exhibits no mass and no tenderness  Left breast exhibits skin change ( mastectomy scar and implant)  Left breast exhibits no mass and no tenderness  Abdominal: Soft  Lymphadenopathy:        Right axillary: No pectoral and no lateral adenopathy present  Left axillary: No pectoral and no lateral adenopathy present  Right: No supraclavicular adenopathy present  Left: No supraclavicular adenopathy present  Neurological: She is alert and oriented to person, place, and time  Psychiatric: She has a normal mood and affect  Discussion/Summary:  27-year-old female status post bilateral mastectomy and reconstruction for a left-sided stage IA invasive ductal carcinoma  She did have adjuvant chemotherapy  She started on Femara and had joint aches  She did a trial of anastrozole which resulted in throbbing headaches  She is now back on the Femara  I made supportive recommendations to her for this  She will discuss this further with her medical oncologist as well  There is no evidence of disease based on examination today  I will see her again in six months or sooner should the need arise

## 2019-12-23 ENCOUNTER — TELEPHONE (OUTPATIENT)
Dept: HEMATOLOGY ONCOLOGY | Facility: CLINIC | Age: 69
End: 2019-12-23

## 2019-12-23 DIAGNOSIS — Z17.0 MALIGNANT NEOPLASM OF LOWER-INNER QUADRANT OF LEFT BREAST IN FEMALE, ESTROGEN RECEPTOR POSITIVE (HCC): Primary | ICD-10-CM

## 2019-12-23 DIAGNOSIS — C50.312 MALIGNANT NEOPLASM OF LOWER-INNER QUADRANT OF LEFT BREAST IN FEMALE, ESTROGEN RECEPTOR POSITIVE (HCC): Primary | ICD-10-CM

## 2019-12-23 RX ORDER — EXEMESTANE 25 MG/1
25 TABLET ORAL DAILY
Qty: 30 TABLET | Refills: 0 | Status: SHIPPED | OUTPATIENT
Start: 2019-12-23 | End: 2020-07-31

## 2019-12-23 NOTE — TELEPHONE ENCOUNTER
Patient called in but the call was disconnected  I called patient back but the call went straight to [facility] informing patient of our office hours and to call us back

## 2019-12-23 NOTE — TELEPHONE ENCOUNTER
Patient called for a refill on femara 2 5 mg  She also wants to try the third option Phyllistine Grapes spoke about in her 10/21/19 appointment  Please call her back at [phone]  She did not like the side effects she had from the anastrozole 1 mg

## 2019-12-23 NOTE — TELEPHONE ENCOUNTER
LVM informing patient of this information  Instructed her to call the office if she wanted to discuss it further or has any other questions

## 2019-12-23 NOTE — TELEPHONE ENCOUNTER
The 3rd option is called Aromasin  I will call that into the pharmacy, 30 day supply  Do not take Femara or anastrozole with Aromasin  Aromasin in to replace the other 2  Please have patient call back within next 30 days to let us know which one of the 3 she wants to remain on long term   Thanks

## 2020-01-08 ENCOUNTER — OFFICE VISIT (OUTPATIENT)
Dept: ENDOCRINOLOGY | Facility: CLINIC | Age: 70
End: 2020-01-08
Payer: COMMERCIAL

## 2020-01-08 VITALS
WEIGHT: 201.8 LBS | BODY MASS INDEX: 33.62 KG/M2 | SYSTOLIC BLOOD PRESSURE: 110 MMHG | DIASTOLIC BLOOD PRESSURE: 80 MMHG | HEIGHT: 65 IN | HEART RATE: 79 BPM

## 2020-01-08 DIAGNOSIS — E55.9 VITAMIN D DEFICIENCY: ICD-10-CM

## 2020-01-08 DIAGNOSIS — R73.01 IMPAIRED FASTING BLOOD SUGAR: ICD-10-CM

## 2020-01-08 DIAGNOSIS — E04.1 THYROID NODULE: Primary | ICD-10-CM

## 2020-01-08 PROCEDURE — 99214 OFFICE O/P EST MOD 30 MIN: CPT | Performed by: INTERNAL MEDICINE

## 2020-01-08 NOTE — PROGRESS NOTES
Bandar Villanueva 81 y o  female MRN: [id number]    Encounter: [id number]      Assessment/Plan     Problem List Items Addressed This Visit        Endocrine    Impaired fasting blood sugar     Repeat fasting glucose and check hemoglobin A1c         Relevant Orders    Comprehensive metabolic panel Lab Collect    HEMOGLOBIN A1C W/ EAG ESTIMATION Lab Collect    Thyroid nodule - Primary     Continue to monitor-repeat thyroid ultrasound prior to next visit         Relevant Orders    T4, free Lab Collect    TSH, 3rd generation Lab Collect    US thyroid       Other    Vitamin D deficiency     Continue supplementations         Relevant Orders    Vitamin D 25 hydroxy Lab Collect          CC:       History of Present Illness     HPI:  68-year-old female with thyroid nodules and vitamin-D deficiency here for follow-up  In the past year she was diagnosed with Breast cancer - s/p surgery and chemo  Thyroid nodules initially discovered in 2016 and have  remained stable on serial imaging  No obstructive symptoms , feels she is gaining weight - 15 lbs in the past year  No constipation , no palpitations   C/o hot flashes   C/o myalgias , arthralgias   Vitamin-D deficiency she takes Vitamin D once a month     Review of Systems   Constitutional: Negative for unexpected weight change  Eyes: Negative for visual disturbance  Respiratory: Negative for cough and shortness of breath  Cardiovascular: Negative for palpitations and leg swelling  Gastrointestinal: Negative for constipation, diarrhea, nausea and vomiting  Endocrine: Positive for heat intolerance  Musculoskeletal: Positive for arthralgias and myalgias  Negative for gait problem  Skin: Negative for pallor, rash and wound  Psychiatric/Behavioral: Positive for sleep disturbance  All other systems reviewed and are negative        Historical Information   Past Medical History:   Diagnosis Date    Back pain     Chemotherapy induced neutropenia ([facility] [address] ) 5/23/2019    GERD (gastroesophageal reflux disease)     Vitamin D deficiency     Wears glasses      Past Surgical History:   Procedure Laterality Date    BREAST BIOPSY Left 01/02/2019    invasive ductal carcinoma    COLONOSCOPY      HYSTERECTOMY      age 41    OOPHORECTOMY Bilateral     age 41    NC BIOPSY/EXCISION, LYMPH NODE(S) Left 2/8/2019    Procedure: BIOPSY LYMPH NODE SENTINEL;  Surgeon: Juvencio Gomez MD;  [facility];  Service: Surgical Oncology    NC BREAST RECONSTRUC W TISS EXPANDR Bilateral 2/8/2019    Procedure: INSERTION/PLACEMENT TISSUE EXPANDER (EXCHANGE); Surgeon: Felicia Hidalgo MD;  [facility];  Service: Plastics    NC DELAY BREAST PROS AFTER BREAST SURG Bilateral 9/27/2019    Procedure: IMPLANT EXPANDER EXCHANGE;  Surgeon: Felicia Hidalgo MD;  [facility];  Service: Plastics    NC EDG US EXAM SURGICAL ALTER STOM DUODENUM/JEJUNUM N/A 2/2/2017    Procedure: RADIAL ENDOSCOPIC U/S;  Surgeon: Dean Gan MD;  [facility];   Service: Gastroenterology    NC IMPLNT BIO IMPLNT FOR SOFT TISSUE REINFORCEMENT Bilateral 2/8/2019    Procedure: RECONSTRUCTION BREAST W/ IMPLANT;  Surgeon: Felicia Hidalgo MD;  [facility];  Service: Plastics    NC MASTECTOMY, SIMPLE, COMPLETE N/A 2/8/2019    Procedure: LEFT breast mastectomy; RIGHT breast prophylactic mastectomy;  Surgeon: Juvencio Gomez MD;  [facility];  Service: Surgical Oncology    NC PART EXCIS PLANTAR FASCIA Right 10/20/2016    Procedure:  OPEN RELEASE FASCIA PLANTAR ,CUTTING BOTTOM OF ARCH,INSTEP ;  Surgeon: Alesha Robertson DPM;  [facility];  Service: Podiatry    NC REMOVAL OF BREAST CAPSULE Bilateral 9/27/2019    Procedure: CAPSULECTOMY;  Surgeon: Felicia Hidalgo MD;  [facility];  Service: Plastics    THUMB ARTHROSCOPY      US GUIDED BREAST BIOPSY LEFT COMPLETE Left 1/2/2019     Social History   Social History     Substance and Sexual Activity   Alcohol Use Not Currently     Social History Substance and Sexual Activity   Drug Use No     Social History     Tobacco Use   Smoking Status Never Smoker   Smokeless Tobacco Never Used     Family History:   Family History   Problem Relation Age of Onset    Brain cancer Mother 67    Prostate cancer Father 66    Breast cancer Sister 54    Breast cancer additional onset Sister 72    Breast cancer Sister 72    Prostate cancer Brother 62       Meds/Allergies   Current Outpatient Medications   Medication Sig Dispense Refill    Adapalene 0 3 % gel 0 1 %   2    CALCIUM PO Take 1,200 mg by mouth daily      Cholecalciferol (VITAMIN D PO) Take 1 25 mg by mouth every 30 (thirty) days      Cyanocobalamin (VITAMIN B 12 PO) Take 500 mcg by mouth daily       exemestane (AROMASIN) 25 MG tablet Take 1 tablet (25 mg total) by mouth daily 30 tablet 0    microfibrillar collagen powder Apply 1 g topically as needed for wound care      Multiple Vitamins-Minerals (MULTIVITAL PO) Take by mouth      Turmeric 400 MG CAPS Take 1 capsule by mouth daily      celecoxib (CeleBREX) 200 mg capsule daily as needed       pantoprazole (PROTONIX) 40 mg tablet Take 40 mg by mouth as needed        No current facility-administered medications for this visit  Allergies   Allergen Reactions    Latex Anaphylaxis    Shellfish Allergy Anaphylaxis    Shellfish-Derived Products Anaphylaxis       Objective   Vitals: Blood pressure 110/80, pulse 79, height 5' 5" (1 651 m), weight 91 5 kg (201 lb 12 8 oz)  Physical Exam   Constitutional: She is oriented to person, place, and time  She appears well-developed and well-nourished  No distress  HENT:   Head: Normocephalic and atraumatic  Mouth/Throat: No oropharyngeal exudate  Eyes: EOM are normal  No scleral icterus  Neck: Normal range of motion  Neck supple  No thyromegaly present  Cardiovascular: Normal rate, regular rhythm and normal heart sounds  No murmur heard    Pulmonary/Chest: Effort normal and breath sounds normal  No stridor  No respiratory distress  She has no wheezes  Abdominal: Soft  Bowel sounds are normal  She exhibits no distension  There is no tenderness  There is no guarding  Musculoskeletal: Normal range of motion  She exhibits no edema or deformity  Lymphadenopathy:     She has no cervical adenopathy  Neurological: She is alert and oriented to person, place, and time  Skin: Skin is warm and dry  Psychiatric: She has a normal mood and affect  Her behavior is normal  Judgment and thought content normal    Vitals reviewed  The history was obtained from the review of the chart, patient  Lab Results:        Imaging Studies:   Results for orders placed during the hospital encounter of 01/03/19   US thyroid    Impression No nodule meets current ACR criteria for requiring biopsy or followup ultrasounds  Reference: ACR Thyroid Imaging, Reporting and Data System (TI-RADS): White Paper of the [employer]  J AM Galdino Radiol 9735;29:088-532  (additional recommendations based on American Thyroid Association 2015 guidelines )      Workstation performed: [id number]         I have personally reviewed pertinent reports  Portions of the record may have been created with voice recognition software  Occasional wrong word or "sound a like" substitutions may have occurred due to the inherent limitations of voice recognition software  Read the chart carefully and recognize, using context, where substitutions have occurred

## 2020-01-14 ENCOUNTER — TELEPHONE (OUTPATIENT)
Dept: HEMATOLOGY ONCOLOGY | Facility: CLINIC | Age: 70
End: 2020-01-14

## 2020-01-14 NOTE — TELEPHONE ENCOUNTER
Called and left voicemail for patient to return phone call to review more in regards to symptoms she is experiencing, how she has tried to manage the symptoms, etc so I can review with Dr Chris Mccain  Encouraged phone call back

## 2020-01-14 NOTE — TELEPHONE ENCOUNTER
Patient has now tried the third drug presrcibed to her, and she feels this one is not working  She is still having pain in her joints with this medication  Patient states she can not bend down because of the pain  She wanted to see if there was something else she could try   Please call her at [phone]

## 2020-01-15 ENCOUNTER — TELEPHONE (OUTPATIENT)
Dept: HEMATOLOGY ONCOLOGY | Facility: CLINIC | Age: 70
End: 2020-01-15

## 2020-01-15 NOTE — TELEPHONE ENCOUNTER
Spoke to patient, she will be available to come in 1/27/20, (ok'd by Dr Chris Mccain) at 11 AM  An apt request was sent by email to get patient on schedule since it is blocked for that time

## 2020-01-15 NOTE — TELEPHONE ENCOUNTER
Patient called back from yesterday to review since being on the Aromasin (3rd Aromatase inhibitor) she cannot take the side effects  Patient reports she experiences extreme joint/muscle and generalized body pain that inhibits her daily life  She reports she literally finds it hard to "lift her arms, bend her knees, etc"  Patient reports she has stomach problems so only takes tylenol PRN for pain which ONLY helps "a little bit"  She wants to know if there is another medication option for her breast cancer  Reviewed with Dr Candance Ly

## 2020-01-15 NOTE — TELEPHONE ENCOUNTER
Called patient and left voicemail reviewing she can stop taking the Aromasin and patient needs a a follow up office visit in 1-2 weeks with Susan Paris or Lauren  Please schedule patient and call with appt

## 2020-01-17 ENCOUNTER — LAB (OUTPATIENT)
Dept: LAB | Facility: HOSPITAL | Age: 70
End: 2020-01-17
Attending: INTERNAL MEDICINE
Payer: COMMERCIAL

## 2020-01-17 DIAGNOSIS — R73.01 IMPAIRED FASTING BLOOD SUGAR: ICD-10-CM

## 2020-01-17 DIAGNOSIS — E55.9 VITAMIN D DEFICIENCY: ICD-10-CM

## 2020-01-17 DIAGNOSIS — E04.1 THYROID NODULE: ICD-10-CM

## 2020-01-17 LAB
25(OH)D3 SERPL-MCNC: 50.7 NG/ML (ref 30–100)
ALBUMIN SERPL BCP-MCNC: 3.6 G/DL (ref 3.5–5)
ALP SERPL-CCNC: 78 U/L (ref 46–116)
ALT SERPL W P-5'-P-CCNC: 22 U/L (ref 12–78)
ANION GAP SERPL CALCULATED.3IONS-SCNC: 3 MMOL/L (ref 4–13)
AST SERPL W P-5'-P-CCNC: 15 U/L (ref 5–45)
BILIRUB SERPL-MCNC: 0.47 MG/DL (ref 0.2–1)
BUN SERPL-MCNC: 19 MG/DL (ref 5–25)
CALCIUM SERPL-MCNC: 9.5 MG/DL (ref 8.3–10.1)
CHLORIDE SERPL-SCNC: 108 MMOL/L (ref 100–108)
CO2 SERPL-SCNC: 29 MMOL/L (ref 21–32)
CREAT SERPL-MCNC: 0.78 MG/DL (ref 0.6–1.3)
EST. AVERAGE GLUCOSE BLD GHB EST-MCNC: 117 MG/DL
GFR SERPL CREATININE-BSD FRML MDRD: 79 ML/MIN/1.73SQ M
GLUCOSE P FAST SERPL-MCNC: 87 MG/DL (ref 65–99)
HBA1C MFR BLD: 5.7 % (ref 4.2–6.3)
POTASSIUM SERPL-SCNC: 4.3 MMOL/L (ref 3.5–5.3)
PROT SERPL-MCNC: 7.6 G/DL (ref 6.4–8.2)
SODIUM SERPL-SCNC: 140 MMOL/L (ref 136–145)
T4 FREE SERPL-MCNC: 1.06 NG/DL (ref 0.76–1.46)
TSH SERPL DL<=0.05 MIU/L-ACNC: 1.36 UIU/ML (ref 0.36–3.74)

## 2020-01-17 PROCEDURE — 84439 ASSAY OF FREE THYROXINE: CPT

## 2020-01-17 PROCEDURE — 83036 HEMOGLOBIN GLYCOSYLATED A1C: CPT

## 2020-01-17 PROCEDURE — 80053 COMPREHEN METABOLIC PANEL: CPT

## 2020-01-17 PROCEDURE — 84443 ASSAY THYROID STIM HORMONE: CPT

## 2020-01-17 PROCEDURE — 82306 VITAMIN D 25 HYDROXY: CPT

## 2020-01-17 PROCEDURE — 36415 COLL VENOUS BLD VENIPUNCTURE: CPT

## 2020-01-20 ENCOUNTER — TELEPHONE (OUTPATIENT)
Dept: ENDOCRINOLOGY | Facility: CLINIC | Age: 70
End: 2020-01-20

## 2020-01-20 NOTE — TELEPHONE ENCOUNTER
----- Message from Eric Alicea MD sent at 1/19/2020 10:48 PM EST -----  Please call the patient regarding labs - labs look good- thyroid function tests normal, vitamin D in good range and A1C in prediabetes range - watch diet

## 2020-01-20 NOTE — RESULT ENCOUNTER NOTE
Please call the patient regarding labs - labs look good- thyroid function tests normal, vitamin D in good range and A1C in prediabetes range - watch diet

## 2020-01-21 ENCOUNTER — APPOINTMENT (OUTPATIENT)
Dept: LAB | Facility: HOSPITAL | Age: 70
End: 2020-01-21
Payer: COMMERCIAL

## 2020-01-21 DIAGNOSIS — C50.312 MALIGNANT NEOPLASM OF LOWER-INNER QUADRANT OF LEFT BREAST IN FEMALE, ESTROGEN RECEPTOR POSITIVE (HCC): ICD-10-CM

## 2020-01-21 DIAGNOSIS — Z17.0 MALIGNANT NEOPLASM OF LOWER-INNER QUADRANT OF LEFT BREAST IN FEMALE, ESTROGEN RECEPTOR POSITIVE (HCC): ICD-10-CM

## 2020-01-21 LAB
ALBUMIN SERPL BCP-MCNC: 3.5 G/DL (ref 3.5–5)
ALP SERPL-CCNC: 77 U/L (ref 46–116)
ALT SERPL W P-5'-P-CCNC: 19 U/L (ref 12–78)
ANION GAP SERPL CALCULATED.3IONS-SCNC: 3 MMOL/L (ref 4–13)
AST SERPL W P-5'-P-CCNC: 14 U/L (ref 5–45)
BASOPHILS # BLD AUTO: 0.05 THOUSANDS/ΜL (ref 0–0.1)
BASOPHILS NFR BLD AUTO: 1 % (ref 0–1)
BILIRUB SERPL-MCNC: 0.44 MG/DL (ref 0.2–1)
BUN SERPL-MCNC: 12 MG/DL (ref 5–25)
CALCIUM SERPL-MCNC: 9.6 MG/DL (ref 8.3–10.1)
CANCER AG27-29 SERPL-ACNC: 21.8 U/ML (ref 0–42.3)
CHLORIDE SERPL-SCNC: 108 MMOL/L (ref 100–108)
CO2 SERPL-SCNC: 30 MMOL/L (ref 21–32)
CREAT SERPL-MCNC: 0.71 MG/DL (ref 0.6–1.3)
EOSINOPHIL # BLD AUTO: 0.21 THOUSAND/ΜL (ref 0–0.61)
EOSINOPHIL NFR BLD AUTO: 3 % (ref 0–6)
ERYTHROCYTE [DISTWIDTH] IN BLOOD BY AUTOMATED COUNT: 13.8 % (ref 11.6–15.1)
GFR SERPL CREATININE-BSD FRML MDRD: 88 ML/MIN/1.73SQ M
GLUCOSE SERPL-MCNC: 94 MG/DL (ref 65–140)
HCT VFR BLD AUTO: 45.6 % (ref 34.8–46.1)
HGB BLD-MCNC: 14.7 G/DL (ref 11.5–15.4)
IMM GRANULOCYTES # BLD AUTO: 0.02 THOUSAND/UL (ref 0–0.2)
IMM GRANULOCYTES NFR BLD AUTO: 0 % (ref 0–2)
LYMPHOCYTES # BLD AUTO: 2.44 THOUSANDS/ΜL (ref 0.6–4.47)
LYMPHOCYTES NFR BLD AUTO: 40 % (ref 14–44)
MCH RBC QN AUTO: 28.2 PG (ref 26.8–34.3)
MCHC RBC AUTO-ENTMCNC: 32.2 G/DL (ref 31.4–37.4)
MCV RBC AUTO: 87 FL (ref 82–98)
MONOCYTES # BLD AUTO: 0.43 THOUSAND/ΜL (ref 0.17–1.22)
MONOCYTES NFR BLD AUTO: 7 % (ref 4–12)
NEUTROPHILS # BLD AUTO: 3.01 THOUSANDS/ΜL (ref 1.85–7.62)
NEUTS SEG NFR BLD AUTO: 49 % (ref 43–75)
NRBC BLD AUTO-RTO: 0 /100 WBCS
PLATELET # BLD AUTO: 216 THOUSANDS/UL (ref 149–390)
PMV BLD AUTO: 10 FL (ref 8.9–12.7)
POTASSIUM SERPL-SCNC: 3.9 MMOL/L (ref 3.5–5.3)
PROT SERPL-MCNC: 7.6 G/DL (ref 6.4–8.2)
RBC # BLD AUTO: 5.22 MILLION/UL (ref 3.81–5.12)
SODIUM SERPL-SCNC: 141 MMOL/L (ref 136–145)
WBC # BLD AUTO: 6.16 THOUSAND/UL (ref 4.31–10.16)

## 2020-01-21 PROCEDURE — 80053 COMPREHEN METABOLIC PANEL: CPT

## 2020-01-21 PROCEDURE — 36415 COLL VENOUS BLD VENIPUNCTURE: CPT

## 2020-01-21 PROCEDURE — 86300 IMMUNOASSAY TUMOR CA 15-3: CPT

## 2020-01-21 PROCEDURE — 85025 COMPLETE CBC W/AUTO DIFF WBC: CPT

## 2020-01-27 ENCOUNTER — OFFICE VISIT (OUTPATIENT)
Dept: HEMATOLOGY ONCOLOGY | Facility: CLINIC | Age: 70
End: 2020-01-27
Payer: COMMERCIAL

## 2020-01-27 VITALS
RESPIRATION RATE: 18 BRPM | BODY MASS INDEX: 33.15 KG/M2 | DIASTOLIC BLOOD PRESSURE: 78 MMHG | TEMPERATURE: 98.2 F | HEART RATE: 70 BPM | SYSTOLIC BLOOD PRESSURE: 126 MMHG | OXYGEN SATURATION: 96 % | HEIGHT: 65 IN | WEIGHT: 199 LBS

## 2020-01-27 DIAGNOSIS — M15.9 OSTEOARTHRITIS OF MULTIPLE JOINTS, UNSPECIFIED OSTEOARTHRITIS TYPE: ICD-10-CM

## 2020-01-27 DIAGNOSIS — G62.0 DRUG-INDUCED POLYNEUROPATHY (HCC): ICD-10-CM

## 2020-01-27 DIAGNOSIS — G56.03 BILATERAL CARPAL TUNNEL SYNDROME: ICD-10-CM

## 2020-01-27 DIAGNOSIS — Z17.0 MALIGNANT NEOPLASM OF LOWER-INNER QUADRANT OF LEFT BREAST IN FEMALE, ESTROGEN RECEPTOR POSITIVE (HCC): Primary | ICD-10-CM

## 2020-01-27 DIAGNOSIS — E04.1 THYROID NODULE: ICD-10-CM

## 2020-01-27 DIAGNOSIS — M19.90 ARTHRITIS: ICD-10-CM

## 2020-01-27 DIAGNOSIS — G62.0 DRUG-INDUCED POLYNEUROPATHY (HCC): Primary | ICD-10-CM

## 2020-01-27 DIAGNOSIS — C50.312 MALIGNANT NEOPLASM OF LOWER-INNER QUADRANT OF LEFT BREAST IN FEMALE, ESTROGEN RECEPTOR POSITIVE (HCC): Primary | ICD-10-CM

## 2020-01-27 PROCEDURE — 99214 OFFICE O/P EST MOD 30 MIN: CPT | Performed by: INTERNAL MEDICINE

## 2020-01-27 NOTE — PROGRESS NOTES
HPI:  In January 2019 patient was diagnosed to have left breast cancer that was hormone receptor-positive and HER2 negative and that was followed by  bilateral mastectomies , left axillary lymph node sampling for stage I,  invasive Left breast cancer, 1 2 cm, grade 3, strongly positive ER, positive IA, negative her 2, positive lymphovascular invasion, negative 3 sentinel lymph nodes, and Oncotype score was 25   Patient had 4 cycles of adjuvant chemotherapy, combination of Taxotere and Cytoxan and Neulasta and that was followed by aromatase inhibitors and she had problems with all 3 aromatase inhibitors  Lot of musculoskeletal symptoms  She still recovering from musculoskeletal symptoms  Also has numbness at fingertips and medial 3 fingers of right hand  Tingling outer aspect of right thigh  She has tiredness         Current Outpatient Medications:     Adapalene 0 3 % gel, 0 1 % , Disp: , Rfl: 2    CALCIUM PO, Take 1,200 mg by mouth daily, Disp: , Rfl:     Cholecalciferol (VITAMIN D PO), Take 1 25 mg by mouth every 30 (thirty) days, Disp: , Rfl:     Cyanocobalamin (VITAMIN B 12 PO), Take 500 mcg by mouth daily , Disp: , Rfl:     exemestane (AROMASIN) 25 MG tablet, Take 1 tablet (25 mg total) by mouth daily, Disp: 30 tablet, Rfl: 0    microfibrillar collagen powder, Apply 1 g topically as needed for wound care, Disp: , Rfl:     Multiple Vitamins-Minerals (MULTIVITAL PO), Take by mouth, Disp: , Rfl:     Turmeric 400 MG CAPS, Take 1 capsule by mouth daily, Disp: , Rfl:     celecoxib (CeleBREX) 200 mg capsule, daily as needed , Disp: , Rfl:     pantoprazole (PROTONIX) 40 mg tablet, Take 40 mg by mouth as needed , Disp: , Rfl:     Allergies   Allergen Reactions    Latex Anaphylaxis    Shellfish Allergy Anaphylaxis    Shellfish-Derived Products Anaphylaxis          Malignant neoplasm of lower-inner quadrant of left breast in female, estrogen receptor positive ([facility] [address] )    1/2/2019 Initial Diagnosis Malignant neoplasm of lower-inner quadrant of left breast in female, estrogen receptor positive [facility])       Chemotherapy     April 2019:  Adjuvant  Taxotere and Cytoxan once every 3 weeks for 4 cycles followed by aromatase inhibitor      4/11/2019 -  Chemotherapy     pegfilgrastim (NEULASTA) subcutaneous injection 6 mg, 6 mg, Subcutaneous, Once, 4 of 4 cycles  Dose modification: 4 mg (original dose 6 mg, Cycle 3)  Administration: 4 mg (5/24/2019), 4 mg (6/14/2019)  cyclophosphamide (CYTOXAN) 1,176 mg in sodium chloride 0 9 % 250 mL IVPB, 600 mg/m2 = 1,176 mg, Intravenous, Once, 4 of 4 cycles  Administration: 1,176 mg (5/23/2019), 1,176 mg (6/13/2019)  DOCEtaxel (TAXOTERE) 147 mg in sodium chloride 0 9 % 250 mL chemo infusion, 75 mg/m2 = 147 mg, Intravenous, Once, 4 of 4 cycles  Administration: 147 mg (5/23/2019), 147 mg (6/13/2019)         ROS:  01/27/20 Reviewed 13 systems:  Presently no headaches, seizures, dizziness, diplopia, dysphagia, hoarseness, chest pain, palpitations, shortness of breath, cough, hemoptysis, abdominal pain, nausea, vomiting, change in bowel habits, melena, hematuria, fever, chills, bleeding, bone pains, skin rash, weight loss,    weakness,  claudication and gait problem  No frequent infections  Not unusually sensitive to heat or cold  No swelling of the ankles  No swollen glands  Patient is anxious   Other symptoms are in HPI        /78 (BP Location: Right arm, Patient Position: Sitting, Cuff Size: Adult)   Pulse 70   Temp 98 2 °F (36 8 °C)   Resp 18   Ht 5' 5" (1 651 m)   Wt 90 3 kg (199 lb)   SpO2 96%   BMI 33 12 kg/m²     Physical Exam:  Alert, oriented, not in distress, no icterus, no oral thrush, no palpable neck mass, clear lung fields, regular heart rate, abdomen  soft and non tender, no palpable abdominal mass, no ascites, no edema of ankles, no calf tenderness, patient is ambulatory, no skin rash, no palpable lymphadenopathy in the neck and axillary areas, good arterial pulses, no clubbing  Patient is anxious  Performance status 1      IMAGING:      LABS:  Results for orders placed or performed in visit on 01/21/20   CBC and differential   Result Value Ref Range    WBC 6 16 4 31 - 10 16 Thousand/uL    RBC 5 22 (H) 3 81 - 5 12 Million/uL    Hemoglobin 14 7 11 5 - 15 4 g/dL    Hematocrit 45 6 34 8 - 46 1 %    MCV 87 82 - 98 fL    MCH 28 2 26 8 - 34 3 pg    MCHC 32 2 31 4 - 37 4 g/dL    RDW 13 8 11 6 - 15 1 %    MPV 10 0 8 9 - 12 7 fL    Platelets 552 590 - 184 Thousands/uL    nRBC 0 /100 WBCs    Neutrophils Relative 49 43 - 75 %    Immat GRANS % 0 0 - 2 %    Lymphocytes Relative 40 14 - 44 %    Monocytes Relative 7 4 - 12 %    Eosinophils Relative 3 0 - 6 %    Basophils Relative 1 0 - 1 %    Neutrophils Absolute 3 01 1 85 - 7 62 Thousands/µL    Immature Grans Absolute 0 02 0 00 - 0 20 Thousand/uL    Lymphocytes Absolute 2 44 0 60 - 4 47 Thousands/µL    Monocytes Absolute 0 43 0 17 - 1 22 Thousand/µL    Eosinophils Absolute 0 21 0 00 - 0 61 Thousand/µL    Basophils Absolute 0 05 0 00 - 0 10 Thousands/µL   Comprehensive metabolic panel   Result Value Ref Range    Sodium 141 136 - 145 mmol/L    Potassium 3 9 3 5 - 5 3 mmol/L    Chloride 108 100 - 108 mmol/L    CO2 30 21 - 32 mmol/L    ANION GAP 3 (L) 4 - 13 mmol/L    BUN 12 5 - 25 mg/dL    Creatinine 0 71 0 60 - 1 30 mg/dL    Glucose 94 65 - 140 mg/dL    Calcium 9 6 8 3 - 10 1 mg/dL    AST 14 5 - 45 U/L    ALT 19 12 - 78 U/L    Alkaline Phosphatase 77 46 - 116 U/L    Total Protein 7 6 6 4 - 8 2 g/dL    Albumin 3 5 3 5 - 5 0 g/dL    Total Bilirubin 0 44 0 20 - 1 00 mg/dL    eGFR 88 ml/min/1 73sq m   Cancer antigen 27 29   Result Value Ref Range    CA 27 29 21 8 0 0 - 42 3 U/mL     Labs, Imaging, & Other studies:   All pertinent labs and imaging studies were personally reviewed    Lab Results   Component Value Date     07/10/2014    K 3 9 01/21/2020     01/21/2020    CO2 30 01/21/2020    ANIONGAP 5 07/10/2014 BUN 12 01/21/2020    CREATININE 0 71 01/21/2020    GLUCOSE 93 07/10/2014    GLUF 87 01/17/2020    CALCIUM 9 6 01/21/2020    AST 14 01/21/2020    ALT 19 01/21/2020    ALKPHOS 77 01/21/2020    PROT 7 3 07/10/2014    BILITOT 0 54 07/10/2014    EGFR 88 01/21/2020     Lab Results   Component Value Date    WBC 6 16 01/21/2020    HGB 14 7 01/21/2020    HCT 45 6 01/21/2020    MCV 87 01/21/2020     01/21/2020       Reviewed and discussed with patient  Assessment and plan: In January 2019 patient was diagnosed to have left breast cancer that was hormone receptor-positive and HER2 negative and that was followed by  bilateral mastectomies , left axillary lymph node sampling for stage I,  invasive Left breast cancer, 1 2 cm, grade 3, strongly positive ER, positive AR, negative her 2, positive lymphovascular invasion, negative 3 sentinel lymph nodes, and Oncotype score was 25   Patient had 4 cycles of adjuvant chemotherapy, combination of Taxotere and Cytoxan and Neulasta and that was followed by aromatase inhibitors and she had problems with all 3 aromatase inhibitors  Lot of musculoskeletal symptoms  She still recovering from musculoskeletal symptoms  Also has numbness at fingertips and medial 3 fingers of right hand  Tingling outer aspect of right thigh  She has tiredness     Physical examination and test results are as recorded and discussed  she is not taking any of the aromatase inhibitor because of side effects  She will take information home on tamoxifen  She will be tested for carpal tunnel in both hands/wrists  She will have MRI scan of lower spine to see if this explanation for tingling on the outer aspect of right thigh  She has grade 1 peripheral neuropathy   For thyroid nodule she goes to her endocrinologist    She will have tests for arthritis    All discussed in detail  Questions answered  Discussed the importance of eating healthy foods, staying active and health screening tests   Patient is capable of self-care  1  Malignant neoplasm of lower-inner quadrant of left breast in female, estrogen receptor positive ([facility] [address] )    - MRI lumbar spine w wo contrast; Future    2  Osteoarthritis of multiple joints, unspecified osteoarthritis type    - MRI lumbar spine w wo contrast; Future    3  Bilateral carpal tunnel syndrome    - Nerve conduction test; Future    4  Thyroid nodule      5  Drug-induced polyneuropathy (HCC)  - Nerve conduction test; Future  - MRI lumbar spine w wo contrast; Future    6  Arthritis    - Sedimentation rate, automated; Future  - C-reactive protein; Future  - Rheumatoid Factor; Future            Patient voiced understanding and agreement in the discussion  Counseling / Coordination of Care   Greater than 50% of total time was spent with the patient and / or family counseling and / or coordination of care

## 2020-01-28 ENCOUNTER — APPOINTMENT (OUTPATIENT)
Dept: LAB | Facility: HOSPITAL | Age: 70
End: 2020-01-28
Attending: INTERNAL MEDICINE
Payer: COMMERCIAL

## 2020-01-28 DIAGNOSIS — M19.90 ARTHRITIS: ICD-10-CM

## 2020-01-28 LAB
CRP SERPL QL: 5 MG/L
ERYTHROCYTE [SEDIMENTATION RATE] IN BLOOD: 13 MM/HOUR (ref 0–20)
RHEUMATOID FACT SER QL LA: NEGATIVE

## 2020-01-28 PROCEDURE — 85652 RBC SED RATE AUTOMATED: CPT

## 2020-01-28 PROCEDURE — 36415 COLL VENOUS BLD VENIPUNCTURE: CPT

## 2020-01-28 PROCEDURE — 86140 C-REACTIVE PROTEIN: CPT

## 2020-01-28 PROCEDURE — 86430 RHEUMATOID FACTOR TEST QUAL: CPT

## 2020-01-31 ENCOUNTER — HOSPITAL ENCOUNTER (OUTPATIENT)
Dept: RADIOLOGY | Facility: HOSPITAL | Age: 70
Discharge: HOME | End: 2020-01-31
Attending: INTERNAL MEDICINE
Payer: COMMERCIAL

## 2020-01-31 DIAGNOSIS — G62.0 DRUG-INDUCED POLYNEUROPATHY (HCC): ICD-10-CM

## 2020-01-31 DIAGNOSIS — C50.312 MALIGNANT NEOPLASM OF LOWER-INNER QUADRANT OF LEFT BREAST IN FEMALE, ESTROGEN RECEPTOR POSITIVE (HCC): ICD-10-CM

## 2020-01-31 DIAGNOSIS — M15.9 OSTEOARTHRITIS OF MULTIPLE JOINTS, UNSPECIFIED OSTEOARTHRITIS TYPE: ICD-10-CM

## 2020-01-31 DIAGNOSIS — Z17.0 MALIGNANT NEOPLASM OF LOWER-INNER QUADRANT OF LEFT BREAST IN FEMALE, ESTROGEN RECEPTOR POSITIVE (HCC): ICD-10-CM

## 2020-01-31 PROCEDURE — 72158 MRI LUMBAR SPINE W/O & W/DYE: CPT

## 2020-01-31 PROCEDURE — A9585 GADOBUTROL INJECTION: HCPCS | Performed by: INTERNAL MEDICINE

## 2020-01-31 RX ADMIN — GADOBUTROL 9 ML: 604.72 INJECTION INTRAVENOUS at 17:07

## 2020-02-03 ENCOUNTER — HOSPITAL ENCOUNTER (OUTPATIENT)
Dept: RADIOLOGY | Facility: HOSPITAL | Age: 70
Discharge: HOME | End: 2020-02-03
Attending: INTERNAL MEDICINE
Payer: COMMERCIAL

## 2020-02-03 DIAGNOSIS — E04.1 THYROID NODULE: ICD-10-CM

## 2020-02-03 PROCEDURE — 76536 US EXAM OF HEAD AND NECK: CPT

## 2020-02-05 ENCOUNTER — TELEPHONE (OUTPATIENT)
Dept: HEMATOLOGY ONCOLOGY | Facility: CLINIC | Age: 70
End: 2020-02-05

## 2020-02-05 NOTE — TELEPHONE ENCOUNTER
Patient called to confirm their appointment  Appointment confirmed for [place] [id number]             Appointment date and time: 04/27 at [facility] location: [place]              Patient verbalized understanding of above

## 2020-02-06 ENCOUNTER — TELEPHONE (OUTPATIENT)
Dept: HEMATOLOGY ONCOLOGY | Facility: CLINIC | Age: 70
End: 2020-02-06

## 2020-02-06 DIAGNOSIS — G56.03 BILATERAL CARPAL TUNNEL SYNDROME: Primary | ICD-10-CM

## 2020-02-06 NOTE — TELEPHONE ENCOUNTER
I spoke with patient and gave result of MRI scan of lower back and suggested seeing a neuro surgeon  She does not want any surgery  She states back is not bothering her that much  I told about the a degenerative changes and disc and perineural cyst   She mentioned about carpal tunnel and test that I had requested, nerve conduction test will not be done until May  She would like to get that done sooner  I will speak with our office person to get that done sooner  I suggested seeing a hand surgeon for carpal tunnel surgery  I will be making that referral too

## 2020-02-10 ENCOUNTER — TELEPHONE (OUTPATIENT)
Dept: HEMATOLOGY ONCOLOGY | Facility: CLINIC | Age: 70
End: 2020-02-10

## 2020-02-10 DIAGNOSIS — G56.03 BILATERAL CARPAL TUNNEL SYNDROME: Primary | ICD-10-CM

## 2020-02-10 NOTE — TELEPHONE ENCOUNTER
Dr Helena Yang wanted this pt seen sooner than April for [place]  I called and spoke with Chema Araya at [phone] & she will put the pt on a cx list & call her as soon as anything comes up sooner

## 2020-02-12 ENCOUNTER — TELEPHONE (OUTPATIENT)
Dept: ENDOCRINOLOGY | Facility: CLINIC | Age: 70
End: 2020-02-12

## 2020-02-12 NOTE — RESULT ENCOUNTER NOTE
Please call the patient regarding thyroid ultrasound- stable thyroid nodule - will continue to monitor periodically

## 2020-02-12 NOTE — TELEPHONE ENCOUNTER
----- Message from Eric Alieca MD sent at 2/11/2020  9:43 PM EST -----  Please call the patient regarding thyroid ultrasound- stable thyroid nodule - will continue to monitor periodically

## 2020-02-20 ENCOUNTER — HOSPITAL ENCOUNTER (OUTPATIENT)
Dept: NEUROLOGY | Facility: CLINIC | Age: 70
Discharge: HOME | End: 2020-02-20
Payer: COMMERCIAL

## 2020-02-20 DIAGNOSIS — G62.0 DRUG-INDUCED POLYNEUROPATHY (HCC): ICD-10-CM

## 2020-02-20 PROCEDURE — 95912 NRV CNDJ TEST 11-12 STUDIES: CPT | Performed by: PHYSICAL MEDICINE & REHABILITATION

## 2020-02-20 PROCEDURE — 95886 MUSC TEST DONE W/N TEST COMP: CPT | Performed by: PHYSICAL MEDICINE & REHABILITATION

## 2020-03-02 NOTE — PROGRESS NOTES
Weight Management Medical Nutrition Assessment  Pt presented for menu planning session  Today's weight is 197 7#  She reports appetite and food choices have been poor (h/o breast cancer s/p chemo, finished in June)  Per dietary recall, she eats very low protein (eats fish, salmon, chicken, eggs)  She doesn't have much energy outside of work  Strongly encouraged increasing her kcal intake to at least 1000 kcal/day  Programs explained in detail  Pt interested in starting healthycore  Patient seen by Medical Provider in past 6 months:  no  Requested to schedule appointment with Medical Provider: Yes      Anthropometric Measurements  Start Weight (#): 197 7#  Current Weight (#): n/a  TBW % Change from start weight: n/a  Ideal Body Weight (#): 125#  Goal Weight (#): lose 25#    Weight Loss History  Previous weight loss attempts: Commercial Programs ([email or web address], [employer] , etc ); reducing portions    Food and Nutrition Related History  Wake up: 4:30am   Bed Time:10:30pm    Food Recall  Breakfast: (5am): tea with collagen powder  Snack: (10:30am): tea with kasandra cracker  Lunch: (12:30): soup; vegetables from cafe; turkey burger sometimes  Snack: fruit cup  Dinner: (5:30pm): bagel with butter  Snack: pb tea      Beverages: water and coffee/tea  Volume of beverage intake: ~64 oz water at least, few cups of tea    Weekends: Improved, eating more meals  Cravings: none  Trouble area of day: none    Frequency of Eating out: irregular  Food restrictions: shellfish (allergy); avoids milk  Cooking: self   Food Shopping: self    Physical Activity Intake  Activity: none  Frequency:rarely  Physical limitations/barriers to exercise: tired, low energy    Estimated Needs  Energy  [address] 1898 Energy Needs:  BMR : 1439 kcal    1-2# loss weekly sedentary:  726-1226 kcal               1-2# loss weekly lightly active: 978-1478 kcal  Protein: 68-85 grams      (1 2-1 5g/kg IBW)  Fluid:  1988 ml     (35mL/kg IBW)    Nutrition Diagnosis  Yes; Overweight/obesity  related to Excess energy intake as evidenced by  BMI more than normative standard for age and sex (obesity-grade I 26-30  9)       Nutrition Intervention    Nutrition Prescription  Calories: 900-1100  Protein: 68-85 grams   Fluid: 66 oz    Meal Plan (Hector/Pro/Carb)  Breakfast: 200-250/20  Snack: 100-150/5-15  Lunch: 300/20-30  Snack:  Dinner:300/20-30  Snack: 100-150/5-15    Nutrition Education:    Healthy Core Manual  Calorie controlled menu  Lean protein food choices  Healthy snack options  Food journaling tips      Nutrition Counseling:  Strategies: meal planning, portion sizes, healthy snack choices, hydration, fiber intake, protein intake, exercise, food journal      Monitoring and Evaluation:  Evaluation criteria:  Energy Intake  Meet protein needs  Maintain adequate hydration  Monitor weekly weight  Meal planning/preparation  Food journal   Decreased portions at mealtimes and snacks  Physical activity     Barriers to learning:none  Readiness to change: Preparation:  (Getting ready to change)   Comprehension: very good  Expected Compliance: very good

## 2020-03-03 ENCOUNTER — TELEPHONE (OUTPATIENT)
Dept: INFUSION CENTER | Facility: HOSPITAL | Age: 70
End: 2020-03-03

## 2020-03-03 ENCOUNTER — TELEPHONE (OUTPATIENT)
Dept: HEMATOLOGY ONCOLOGY | Facility: CLINIC | Age: 70
End: 2020-03-03

## 2020-03-03 NOTE — TELEPHONE ENCOUNTER
Patient is calling in requesting someone to call her back regarding if Stefan Myers wanted her to start taking estrogen? ???  Please return her call at [phone]

## 2020-03-05 ENCOUNTER — OFFICE VISIT (OUTPATIENT)
Dept: BARIATRICS | Facility: CLINIC | Age: 70
End: 2020-03-05

## 2020-03-05 VITALS — HEIGHT: 65 IN | WEIGHT: 197.7 LBS | BODY MASS INDEX: 32.94 KG/M2

## 2020-03-05 DIAGNOSIS — R63.5 ABNORMAL WEIGHT GAIN: ICD-10-CM

## 2020-03-05 PROCEDURE — RECHECK

## 2020-03-05 PROCEDURE — WMDI30

## 2020-03-10 ENCOUNTER — APPOINTMENT (OUTPATIENT)
Dept: LAB | Facility: HOSPITAL | Age: 70
End: 2020-03-10
Payer: COMMERCIAL

## 2020-03-10 ENCOUNTER — TRANSCRIBE ORDERS (OUTPATIENT)
Dept: LAB | Facility: HOSPITAL | Age: 70
End: 2020-03-10

## 2020-03-10 DIAGNOSIS — Z00.8 HEALTH EXAMINATION IN POPULATION SURVEY: Primary | ICD-10-CM

## 2020-03-10 DIAGNOSIS — Z00.8 HEALTH EXAMINATION IN POPULATION SURVEY: ICD-10-CM

## 2020-03-10 LAB
CHOLEST SERPL-MCNC: 180 MG/DL (ref 50–200)
EST. AVERAGE GLUCOSE BLD GHB EST-MCNC: 111 MG/DL
HBA1C MFR BLD: 5.5 %
HDLC SERPL-MCNC: 61 MG/DL
LDLC SERPL CALC-MCNC: 102 MG/DL (ref 0–100)
NONHDLC SERPL-MCNC: 119 MG/DL
TRIGL SERPL-MCNC: 85 MG/DL

## 2020-03-10 PROCEDURE — 83036 HEMOGLOBIN GLYCOSYLATED A1C: CPT

## 2020-03-10 PROCEDURE — 36415 COLL VENOUS BLD VENIPUNCTURE: CPT

## 2020-03-10 PROCEDURE — 80061 LIPID PANEL: CPT

## 2020-03-27 ENCOUNTER — TELEPHONE (OUTPATIENT)
Dept: HEMATOLOGY ONCOLOGY | Facility: CLINIC | Age: 70
End: 2020-03-27

## 2020-03-27 NOTE — TELEPHONE ENCOUNTER
Faxed clearance letter for patient (to follow standard procedure when coming into contact with COVID-19 patients) to provided fax number of [phone]

## 2020-03-27 NOTE — TELEPHONE ENCOUNTER
Spoke to Nicky requiring more information on what exactly this letter should read  Nicky states that if Dr Simona Jaime does not recommend her going into COVID-19 patient rooms that she is required to obtain a letter from her physician  I stated I will talk to Dr Simona Jaime and once I have an answer I will call patient back  Patient voiced understanding

## 2020-03-27 NOTE — TELEPHONE ENCOUNTER
Reviewed and completed, please refer to telephone encounter from [facility] for additional information

## 2020-03-27 NOTE — TELEPHONE ENCOUNTER
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

## 2020-04-23 ENCOUNTER — TELEPHONE (OUTPATIENT)
Dept: HEMATOLOGY ONCOLOGY | Facility: CLINIC | Age: 70
End: 2020-04-23

## 2020-04-23 ENCOUNTER — TELEPHONE (OUTPATIENT)
Dept: HEMATOLOGY ONCOLOGY | Facility: MEDICAL CENTER | Age: 70
End: 2020-04-23

## 2020-04-23 DIAGNOSIS — C50.312 MALIGNANT NEOPLASM OF LOWER-INNER QUADRANT OF LEFT BREAST IN FEMALE, ESTROGEN RECEPTOR POSITIVE (HCC): Primary | ICD-10-CM

## 2020-04-23 DIAGNOSIS — Z17.0 MALIGNANT NEOPLASM OF LOWER-INNER QUADRANT OF LEFT BREAST IN FEMALE, ESTROGEN RECEPTOR POSITIVE (HCC): Primary | ICD-10-CM

## 2020-04-24 ENCOUNTER — APPOINTMENT (OUTPATIENT)
Dept: LAB | Facility: HOSPITAL | Age: 70
End: 2020-04-24
Attending: INTERNAL MEDICINE
Payer: COMMERCIAL

## 2020-04-24 DIAGNOSIS — C50.312 MALIGNANT NEOPLASM OF LOWER-INNER QUADRANT OF LEFT BREAST IN FEMALE, ESTROGEN RECEPTOR POSITIVE (HCC): ICD-10-CM

## 2020-04-24 DIAGNOSIS — Z17.0 MALIGNANT NEOPLASM OF LOWER-INNER QUADRANT OF LEFT BREAST IN FEMALE, ESTROGEN RECEPTOR POSITIVE (HCC): ICD-10-CM

## 2020-04-24 LAB
ALBUMIN SERPL BCP-MCNC: 3.5 G/DL (ref 3.5–5)
ALP SERPL-CCNC: 78 U/L (ref 46–116)
ALT SERPL W P-5'-P-CCNC: 25 U/L (ref 12–78)
ANION GAP SERPL CALCULATED.3IONS-SCNC: 4 MMOL/L (ref 4–13)
AST SERPL W P-5'-P-CCNC: 19 U/L (ref 5–45)
BASOPHILS # BLD AUTO: 0.04 THOUSANDS/ΜL (ref 0–0.1)
BASOPHILS NFR BLD AUTO: 1 % (ref 0–1)
BILIRUB SERPL-MCNC: 0.5 MG/DL (ref 0.2–1)
BUN SERPL-MCNC: 14 MG/DL (ref 5–25)
CALCIUM SERPL-MCNC: 9.5 MG/DL (ref 8.3–10.1)
CANCER AG27-29 SERPL-ACNC: 20.1 U/ML (ref 0–42.3)
CHLORIDE SERPL-SCNC: 107 MMOL/L (ref 100–108)
CO2 SERPL-SCNC: 28 MMOL/L (ref 21–32)
CREAT SERPL-MCNC: 0.65 MG/DL (ref 0.6–1.3)
CRP SERPL QL: 5.9 MG/L
EOSINOPHIL # BLD AUTO: 0.24 THOUSAND/ΜL (ref 0–0.61)
EOSINOPHIL NFR BLD AUTO: 5 % (ref 0–6)
ERYTHROCYTE [DISTWIDTH] IN BLOOD BY AUTOMATED COUNT: 13.7 % (ref 11.6–15.1)
ERYTHROCYTE [SEDIMENTATION RATE] IN BLOOD: 50 MM/HOUR (ref 0–20)
GFR SERPL CREATININE-BSD FRML MDRD: 92 ML/MIN/1.73SQ M
GLUCOSE P FAST SERPL-MCNC: 80 MG/DL (ref 65–99)
HCT VFR BLD AUTO: 46.2 % (ref 34.8–46.1)
HGB BLD-MCNC: 14.8 G/DL (ref 11.5–15.4)
IMM GRANULOCYTES # BLD AUTO: 0.01 THOUSAND/UL (ref 0–0.2)
IMM GRANULOCYTES NFR BLD AUTO: 0 % (ref 0–2)
LYMPHOCYTES # BLD AUTO: 2.14 THOUSANDS/ΜL (ref 0.6–4.47)
LYMPHOCYTES NFR BLD AUTO: 43 % (ref 14–44)
MCH RBC QN AUTO: 27.8 PG (ref 26.8–34.3)
MCHC RBC AUTO-ENTMCNC: 32 G/DL (ref 31.4–37.4)
MCV RBC AUTO: 87 FL (ref 82–98)
MONOCYTES # BLD AUTO: 0.38 THOUSAND/ΜL (ref 0.17–1.22)
MONOCYTES NFR BLD AUTO: 8 % (ref 4–12)
NEUTROPHILS # BLD AUTO: 2.21 THOUSANDS/ΜL (ref 1.85–7.62)
NEUTS SEG NFR BLD AUTO: 43 % (ref 43–75)
NRBC BLD AUTO-RTO: 0 /100 WBCS
PLATELET # BLD AUTO: 190 THOUSANDS/UL (ref 149–390)
PMV BLD AUTO: 9.8 FL (ref 8.9–12.7)
POTASSIUM SERPL-SCNC: 4.1 MMOL/L (ref 3.5–5.3)
PROT SERPL-MCNC: 7.3 G/DL (ref 6.4–8.2)
RBC # BLD AUTO: 5.32 MILLION/UL (ref 3.81–5.12)
SODIUM SERPL-SCNC: 139 MMOL/L (ref 136–145)
WBC # BLD AUTO: 5.02 THOUSAND/UL (ref 4.31–10.16)

## 2020-04-24 PROCEDURE — 86140 C-REACTIVE PROTEIN: CPT

## 2020-04-24 PROCEDURE — 36415 COLL VENOUS BLD VENIPUNCTURE: CPT

## 2020-04-24 PROCEDURE — 80053 COMPREHEN METABOLIC PANEL: CPT

## 2020-04-24 PROCEDURE — 86430 RHEUMATOID FACTOR TEST QUAL: CPT

## 2020-04-24 PROCEDURE — 85025 COMPLETE CBC W/AUTO DIFF WBC: CPT

## 2020-04-24 PROCEDURE — 86300 IMMUNOASSAY TUMOR CA 15-3: CPT

## 2020-04-24 PROCEDURE — 85652 RBC SED RATE AUTOMATED: CPT

## 2020-04-27 ENCOUNTER — TELEMEDICINE (OUTPATIENT)
Dept: HEMATOLOGY ONCOLOGY | Facility: CLINIC | Age: 70
End: 2020-04-27
Payer: COMMERCIAL

## 2020-04-27 ENCOUNTER — DOCUMENTATION (OUTPATIENT)
Dept: HEMATOLOGY ONCOLOGY | Facility: CLINIC | Age: 70
End: 2020-04-27

## 2020-04-27 DIAGNOSIS — C50.312 MALIGNANT NEOPLASM OF LOWER-INNER QUADRANT OF LEFT BREAST IN FEMALE, ESTROGEN RECEPTOR POSITIVE (HCC): Primary | ICD-10-CM

## 2020-04-27 DIAGNOSIS — Z17.0 MALIGNANT NEOPLASM OF LOWER-INNER QUADRANT OF LEFT BREAST IN FEMALE, ESTROGEN RECEPTOR POSITIVE (HCC): Primary | ICD-10-CM

## 2020-04-27 DIAGNOSIS — M19.90 ARTHRITIS: ICD-10-CM

## 2020-04-27 DIAGNOSIS — G56.03 BILATERAL CARPAL TUNNEL SYNDROME: ICD-10-CM

## 2020-04-27 DIAGNOSIS — R70.0 ELEVATED SED RATE: ICD-10-CM

## 2020-04-27 DIAGNOSIS — M15.9 OSTEOARTHRITIS OF MULTIPLE JOINTS, UNSPECIFIED OSTEOARTHRITIS TYPE: ICD-10-CM

## 2020-04-27 LAB — RHEUMATOID FACT SER QL LA: NEGATIVE

## 2020-04-27 PROCEDURE — 99214 OFFICE O/P EST MOD 30 MIN: CPT | Performed by: INTERNAL MEDICINE

## 2020-05-14 ENCOUNTER — TELEPHONE (OUTPATIENT)
Dept: HEMATOLOGY ONCOLOGY | Facility: CLINIC | Age: 70
End: 2020-05-14

## 2020-05-14 DIAGNOSIS — Z17.0 MALIGNANT NEOPLASM OF LOWER-INNER QUADRANT OF LEFT BREAST IN FEMALE, ESTROGEN RECEPTOR POSITIVE (HCC): Primary | ICD-10-CM

## 2020-05-14 DIAGNOSIS — C50.312 MALIGNANT NEOPLASM OF LOWER-INNER QUADRANT OF LEFT BREAST IN FEMALE, ESTROGEN RECEPTOR POSITIVE (HCC): Primary | ICD-10-CM

## 2020-05-14 RX ORDER — TAMOXIFEN CITRATE 20 MG/1
20 TABLET ORAL DAILY
Qty: 30 TABLET | Refills: 11 | Status: SHIPPED | OUTPATIENT
Start: 2020-05-14 | End: 2021-04-28

## 2020-05-26 ENCOUNTER — TELEPHONE (OUTPATIENT)
Dept: HEMATOLOGY ONCOLOGY | Facility: CLINIC | Age: 70
End: 2020-05-26

## 2020-06-02 ENCOUNTER — TRANSCRIBE ORDERS (OUTPATIENT)
Dept: ADMINISTRATIVE | Facility: HOSPITAL | Age: 70
End: 2020-06-02

## 2020-06-08 ENCOUNTER — APPOINTMENT (OUTPATIENT)
Dept: LAB | Facility: HOSPITAL | Age: 70
End: 2020-06-08
Attending: INTERNAL MEDICINE
Payer: COMMERCIAL

## 2020-06-08 ENCOUNTER — TELEPHONE (OUTPATIENT)
Dept: HEMATOLOGY ONCOLOGY | Facility: MEDICAL CENTER | Age: 70
End: 2020-06-08

## 2020-06-08 ENCOUNTER — TELEPHONE (OUTPATIENT)
Dept: HEMATOLOGY ONCOLOGY | Facility: CLINIC | Age: 70
End: 2020-06-08

## 2020-06-08 ENCOUNTER — TRANSCRIBE ORDERS (OUTPATIENT)
Dept: LAB | Facility: HOSPITAL | Age: 70
End: 2020-06-08

## 2020-06-08 DIAGNOSIS — G56.03 BILATERAL CARPAL TUNNEL SYNDROME: ICD-10-CM

## 2020-06-08 DIAGNOSIS — M25.50 PAIN IN JOINT, MULTIPLE SITES: ICD-10-CM

## 2020-06-08 DIAGNOSIS — C50.312 MALIGNANT NEOPLASM OF LOWER-INNER QUADRANT OF LEFT BREAST IN FEMALE, ESTROGEN RECEPTOR POSITIVE (HCC): ICD-10-CM

## 2020-06-08 DIAGNOSIS — M19.90 ARTHRITIS: ICD-10-CM

## 2020-06-08 DIAGNOSIS — Z17.0 MALIGNANT NEOPLASM OF LOWER-INNER QUADRANT OF LEFT BREAST IN FEMALE, ESTROGEN RECEPTOR POSITIVE (HCC): ICD-10-CM

## 2020-06-08 DIAGNOSIS — R70.0 ELEVATED SED RATE: ICD-10-CM

## 2020-06-08 DIAGNOSIS — M25.50 PAIN IN JOINT, MULTIPLE SITES: Primary | ICD-10-CM

## 2020-06-08 LAB
ALBUMIN SERPL BCP-MCNC: 3.5 G/DL (ref 3.5–5)
ALP SERPL-CCNC: 78 U/L (ref 46–116)
ALT SERPL W P-5'-P-CCNC: 41 U/L (ref 12–78)
ANION GAP SERPL CALCULATED.3IONS-SCNC: 4 MMOL/L (ref 4–13)
AST SERPL W P-5'-P-CCNC: 20 U/L (ref 5–45)
BASOPHILS # BLD AUTO: 0.04 THOUSANDS/ΜL (ref 0–0.1)
BASOPHILS NFR BLD AUTO: 1 % (ref 0–1)
BILIRUB SERPL-MCNC: 0.47 MG/DL (ref 0.2–1)
BUN SERPL-MCNC: 13 MG/DL (ref 5–25)
C3 SERPL-MCNC: 121 MG/DL (ref 90–180)
C4 SERPL-MCNC: 29 MG/DL (ref 10–40)
CALCIUM SERPL-MCNC: 9 MG/DL (ref 8.3–10.1)
CHLORIDE SERPL-SCNC: 108 MMOL/L (ref 100–108)
CO2 SERPL-SCNC: 27 MMOL/L (ref 21–32)
CREAT SERPL-MCNC: 0.62 MG/DL (ref 0.6–1.3)
EOSINOPHIL # BLD AUTO: 0.08 THOUSAND/ΜL (ref 0–0.61)
EOSINOPHIL NFR BLD AUTO: 1 % (ref 0–6)
ERYTHROCYTE [DISTWIDTH] IN BLOOD BY AUTOMATED COUNT: 13.5 % (ref 11.6–15.1)
ERYTHROCYTE [SEDIMENTATION RATE] IN BLOOD: 18 MM/HOUR (ref 0–20)
GFR SERPL CREATININE-BSD FRML MDRD: 94 ML/MIN/1.73SQ M
GLUCOSE P FAST SERPL-MCNC: 82 MG/DL (ref 65–99)
HCT VFR BLD AUTO: 45.1 % (ref 34.8–46.1)
HGB BLD-MCNC: 14.6 G/DL (ref 11.5–15.4)
IMM GRANULOCYTES # BLD AUTO: 0.02 THOUSAND/UL (ref 0–0.2)
IMM GRANULOCYTES NFR BLD AUTO: 0 % (ref 0–2)
LYMPHOCYTES # BLD AUTO: 3.25 THOUSANDS/ΜL (ref 0.6–4.47)
LYMPHOCYTES NFR BLD AUTO: 41 % (ref 14–44)
MCH RBC QN AUTO: 28.2 PG (ref 26.8–34.3)
MCHC RBC AUTO-ENTMCNC: 32.4 G/DL (ref 31.4–37.4)
MCV RBC AUTO: 87 FL (ref 82–98)
MONOCYTES # BLD AUTO: 0.49 THOUSAND/ΜL (ref 0.17–1.22)
MONOCYTES NFR BLD AUTO: 6 % (ref 4–12)
NEUTROPHILS # BLD AUTO: 4.06 THOUSANDS/ΜL (ref 1.85–7.62)
NEUTS SEG NFR BLD AUTO: 51 % (ref 43–75)
NRBC BLD AUTO-RTO: 0 /100 WBCS
PLATELET # BLD AUTO: 214 THOUSANDS/UL (ref 149–390)
PMV BLD AUTO: 10.3 FL (ref 8.9–12.7)
POTASSIUM SERPL-SCNC: 3.8 MMOL/L (ref 3.5–5.3)
PROT SERPL-MCNC: 7.4 G/DL (ref 6.4–8.2)
RBC # BLD AUTO: 5.17 MILLION/UL (ref 3.81–5.12)
SODIUM SERPL-SCNC: 139 MMOL/L (ref 136–145)
WBC # BLD AUTO: 7.94 THOUSAND/UL (ref 4.31–10.16)

## 2020-06-08 PROCEDURE — 86200 CCP ANTIBODY: CPT

## 2020-06-08 PROCEDURE — 84165 PROTEIN E-PHORESIS SERUM: CPT | Performed by: PATHOLOGY

## 2020-06-08 PROCEDURE — 86376 MICROSOMAL ANTIBODY EACH: CPT

## 2020-06-08 PROCEDURE — 86160 COMPLEMENT ANTIGEN: CPT

## 2020-06-08 PROCEDURE — 36415 COLL VENOUS BLD VENIPUNCTURE: CPT

## 2020-06-08 PROCEDURE — 85025 COMPLETE CBC W/AUTO DIFF WBC: CPT

## 2020-06-08 PROCEDURE — 86162 COMPLEMENT TOTAL (CH50): CPT

## 2020-06-08 PROCEDURE — 86430 RHEUMATOID FACTOR TEST QUAL: CPT

## 2020-06-08 PROCEDURE — 84165 PROTEIN E-PHORESIS SERUM: CPT

## 2020-06-08 PROCEDURE — 86038 ANTINUCLEAR ANTIBODIES: CPT

## 2020-06-08 PROCEDURE — 86235 NUCLEAR ANTIGEN ANTIBODY: CPT

## 2020-06-08 PROCEDURE — 85652 RBC SED RATE AUTOMATED: CPT

## 2020-06-08 PROCEDURE — 80053 COMPREHEN METABOLIC PANEL: CPT

## 2020-06-08 PROCEDURE — 86800 THYROGLOBULIN ANTIBODY: CPT

## 2020-06-08 PROCEDURE — 86039 ANTINUCLEAR ANTIBODIES (ANA): CPT

## 2020-06-08 PROCEDURE — 86225 DNA ANTIBODY NATIVE: CPT

## 2020-06-08 RX ORDER — TAMOXIFEN CITRATE 20 MG/1
20 TABLET ORAL DAILY
Qty: 30 TABLET | Refills: 0 | Status: CANCELLED | OUTPATIENT
Start: 2020-06-08 | End: 2020-07-08

## 2020-06-09 LAB
ALBUMIN SERPL ELPH-MCNC: 4.13 G/DL (ref 3.5–5)
ALBUMIN SERPL ELPH-MCNC: 58.2 % (ref 52–65)
ALPHA1 GLOB SERPL ELPH-MCNC: 0.33 G/DL (ref 0.1–0.4)
ALPHA1 GLOB SERPL ELPH-MCNC: 4.7 % (ref 2.5–5)
ALPHA2 GLOB SERPL ELPH-MCNC: 0.7 G/DL (ref 0.4–1.2)
ALPHA2 GLOB SERPL ELPH-MCNC: 9.8 % (ref 7–13)
BETA GLOB ABNORMAL SERPL ELPH-MCNC: 0.47 G/DL (ref 0.4–0.8)
BETA1 GLOB SERPL ELPH-MCNC: 6.6 % (ref 5–13)
BETA2 GLOB SERPL ELPH-MCNC: 6.1 % (ref 2–8)
BETA2+GAMMA GLOB SERPL ELPH-MCNC: 0.43 G/DL (ref 0.2–0.5)
CH50 SERPL-ACNC: 58 U/ML
DSDNA AB SER-ACNC: <1 IU/ML (ref 0–9)
ENA RNP AB SER-ACNC: 0.4 AI (ref 0–0.9)
ENA SM AB SER-ACNC: <0.2 AI (ref 0–0.9)
ENA SS-A AB SER-ACNC: <0.2 AI (ref 0–0.9)
ENA SS-B AB SER-ACNC: <0.2 AI (ref 0–0.9)
GAMMA GLOB ABNORMAL SERPL ELPH-MCNC: 1.04 G/DL (ref 0.5–1.6)
GAMMA GLOB SERPL ELPH-MCNC: 14.6 % (ref 12–22)
IGG/ALB SER: 1.39 {RATIO} (ref 1.1–1.8)
PROT PATTERN SERPL ELPH-IMP: NORMAL
PROT SERPL-MCNC: 7.1 G/DL (ref 6.4–8.2)
RHEUMATOID FACT SER QL LA: NEGATIVE
THYROGLOB AB SERPL-ACNC: <1 IU/ML (ref 0–0.9)
THYROPEROXIDASE AB SERPL-ACNC: <9 IU/ML (ref 0–34)

## 2020-06-10 LAB
ANA HOMOGEN SER QL IF: NORMAL
ANA HOMOGEN TITR SER: NORMAL {TITER}
CCP IGA+IGG SERPL IA-ACNC: 4 UNITS (ref 0–19)
RYE IGE QN: POSITIVE

## 2020-06-15 ENCOUNTER — TELEPHONE (OUTPATIENT)
Dept: HEMATOLOGY ONCOLOGY | Facility: CLINIC | Age: 70
End: 2020-06-15

## 2020-06-17 ENCOUNTER — OFFICE VISIT (OUTPATIENT)
Dept: SURGICAL ONCOLOGY | Facility: CLINIC | Age: 70
End: 2020-06-17
Payer: COMMERCIAL

## 2020-06-17 ENCOUNTER — TRANSCRIBE ORDERS (OUTPATIENT)
Dept: ADMINISTRATIVE | Facility: HOSPITAL | Age: 70
End: 2020-06-17

## 2020-06-17 VITALS
HEART RATE: 73 BPM | SYSTOLIC BLOOD PRESSURE: 120 MMHG | WEIGHT: 193.6 LBS | HEIGHT: 65 IN | DIASTOLIC BLOOD PRESSURE: 80 MMHG | TEMPERATURE: 97.7 F | RESPIRATION RATE: 18 BRPM | BODY MASS INDEX: 32.26 KG/M2

## 2020-06-17 DIAGNOSIS — C50.312 MALIGNANT NEOPLASM OF LOWER-INNER QUADRANT OF LEFT BREAST IN FEMALE, ESTROGEN RECEPTOR POSITIVE (HCC): Primary | ICD-10-CM

## 2020-06-17 DIAGNOSIS — Z79.810 USE OF TAMOXIFEN (NOLVADEX): ICD-10-CM

## 2020-06-17 DIAGNOSIS — Z17.0 MALIGNANT NEOPLASM OF LOWER-INNER QUADRANT OF LEFT BREAST IN FEMALE, ESTROGEN RECEPTOR POSITIVE (HCC): Primary | ICD-10-CM

## 2020-06-17 DIAGNOSIS — M25.50 PAIN IN JOINT, MULTIPLE SITES: Primary | ICD-10-CM

## 2020-06-17 PROBLEM — Z79.811 AROMATASE INHIBITOR USE: Status: RESOLVED | Noted: 2019-12-18 | Resolved: 2020-06-17

## 2020-06-17 PROCEDURE — 99214 OFFICE O/P EST MOD 30 MIN: CPT | Performed by: SURGERY

## 2020-06-17 RX ORDER — CHLORAL HYDRATE 500 MG
1000 CAPSULE ORAL DAILY
COMMUNITY
End: 2020-08-06 | Stop reason: HOSPADM

## 2020-06-17 RX ORDER — PREDNISONE 1 MG/1
TABLET ORAL
COMMUNITY
Start: 2020-06-01 | End: 2020-07-31

## 2020-06-25 ENCOUNTER — HOSPITAL ENCOUNTER (OUTPATIENT)
Dept: RADIOLOGY | Facility: HOSPITAL | Age: 70
Discharge: HOME | End: 2020-06-25
Attending: INTERNAL MEDICINE
Payer: COMMERCIAL

## 2020-06-25 DIAGNOSIS — M25.50 PAIN IN JOINT, MULTIPLE SITES: ICD-10-CM

## 2020-06-25 PROCEDURE — 76882 US LMTD JT/FCL EVL NVASC XTR: CPT

## 2020-06-30 ENCOUNTER — TRANSCRIBE ORDERS (OUTPATIENT)
Dept: LAB | Facility: HOSPITAL | Age: 70
End: 2020-06-30

## 2020-06-30 ENCOUNTER — APPOINTMENT (OUTPATIENT)
Dept: LAB | Facility: HOSPITAL | Age: 70
End: 2020-06-30
Attending: PLASTIC SURGERY
Payer: COMMERCIAL

## 2020-06-30 ENCOUNTER — TELEPHONE (OUTPATIENT)
Dept: HEMATOLOGY ONCOLOGY | Facility: CLINIC | Age: 70
End: 2020-06-30

## 2020-06-30 DIAGNOSIS — Z85.3 PERSONAL HISTORY OF MALIGNANT NEOPLASM OF BREAST: ICD-10-CM

## 2020-06-30 DIAGNOSIS — Z01.818 OTHER SPECIFIED PRE-OPERATIVE EXAMINATION: ICD-10-CM

## 2020-06-30 DIAGNOSIS — Z01.812 PRE-OPERATIVE LABORATORY EXAMINATION: Primary | ICD-10-CM

## 2020-06-30 DIAGNOSIS — Z01.812 PRE-OPERATIVE LABORATORY EXAMINATION: ICD-10-CM

## 2020-06-30 LAB
ANION GAP SERPL CALCULATED.3IONS-SCNC: 10 MMOL/L (ref 4–13)
ATRIAL RATE: 72 BPM
BUN SERPL-MCNC: 14 MG/DL (ref 5–25)
CALCIUM SERPL-MCNC: 9.5 MG/DL (ref 8.3–10.1)
CHLORIDE SERPL-SCNC: 105 MMOL/L (ref 100–108)
CO2 SERPL-SCNC: 25 MMOL/L (ref 21–32)
CREAT SERPL-MCNC: 0.66 MG/DL (ref 0.6–1.3)
ERYTHROCYTE [DISTWIDTH] IN BLOOD BY AUTOMATED COUNT: 13.8 % (ref 11.6–15.1)
GFR SERPL CREATININE-BSD FRML MDRD: 92 ML/MIN/1.73SQ M
GLUCOSE SERPL-MCNC: 82 MG/DL (ref 65–140)
HCT VFR BLD AUTO: 44.1 % (ref 34.8–46.1)
HGB BLD-MCNC: 14.2 G/DL (ref 11.5–15.4)
MCH RBC QN AUTO: 28.3 PG (ref 26.8–34.3)
MCHC RBC AUTO-ENTMCNC: 32.2 G/DL (ref 31.4–37.4)
MCV RBC AUTO: 88 FL (ref 82–98)
P AXIS: 41 DEGREES
PLATELET # BLD AUTO: 194 THOUSANDS/UL (ref 149–390)
PMV BLD AUTO: 9.8 FL (ref 8.9–12.7)
POTASSIUM SERPL-SCNC: 3.7 MMOL/L (ref 3.5–5.3)
PR INTERVAL: 166 MS
QRS AXIS: 19 DEGREES
QRSD INTERVAL: 74 MS
QT INTERVAL: 394 MS
QTC INTERVAL: 431 MS
RBC # BLD AUTO: 5.01 MILLION/UL (ref 3.81–5.12)
SODIUM SERPL-SCNC: 140 MMOL/L (ref 136–145)
T WAVE AXIS: 20 DEGREES
VENTRICULAR RATE: 72 BPM
WBC # BLD AUTO: 6.9 THOUSAND/UL (ref 4.31–10.16)

## 2020-06-30 PROCEDURE — 85027 COMPLETE CBC AUTOMATED: CPT

## 2020-06-30 PROCEDURE — 93005 ELECTROCARDIOGRAM TRACING: CPT

## 2020-06-30 PROCEDURE — 80048 BASIC METABOLIC PNL TOTAL CA: CPT

## 2020-06-30 PROCEDURE — 93010 ELECTROCARDIOGRAM REPORT: CPT | Performed by: INTERNAL MEDICINE

## 2020-06-30 PROCEDURE — 36415 COLL VENOUS BLD VENIPUNCTURE: CPT

## 2020-07-01 ENCOUNTER — TELEPHONE (OUTPATIENT)
Dept: HEMATOLOGY ONCOLOGY | Facility: CLINIC | Age: 70
End: 2020-07-01

## 2020-07-06 ENCOUNTER — OFFICE VISIT (OUTPATIENT)
Dept: HEMATOLOGY ONCOLOGY | Facility: CLINIC | Age: 70
End: 2020-07-06
Payer: COMMERCIAL

## 2020-07-06 VITALS
BODY MASS INDEX: 32.49 KG/M2 | HEART RATE: 71 BPM | OXYGEN SATURATION: 98 % | DIASTOLIC BLOOD PRESSURE: 80 MMHG | WEIGHT: 195 LBS | SYSTOLIC BLOOD PRESSURE: 122 MMHG | TEMPERATURE: 97.4 F | RESPIRATION RATE: 18 BRPM | HEIGHT: 65 IN

## 2020-07-06 DIAGNOSIS — C50.312 MALIGNANT NEOPLASM OF LOWER-INNER QUADRANT OF LEFT BREAST IN FEMALE, ESTROGEN RECEPTOR POSITIVE (HCC): Primary | ICD-10-CM

## 2020-07-06 DIAGNOSIS — G56.03 BILATERAL CARPAL TUNNEL SYNDROME: ICD-10-CM

## 2020-07-06 DIAGNOSIS — M15.9 OSTEOARTHRITIS OF MULTIPLE JOINTS, UNSPECIFIED OSTEOARTHRITIS TYPE: ICD-10-CM

## 2020-07-06 DIAGNOSIS — Z17.0 MALIGNANT NEOPLASM OF LOWER-INNER QUADRANT OF LEFT BREAST IN FEMALE, ESTROGEN RECEPTOR POSITIVE (HCC): Primary | ICD-10-CM

## 2020-07-06 DIAGNOSIS — E04.1 THYROID NODULE: ICD-10-CM

## 2020-07-06 DIAGNOSIS — Z79.810 USE OF TAMOXIFEN (NOLVADEX): ICD-10-CM

## 2020-07-06 PROCEDURE — 99214 OFFICE O/P EST MOD 30 MIN: CPT | Performed by: INTERNAL MEDICINE

## 2020-07-06 NOTE — PROGRESS NOTES
HPI:   Follow-up visit for hormone receptor-positive and HER2 negative stage I invasive left breast cancer diagnosed in January 2019 and patient had bilateral mastectomies and  left axillary lymph node sampling  Tumor was 1 2 cm, grade 3, strongly positive ER, positive KS, negative HER 2, positive lymphovascular invasion, negative 3 sentinel lymph nodes, and Oncotype score was 25   Patient had 4 cycles of adjuvant chemotherapy, combination of Taxotere and Cytoxan and Neulasta and that was followed by aromatase inhibitors and she had problems with all 3 aromatase inhibitors  Lot of musculoskeletal symptoms  Since May 2020 she has been on tamoxifen and has less musculoskeletal symptoms   She is following with her rheumatologist    She also has bilateral carpal tunnel problem   She states she is not ready for carpal tunnel surgeries  She is undergoing breast reconstruction surgeries  She has tiredness                  Current Outpatient Medications:     Adapalene 0 3 % gel, 0 1 % , Disp: , Rfl: 2    CALCIUM PO, Take 1,200 mg by mouth daily, Disp: , Rfl:     celecoxib (CeleBREX) 200 mg capsule, daily as needed , Disp: , Rfl:     Cholecalciferol (VITAMIN D PO), Take 1 25 mg by mouth every 30 (thirty) days, Disp: , Rfl:     Cyanocobalamin (VITAMIN B 12 PO), Take 500 mcg by mouth daily , Disp: , Rfl:     exemestane (AROMASIN) 25 MG tablet, Take 1 tablet (25 mg total) by mouth daily, Disp: 30 tablet, Rfl: 0    Multiple Vitamins-Minerals (MULTIVITAL PO), Take by mouth, Disp: , Rfl:     Omega-3 Fatty Acids (FISH OIL) 1,000 mg, Take 1,000 mg by mouth daily, Disp: , Rfl:     pantoprazole (PROTONIX) 40 mg tablet, Take 40 mg by mouth as needed , Disp: , Rfl:     predniSONE 5 mg tablet, , Disp: , Rfl:     tamoxifen (NOLVADEX) 20 mg tablet, Take 1 tablet (20 mg total) by mouth daily, Disp: 30 tablet, Rfl: 11    Turmeric 400 MG CAPS, Take 1 capsule by mouth daily, Disp: , Rfl:     microfibrillar collagen powder, Apply 1 g topically as needed for wound care, Disp: , Rfl:     Allergies   Allergen Reactions    Latex Anaphylaxis    Shellfish Allergy Anaphylaxis    Shellfish-Derived Products Anaphylaxis          Malignant neoplasm of lower-inner quadrant of left breast in female, estrogen receptor positive ([facility] [address] )    1/2/2019 Initial Diagnosis     Malignant neoplasm of lower-inner quadrant of left breast in female, estrogen receptor positive [facility])       Chemotherapy     April 2019:  Adjuvant  Taxotere and Cytoxan once every 3 weeks for 4 cycles followed by aromatase inhibitor      4/11/2019 - 7/3/2019 Chemotherapy     pegfilgrastim (NEULASTA) subcutaneous injection 6 mg, 6 mg, Subcutaneous, Once, 4 of 4 cycles  Dose modification: 4 mg (original dose 6 mg, Cycle 3)  Administration: 4 mg (5/24/2019), 4 mg (6/14/2019)  cyclophosphamide (CYTOXAN) 1,176 mg in sodium chloride 0 9 % 250 mL IVPB, 600 mg/m2 = 1,176 mg, Intravenous, Once, 4 of 4 cycles  Administration: 1,176 mg (5/23/2019), 1,176 mg (6/13/2019)  DOCEtaxel (TAXOTERE) 147 mg in sodium chloride 0 9 % 250 mL chemo infusion, 75 mg/m2 = 147 mg, Intravenous, Once, 4 of 4 cycles  Administration: 147 mg (5/23/2019), 147 mg (6/13/2019)         ROS:  07/06/20 Reviewed 13 systems: See symptoms in HPI:  Tiredness  Arthritic symptoms  Presently no headaches, seizures, dizziness, diplopia, dysphagia, hoarseness, chest pain, palpitations, shortness of breath, cough, hemoptysis, abdominal pain, nausea, vomiting, change in bowel habits, melena, hematuria, fever, chills, bleeding, bone pains, skin rash, weight loss,  weakness, numbness,  claudication and gait problem  No frequent infections  Not unusually sensitive to heat or cold  No swelling of the ankles  No swollen glands  Patient is anxious     No gyn symptoms        /80 (BP Location: Right arm, Patient Position: Sitting, Cuff Size: Adult)   Pulse 71   Temp (!) 97 4 °F (36 3 °C)   Resp 18   Ht 5' 5" (1 651 m) Wt 88 5 kg (195 lb)   SpO2 98%   BMI 32 45 kg/m²     Physical Exam:  Alert, oriented, not in distress, no icterus, no oral thrush, no palpable neck mass, clear lung fields, regular heart rate, abdomen  soft and non tender, no palpable abdominal mass, no ascites, no edema of ankles, no calf tenderness, no focal neurological deficit, no skin rash, no palpable lymphadenopathy in the neck and axillary areas, good arterial pulses  Patient is anxious  Performance status 1   No lymphedema  IMAGING:      LABS:  Results for orders placed or performed in visit on 06/30/20   ECG 12 lead   Result Value Ref Range    Ventricular Rate 72 BPM    Atrial Rate 72 BPM    ID Interval 166 ms    QRSD Interval 74 ms    QT Interval 394 ms    QTC Interval 431 ms    P Axis 41 degrees    QRS Axis 19 degrees    T Wave Axis 20 degrees     Labs, Imaging, & Other studies:   All pertinent labs and imaging studies were personally reviewed    Lab Results   Component Value Date     07/10/2014    K 3 7 06/30/2020     06/30/2020    CO2 25 06/30/2020    ANIONGAP 5 07/10/2014    BUN 14 06/30/2020    CREATININE 0 66 06/30/2020    GLUCOSE 93 07/10/2014    GLUF 82 06/08/2020    CALCIUM 9 5 06/30/2020    AST 20 06/08/2020    ALT 41 06/08/2020    ALKPHOS 78 06/08/2020    PROT 7 3 07/10/2014    BILITOT 0 54 07/10/2014    EGFR 92 06/30/2020     Lab Results   Component Value Date    WBC 6 90 06/30/2020    HGB 14 2 06/30/2020    HCT 44 1 06/30/2020    MCV 88 06/30/2020     06/30/2020       Reviewed and discussed with patient  Assessment and plan: Follow-up visit for hormone receptor-positive and HER2 negative stage I invasive left breast cancer diagnosed in January 2019 and patient had bilateral mastectomies and  left axillary lymph node sampling    Tumor was 1 2 cm, grade 3, strongly positive ER, positive ID, negative HER 2, positive lymphovascular invasion, negative 3 sentinel lymph nodes, and Oncotype score was 25   Patient had 4 cycles of adjuvant chemotherapy, combination of Taxotere and Cytoxan and Neulasta and that was followed by aromatase inhibitors and she had problems with all 3 aromatase inhibitors  Lot of musculoskeletal symptoms  Since May 2020 she has been on tamoxifen and has less musculoskeletal symptoms   She is following with her rheumatologist    She also has bilateral carpal tunnel problem   She states she is not ready for carpal tunnel surgeries  She is undergoing breast reconstruction surgeries  She has tiredness     Physical examination and test results are as recorded and discussed   she is not taking any of the aromatase inhibitor because of side effects  She is taking tamoxifen  For thyroid nodule she goes to her endocrinologist       For arthritic symptoms she follows with her rheumatologist   She states she was recently seen by her breast surgeon  Gold Toney discussed in detail   Questions answered     Discussed the importance of eating healthy foods, staying active and health screening tests   Patient is capable of self-care     Discussed precautions against coronavirus  1  Malignant neoplasm of lower-inner quadrant of left breast in female, estrogen receptor positive (HCC)    - CBC and differential; Future  - Comprehensive metabolic panel; Future  - Cancer antigen 27 29; Future    2  Use of tamoxifen (Nolvadex)      3  Osteoarthritis of multiple joints, unspecified osteoarthritis type-rheumatologist      4  Bilateral carpal tunnel syndrome      5  Thyroid nodule-endocrinologist                    Patient voiced understanding and agreement in the discussion  Counseling / Coordination of Care   Greater than 50% of total time was spent with the patient and / or family counseling and / or coordination of care

## 2020-07-11 DIAGNOSIS — Z20.822 COVID-19 RULED OUT: Primary | ICD-10-CM

## 2020-07-11 PROCEDURE — U0003 INFECTIOUS AGENT DETECTION BY NUCLEIC ACID (DNA OR RNA); SEVERE ACUTE RESPIRATORY SYNDROME CORONAVIRUS 2 (SARS-COV-2) (CORONAVIRUS DISEASE [COVID-19]), AMPLIFIED PROBE TECHNIQUE, MAKING USE OF HIGH THROUGHPUT TECHNOLOGIES AS DESCRIBED BY CMS-2020-01-R: HCPCS

## 2020-07-13 LAB
INPATIENT: NORMAL
SARS-COV-2 RNA SPEC QL NAA+PROBE: NOT DETECTED

## 2020-07-27 DIAGNOSIS — Z01.818 PRE-OP TESTING: ICD-10-CM

## 2020-07-27 DIAGNOSIS — Z20.822 COVID-19 RULED OUT: ICD-10-CM

## 2020-07-27 PROCEDURE — U0003 INFECTIOUS AGENT DETECTION BY NUCLEIC ACID (DNA OR RNA); SEVERE ACUTE RESPIRATORY SYNDROME CORONAVIRUS 2 (SARS-COV-2) (CORONAVIRUS DISEASE [COVID-19]), AMPLIFIED PROBE TECHNIQUE, MAKING USE OF HIGH THROUGHPUT TECHNOLOGIES AS DESCRIBED BY CMS-2020-01-R: HCPCS

## 2020-07-28 LAB — SARS-COV-2 RNA SPEC QL NAA+PROBE: NOT DETECTED

## 2020-07-31 NOTE — PRE-PROCEDURE INSTRUCTIONS
Pre-Surgery Instructions:   Medication Instructions    CALCIUM PO Instructed patient per Anesthesia Guidelines   Cholecalciferol (VITAMIN D PO) Instructed patient per Anesthesia Guidelines   Cyanocobalamin (VITAMIN B 12 PO) Instructed patient per Anesthesia Guidelines   Multiple Vitamins-Minerals (MULTIVITAL PO) Patient was instructed by Physician and understands   Omega-3 Fatty Acids (FISH OIL) 1,000 mg Patient was instructed by Physician and understands   pantoprazole (PROTONIX) 40 mg tablet Instructed patient per Anesthesia Guidelines   tamoxifen (NOLVADEX) 20 mg tablet Instructed patient per Anesthesia Guidelines   Turmeric 400 MG CAPS Patient was instructed by Physician and understands  Instructed to take pantoprazole am of surgery if needed  with sip ofw ater per anesthesia

## 2020-08-05 ENCOUNTER — ANESTHESIA EVENT (OUTPATIENT)
Dept: PERIOP | Facility: HOSPITAL | Age: 70
End: 2020-08-05
Payer: COMMERCIAL

## 2020-08-06 ENCOUNTER — HOSPITAL ENCOUNTER (OUTPATIENT)
Facility: HOSPITAL | Age: 70
Setting detail: OUTPATIENT SURGERY
Discharge: HOME | End: 2020-08-06
Attending: PLASTIC SURGERY | Admitting: PLASTIC SURGERY
Payer: COMMERCIAL

## 2020-08-06 ENCOUNTER — ANESTHESIA (OUTPATIENT)
Dept: PERIOP | Facility: HOSPITAL | Age: 70
End: 2020-08-06
Payer: COMMERCIAL

## 2020-08-06 VITALS
OXYGEN SATURATION: 95 % | WEIGHT: 190 LBS | HEIGHT: 65 IN | HEART RATE: 66 BPM | DIASTOLIC BLOOD PRESSURE: 71 MMHG | RESPIRATION RATE: 16 BRPM | TEMPERATURE: 97.1 F | BODY MASS INDEX: 31.65 KG/M2 | SYSTOLIC BLOOD PRESSURE: 111 MMHG

## 2020-08-06 PROBLEM — K21.9 GASTROESOPHAGEAL REFLUX DISEASE: Chronic | Status: ACTIVE | Noted: 2020-08-06

## 2020-08-06 PROCEDURE — 19380 REVJ RECONSTRUCTED BREAST: CPT | Performed by: PHYSICIAN ASSISTANT

## 2020-08-06 RX ORDER — ONDANSETRON 2 MG/ML
INJECTION INTRAMUSCULAR; INTRAVENOUS AS NEEDED
Status: DISCONTINUED | OUTPATIENT
Start: 2020-08-06 | End: 2020-08-06

## 2020-08-06 RX ORDER — FENTANYL CITRATE 50 UG/ML
INJECTION, SOLUTION INTRAMUSCULAR; INTRAVENOUS AS NEEDED
Status: DISCONTINUED | OUTPATIENT
Start: 2020-08-06 | End: 2020-08-06

## 2020-08-06 RX ORDER — MIDAZOLAM HYDROCHLORIDE 2 MG/2ML
INJECTION, SOLUTION INTRAMUSCULAR; INTRAVENOUS AS NEEDED
Status: DISCONTINUED | OUTPATIENT
Start: 2020-08-06 | End: 2020-08-06

## 2020-08-06 RX ORDER — PROPOFOL 10 MG/ML
INJECTION, EMULSION INTRAVENOUS AS NEEDED
Status: DISCONTINUED | OUTPATIENT
Start: 2020-08-06 | End: 2020-08-06

## 2020-08-06 RX ORDER — LIDOCAINE HYDROCHLORIDE 10 MG/ML
INJECTION, SOLUTION EPIDURAL; INFILTRATION; INTRACAUDAL; PERINEURAL AS NEEDED
Status: DISCONTINUED | OUTPATIENT
Start: 2020-08-06 | End: 2020-08-06

## 2020-08-06 RX ORDER — LIDOCAINE HYDROCHLORIDE AND EPINEPHRINE 10; 10 MG/ML; UG/ML
INJECTION, SOLUTION INFILTRATION; PERINEURAL AS NEEDED
Status: DISCONTINUED | OUTPATIENT
Start: 2020-08-06 | End: 2020-08-06 | Stop reason: HOSPADM

## 2020-08-06 RX ORDER — DEXAMETHASONE SODIUM PHOSPHATE 4 MG/ML
INJECTION, SOLUTION INTRA-ARTICULAR; INTRALESIONAL; INTRAMUSCULAR; INTRAVENOUS; SOFT TISSUE AS NEEDED
Status: DISCONTINUED | OUTPATIENT
Start: 2020-08-06 | End: 2020-08-06

## 2020-08-06 RX ORDER — CEFAZOLIN SODIUM 2 G/50ML
SOLUTION INTRAVENOUS AS NEEDED
Status: DISCONTINUED | OUTPATIENT
Start: 2020-08-06 | End: 2020-08-06

## 2020-08-06 RX ORDER — MAGNESIUM HYDROXIDE 1200 MG/15ML
LIQUID ORAL AS NEEDED
Status: DISCONTINUED | OUTPATIENT
Start: 2020-08-06 | End: 2020-08-06 | Stop reason: HOSPADM

## 2020-08-06 RX ORDER — ONDANSETRON 2 MG/ML
4 INJECTION INTRAMUSCULAR; INTRAVENOUS EVERY 8 HOURS PRN
Status: DISCONTINUED | OUTPATIENT
Start: 2020-08-06 | End: 2020-08-06 | Stop reason: HOSPADM

## 2020-08-06 RX ORDER — NEOSTIGMINE METHYLSULFATE 1 MG/ML
INJECTION INTRAVENOUS AS NEEDED
Status: DISCONTINUED | OUTPATIENT
Start: 2020-08-06 | End: 2020-08-06

## 2020-08-06 RX ORDER — FENTANYL CITRATE/PF 50 MCG/ML
50 SYRINGE (ML) INJECTION
Status: COMPLETED | OUTPATIENT
Start: 2020-08-06 | End: 2020-08-06

## 2020-08-06 RX ORDER — OXYCODONE HYDROCHLORIDE AND ACETAMINOPHEN 5; 325 MG/1; MG/1
2 TABLET ORAL EVERY 4 HOURS PRN
Status: DISCONTINUED | OUTPATIENT
Start: 2020-08-06 | End: 2020-08-06 | Stop reason: HOSPADM

## 2020-08-06 RX ORDER — GLYCOPYRROLATE 0.2 MG/ML
INJECTION INTRAMUSCULAR; INTRAVENOUS AS NEEDED
Status: DISCONTINUED | OUTPATIENT
Start: 2020-08-06 | End: 2020-08-06

## 2020-08-06 RX ORDER — SODIUM CHLORIDE 9 MG/ML
125 INJECTION, SOLUTION INTRAVENOUS CONTINUOUS
Status: DISCONTINUED | OUTPATIENT
Start: 2020-08-06 | End: 2020-08-06 | Stop reason: HOSPADM

## 2020-08-06 RX ORDER — ONDANSETRON 2 MG/ML
4 INJECTION INTRAMUSCULAR; INTRAVENOUS ONCE AS NEEDED
Status: DISCONTINUED | OUTPATIENT
Start: 2020-08-06 | End: 2020-08-06 | Stop reason: HOSPADM

## 2020-08-06 RX ORDER — CEFAZOLIN SODIUM 2 G/50ML
2000 SOLUTION INTRAVENOUS
Status: DISCONTINUED | OUTPATIENT
Start: 2020-08-06 | End: 2020-08-06 | Stop reason: HOSPADM

## 2020-08-06 RX ORDER — ROCURONIUM BROMIDE 10 MG/ML
INJECTION, SOLUTION INTRAVENOUS AS NEEDED
Status: DISCONTINUED | OUTPATIENT
Start: 2020-08-06 | End: 2020-08-06

## 2020-08-06 RX ADMIN — CEFAZOLIN SODIUM 2000 MG: 2 SOLUTION INTRAVENOUS at 15:51

## 2020-08-06 RX ADMIN — GLYCOPYRROLATE 0.4 MG: 0.2 INJECTION, SOLUTION INTRAMUSCULAR; INTRAVENOUS at 18:36

## 2020-08-06 RX ADMIN — PROPOFOL 200 MG: 10 INJECTION, EMULSION INTRAVENOUS at 16:19

## 2020-08-06 RX ADMIN — CEFAZOLIN SODIUM 2000 MG: 2 SOLUTION INTRAVENOUS at 16:00

## 2020-08-06 RX ADMIN — MIDAZOLAM 2 MG: 1 INJECTION INTRAMUSCULAR; INTRAVENOUS at 16:01

## 2020-08-06 RX ADMIN — FENTANYL CITRATE 25 MCG: 50 INJECTION, SOLUTION INTRAMUSCULAR; INTRAVENOUS at 18:29

## 2020-08-06 RX ADMIN — ROCURONIUM BROMIDE 50 MG: 10 INJECTION, SOLUTION INTRAVENOUS at 16:19

## 2020-08-06 RX ADMIN — DEXAMETHASONE SODIUM PHOSPHATE 4 MG: 4 INJECTION, SOLUTION INTRAMUSCULAR; INTRAVENOUS at 16:34

## 2020-08-06 RX ADMIN — FENTANYL CITRATE 50 MCG: 50 INJECTION, SOLUTION INTRAMUSCULAR; INTRAVENOUS at 19:17

## 2020-08-06 RX ADMIN — FENTANYL CITRATE 50 MCG: 50 INJECTION, SOLUTION INTRAMUSCULAR; INTRAVENOUS at 19:32

## 2020-08-06 RX ADMIN — FENTANYL CITRATE 50 MCG: 50 INJECTION, SOLUTION INTRAMUSCULAR; INTRAVENOUS at 19:02

## 2020-08-06 RX ADMIN — ONDANSETRON 4 MG: 2 INJECTION INTRAMUSCULAR; INTRAVENOUS at 16:35

## 2020-08-06 RX ADMIN — FENTANYL CITRATE 25 MCG: 50 INJECTION, SOLUTION INTRAMUSCULAR; INTRAVENOUS at 17:59

## 2020-08-06 RX ADMIN — SODIUM CHLORIDE 125 ML/HR: 0.9 INJECTION, SOLUTION INTRAVENOUS at 13:33

## 2020-08-06 RX ADMIN — FENTANYL CITRATE 50 MCG: 50 INJECTION, SOLUTION INTRAMUSCULAR; INTRAVENOUS at 19:11

## 2020-08-06 RX ADMIN — FENTANYL CITRATE 50 MCG: 50 INJECTION, SOLUTION INTRAMUSCULAR; INTRAVENOUS at 17:19

## 2020-08-06 RX ADMIN — FENTANYL CITRATE 100 MCG: 50 INJECTION, SOLUTION INTRAMUSCULAR; INTRAVENOUS at 16:19

## 2020-08-06 RX ADMIN — LIDOCAINE HYDROCHLORIDE 100 MG: 10 INJECTION, SOLUTION EPIDURAL; INFILTRATION; INTRACAUDAL; PERINEURAL at 16:19

## 2020-08-06 RX ADMIN — NEOSTIGMINE METHYLSULFATE 3 MG: 1 INJECTION, SOLUTION INTRAVENOUS at 18:36

## 2020-08-06 NOTE — DISCHARGE INSTRUCTIONS
1 [facility], [address], [address], [facility], [address] [facility] /B / [employer]       No strenuous activity 1 week    Apply gauze to incision sites daily    No ice or heating pack    Wear your bra for 3 weeks    Drainage from the incision sites is normal    Avoid aspirin/motrin/advil/alleve for 1 week    No smoking    It is ok to shower    Swelling and bruising is normal    Do no lay on your sides or stomach   Only sleep on your back    Call [phone] for an appointment in 10-14 days

## 2020-08-06 NOTE — OP NOTE
OPERATIVE REPORT  PATIENT NAME: Sharyle Garrison    :  1950  MRN: [id number]  Pt Location: [facility] OR ROOM 04    SURGERY DATE: 2020    Surgeon(s) and Role:     Melo Robert MD - Primary    Preop Diagnosis:  Personal history of malignant neoplasm of breast [Z85 3]    Post-Op Diagnosis Codes:     * Personal history of malignant neoplasm of breast [Z85 3]    Procedure(s) (LRB):  BREAST LATERAL STANDING EXCISION SKIN DEFORMITIES POST RECON  (Bilateral)  FAT GRAFTING (Bilateral)    Specimen(s):  * No specimens in log *    Estimated Blood Loss:   Minimal    Drains:  Closed/Suction Drain Right;Medial Breast 15 Fr  (Active)   Number of days: 545       Closed/Suction Drain Right;Lateral Breast 15 Fr  (Active)   Number of days: 545       Closed/Suction Drain Left;Medial Breast 15 Fr  (Active)   Number of days: 545       Closed/Suction Drain Left;Lateral Breast Bulb 15 Fr  (Active)   Number of days: 545       Anesthesia Type:   General    Operative Indications:  Personal history of malignant neoplasm of breast [Z85 3]      Operative Findings:      Complications:   None    Procedure and Technique:  The patient was brought to the operating room and placed supine on the    operating room table  A time-out procedure was performed  Sequential    compression devices were applied  Intravenous antibiotics were given     After adequate anesthesia was obtained, all appropriate pressure    precautions were taken, and a beanbag was used  The patient was placed    in a right side down decubitus position  Also, the patient had been    marked while standing in the holding area prior to surgery     An elliptical design was included to excise the standing skin    deformity at the left lateral breast  This did extend onto the lateral    chest  This elliptical design was incised using a #15 blade scalpel    and extended down to the lateral chest wall, as well as superficial to    the breast capsule  This was then amputated   Tumescent was infiltrated    into the lateral posterior aspect of the wound, and liposuction was    performed with a 4-Zambian, Mercedes-style cannula in order to avoid    the posterior standing skin deformity  This was    connected to the Revolve system, and the fat was prepared according to    protocol  The marked areas to be injected of both breasts were    injected with 1% lidocaine with epinephrine      Following this, undermining was    performed deep to Bea fascia using electrocautery  The Bea fascia was closed using 0    PDS suture in an interrupted technique  Attention was first turned to the left breast  Stab incisions were    made, and a 1-Z[place]ian fat grafting cannula was used to inject 200 mL in    a multithreading technique with feathering at the borders of the    injected areas  This was performed in the upper, medial and lateral    poles of the breast         The deep dermis was closed    using 2-0 Vicryl and 3-0 PDS suture in an interrupted deep dermal    technique  The superficial skin was closed using 3-0 Monocryl suture    in a running subcuticular technique  Skin glue was applied         The patient was placed    in a left side down decubitus position  Also, the patient had been    marked while standing in the holding area prior to surgery     An elliptical design was included to excise the standing skin    deformity at the right lateral breast  This did extend onto the lateral    chest  This elliptical design was incised using a #15 blade scalpel    and extended down to the lateral chest wall, as well as superficial to    the breast capsule  This was then amputated  Tumescent was infiltrated    into the lateral posterior aspect of the wound, and liposuction was    performed with a 4-[place]ian, Mercedes-style cannula in order to avoid    the posterior standing skin deformity  This was    connected to the Revolve system, and the fat was prepared according to    protocol   The marked areas to be injected of both breasts were    injected with 1% lidocaine with epinephrine   Following this, undermining was    performed deep to Bea fascia using electrocautery  The Bea fascia was closed using 0    PDS suture in an interrupted technique  Attention was first turned to the right breast  Stab incisions were    made, and a 1-Palestinian fat grafting cannula was used to inject 200 mL in    a multithreading technique with feathering at the borders of the    injected areas  This was performed in the upper, medial and lateral    poles of the breast     The deep dermis was closed    using 2-0 Vicryl and 3-0 PDS suture in an interrupted deep dermal    technique  The superficial skin was closed using 3-0 Monocryl suture    in a running subcuticular technique  Skin glue was applied        All the    wounds were cleaned and dried  Benzoin, Steri-Strips and skin glue    were applied as, well as Bio-patches to the drain sites  Sterile gauze    and a surgical bra were applied  The patient tolerated the procedure    well, was extubated in the operating room and taken to the recovery    room in stable condition        I was present for the entire procedure and A qualified resident physician was not available    Patient Disposition:  hemodynamically stable and extubated and stable    SIGNATURE: Mao Jorgensen MD  DATE: August 6, 2020  TIME: 4:20 PM

## 2020-08-06 NOTE — DISCHARGE SUMMARY
Discharge Summary - Medical Clarita Restrepo 81 y o  female MRN: [id number]    Ger 38 Room / Bed: OR POOL/OR POOL Encounter: [id number]    BRIEF OVERVIEW  Admitting Provider: Della Jimenez MD  Discharge Provider: Della Jimenez MD  Primary Care Physician at Discharge: Teodoro Tilley [phone]    Discharge To: Home      Admission Date: 8/6/2020     Discharge Date: No discharge date for patient encounter  Code Status: Prior  Advance Directive and Living Will: <no information>  Power of :        Primary Discharge Diagnosis  Active Problems:    * No active hospital problems  *  Resolved Problems:    * No resolved hospital problems   *        Discharge Disposition: [address] [place] [address]    Presenting Problem/History of Present Illness  <principal problem not specified>      Discharge Condition: stable    Patient tolerated the procedure well, recovered and was discharged home in stable condition    Della Jimenez MD  8/6/2020  4:18 PM

## 2020-08-06 NOTE — ANESTHESIA PREPROCEDURE EVALUATION
Procedure:  BREAST LATERAL STANDING EXCISION SKIN DEFORMITIES POST RECON  (Bilateral Breast)  FAT GRAFTING (Bilateral Breast)    Relevant Problems   CARDIO   (+) Atypical chest pain      GI/HEPATIC   (+) Excessive VIP secretion   (+) Gastroesophageal reflux disease      GYN   (+) Malignant neoplasm of lower-inner quadrant of left breast in female, estrogen receptor positive (HCC)      MUSCULOSKELETAL   (+) Arthritis   (+) Osteoarthritis of multiple joints        Physical Exam    Airway    Mallampati score: II         Dental   No notable dental hx     Cardiovascular  Rhythm: regular, Rate: normal, Cardiovascular exam normal    Pulmonary  Pulmonary exam normal Breath sounds clear to auscultation,     Other Findings        Anesthesia Plan  ASA Score- 2     Anesthesia Type- general with ASA Monitors  Additional Monitors:   Airway Plan: ETT  Plan Factors-    Chart reviewed  Patient summary reviewed  Induction- intravenous  Postoperative Plan- Plan for postoperative opioid use  Informed Consent- Anesthetic plan and risks discussed with patient

## 2020-10-14 ENCOUNTER — TELEPHONE (OUTPATIENT)
Dept: HEMATOLOGY ONCOLOGY | Facility: CLINIC | Age: 70
End: 2020-10-14

## 2020-11-11 ENCOUNTER — TELEPHONE (OUTPATIENT)
Dept: HEMATOLOGY ONCOLOGY | Facility: CLINIC | Age: 70
End: 2020-11-11

## 2020-11-12 ENCOUNTER — OFFICE VISIT (OUTPATIENT)
Dept: HEMATOLOGY ONCOLOGY | Facility: CLINIC | Age: 70
End: 2020-11-12
Payer: COMMERCIAL

## 2020-11-12 VITALS
TEMPERATURE: 97.7 F | DIASTOLIC BLOOD PRESSURE: 70 MMHG | OXYGEN SATURATION: 96 % | SYSTOLIC BLOOD PRESSURE: 118 MMHG | HEIGHT: 65 IN | HEART RATE: 70 BPM | WEIGHT: 189 LBS | BODY MASS INDEX: 31.49 KG/M2 | RESPIRATION RATE: 18 BRPM

## 2020-11-12 DIAGNOSIS — E04.1 THYROID NODULE: ICD-10-CM

## 2020-11-12 DIAGNOSIS — Z79.810 USE OF TAMOXIFEN (NOLVADEX): ICD-10-CM

## 2020-11-12 DIAGNOSIS — G56.03 BILATERAL CARPAL TUNNEL SYNDROME: ICD-10-CM

## 2020-11-12 DIAGNOSIS — C50.312 MALIGNANT NEOPLASM OF LOWER-INNER QUADRANT OF LEFT BREAST IN FEMALE, ESTROGEN RECEPTOR POSITIVE (HCC): Primary | ICD-10-CM

## 2020-11-12 DIAGNOSIS — Z17.0 MALIGNANT NEOPLASM OF LOWER-INNER QUADRANT OF LEFT BREAST IN FEMALE, ESTROGEN RECEPTOR POSITIVE (HCC): Primary | ICD-10-CM

## 2020-11-12 DIAGNOSIS — M15.9 OSTEOARTHRITIS OF MULTIPLE JOINTS, UNSPECIFIED OSTEOARTHRITIS TYPE: ICD-10-CM

## 2020-11-12 PROCEDURE — 99214 OFFICE O/P EST MOD 30 MIN: CPT | Performed by: INTERNAL MEDICINE

## 2020-12-15 ENCOUNTER — OFFICE VISIT (OUTPATIENT)
Dept: SURGICAL ONCOLOGY | Facility: CLINIC | Age: 70
End: 2020-12-15
Payer: COMMERCIAL

## 2020-12-15 VITALS
HEIGHT: 65 IN | WEIGHT: 189.4 LBS | DIASTOLIC BLOOD PRESSURE: 82 MMHG | BODY MASS INDEX: 31.56 KG/M2 | TEMPERATURE: 97.6 F | SYSTOLIC BLOOD PRESSURE: 120 MMHG | HEART RATE: 71 BPM

## 2020-12-15 DIAGNOSIS — Z79.810 USE OF TAMOXIFEN (NOLVADEX): ICD-10-CM

## 2020-12-15 DIAGNOSIS — Z17.0 MALIGNANT NEOPLASM OF LOWER-INNER QUADRANT OF LEFT BREAST IN FEMALE, ESTROGEN RECEPTOR POSITIVE (HCC): Primary | ICD-10-CM

## 2020-12-15 DIAGNOSIS — C50.312 MALIGNANT NEOPLASM OF LOWER-INNER QUADRANT OF LEFT BREAST IN FEMALE, ESTROGEN RECEPTOR POSITIVE (HCC): Primary | ICD-10-CM

## 2020-12-15 PROCEDURE — 99214 OFFICE O/P EST MOD 30 MIN: CPT | Performed by: SURGERY

## 2020-12-15 RX ORDER — TAMOXIFEN CITRATE 20 MG/1
20 TABLET ORAL 2 TIMES DAILY
COMMUNITY
End: 2021-05-13 | Stop reason: SDUPTHER

## 2020-12-22 ENCOUNTER — TRANSCRIBE ORDERS (OUTPATIENT)
Dept: LAB | Facility: HOSPITAL | Age: 70
End: 2020-12-22

## 2020-12-22 ENCOUNTER — LAB (OUTPATIENT)
Dept: LAB | Facility: HOSPITAL | Age: 70
End: 2020-12-22
Attending: INTERNAL MEDICINE
Payer: COMMERCIAL

## 2020-12-22 DIAGNOSIS — M25.00 HEMARTHROSIS, MULTIPLE SITES: ICD-10-CM

## 2020-12-22 DIAGNOSIS — M25.00 HEMARTHROSIS, MULTIPLE SITES: Primary | ICD-10-CM

## 2020-12-22 DIAGNOSIS — C50.312 MALIGNANT NEOPLASM OF LOWER-INNER QUADRANT OF LEFT BREAST IN FEMALE, ESTROGEN RECEPTOR POSITIVE (HCC): ICD-10-CM

## 2020-12-22 DIAGNOSIS — Z17.0 MALIGNANT NEOPLASM OF LOWER-INNER QUADRANT OF LEFT BREAST IN FEMALE, ESTROGEN RECEPTOR POSITIVE (HCC): ICD-10-CM

## 2020-12-22 LAB
ALBUMIN SERPL BCP-MCNC: 3.5 G/DL (ref 3.5–5)
ALP SERPL-CCNC: 59 U/L (ref 46–116)
ALT SERPL W P-5'-P-CCNC: 21 U/L (ref 12–78)
ANION GAP SERPL CALCULATED.3IONS-SCNC: 5 MMOL/L (ref 4–13)
AST SERPL W P-5'-P-CCNC: 16 U/L (ref 5–45)
BASOPHILS # BLD AUTO: 0.04 THOUSANDS/ΜL (ref 0–0.1)
BASOPHILS NFR BLD AUTO: 1 % (ref 0–1)
BILIRUB SERPL-MCNC: 0.36 MG/DL (ref 0.2–1)
BUN SERPL-MCNC: 13 MG/DL (ref 5–25)
CALCIUM SERPL-MCNC: 9.6 MG/DL (ref 8.3–10.1)
CANCER AG27-29 SERPL-ACNC: 17.4 U/ML (ref 0–42.3)
CHLORIDE SERPL-SCNC: 109 MMOL/L (ref 100–108)
CO2 SERPL-SCNC: 29 MMOL/L (ref 21–32)
CREAT SERPL-MCNC: 0.64 MG/DL (ref 0.6–1.3)
CRP SERPL QL: <3 MG/L
EOSINOPHIL # BLD AUTO: 0.22 THOUSAND/ΜL (ref 0–0.61)
EOSINOPHIL NFR BLD AUTO: 4 % (ref 0–6)
ERYTHROCYTE [DISTWIDTH] IN BLOOD BY AUTOMATED COUNT: 13.2 % (ref 11.6–15.1)
ERYTHROCYTE [SEDIMENTATION RATE] IN BLOOD: 16 MM/HOUR (ref 0–29)
GFR SERPL CREATININE-BSD FRML MDRD: 92 ML/MIN/1.73SQ M
GLUCOSE P FAST SERPL-MCNC: 86 MG/DL (ref 65–99)
HCT VFR BLD AUTO: 44.6 % (ref 34.8–46.1)
HGB BLD-MCNC: 14.1 G/DL (ref 11.5–15.4)
IMM GRANULOCYTES # BLD AUTO: 0 THOUSAND/UL (ref 0–0.2)
IMM GRANULOCYTES NFR BLD AUTO: 0 % (ref 0–2)
LYMPHOCYTES # BLD AUTO: 2.51 THOUSANDS/ΜL (ref 0.6–4.47)
LYMPHOCYTES NFR BLD AUTO: 44 % (ref 14–44)
MCH RBC QN AUTO: 28.1 PG (ref 26.8–34.3)
MCHC RBC AUTO-ENTMCNC: 31.6 G/DL (ref 31.4–37.4)
MCV RBC AUTO: 89 FL (ref 82–98)
MONOCYTES # BLD AUTO: 0.44 THOUSAND/ΜL (ref 0.17–1.22)
MONOCYTES NFR BLD AUTO: 8 % (ref 4–12)
NEUTROPHILS # BLD AUTO: 2.5 THOUSANDS/ΜL (ref 1.85–7.62)
NEUTS SEG NFR BLD AUTO: 43 % (ref 43–75)
NRBC BLD AUTO-RTO: 0 /100 WBCS
PLATELET # BLD AUTO: 185 THOUSANDS/UL (ref 149–390)
PMV BLD AUTO: 10.6 FL (ref 8.9–12.7)
POTASSIUM SERPL-SCNC: 4 MMOL/L (ref 3.5–5.3)
PROT SERPL-MCNC: 7.2 G/DL (ref 6.4–8.2)
RBC # BLD AUTO: 5.01 MILLION/UL (ref 3.81–5.12)
RHEUMATOID FACT SER QL LA: NEGATIVE
SODIUM SERPL-SCNC: 143 MMOL/L (ref 136–145)
WBC # BLD AUTO: 5.71 THOUSAND/UL (ref 4.31–10.16)

## 2020-12-22 PROCEDURE — 86430 RHEUMATOID FACTOR TEST QUAL: CPT

## 2020-12-22 PROCEDURE — 36415 COLL VENOUS BLD VENIPUNCTURE: CPT

## 2020-12-22 PROCEDURE — 86300 IMMUNOASSAY TUMOR CA 15-3: CPT

## 2020-12-22 PROCEDURE — 85025 COMPLETE CBC W/AUTO DIFF WBC: CPT

## 2020-12-22 PROCEDURE — 85652 RBC SED RATE AUTOMATED: CPT

## 2020-12-22 PROCEDURE — 86038 ANTINUCLEAR ANTIBODIES: CPT

## 2020-12-22 PROCEDURE — 86140 C-REACTIVE PROTEIN: CPT

## 2020-12-22 PROCEDURE — 86200 CCP ANTIBODY: CPT

## 2020-12-22 PROCEDURE — 80053 COMPREHEN METABOLIC PANEL: CPT

## 2020-12-23 LAB
CCP IGA+IGG SERPL IA-ACNC: 8 UNITS (ref 0–19)
RYE IGE QN: NEGATIVE

## 2020-12-27 ENCOUNTER — IMMUNIZATIONS (OUTPATIENT)
Dept: FAMILY MEDICINE CLINIC | Facility: HOSPITAL | Age: 70
End: 2020-12-27
Payer: COMMERCIAL

## 2020-12-27 DIAGNOSIS — Z23 ENCOUNTER FOR IMMUNIZATION: ICD-10-CM

## 2020-12-27 PROCEDURE — 0011A SARS-COV-2 / COVID-19 MRNA VACCINE (MODERNA) 100 MCG: CPT

## 2020-12-27 PROCEDURE — 91301 SARS-COV-2 / COVID-19 MRNA VACCINE (MODERNA) 100 MCG: CPT

## 2020-12-30 ENCOUNTER — TELEPHONE (OUTPATIENT)
Dept: INTERNAL MEDICINE CLINIC | Facility: CLINIC | Age: 70
End: 2020-12-30

## 2020-12-30 DIAGNOSIS — Z20.828 EXPOSURE TO SARS-ASSOCIATED CORONAVIRUS: Primary | ICD-10-CM

## 2021-01-04 DIAGNOSIS — Z20.828 EXPOSURE TO SARS-ASSOCIATED CORONAVIRUS: ICD-10-CM

## 2021-01-04 LAB — SARS-COV-2 RNA RESP QL NAA+PROBE: NEGATIVE

## 2021-01-04 PROCEDURE — 87635 SARS-COV-2 COVID-19 AMP PRB: CPT | Performed by: INTERNAL MEDICINE

## 2021-01-22 ENCOUNTER — IMMUNIZATIONS (OUTPATIENT)
Dept: FAMILY MEDICINE CLINIC | Facility: HOSPITAL | Age: 71
End: 2021-01-22

## 2021-01-22 DIAGNOSIS — Z23 ENCOUNTER FOR IMMUNIZATION: Primary | ICD-10-CM

## 2021-01-22 PROCEDURE — 91301 SARS-COV-2 / COVID-19 MRNA VACCINE (MODERNA) 100 MCG: CPT

## 2021-01-22 PROCEDURE — 0012A SARS-COV-2 / COVID-19 MRNA VACCINE (MODERNA) 100 MCG: CPT

## 2021-02-12 DIAGNOSIS — U07.1 COVID-19: Primary | ICD-10-CM

## 2021-02-13 ENCOUNTER — APPOINTMENT (OUTPATIENT)
Dept: LAB | Facility: HOSPITAL | Age: 71
End: 2021-02-13

## 2021-02-13 PROCEDURE — 87635 SARS-COV-2 COVID-19 AMP PRB: CPT

## 2021-02-14 LAB — SARS-COV-2 RNA RESP QL NAA+PROBE: NEGATIVE

## 2021-02-25 ENCOUNTER — LAB (OUTPATIENT)
Dept: LAB | Facility: HOSPITAL | Age: 71
End: 2021-02-25
Attending: INTERNAL MEDICINE
Payer: COMMERCIAL

## 2021-02-25 DIAGNOSIS — C50.312 MALIGNANT NEOPLASM OF LOWER-INNER QUADRANT OF LEFT BREAST IN FEMALE, ESTROGEN RECEPTOR POSITIVE (HCC): ICD-10-CM

## 2021-02-25 DIAGNOSIS — Z17.0 MALIGNANT NEOPLASM OF LOWER-INNER QUADRANT OF LEFT BREAST IN FEMALE, ESTROGEN RECEPTOR POSITIVE (HCC): ICD-10-CM

## 2021-02-25 LAB
ALBUMIN SERPL BCP-MCNC: 3.4 G/DL (ref 3.5–5)
ALP SERPL-CCNC: 56 U/L (ref 46–116)
ALT SERPL W P-5'-P-CCNC: 21 U/L (ref 12–78)
ANION GAP SERPL CALCULATED.3IONS-SCNC: 3 MMOL/L (ref 4–13)
AST SERPL W P-5'-P-CCNC: 18 U/L (ref 5–45)
BASOPHILS # BLD AUTO: 0.05 THOUSANDS/ΜL (ref 0–0.1)
BASOPHILS NFR BLD AUTO: 1 % (ref 0–1)
BILIRUB SERPL-MCNC: 0.41 MG/DL (ref 0.2–1)
BUN SERPL-MCNC: 15 MG/DL (ref 5–25)
CALCIUM ALBUM COR SERPL-MCNC: 9.7 MG/DL (ref 8.3–10.1)
CALCIUM SERPL-MCNC: 9.2 MG/DL (ref 8.3–10.1)
CANCER AG27-29 SERPL-ACNC: 22.2 U/ML (ref 0–42.3)
CHLORIDE SERPL-SCNC: 111 MMOL/L (ref 100–108)
CO2 SERPL-SCNC: 28 MMOL/L (ref 21–32)
CREAT SERPL-MCNC: 0.67 MG/DL (ref 0.6–1.3)
EOSINOPHIL # BLD AUTO: 0.27 THOUSAND/ΜL (ref 0–0.61)
EOSINOPHIL NFR BLD AUTO: 5 % (ref 0–6)
ERYTHROCYTE [DISTWIDTH] IN BLOOD BY AUTOMATED COUNT: 13.3 % (ref 11.6–15.1)
GFR SERPL CREATININE-BSD FRML MDRD: 91 ML/MIN/1.73SQ M
GLUCOSE P FAST SERPL-MCNC: 82 MG/DL (ref 65–99)
HCT VFR BLD AUTO: 43.7 % (ref 34.8–46.1)
HGB BLD-MCNC: 14.2 G/DL (ref 11.5–15.4)
IMM GRANULOCYTES # BLD AUTO: 0 THOUSAND/UL (ref 0–0.2)
IMM GRANULOCYTES NFR BLD AUTO: 0 % (ref 0–2)
LYMPHOCYTES # BLD AUTO: 2.47 THOUSANDS/ΜL (ref 0.6–4.47)
LYMPHOCYTES NFR BLD AUTO: 43 % (ref 14–44)
MCH RBC QN AUTO: 28.6 PG (ref 26.8–34.3)
MCHC RBC AUTO-ENTMCNC: 32.5 G/DL (ref 31.4–37.4)
MCV RBC AUTO: 88 FL (ref 82–98)
MONOCYTES # BLD AUTO: 0.46 THOUSAND/ΜL (ref 0.17–1.22)
MONOCYTES NFR BLD AUTO: 8 % (ref 4–12)
NEUTROPHILS # BLD AUTO: 2.42 THOUSANDS/ΜL (ref 1.85–7.62)
NEUTS SEG NFR BLD AUTO: 43 % (ref 43–75)
NRBC BLD AUTO-RTO: 0 /100 WBCS
PLATELET # BLD AUTO: 196 THOUSANDS/UL (ref 149–390)
PMV BLD AUTO: 9.9 FL (ref 8.9–12.7)
POTASSIUM SERPL-SCNC: 3.9 MMOL/L (ref 3.5–5.3)
PROT SERPL-MCNC: 7.1 G/DL (ref 6.4–8.2)
RBC # BLD AUTO: 4.97 MILLION/UL (ref 3.81–5.12)
SODIUM SERPL-SCNC: 142 MMOL/L (ref 136–145)
WBC # BLD AUTO: 5.67 THOUSAND/UL (ref 4.31–10.16)

## 2021-02-25 PROCEDURE — 80053 COMPREHEN METABOLIC PANEL: CPT

## 2021-02-25 PROCEDURE — 86300 IMMUNOASSAY TUMOR CA 15-3: CPT

## 2021-02-25 PROCEDURE — 85025 COMPLETE CBC W/AUTO DIFF WBC: CPT

## 2021-02-25 PROCEDURE — 36415 COLL VENOUS BLD VENIPUNCTURE: CPT

## 2021-03-03 ENCOUNTER — TELEPHONE (OUTPATIENT)
Dept: HEMATOLOGY ONCOLOGY | Facility: CLINIC | Age: 71
End: 2021-03-03

## 2021-03-03 NOTE — TELEPHONE ENCOUNTER
Patient has 2:40 pm f/u apt tomorrow Thursday 03/04/2021 w/Dr Feliciano Cervantes at the [facility] office  Called pt and left message to call the office to do covid pre screening questionnaire, [phone]

## 2021-03-04 ENCOUNTER — OFFICE VISIT (OUTPATIENT)
Dept: HEMATOLOGY ONCOLOGY | Facility: CLINIC | Age: 71
End: 2021-03-04
Payer: COMMERCIAL

## 2021-03-04 VITALS
RESPIRATION RATE: 18 BRPM | TEMPERATURE: 98.4 F | WEIGHT: 191 LBS | SYSTOLIC BLOOD PRESSURE: 124 MMHG | HEART RATE: 77 BPM | DIASTOLIC BLOOD PRESSURE: 80 MMHG | BODY MASS INDEX: 31.82 KG/M2 | HEIGHT: 65 IN | OXYGEN SATURATION: 98 %

## 2021-03-04 DIAGNOSIS — E04.1 THYROID NODULE: ICD-10-CM

## 2021-03-04 DIAGNOSIS — M81.8 OSTEOPOROSIS DUE TO AROMATASE INHIBITOR: Primary | ICD-10-CM

## 2021-03-04 DIAGNOSIS — Z17.0 MALIGNANT NEOPLASM OF LOWER-INNER QUADRANT OF LEFT BREAST IN FEMALE, ESTROGEN RECEPTOR POSITIVE (HCC): ICD-10-CM

## 2021-03-04 DIAGNOSIS — Z79.810 USE OF TAMOXIFEN (NOLVADEX): ICD-10-CM

## 2021-03-04 DIAGNOSIS — G62.0 DRUG-INDUCED POLYNEUROPATHY (HCC): ICD-10-CM

## 2021-03-04 DIAGNOSIS — M15.9 OSTEOARTHRITIS OF MULTIPLE JOINTS, UNSPECIFIED OSTEOARTHRITIS TYPE: ICD-10-CM

## 2021-03-04 DIAGNOSIS — C50.312 MALIGNANT NEOPLASM OF LOWER-INNER QUADRANT OF LEFT BREAST IN FEMALE, ESTROGEN RECEPTOR POSITIVE (HCC): ICD-10-CM

## 2021-03-04 DIAGNOSIS — T38.6X5A OSTEOPOROSIS DUE TO AROMATASE INHIBITOR: Primary | ICD-10-CM

## 2021-03-04 PROCEDURE — 99214 OFFICE O/P EST MOD 30 MIN: CPT | Performed by: INTERNAL MEDICINE

## 2021-03-04 NOTE — PROGRESS NOTES
HPI:    Follow-up visit for  hormone receptor-positive and HER2 negative stage I invasive left breast cancer and in January 2019 patient had bilateral mastectomies and left axillary lymph node sampling   Tumor was 1 2 cm, grade 3, strongly positive ER, positive [place], negative HER 2, positive lymphovascular invasion, negative 3 sentinel lymph nodes, and Oncotype score was 25   Patient had 4 cycles of adjuvant chemotherapy, combination of Taxotere and Cytoxan and Neulasta and that was followed by aromatase inhibitors and she had problems with all 3 aromatase inhibitors   Lot of musculoskeletal symptoms  Since May 2020 she has been on tamoxifen and has less musculoskeletal symptoms  She has tolerable hot flashes  Has some tiredness     She also has bilateral carpal tunnel problem but has not have surgeries for carpal for problem     She states she is not ready for carpal tunnel surgeries     She follows with her rheumatologist      There is family history of breast cancer    Patient states that she tested negative for BRCA                         Current Outpatient Medications:     CALCIUM PO, Take 1,200 mg by mouth daily, Disp: , Rfl:     Cholecalciferol (VITAMIN D PO), Take 1 25 mg by mouth every 30 (thirty) days, Disp: , Rfl:     COLLAGEN PO, Take by mouth Powder - mixes with tea daily, Disp: , Rfl:     Cyanocobalamin (VITAMIN B 12 PO), Take 500 mcg by mouth daily , Disp: , Rfl:     Multiple Vitamins-Minerals (MULTIVITAL PO), Take by mouth daily , Disp: , Rfl:     pantoprazole (PROTONIX) 40 mg tablet, Take 40 mg by mouth daily as needed , Disp: , Rfl:     tamoxifen (NOLVADEX) 20 mg tablet, Take 1 tablet (20 mg total) by mouth daily, Disp: 30 tablet, Rfl: 11    Turmeric 400 MG CAPS, Take 1 capsule by mouth daily, Disp: , Rfl:     tamoxifen (NOLVADEX) 20 mg tablet, Take 20 mg by mouth 2 (two) times a day, Disp: , Rfl:     Allergies   Allergen Reactions    Latex Anaphylaxis    Shellfish Allergy Anaphylaxis  Shellfish-Derived Products Anaphylaxis       Oncology History   Malignant neoplasm of lower-inner quadrant of left breast in female, estrogen receptor positive ([facility] [address] )   1/2/2019 Initial Diagnosis    Malignant neoplasm of lower-inner quadrant of left breast in female, estrogen receptor positive [facility])      Chemotherapy    April 2019:  Adjuvant  Taxotere and Cytoxan once every 3 weeks for 4 cycles followed by aromatase inhibitor     4/11/2019 - 7/3/2019 Chemotherapy    pegfilgrastim (NEULASTA) subcutaneous injection 6 mg, 6 mg, Subcutaneous, Once, 4 of 4 cycles  Dose modification: 4 mg (original dose 6 mg, Cycle 3)  Administration: 4 mg (5/24/2019), 4 mg (6/14/2019)  cyclophosphamide (CYTOXAN) 1,176 mg in sodium chloride 0 9 % 250 mL IVPB, 600 mg/m2 = 1,176 mg, Intravenous, Once, 4 of 4 cycles  Administration: 1,176 mg (5/23/2019), 1,176 mg (6/13/2019)  DOCEtaxel (TAXOTERE) 147 mg in sodium chloride 0 9 % 250 mL chemo infusion, 75 mg/m2 = 147 mg, Intravenous, Once, 4 of 4 cycles  Administration: 147 mg (5/23/2019), 147 mg (6/13/2019)         ROS:  03/04/21 Reviewed 12 systems: See symptoms in HPI:   Presently no other neurological, cardiac, pulmonary, GI and  symptoms other than listed in HPI  No  fever, chills, bleeding, bone pains, skin rash, weight loss,  weakness, numbness,  claudication and gait problem  No frequent infections  Not unusually sensitive to heat or cold  No swelling of the ankles  No swollen glands  Patient is not anxious         /80 (BP Location: Left arm, Patient Position: Sitting, Cuff Size: Adult)   Pulse 77   Temp 98 4 °F (36 9 °C)   Resp 18   Ht 5' 5" (1 651 m)   Wt 86 6 kg (191 lb)   SpO2 98%   BMI 31 78 kg/m²     Physical Exam:  Our medical assistant Rubi was in the room with me during examination  Alert, oriented, not in distress, stable vital signs, no icterus, no oral thrush, no palpable neck mass, clear lung fields, regular heart rate, abdomen  soft and non tender, no palpable abdominal mass, no ascites, no edema of ankles, no calf tenderness, no focal neurological deficit, no skin rash, no palpable lymphadenopathy in the neck and axillary areas,  no clubbing  Patient is anxious    Performance status 1   No lymphedema    IMAGING:      LABS:    Results for orders placed or performed in visit on 02/25/21   CBC and differential   Result Value Ref Range    WBC 5 67 4 31 - 10 16 Thousand/uL    RBC 4 97 3 81 - 5 12 Million/uL    Hemoglobin 14 2 11 5 - 15 4 g/dL    Hematocrit 43 7 34 8 - 46 1 %    MCV 88 82 - 98 fL    MCH 28 6 26 8 - 34 3 pg    MCHC 32 5 31 4 - 37 4 g/dL    RDW 13 3 11 6 - 15 1 %    MPV 9 9 8 9 - 12 7 fL    Platelets 641 985 - 392 Thousands/uL    nRBC 0 /100 WBCs    Neutrophils Relative 43 43 - 75 %    Immat GRANS % 0 0 - 2 %    Lymphocytes Relative 43 14 - 44 %    Monocytes Relative 8 4 - 12 %    Eosinophils Relative 5 0 - 6 %    Basophils Relative 1 0 - 1 %    Neutrophils Absolute 2 42 1 85 - 7 62 Thousands/µL    Immature Grans Absolute 0 00 0 00 - 0 20 Thousand/uL    Lymphocytes Absolute 2 47 0 60 - 4 47 Thousands/µL    Monocytes Absolute 0 46 0 17 - 1 22 Thousand/µL    Eosinophils Absolute 0 27 0 00 - 0 61 Thousand/µL    Basophils Absolute 0 05 0 00 - 0 10 Thousands/µL   Comprehensive metabolic panel   Result Value Ref Range    Sodium 142 136 - 145 mmol/L    Potassium 3 9 3 5 - 5 3 mmol/L    Chloride 111 (H) 100 - 108 mmol/L    CO2 28 21 - 32 mmol/L    ANION GAP 3 (L) 4 - 13 mmol/L    BUN 15 5 - 25 mg/dL    Creatinine 0 67 0 60 - 1 30 mg/dL    Glucose, Fasting 82 65 - 99 mg/dL    Calcium 9 2 8 3 - 10 1 mg/dL    Corrected Calcium 9 7 8 3 - 10 1 mg/dL    AST 18 5 - 45 U/L    ALT 21 12 - 78 U/L    Alkaline Phosphatase 56 46 - 116 U/L    Total Protein 7 1 6 4 - 8 2 g/dL    Albumin 3 4 (L) 3 5 - 5 0 g/dL    Total Bilirubin 0 41 0 20 - 1 00 mg/dL    eGFR 91 ml/min/1 73sq m   Cancer antigen 27 29   Result Value Ref Range    CA 27 29 22 2 0 0 - 42 3 U/mL     Labs, Imaging, & Other studies:   All pertinent labs and imaging studies were personally reviewed    Lab Results   Component Value Date     07/10/2014    K 3 9 02/25/2021     (H) 02/25/2021    CO2 28 02/25/2021    ANIONGAP 5 07/10/2014    BUN 15 02/25/2021    CREATININE 0 67 02/25/2021    GLUCOSE 93 07/10/2014    GLUF 82 02/25/2021    CALCIUM 9 2 02/25/2021    CORRECTEDCA 9 7 02/25/2021    AST 18 02/25/2021    ALT 21 02/25/2021    ALKPHOS 56 02/25/2021    PROT 7 3 07/10/2014    BILITOT 0 54 07/10/2014    EGFR 91 02/25/2021     Lab Results   Component Value Date    WBC 5 67 02/25/2021    HGB 14 2 02/25/2021    HCT 43 7 02/25/2021    MCV 88 02/25/2021     02/25/2021       Reviewed CBC, CMP and CA 27-29 and discussed with patient  Assessment and plan: Follow-up visit for  hormone receptor-positive and HER2 negative stage I invasive left breast cancer and in January 2019 patient had bilateral mastectomies and left axillary lymph node sampling   Tumor was 1 2 cm, grade 3, strongly positive ER, positive [place], negative HER 2, positive lymphovascular invasion, negative 3 sentinel lymph nodes, and Oncotype score was 25   Patient had 4 cycles of adjuvant chemotherapy, combination of Taxotere and Cytoxan and Neulasta and that was followed by aromatase inhibitors and she had problems with all 3 aromatase inhibitors   Lot of musculoskeletal symptoms  Since May 2020 she has been on tamoxifen and has less musculoskeletal symptoms  She has tolerable hot flashes  Has some tiredness     She also has bilateral carpal tunnel problem but has not have surgeries for carpal for problem     She states she is not ready for carpal tunnel surgeries     She follows with her rheumatologist      There is family history of breast cancer  Patient states that she tested negative for BRCA        Physical examination and test results are as recorded and discussed  Reviewed CBC, CMP and CA 27-29 and discussed with patient     Patient will stay tamoxifen     Ordered DEXA scan  Patient is taking vitamin-D and calcium and is staying active   For thyroid nodule she goes to her endocrinologist       For arthritic symptoms she follows with her rheumatologist   She follows with her breast surgeon for examination  Aura Do discussed in detail   Questions answered     Discussed the importance of eating healthy foods, staying active and health screening tests   Patient is capable of self-care     Discussed precautions against coronavirus  1  Malignant neoplasm of lower-inner quadrant of left breast in female, estrogen receptor positive ([facility] [address] )    - CBC and differential; Future  - Comprehensive metabolic panel; Future  - Cancer antigen 27 29; Future  - DXA bone density spine hip and pelvis; Future    2  Use of tamoxifen (Nolvadex)      3  Osteoarthritis of multiple joints, unspecified osteoarthritis type      4  Drug-induced polyneuropathy ([facility] [address] )      5  Thyroid nodule      6  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

## 2021-03-05 ENCOUNTER — OFFICE VISIT (OUTPATIENT)
Dept: ENDOCRINOLOGY | Facility: CLINIC | Age: 71
End: 2021-03-05
Payer: COMMERCIAL

## 2021-03-05 VITALS
WEIGHT: 194.2 LBS | BODY MASS INDEX: 32.36 KG/M2 | HEIGHT: 65 IN | HEART RATE: 72 BPM | SYSTOLIC BLOOD PRESSURE: 114 MMHG | DIASTOLIC BLOOD PRESSURE: 76 MMHG

## 2021-03-05 DIAGNOSIS — R73.01 IMPAIRED FASTING BLOOD SUGAR: ICD-10-CM

## 2021-03-05 DIAGNOSIS — E55.9 VITAMIN D DEFICIENCY: ICD-10-CM

## 2021-03-05 DIAGNOSIS — M85.80 OSTEOPENIA, UNSPECIFIED LOCATION: ICD-10-CM

## 2021-03-05 DIAGNOSIS — E04.1 THYROID NODULE: Primary | ICD-10-CM

## 2021-03-05 PROCEDURE — 99214 OFFICE O/P EST MOD 30 MIN: CPT | Performed by: INTERNAL MEDICINE

## 2021-03-05 NOTE — PROGRESS NOTES
Rufus Michaud 78 y o  female MRN: [id number]    Encounter: [id number]      Assessment/Plan     Assessment: This is a 78 y o  female with thyroid nodules, vitamin-D deficiency, osteopenia, impaired fasting blood sugar  Plan:    Diagnoses and all orders for this visit:    Thyroid nodule-  Stable, no more thyroid ultrasounds required to follow-up  -     TSH, 3rd generation Lab Collect; Future  -     T4, free Lab Collect; Future    Vitamin D deficiency -  Continue vitamin-D supplementation    Impaired fasting blood sugar -  Stable continue to monitor fasting blood sugars and A1c  -  Managed per primary care physician    Osteopenia, unspecified location - she is scheduled for DXA scan  - I will get the results, if osteoporosis I will call pt to schedule follow up appointment, otherwise no need to schedule follow up appointment  -  Continue current calcium and vitamin-D supplementation    CC: thyroid nodules, vitamin-D deficiency, osteopenia      History of Present Illness     HPI:  74-year-old female with thyroid nodules and vitamin-D deficiency here for follow-up  In 2018 - she was diagnosed with breast cancer - s/p surgery and chemo, currently on tamoxifen  Thyroid nodule initially discovered in 2016 and have remained stable on serial imaging  She had fine-needle aspiration biopsy in January 2017 with benign pathology  No obstructive symptoms  She had 5 lb weight gain since 12/2020  She has dry skin otherwise denies symptoms of hypothyroidism or hyperthyroidism  Last DXA scan in 12/2018 - that showed ostopenia  She takes daily 500 mg of calcium, she eats daily cheese, yogurt  She had hysterectomy at age 41  No recent falls or fractures  Sometimes she feels dizzy, but no recent falls  Regarding vitamin-D deficiency she takes 50,000 units of Vitamin D2 monthly  Review of Systems   Constitutional: Negative for chills, fatigue and fever  HENT: Negative for congestion, postnasal drip and sore throat  Eyes: Negative for pain  Respiratory: Negative for cough and shortness of breath  Cardiovascular: Negative for chest pain, palpitations and leg swelling  Gastrointestinal: Negative for abdominal pain, blood in stool, constipation, diarrhea and nausea  Endocrine: Negative for polydipsia and polyuria  Genitourinary: Negative for dysuria  Musculoskeletal: Negative for arthralgias and back pain  Neurological: Negative for dizziness, light-headedness and headaches  Psychiatric/Behavioral: Negative for behavioral problems  The patient is not nervous/anxious  All other systems reviewed and are negative  Historical Information   Past Medical History:   Diagnosis Date    Arthritis     Carpal tunnel syndrome on right     Chemotherapy induced neutropenia ([facility] [address] ) 5/23/2019    Gastroesophageal reflux disease 8/6/2020    GERD (gastroesophageal reflux disease)     Trigger thumb     right    Vitamin D deficiency     Wears glasses      Past Surgical History:   Procedure Laterality Date    BREAST BIOPSY Left 01/02/2019    invasive ductal carcinoma    COLONOSCOPY      HYSTERECTOMY      age 41    LIPOSUCTION W/ FAT INJECTION Bilateral 8/6/2020    Procedure: FAT GRAFTING;  Surgeon: Solo Fitch MD;  [facility];  Service: Plastics    OOPHORECTOMY Bilateral     age 41    ND BIOPSY/EXCISION, LYMPH NODE(S) Left 2/8/2019    Procedure: BIOPSY LYMPH NODE SENTINEL;  Surgeon: Khalif Amaral MD;  [facility];  Service: Surgical Oncology    ND EDG US EXAM SURGICAL ALTER STOM DUODENUM/JEJUNUM N/A 2/2/2017    Procedure: RADIAL ENDOSCOPIC U/S;  Surgeon: Chrissie Corcoran MD;  [facility];   Service: Gastroenterology    ND IMPLNT BIO IMPLNT FOR SOFT TISSUE REINFORCEMENT Bilateral 2/8/2019    Procedure: RECONSTRUCTION BREAST W/ IMPLANT;  Surgeon: Solo Fitch MD;  [facility];  Service: Plastics    ND INSJ/RPLCMT BREAST IMPLANT SEP DAY MASTECTOMY Bilateral 9/27/2019    Procedure: Kassandra Proper EXPANDER EXCHANGE;  Surgeon: Jermaine Abraham MD;  [facility];  Service: Plastics    DC MASTECTOMY, SIMPLE, COMPLETE N/A 2/8/2019    Procedure: LEFT breast mastectomy; RIGHT breast prophylactic mastectomy;  Surgeon: Patrice Prince MD;  [facility];  Service: Surgical Oncology    DC PART EXCIS PLANTAR FASCIA Right 10/20/2016    Procedure:  OPEN RELEASE FASCIA PLANTAR ,CUTTING BOTTOM OF ARCH,INSTEP ;  Surgeon: Loulou Parker DPM;  [facility];  Service: Podiatry    DC ZUNILDA-IMPLANT CAPSULECTOMY BREAST COMPLETE Bilateral 9/27/2019    Procedure: CAPSULECTOMY;  Surgeon: Jermaine Abraham MD;  [facility];  Service: Plastics    DC REVISION OF RECONSTRUCTED BREAST Bilateral 8/6/2020    Procedure: BREAST LATERAL STANDING EXCISION SKIN DEFORMITIES POST RECON ;  Surgeon: Jermaine Abraham MD;  [facility];  Service: Plastics    DC TISSUE EXPANDER PLACEMENT BREAST RECONSTRUCTION Bilateral 2/8/2019    Procedure: INSERTION/PLACEMENT TISSUE EXPANDER (EXCHANGE);   Surgeon: Jermaine Abraham MD;  [facility];  Service: Plastics    THUMB ARTHROSCOPY      US GUIDED BREAST BIOPSY LEFT COMPLETE Left 1/2/2019     Social History   Social History     Substance and Sexual Activity   Alcohol Use Yes    Alcohol/week: 1 0 standard drinks    Types: 1 Glasses of wine per week    Frequency: Monthly or less    Drinks per session: 1 or 2    Binge frequency: Less than monthly    Comment: 3 x yearly     Social History     Substance and Sexual Activity   Drug Use No     Social History     Tobacco Use   Smoking Status Never Smoker   Smokeless Tobacco Never Used     Family History:   Family History   Problem Relation Age of Onset    Brain cancer Mother 67    Prostate cancer Father 66    Breast cancer Sister 54    Breast cancer additional onset Sister 72    Breast cancer Sister 72    Prostate cancer Brother 62       Meds/Allergies   Current Outpatient Medications   Medication Sig Dispense Refill    CALCIUM PO Take 1,200 mg by mouth daily      Cholecalciferol (VITAMIN D PO) Take 1 25 mg by mouth every 30 (thirty) days      COLLAGEN PO Take by mouth Powder - mixes with tea daily      Cyanocobalamin (VITAMIN B 12 PO) Take 500 mcg by mouth daily       Multiple Vitamins-Minerals (MULTIVITAL PO) Take by mouth daily       tamoxifen (NOLVADEX) 20 mg tablet Take 1 tablet (20 mg total) by mouth daily 30 tablet 11    Turmeric 400 MG CAPS Take 1 capsule by mouth daily      pantoprazole (PROTONIX) 40 mg tablet Take 40 mg by mouth daily as needed       tamoxifen (NOLVADEX) 20 mg tablet Take 20 mg by mouth 2 (two) times a day       No current facility-administered medications for this visit  Allergies   Allergen Reactions    Latex Anaphylaxis    Shellfish Allergy Anaphylaxis    Shellfish-Derived Products Anaphylaxis       Objective   Vitals: Blood pressure 114/76, pulse 72, height 5' 5" (1 651 m), weight 88 1 kg (194 lb 3 2 oz), not currently breastfeeding  Physical Exam  Constitutional:       Appearance: She is well-developed  HENT:      Head: Normocephalic and atraumatic  Eyes:      General: No scleral icterus  Right eye: No discharge  Left eye: No discharge  Conjunctiva/sclera: Conjunctivae normal       Pupils: Pupils are equal, round, and reactive to light  Neck:      Musculoskeletal: Neck supple  Thyroid: No thyromegaly  Cardiovascular:      Rate and Rhythm: Normal rate and regular rhythm  Heart sounds: No murmur  Pulmonary:      Effort: Pulmonary effort is normal  No respiratory distress  Breath sounds: Normal breath sounds  Abdominal:      General: There is no distension  Palpations: Abdomen is soft  Tenderness: There is no abdominal tenderness  Skin:     General: Skin is warm and dry  Neurological:      Mental Status: She is alert     Psychiatric:         Mood and Affect: Mood normal          The history was obtained from the review of the chart, patient  Lab Results:        Imaging Studies:   Results for orders placed during the hospital encounter of 02/03/20   US thyroid    Impression 1  Multinodular thyroid gland  No nodule meets current ACR criteria for requiring biopsy or followup ultrasounds  Reference: ACR Thyroid Imaging, Reporting and Data System (TI-RADS): White Paper of the [employer]  J AM Galdino Radiol 5650;[id number]  (additional recommendations based on American Thyroid Association 2015 guidelines )      Workstation performed: [id number]         I have personally reviewed pertinent reports  Portions of the record may have been created with voice recognition software  Occasional wrong word or "sound a like" substitutions may have occurred due to the inherent limitations of voice recognition software  Read the chart carefully and recognize, using context, where substitutions have occurred

## 2021-03-08 DIAGNOSIS — E55.9 VITAMIN D DEFICIENCY: Primary | ICD-10-CM

## 2021-03-08 RX ORDER — ERGOCALCIFEROL 1.25 MG/1
CAPSULE ORAL
Qty: 3 CAPSULE | Refills: 0 | Status: SHIPPED | OUTPATIENT
Start: 2021-03-08 | End: 2021-03-11

## 2021-03-11 DIAGNOSIS — E55.9 VITAMIN D DEFICIENCY: ICD-10-CM

## 2021-03-11 RX ORDER — ERGOCALCIFEROL 1.25 MG/1
CAPSULE ORAL
Qty: 3 CAPSULE | Refills: 3 | Status: SHIPPED | OUTPATIENT
Start: 2021-03-11 | End: 2022-05-05 | Stop reason: SDUPTHER

## 2021-04-01 ENCOUNTER — OFFICE VISIT (OUTPATIENT)
Dept: URGENT CARE | Age: 71
End: 2021-04-01
Payer: COMMERCIAL

## 2021-04-01 ENCOUNTER — TELEPHONE (OUTPATIENT)
Dept: OTHER | Facility: OTHER | Age: 71
End: 2021-04-01

## 2021-04-01 VITALS — RESPIRATION RATE: 20 BRPM | OXYGEN SATURATION: 96 % | HEART RATE: 82 BPM | TEMPERATURE: 99.5 F

## 2021-04-01 DIAGNOSIS — J02.9 SORE THROAT: ICD-10-CM

## 2021-04-01 DIAGNOSIS — J06.9 ACUTE URI: ICD-10-CM

## 2021-04-01 DIAGNOSIS — Z11.59 SPECIAL SCREENING EXAMINATION FOR VIRAL DISEASE: Primary | ICD-10-CM

## 2021-04-01 PROCEDURE — U0003 INFECTIOUS AGENT DETECTION BY NUCLEIC ACID (DNA OR RNA); SEVERE ACUTE RESPIRATORY SYNDROME CORONAVIRUS 2 (SARS-COV-2) (CORONAVIRUS DISEASE [COVID-19]), AMPLIFIED PROBE TECHNIQUE, MAKING USE OF HIGH THROUGHPUT TECHNOLOGIES AS DESCRIBED BY CMS-2020-01-R: HCPCS | Performed by: NURSE PRACTITIONER

## 2021-04-01 PROCEDURE — U0005 INFEC AGEN DETEC AMPLI PROBE: HCPCS | Performed by: NURSE PRACTITIONER

## 2021-04-01 PROCEDURE — G0382 LEV 3 HOSP TYPE B ED VISIT: HCPCS | Performed by: NURSE PRACTITIONER

## 2021-04-01 RX ORDER — AZITHROMYCIN 250 MG/1
TABLET, FILM COATED ORAL
Qty: 6 TABLET | Refills: 0 | Status: SHIPPED | OUTPATIENT
Start: 2021-04-01 | End: 2021-04-05

## 2021-04-01 NOTE — TELEPHONE ENCOUNTER
Patient wants to make an appointment for today  She said she will call back when the office is open

## 2021-04-01 NOTE — PATIENT INSTRUCTIONS
Follow up with pcp   Take meds as directed   Increase fluids     Take zyrtec or allegra daily   Use flonase as directed  If worse go to the ER   Rest     Take cough medicine over the counter as discussed    Follow up with PCP  Take meds as directed   Tylenol for fever and pain   Follow CDC guidelines daily at [facility]    Stay home until your labs are resulted and if negative and fever free may return to work    If symptoms worsen go to the ER    Isolate for 10 days from the onset of your symptoms if covid results are positive, you will NOT get a call from the office if your [employer] dakot[employer] was reviewed  Please download the [employer] dakotah for your results  If your results are negative, you will not get a phone call, please download your [employer] dakotah  For your results    Download the [employer] dakotah as directed, directions given to pt at time of exam    Recommend taking VD3 2000IU daily and Vitamin C 1000mg every 12 hours     Multivitamin daily    Take zyrtec, allegra, or Claritin daily  Use flonase 1-2 sprays in each nare daily   Use nasal saline to the nose,   Use humidifer in room  Symptoms worsen go to ER  Rest      Continue to social distance, wash your hands, and wear your masks  Please continue to follow the CDC  gov guidelines daily for they are subject to change on COVID-19      COVID-19 Home Care Guidelines    Your healthcare provider and/or public health staff have evaluated you and have determined that you do not need to remain in the hospital at this time  At this time you can be isolated at home where you will be monitored by staff from your local or state health department  You should carefully follow the prevention and isolation steps below until a healthcare provider or local or state health department says that you can return to your normal activities  Stay home except to get medical care    People who are mildly ill with COVID-19 are able to isolate at home during their illness   You should restrict activities outside your home, except for getting medical care  Do not go to work, school, or public areas  Avoid using public transportation, ride-sharing, or taxis  Separate yourself from other people and animals in your home    People: As much as possible, you should stay in a specific room and away from other people in your home  Also, you should use a separate bathroom, if available  Animals: You should restrict contact with pets and other animals while you are sick with COVID-19, just like you would around other people  Although there have not been reports of pets or other animals becoming sick with COVID-19, it is still recommended that people sick with COVID-19 limit contact with animals until more information is known about the virus  When possible, have another member of your household care for your animals while you are sick  If you are sick with COVID-19, avoid contact with your pet, including petting, snuggling, being kissed or licked, and sharing food  If you must care for your pet or be around animals while you are sick, wash your hands before and after you interact with pets and wear a facemask  See COVID-19 and Animals for more information  Call ahead before visiting your doctor    If you have a medical appointment, call the healthcare provider and tell them that you have or may have COVID-19  This will help the healthcare providers office take steps to keep other people from getting infected or exposed  Wear a facemask    You should wear a facemask when you are around other people (e g , sharing a room or vehicle) or pets and before you enter a healthcare providers office  If you are not able to wear a facemask (for example, because it causes trouble breathing), then people who live with you should not stay in the same room with you, or they should wear a facemask if they enter your room      Cover your coughs and sneezes    Cover your mouth and nose with a tissue when you cough or sneeze  Throw used tissues in a lined trash can  Immediately wash your hands with soap and water for at least 20 seconds or, if soap and water are not available, clean your hands with an alcohol-based hand  that contains at least 60% alcohol  Clean your hands often    Wash your hands often with soap and water for at least 20 seconds, especially after blowing your nose, coughing, or sneezing; going to the bathroom; and before eating or preparing food  If soap and water are not readily available, use an alcohol-based hand  with at least 60% alcohol, covering all surfaces of your hands and rubbing them together until they feel dry  Soap and water are the best option if hands are visibly dirty  Avoid touching your eyes, nose, and mouth with unwashed hands  Avoid sharing personal household items    You should not share dishes, drinking glasses, cups, eating utensils, towels, or bedding with other people or pets in your home  After using these items, they should be washed thoroughly with soap and water  Clean all high-touch surfaces everyday    High touch surfaces include counters, tabletops, doorknobs, bathroom fixtures, toilets, phones, keyboards, tablets, and bedside tables  Also, clean any surfaces that may have blood, stool, or body fluids on them  Use a household cleaning spray or wipe, according to the label instructions  Labels contain instructions for safe and effective use of the cleaning product including precautions you should take when applying the product, such as wearing gloves and making sure you have good ventilation during use of the product  Monitor your symptoms    Seek prompt medical attention if your illness is worsening (e g , difficulty breathing)  Before seeking care, call your healthcare provider and tell them that you have, or are being evaluated for, COVID-19  Put on a facemask before you enter the facility   These steps will help the healthcare providers office to keep other people in the office or waiting room from getting infected or exposed  Ask your healthcare provider to call the local or state health department  Persons who are placed under active monitoring or facilitated self-monitoring should follow instructions provided by their local health department or occupational health professionals, as appropriate  If you have a medical emergency and need to call 911, notify the dispatch personnel that you have, or are being evaluated for COVID-19  If possible, put on a facemask before emergency medical services arrive  Discontinuing home isolation    Patients with confirmed COVID-19 should remain under home isolation precautions until the following conditions are met:   - They have had no fever for at least 24 hours (that is one full day of no fever without the use medicine that reduces fevers)  AND  - other symptoms have improved (for example, when their cough or shortness of breath have improved)  AND  - If had mild or moderate illness, at least 10 days have passed since their symptoms first appeared or if severe illness (needed oxygen) or immunosuppressed, at least 20 days have passed since symptoms first appeared  Patients with confirmed COVID-19 should also notify close contacts (including their workplace) and ask that they self-quarantine  Currently, close contact is defined as being within 6 feet for 15 minutes or more from the period 24 hours starting 48 hours before symptom onset to the time at which the patient went into isolation  Close contacts of patients diagnosed with COVID-19 should be instructed by the patient to self-quarantine for 14 days from the last time of their last contact with the patient       Source: [employer]

## 2021-04-01 NOTE — LETTER
April 1, 2021     Patient: Samantha Newell   YOB: 1950   Date of Visit: 4/1/2021       To Whom It May Concern: It is my medical opinion that Samantha Newell should remain out of work until labs result negative           Sincerely,        EnBELLA Orourke    CC: No Recipients

## 2021-04-01 NOTE — PROGRESS NOTES
NAME: Clarita Restrepo is a 78 y o  female  : 1950    MRN: [id number]    Pulse 82   Temp 99 5 °F (37 5 °C)   Resp 20   SpO2 96%     Assessment and Plan   Special screening examination for viral disease [Z11 59]  1  Special screening examination for viral disease  Novel Coronavirus (Covid-19),PCR [facility] - Office Collection   2  Sore throat  CANCELED: POCT rapid strepA   3  Acute URI  azithromycin (ZITHROMAX) 250 mg tablet       Martita was seen today for covid-19  Diagnoses and all orders for this visit:    Special screening examination for viral disease  -     Novel Coronavirus [facility]Covid-19),[facility]    Sore throat  -     Cancel: POCT rapid strepA    Acute URI  -     azithromycin (ZITHROMAX) 250 mg tablet; Take 2 tablets today and only 1 pill daily until finished  Patient Instructions   Patient Instructions   Follow up with pcp   Take meds as directed   Increase fluids     Take zyrtec or allegra daily   Use flonase as directed  If worse go to the ER   Rest     Take cough medicine over the counter as discussed    Follow up with PCP  Take meds as directed   Tylenol for fever and pain   Follow CDC guidelines daily at [facility]    Stay home until your labs are resulted and if negative and fever free may return to work    If symptoms worsen go to the ER    Isolate for 10 days from the onset of your symptoms if covid results are positive, you will NOT get a call from the office if your opentabs dakotah was reviewed  Please download the opentabs dakotah for your results  If your results are negative, you will not get a phone call, please download your opentabs dakotah   For your results    Download the opentabs dakotah as directed, directions given to pt at time of exam    Recommend taking VD3 2000IU daily and Vitamin C 1000mg every 12 hours     Multivitamin daily    Take zyrtec, allegra, or Claritin daily  Use flonase 1-2 sprays in each nare daily   Use nasal saline to the nose,   Use humidifer in room  Symptoms worsen go to ER  Rest      Continue to social distance, wash your hands, and wear your masks  Please continue to follow the CDC  gov guidelines daily for they are subject to change on COVID-19      COVID-19 Home Care Guidelines    Your healthcare provider and/or public health staff have evaluated you and have determined that you do not need to remain in the hospital at this time  At this time you can be isolated at home where you will be monitored by staff from your local or state health department  You should carefully follow the prevention and isolation steps below until a healthcare provider or local or state health department says that you can return to your normal activities  Stay home except to get medical care    People who are mildly ill with COVID-19 are able to isolate at home during their illness  You should restrict activities outside your home, except for getting medical care  Do not go to work, school, or public areas  Avoid using public transportation, ride-sharing, or taxis  Separate yourself from other people and animals in your home    People: As much as possible, you should stay in a specific room and away from other people in your home  Also, you should use a separate bathroom, if available  Animals: You should restrict contact with pets and other animals while you are sick with COVID-19, just like you would around other people  Although there have not been reports of pets or other animals becoming sick with COVID-19, it is still recommended that people sick with COVID-19 limit contact with animals until more information is known about the virus  When possible, have another member of your household care for your animals while you are sick  If you are sick with COVID-19, avoid contact with your pet, including petting, snuggling, being kissed or licked, and sharing food   If you must care for your pet or be around animals while you are sick, wash your hands before and after you interact with pets and wear a facemask  See COVID-19 and Animals for more information  Call ahead before visiting your doctor    If you have a medical appointment, call the healthcare provider and tell them that you have or may have COVID-19  This will help the healthcare providers office take steps to keep other people from getting infected or exposed  Wear a facemask    You should wear a facemask when you are around other people (e g , sharing a room or vehicle) or pets and before you enter a healthcare providers office  If you are not able to wear a facemask (for example, because it causes trouble breathing), then people who live with you should not stay in the same room with you, or they should wear a facemask if they enter your room  Cover your coughs and sneezes    Cover your mouth and nose with a tissue when you cough or sneeze  Throw used tissues in a lined trash can  Immediately wash your hands with soap and water for at least 20 seconds or, if soap and water are not available, clean your hands with an alcohol-based hand  that contains at least 60% alcohol  Clean your hands often    Wash your hands often with soap and water for at least 20 seconds, especially after blowing your nose, coughing, or sneezing; going to the bathroom; and before eating or preparing food  If soap and water are not readily available, use an alcohol-based hand  with at least 60% alcohol, covering all surfaces of your hands and rubbing them together until they feel dry  Soap and water are the best option if hands are visibly dirty  Avoid touching your eyes, nose, and mouth with unwashed hands  Avoid sharing personal household items    You should not share dishes, drinking glasses, cups, eating utensils, towels, or bedding with other people or pets in your home  After using these items, they should be washed thoroughly with soap and water      Clean all high-touch surfaces everyday    High touch surfaces include counters, tabletops, doorknobs, bathroom fixtures, toilets, phones, keyboards, tablets, and bedside tables  Also, clean any surfaces that may have blood, stool, or body fluids on them  Use a household cleaning spray or wipe, according to the label instructions  Labels contain instructions for safe and effective use of the cleaning product including precautions you should take when applying the product, such as wearing gloves and making sure you have good ventilation during use of the product  Monitor your symptoms    Seek prompt medical attention if your illness is worsening (e g , difficulty breathing)  Before seeking care, call your healthcare provider and tell them that you have, or are being evaluated for, COVID-19  Put on a facemask before you enter the facility  These steps will help the healthcare providers office to keep other people in the office or waiting room from getting infected or exposed  Ask your healthcare provider to call the local or [facility] health department  Persons who are placed under active monitoring or facilitated self-monitoring should follow instructions provided by their local health department or occupational health professionals, as appropriate  If you have a medical emergency and need to call 911, notify the dispatch personnel that you have, or are being evaluated for COVID-19  If possible, put on a facemask before emergency medical services arrive      Discontinuing home isolation    Patients with confirmed COVID-19 should remain under home isolation precautions until the following conditions are met:   - They have had no fever for at least 24 hours (that is one full day of no fever without the use medicine that reduces fevers)  AND  - other symptoms have improved (for example, when their cough or shortness of breath have improved)  AND  - If had mild or moderate illness, at least 10 days have passed since their symptoms first appeared or if severe illness (needed oxygen) or immunosuppressed, at least 20 days have passed since symptoms first appeared  Patients with confirmed COVID-19 should also notify close contacts (including their workplace) and ask that they self-quarantine  Currently, close contact is defined as being within 6 feet for 15 minutes or more from the period 24 hours starting 48 hours before symptom onset to the time at which the patient went into isolation  Close contacts of patients diagnosed with COVID-19 should be instructed by the patient to self-quarantine for 14 days from the last time of their last contact with the patient  Source: [employer]        Proceed to the nearest ER if symptoms worsen, Follow up with your PCP  Continue to social distance, wash your hands, and wear your masks  Please continue to follow the CDC  gov guidelines daily for they are subject to change on COVID-19    Chief Complaint     Chief Complaint   Patient presents with    COVID-19     Patient having symptoms of cough, muscle ache, fever, loss of smell, sore throat, runny nose & congestion which started last firday  Great granddaughter who lives with her was diagnosed with RSV sometime last week  Patient was tested for covid before and was negative  Second Covid-19 vaccination received 12/27/20  History of Present Illness       Patient is a 82-year-old female who is here today with a congestion muscle aches and fever loss of smell sore throat and nose since last Friday  Patient states her great granddaughter does live with her was diagnosed or RSV sometime last week and feels that she may contracted  Patient says for COVID-19 before was negative her 2nd COVID-19 vaccine was received on December 27, 2020  She has taken some over the counter meds with some relief but not much  She currenty doesn't take any antihistamines for her symptoms and encouraged to do so               Review of Systems Review of Systems   Constitutional: Positive for fatigue and fever  Negative for chills  HENT: Positive for congestion, postnasal drip, rhinorrhea, sneezing and sore throat  Negative for facial swelling, sinus pressure and sinus pain  Eyes: Negative  Respiratory: Positive for cough  Negative for shortness of breath  Cardiovascular: Negative  Gastrointestinal: Negative  Genitourinary: Negative  Musculoskeletal: Positive for myalgias  Neurological: Negative for dizziness, syncope, weakness, light-headedness and headaches           Current Medications       Current Outpatient Medications:     CALCIUM PO, Take 1,200 mg by mouth daily, Disp: , Rfl:     Cholecalciferol (VITAMIN D PO), Take 1 25 mg by mouth every 30 (thirty) days, Disp: , Rfl:     COLLAGEN PO, Take by mouth Powder - mixes with tea daily, Disp: , Rfl:     Cyanocobalamin (VITAMIN B 12 PO), Take 500 mcg by mouth daily , Disp: , Rfl:     ergocalciferol (VITAMIN D2) 50,000 units, TAKE 1 CAPSULE MONTHLY, Disp: 3 capsule, Rfl: 3    Multiple Vitamins-Minerals (MULTIVITAL PO), Take by mouth daily , Disp: , Rfl:     Turmeric 400 MG CAPS, Take 1 capsule by mouth daily, Disp: , Rfl:     azithromycin (ZITHROMAX) 250 mg tablet, Take 2 tablets today and only 1 pill daily until finished , Disp: 6 tablet, Rfl: 0    pantoprazole (PROTONIX) 40 mg tablet, Take 40 mg by mouth daily as needed , Disp: , Rfl:     tamoxifen (NOLVADEX) 20 mg tablet, Take 1 tablet (20 mg total) by mouth daily, Disp: 30 tablet, Rfl: 11    tamoxifen (NOLVADEX) 20 mg tablet, Take 20 mg by mouth 2 (two) times a day, Disp: , Rfl:     Current Allergies     Allergies as of 04/01/2021 - Reviewed 04/01/2021   Allergen Reaction Noted    Latex - food allergy Anaphylaxis 07/17/2013    Shellfish allergy - food allergy Anaphylaxis 10/31/2013    Shellfish-derived products - food allergy Anaphylaxis 07/17/2013              Past Medical History:   Diagnosis Date    Arthritis  Carpal tunnel syndrome on right     Chemotherapy induced neutropenia (HCC) 5/23/2019    Gastroesophageal reflux disease 8/6/2020    GERD (gastroesophageal reflux disease)     Trigger thumb     right    Vitamin D deficiency     Wears glasses        Past Surgical History:   Procedure Laterality Date    BREAST BIOPSY Left 01/02/2019    invasive ductal carcinoma    COLONOSCOPY      HYSTERECTOMY      age 41    LIPOSUCTION W/ FAT INJECTION Bilateral 8/6/2020    Procedure: FAT GRAFTING;  Surgeon: Marcell Penny MD;  [facility];  Service: Plastics    OOPHORECTOMY Bilateral     age 41    MS BIOPSY/EXCISION, LYMPH NODE(S) Left 2/8/2019    Procedure: BIOPSY LYMPH NODE SENTINEL;  Surgeon: Mary Kate Barfield MD;  [facility];  Service: Surgical Oncology    MS EDG US EXAM SURGICAL ALTER STOM DUODENUM/JEJUNUM N/A 2/2/2017    Procedure: RADIAL ENDOSCOPIC U/S;  Surgeon: Shobha Rm MD;  [facility]: [facility];   Service: Gastroenterology    MS IMPLNT BIO IMPLNT FOR SOFT TISSUE REINFORCEMENT Bilateral 2/8/2019    Procedure: RECONSTRUCTION BREAST W/ IMPLANT;  Surgeon: Marcell Penny MD;  [facility];  Service: Plastics    MS INSJ/RPLCMT BREAST IMPLANT SEP DAY MASTECTOMY Bilateral 9/27/2019    Procedure: IMPLANT EXPANDER EXCHANGE;  Surgeon: Marcell Penny MD;  [facility];  Service: Plastics    MS MASTECTOMY, SIMPLE, COMPLETE N/A 2/8/2019    Procedure: LEFT breast mastectomy; RIGHT breast prophylactic mastectomy;  Surgeon: Mary Kate Barfield MD;  [facility];  Service: Surgical Oncology    MS PART EXCIS PLANTAR FASCIA Right 10/20/2016    Procedure:  OPEN RELEASE FASCIA PLANTAR ,CUTTING BOTTOM OF ARCH,INSTEP ;  Surgeon: Parth Kinsey DPM;  [facility];  Service: Podiatry    MS ZUNILDA-IMPLANT CAPSULECTOMY BREAST COMPLETE Bilateral 9/27/2019    Procedure: CAPSULECTOMY;  Surgeon: Marcell Penny MD;  [facility];  Service: Plastics    MS REVISION OF RECONSTRUCTED BREAST Bilateral 8/6/2020    Procedure: BREAST LATERAL STANDING EXCISION SKIN DEFORMITIES POST RECON ;  Surgeon: Rad Randolph MD;  [facility];  Service: Plastics    CT TISSUE EXPANDER PLACEMENT BREAST RECONSTRUCTION Bilateral 2/8/2019    Procedure: INSERTION/PLACEMENT TISSUE EXPANDER (EXCHANGE); Surgeon: Rad Randolph MD;  [facility];  Service: Plastics    THUMB ARTHROSCOPY      US GUIDED BREAST BIOPSY LEFT COMPLETE Left 1/2/2019       Family History   Problem Relation Age of Onset    Brain cancer Mother 67    Prostate cancer Father 66    Breast cancer Sister 54    Breast cancer additional onset Sister 72    Breast cancer Sister 72    Prostate cancer Brother 62         Medications have been verified  The following portions of the patient's history were reviewed and updated as appropriate: allergies, current medications, past family history, past medical history, past social history, past surgical history and problem list     Objective   Pulse 82   Temp 99 5 °F (37 5 °C)   Resp 20   SpO2 96%      Physical Exam     Physical Exam  Constitutional:       General: She is not in acute distress  Appearance: Normal appearance  She is well-developed  HENT:      Right Ear: Hearing and tympanic membrane normal       Left Ear: Hearing and tympanic membrane normal       Nose: Mucosal edema present  Mouth/Throat:      Pharynx: Uvula midline  No posterior oropharyngeal erythema  Tonsils: No tonsillar exudate  Neck:      Musculoskeletal: Normal range of motion  Normal range of motion  No spinous process tenderness or muscular tenderness  Cardiovascular:      Rate and Rhythm: Normal rate and regular rhythm  Heart sounds: Normal heart sounds  Pulmonary:      Effort: Pulmonary effort is normal  No respiratory distress  Breath sounds: Normal breath sounds  No stridor  No decreased breath sounds, wheezing or rhonchi  Lymphadenopathy:      Cervical: No cervical adenopathy     Skin: General: Skin is warm  Capillary Refill: Capillary refill takes less than 2 seconds  Neurological:      Mental Status: She is alert and oriented to person, place, and time  Psychiatric:         Behavior: Behavior is cooperative  Note: Portions of this record may have been created with voice recognition software  Occasional wrong word or "sound a like" substitutions may have occurred due to the inherent limitations of voice recognition software  Please read the chart carefully and recognize, using context, where substitutions have occurred  BELLA Garicas

## 2021-04-02 LAB — SARS-COV-2 RNA RESP QL NAA+PROBE: NEGATIVE

## 2021-04-02 NOTE — TELEPHONE ENCOUNTER
Spoke to pt and she went to urgent care yesterday because she did not get call back for appt  She was put on antibiotics

## 2021-04-06 ENCOUNTER — OFFICE VISIT (OUTPATIENT)
Dept: INTERNAL MEDICINE CLINIC | Facility: CLINIC | Age: 71
End: 2021-04-06
Payer: COMMERCIAL

## 2021-04-06 VITALS — TEMPERATURE: 98.6 F | HEIGHT: 65 IN | BODY MASS INDEX: 32.32 KG/M2 | WEIGHT: 194 LBS

## 2021-04-06 DIAGNOSIS — J06.9 UPPER RESPIRATORY TRACT INFECTION, UNSPECIFIED TYPE: ICD-10-CM

## 2021-04-06 DIAGNOSIS — J04.10 TRACHEITIS: Primary | ICD-10-CM

## 2021-04-06 PROCEDURE — 99213 OFFICE O/P EST LOW 20 MIN: CPT | Performed by: INTERNAL MEDICINE

## 2021-04-06 NOTE — TELEPHONE ENCOUNTER
I did call patient and apologize for this issue  She originally spoke with a nurse (before office hours) and the message was sent to the clerical team and then she called back after 9 am and pressed option 3 for an appt and left a message but I am not sure what happened and why no one ever called the patient back   I spoke with patient and apologized for all of this

## 2021-04-06 NOTE — PROGRESS NOTES
Virtual Regular Visit      Assessment/Plan: This is a 73-year-old lady who complains of cough and congestion  Last week she was seen in the urgent care and COVID test was negative and she was also given a course of Zithromax  She does not feel improved and feels very congested  Problem List Items Addressed This Visit     None      Visit Diagnoses     Tracheitis    -  Primary    Upper respiratory tract infection, unspecified type                   Reason for visit is   Chief Complaint   Patient presents with    URI    Virtual Regular Visit        Encounter provider Leobardo Severino MD    Provider located at [address]  [facility] [address] [phone]      Recent Visits  No visits were found meeting these conditions  Showing recent visits within past 7 days and meeting all other requirements     Today's Visits  Date Type Provider Dept   04/06/21 Office Visit Leobardo Severino MD [facility]  [facility] Internal Med   Showing today's visits and meeting all other requirements     Future Appointments  No visits were found meeting these conditions  Showing future appointments within next 150 days and meeting all other requirements        The patient was identified by name and date of birth  Sharyle Garrison was informed that this is a telemedicine visit and that the visit is being conducted through [address] [place] [address] and patient was informed that this is not a secure, HIPAA-compliant platform  She agrees to proceed     My office door was closed  No one else was in the room  She acknowledged consent and understanding of privacy and security of the video platform  The patient has agreed to participate and understands they can discontinue the visit at any time  Patient is aware this is a billable service  Subjective  Sharyle Garrison is a 78 y o  female with breast cancer         HPI     Past Medical History:   Diagnosis Date    Arthritis     Carpal tunnel syndrome on right     Chemotherapy induced neutropenia (HCC) 5/23/2019    Gastroesophageal reflux disease 8/6/2020    GERD (gastroesophageal reflux disease)     Trigger thumb     right    Vitamin D deficiency     Wears glasses        Past Surgical History:   Procedure Laterality Date    BREAST BIOPSY Left 01/02/2019    invasive ductal carcinoma    COLONOSCOPY      HYSTERECTOMY      age 41    LIPOSUCTION W/ FAT INJECTION Bilateral 8/6/2020    Procedure: FAT GRAFTING;  Surgeon: Lakesha Garcia MD;  [facility];  Service: Plastics    OOPHORECTOMY Bilateral     age 41    PA BIOPSY/EXCISION, LYMPH NODE(S) Left 2/8/2019    Procedure: BIOPSY LYMPH NODE SENTINEL;  Surgeon: Doron Gupta MD;  [facility];  Service: Surgical Oncology    PA EDG US EXAM SURGICAL ALTER STOM DUODENUM/JEJUNUM N/A 2/2/2017    Procedure: RADIAL ENDOSCOPIC U/S;  Surgeon: Sophia Goldman MD;  [facility];   Service: Gastroenterology    PA IMPLNT BIO IMPLNT FOR SOFT TISSUE REINFORCEMENT Bilateral 2/8/2019    Procedure: RECONSTRUCTION BREAST W/ IMPLANT;  Surgeon: Lakesha Garcia MD;  [facility];  Service: Plastics    PA INSJ/RPLCMT BREAST IMPLANT SEP DAY MASTECTOMY Bilateral 9/27/2019    Procedure: IMPLANT EXPANDER EXCHANGE;  Surgeon: Lakesha Garcia MD;  [facility];  Service: Plastics    PA MASTECTOMY, SIMPLE, COMPLETE N/A 2/8/2019    Procedure: LEFT breast mastectomy; RIGHT breast prophylactic mastectomy;  Surgeon: Doron Gupta MD;  [facility];  Service: Surgical Oncology    PA PART EXCIS PLANTAR FASCIA Right 10/20/2016    Procedure:  OPEN RELEASE FASCIA PLANTAR ,CUTTING BOTTOM OF ARCH,INSTEP ;  Surgeon: Janel Garcia DPM;  [facility];  Service: Podiatry    PA ZUNILDA-IMPLANT CAPSULECTOMY BREAST COMPLETE Bilateral 9/27/2019    Procedure: CAPSULECTOMY;  Surgeon: Lakesha Garcia MD;  [facility];  Service: Plastics    PA REVISION OF RECONSTRUCTED BREAST Bilateral 8/6/2020 Procedure: BREAST LATERAL STANDING EXCISION SKIN DEFORMITIES POST RECON ;  Surgeon: Marcell Penny MD;  [facility];  Service: Plastics    ME TISSUE EXPANDER PLACEMENT BREAST RECONSTRUCTION Bilateral 2/8/2019    Procedure: INSERTION/PLACEMENT TISSUE EXPANDER (EXCHANGE); Surgeon: Marcell Penny MD;  [facility];  Service: Plastics    THUMB ARTHROSCOPY      US GUIDED BREAST BIOPSY LEFT COMPLETE Left 1/2/2019       Current Outpatient Medications   Medication Sig Dispense Refill    CALCIUM PO Take 1,200 mg by mouth daily      Cholecalciferol (VITAMIN D PO) Take 1 25 mg by mouth every 30 (thirty) days      COLLAGEN PO Take by mouth Powder - mixes with tea daily      Cyanocobalamin (VITAMIN B 12 PO) Take 500 mcg by mouth daily       ergocalciferol (VITAMIN D2) 50,000 units TAKE 1 CAPSULE MONTHLY 3 capsule 3    Multiple Vitamins-Minerals (MULTIVITAL PO) Take by mouth daily       tamoxifen (NOLVADEX) 20 mg tablet Take 20 mg by mouth 2 (two) times a day      pantoprazole (PROTONIX) 40 mg tablet Take 40 mg by mouth daily as needed       tamoxifen (NOLVADEX) 20 mg tablet Take 1 tablet (20 mg total) by mouth daily 30 tablet 11    Turmeric 400 MG CAPS Take 1 capsule by mouth daily       No current facility-administered medications for this visit  Allergies   Allergen Reactions    Latex - Food Allergy Anaphylaxis    Shellfish Allergy - Food Allergy Anaphylaxis    Shellfish-Derived Products - Food Allergy Anaphylaxis       Review of Systems   Constitutional: Positive for chills  Negative for appetite change, fatigue and fever  HENT: Positive for congestion  Negative for sore throat and trouble swallowing  Eyes: Negative for redness  Respiratory: Positive for cough  Negative for shortness of breath  Cardiovascular: Negative for chest pain and palpitations  Gastrointestinal: Negative for abdominal pain, constipation and diarrhea  Genitourinary: Negative for dysuria and hematuria  Musculoskeletal: Negative for back pain and neck pain  Skin: Negative for rash  Neurological: Negative for seizures, weakness and headaches  Hematological: Negative for adenopathy  Psychiatric/Behavioral: Negative for confusion  The patient is not nervous/anxious  Video Exam    Vitals:    04/06/21 1720   Temp: 98 6 °F (37 °C)   TempSrc: Oral   Weight: 88 kg (194 lb)   Height: 5' 5" (1 651 m)       Physical Exam  Constitutional:       Appearance: Normal appearance  She is normal weight  HENT:      Head: Normocephalic and atraumatic  Nose: Nose normal       Mouth/Throat:      Mouth: Mucous membranes are moist    Eyes:      Extraocular Movements: Extraocular movements intact  Pupils: Pupils are equal, round, and reactive to light  Neck:      Musculoskeletal: Normal range of motion and neck supple  Pulmonary:      Effort: Pulmonary effort is normal    Abdominal:      Palpations: Abdomen is soft  Musculoskeletal: Normal range of motion  Neurological:      General: No focal deficit present  Mental Status: She is alert and oriented to person, place, and time  Mental status is at baseline  I spent 15 minutes directly with the patient during this visit      VIRTUAL VISIT DISCLAIMER    Garry Rios acknowledges that she has consented to an online visit or consultation  She understands that the online visit is based solely on information provided by her, and that, in the absence of a face-to-face physical evaluation by the physician, the diagnosis she receives is both limited and provisional in terms of accuracy and completeness  This is not intended to replace a full medical face-to-face evaluation by the physician  Garry Rios understands and accepts these terms

## 2021-04-07 ENCOUNTER — CLINICAL SUPPORT (OUTPATIENT)
Dept: INTERNAL MEDICINE CLINIC | Facility: CLINIC | Age: 71
End: 2021-04-07

## 2021-04-07 DIAGNOSIS — J06.9 UPPER RESPIRATORY TRACT INFECTION, UNSPECIFIED TYPE: Primary | ICD-10-CM

## 2021-04-07 PROCEDURE — 0241U HB NFCT DS VIR RESP RNA 4 TRGT: CPT | Performed by: INTERNAL MEDICINE

## 2021-04-08 LAB
FLUAV RNA RESP QL NAA+PROBE: NEGATIVE
FLUBV RNA RESP QL NAA+PROBE: NEGATIVE
RSV RNA RESP QL NAA+PROBE: POSITIVE
SARS-COV-2 RNA RESP QL NAA+PROBE: NEGATIVE

## 2021-04-09 ENCOUNTER — TELEPHONE (OUTPATIENT)
Dept: INTERNAL MEDICINE CLINIC | Facility: CLINIC | Age: 71
End: 2021-04-09

## 2021-04-19 ENCOUNTER — TELEPHONE (OUTPATIENT)
Dept: HEMATOLOGY ONCOLOGY | Facility: CLINIC | Age: 71
End: 2021-04-19

## 2021-04-28 ENCOUNTER — OFFICE VISIT (OUTPATIENT)
Dept: INTERNAL MEDICINE CLINIC | Facility: CLINIC | Age: 71
End: 2021-04-28
Payer: COMMERCIAL

## 2021-04-28 VITALS
SYSTOLIC BLOOD PRESSURE: 124 MMHG | HEART RATE: 70 BPM | BODY MASS INDEX: 31.99 KG/M2 | TEMPERATURE: 97.3 F | WEIGHT: 192 LBS | DIASTOLIC BLOOD PRESSURE: 84 MMHG | OXYGEN SATURATION: 90 % | HEIGHT: 65 IN

## 2021-04-28 DIAGNOSIS — N39.0 URINARY TRACT INFECTION WITHOUT HEMATURIA, SITE UNSPECIFIED: Primary | ICD-10-CM

## 2021-04-28 LAB
SL AMB  POCT GLUCOSE, UA: NORMAL
SL AMB LEUKOCYTE ESTERASE,UA: NORMAL
SL AMB POCT BILIRUBIN,UA: NORMAL
SL AMB POCT BLOOD,UA: NORMAL
SL AMB POCT CLARITY,UA: CLEAR
SL AMB POCT COLOR,UA: CLEAR
SL AMB POCT KETONES,UA: NORMAL
SL AMB POCT NITRITE,UA: NORMAL
SL AMB POCT PH,UA: 8
SL AMB POCT SPECIFIC GRAVITY,UA: 1005
SL AMB POCT URINE PROTEIN: NORMAL
SL AMB POCT UROBILINOGEN: NORMAL

## 2021-04-28 PROCEDURE — 99213 OFFICE O/P EST LOW 20 MIN: CPT | Performed by: INTERNAL MEDICINE

## 2021-04-28 PROCEDURE — 81002 URINALYSIS NONAUTO W/O SCOPE: CPT | Performed by: INTERNAL MEDICINE

## 2021-04-28 RX ORDER — IVERMECTIN 10 MG/G
CREAM TOPICAL
COMMUNITY
Start: 2021-04-09

## 2021-04-28 RX ORDER — CEPHALEXIN 500 MG/1
500 CAPSULE ORAL EVERY 8 HOURS SCHEDULED
Qty: 21 CAPSULE | Refills: 0 | Status: SHIPPED | OUTPATIENT
Start: 2021-04-28 | End: 2021-04-28

## 2021-04-28 RX ORDER — CEPHALEXIN 500 MG/1
500 CAPSULE ORAL EVERY 8 HOURS SCHEDULED
Qty: 21 CAPSULE | Refills: 0 | Status: SHIPPED | OUTPATIENT
Start: 2021-04-28 | End: 2021-05-05

## 2021-04-28 NOTE — PROGRESS NOTES
Assessment/Plan:    BMI Counseling: Body mass index is 31 95 kg/m²  The BMI is above normal  Nutrition recommendations include decreasing portion sizes, encouraging healthy choices of fruits and vegetables and decreasing fast food intake  Exercise recommendations include moderate physical activity 150 minutes/week  1  Urinary tract infection without hematuria, site unspecified  -     POCT urine dip  -     cephalexin (KEFLEX) 500 mg capsule; Take 1 capsule (500 mg total) by mouth every 8 (eight) hours for 7 days           Subjective:      Patient ID: Alyson Marx is a 78 y o  female  Urinary frequency and urgency denies any back pain denies any fever    Urinary Tract Infection   Associated symptoms include frequency and urgency  Pertinent negatives include no chills, nausea or vomiting  The following portions of the patient's history were reviewed and updated as appropriate: She  has a past medical history of Allergies, Arthritis, Breast cancer ([facility] ), Carpal tunnel syndrome on right, Chemotherapy induced neutropenia ([facility] ) (5/23/2019), Gastroesophageal reflux disease (8/6/2020), GERD (gastroesophageal reflux disease), Lumbar disc disease, Trigger thumb, Vitamin D deficiency, and Wears glasses    She   Patient Active Problem List    Diagnosis Date Noted    Urinary tract infection without hematuria 04/28/2021    Osteopenia 03/05/2021    Gastroesophageal reflux disease 08/06/2020    Breast reconstruction deformity     Use of tamoxifen (Nolvadex) 06/17/2020    Bilateral carpal tunnel syndrome 01/27/2020    Osteoarthritis of multiple joints 01/27/2020    Drug-induced polyneuropathy ([facility] ) 01/27/2020    Arthritis 01/27/2020    Vitamin D deficiency 01/08/2020    Impaired fasting blood sugar 01/08/2020    Urinary tract infection with hematuria 11/10/2019    Dysuria 11/10/2019    Chemotherapy induced neutropenia ([facility] ) 86/81/0124    Folliculitis 30/84/5345    Family history of breast cancer in sister 01/10/2019    Malignant neoplasm of lower-inner quadrant of left breast in female, estrogen receptor positive ([facility] ) 01/02/2019    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    Thyroid nodule 12/15/2016    Elevated LFTs 10/26/2016    Nausea 10/26/2016    Atypical chest pain 10/24/2016    Epigastric pain 10/24/2016    Plantar fasciitis of right foot 10/20/2016     She  has a past surgical history that includes Thumb arthroscopy; pr edg us exam surgical alter stom duodenum/jejunum (N/A, 2/2/2017); pr part excis plantar fascia (Right, 10/20/2016); Oophorectomy (Bilateral); US guided breast biopsy left complete (Left, 1/2/2019); Breast biopsy (Left, 01/02/2019); Colonoscopy; pr mastectomy, simple, complete (N/A, 2/8/2019); pr biopsy/excision, lymph node(s) (Left, 2/8/2019); pr tissue expander placement breast reconstruction (Bilateral, 2/8/2019); pr implnt bio implnt for soft tissue reinforcement (Bilateral, 2/8/2019); pr huong-implant capsulectomy breast complete (Bilateral, 9/27/2019); pr insj/rplcmt breast implant sep day mastectomy (Bilateral, 9/27/2019); pr revision of reconstructed breast (Bilateral, 8/6/2020); Liposuction w/ fat injection (Bilateral, 8/6/2020); Hysterectomy; and [place] tooth extraction  Her family history includes Brain cancer (age of onset: 67) in her mother; Breast cancer (age of onset: 54) in her sister; Breast cancer (age of onset: 72) in her sister; Breast cancer additional onset (age of onset: 72) in her sister; Prostate cancer (age of onset: 62) in her brother; Prostate cancer (age of onset: 66) in her father  She  reports that she has never smoked  She has never used smokeless tobacco  She reports current alcohol use of about 1 0 standard drinks of alcohol per week  She reports that she does not use drugs    Current Outpatient Medications   Medication Sig Dispense Refill    CALCIUM PO Take 1,200 mg by mouth daily      Cholecalciferol (VITAMIN D PO) Take 1 25 mg by mouth every 30 (thirty) days      Cyanocobalamin (VITAMIN B 12 PO) Take 500 mcg by mouth daily       ergocalciferol (VITAMIN D2) 50,000 units TAKE 1 CAPSULE MONTHLY 3 capsule 3    Multiple Vitamins-Minerals (MULTIVITAL PO) Take by mouth daily       tamoxifen (NOLVADEX) 20 mg tablet Take 20 mg by mouth 2 (two) times a day      cephalexin (KEFLEX) 500 mg capsule Take 1 capsule (500 mg total) by mouth every 8 (eight) hours for 7 days 21 capsule 0    COLLAGEN PO Take by mouth Powder - mixes with tea daily      pantoprazole (PROTONIX) 40 mg tablet Take 40 mg by mouth daily as needed       Soolantra 1 % CREA       triamcinolone (KENALOG) 0 1 % ointment       Turmeric 400 MG CAPS Take 1 capsule by mouth daily       No current facility-administered medications for this visit  Current Outpatient Medications on File Prior to Visit   Medication Sig    CALCIUM PO Take 1,200 mg by mouth daily    Cholecalciferol (VITAMIN D PO) Take 1 25 mg by mouth every 30 (thirty) days    Cyanocobalamin (VITAMIN B 12 PO) Take 500 mcg by mouth daily     ergocalciferol (VITAMIN D2) 50,000 units TAKE 1 CAPSULE MONTHLY    Multiple Vitamins-Minerals (MULTIVITAL PO) Take by mouth daily     tamoxifen (NOLVADEX) 20 mg tablet Take 20 mg by mouth 2 (two) times a day    COLLAGEN PO Take by mouth Powder - mixes with tea daily    pantoprazole (PROTONIX) 40 mg tablet Take 40 mg by mouth daily as needed     Soolantra 1 % CREA     triamcinolone (KENALOG) 0 1 % ointment     Turmeric 400 MG CAPS Take 1 capsule by mouth daily    [DISCONTINUED] tamoxifen (NOLVADEX) 20 mg tablet Take 1 tablet (20 mg total) by mouth daily     No current facility-administered medications on file prior to visit  She is allergic to latex; shellfish allergy - food allergy; and shellfish-derived products - food allergy       Review of Systems   Constitutional: Negative for chills and fever  HENT: Negative for congestion, ear pain and sore throat      Eyes: Negative for pain  Respiratory: Negative for cough and shortness of breath  Cardiovascular: Negative for chest pain and leg swelling  Gastrointestinal: Negative for abdominal pain, nausea and vomiting  Endocrine: Negative for polyuria  Genitourinary: Positive for difficulty urinating, frequency and urgency  Musculoskeletal: Negative for arthralgias and back pain  Skin: Negative for rash  Neurological: Negative for weakness and headaches  Psychiatric/Behavioral: Negative for sleep disturbance  The patient is not nervous/anxious  Objective:      /84 (BP Location: Left arm, Patient Position: Sitting, Cuff Size: Standard)   Pulse 70   Temp (!) 97 3 °F (36 3 °C) (Temporal)   Ht 5' 5" (1 651 m)   Wt 87 1 kg (192 lb)   SpO2 90%   BMI 31 95 kg/m²     Recent Results (from the past 1344 hour(s))   Novel Coronavirus (Covid-19),PCR [facility] - Office Collection    Collection Time: 04/01/21 12:10 PM    Specimen: Nose; Nares   Result Value Ref Range    SARS-CoV-2 Negative Negative   Multiplex COVID19, Influenza A/B, RSV PCR, [facility] - Collected in office    Collection Time: 04/07/21 11:56 AM    Specimen: Nasopharyngeal Swab; Nares   Result Value Ref Range    SARS-CoV-2 Negative Negative    INFLUENZA A PCR Negative Negative    INFLUENZA B PCR Negative Negative    RSV PCR Positive (A) Negative   POCT urine dip    Collection Time: 04/28/21  5:27 PM   Result Value Ref Range    LEUKOCYTE ESTERASE,UA trace     NITRITE,UA neg     SL AMB POCT UROBILINOGEN normal     POCT URINE PROTEIN trace      PH,UA 8     BLOOD,UA neg     SPECIFIC GRAVITY,UA 1,005     KETONES,UA neg     BILIRUBIN,UA neg     GLUCOSE, UA normal      COLOR,UA clear     CLARITY,UA clear         Physical Exam  Constitutional:       Appearance: Normal appearance  HENT:      Head: Normocephalic        Right Ear: Tympanic membrane, ear canal and external ear normal       Left Ear: Tympanic membrane, ear canal and external ear normal  Nose: Nose normal  No congestion  Mouth/Throat:      Mouth: Mucous membranes are moist       Pharynx: Oropharynx is clear  No oropharyngeal exudate or posterior oropharyngeal erythema  Eyes:      Extraocular Movements: Extraocular movements intact  Conjunctiva/sclera: Conjunctivae normal       Pupils: Pupils are equal, round, and reactive to light  Neck:      Musculoskeletal: Normal range of motion and neck supple  Cardiovascular:      Rate and Rhythm: Normal rate and regular rhythm  Heart sounds: Normal heart sounds  No murmur  Pulmonary:      Effort: Pulmonary effort is normal       Breath sounds: Normal breath sounds  No wheezing or rales  Abdominal:      General: Bowel sounds are normal  There is no distension  Palpations: Abdomen is soft  Tenderness: There is no abdominal tenderness  There is no right CVA tenderness or left CVA tenderness  Musculoskeletal: Normal range of motion  Right lower leg: No edema  Left lower leg: No edema  Lymphadenopathy:      Cervical: No cervical adenopathy  Skin:     General: Skin is warm  Neurological:      General: No focal deficit present  Mental Status: She is alert and oriented to person, place, and time

## 2021-05-03 ENCOUNTER — TRANSCRIBE ORDERS (OUTPATIENT)
Dept: LAB | Facility: HOSPITAL | Age: 71
End: 2021-05-03

## 2021-05-03 ENCOUNTER — TELEPHONE (OUTPATIENT)
Dept: INTERNAL MEDICINE CLINIC | Facility: CLINIC | Age: 71
End: 2021-05-03

## 2021-05-03 ENCOUNTER — TELEPHONE (OUTPATIENT)
Dept: HEMATOLOGY ONCOLOGY | Facility: CLINIC | Age: 71
End: 2021-05-03

## 2021-05-03 ENCOUNTER — APPOINTMENT (OUTPATIENT)
Dept: LAB | Facility: HOSPITAL | Age: 71
End: 2021-05-03
Payer: COMMERCIAL

## 2021-05-03 DIAGNOSIS — Z00.8 ENCOUNTER FOR OTHER GENERAL EXAMINATION: ICD-10-CM

## 2021-05-03 DIAGNOSIS — Z00.8 ENCOUNTER FOR OTHER GENERAL EXAMINATION: Primary | ICD-10-CM

## 2021-05-03 LAB
CHOLEST SERPL-MCNC: 199 MG/DL (ref 50–200)
EST. AVERAGE GLUCOSE BLD GHB EST-MCNC: 108 MG/DL
HBA1C MFR BLD: 5.4 %
HDLC SERPL-MCNC: 61 MG/DL
LDLC SERPL CALC-MCNC: 84 MG/DL (ref 0–100)
NONHDLC SERPL-MCNC: 138 MG/DL
TRIGL SERPL-MCNC: 268 MG/DL

## 2021-05-03 PROCEDURE — 80061 LIPID PANEL: CPT

## 2021-05-03 PROCEDURE — 36415 COLL VENOUS BLD VENIPUNCTURE: CPT

## 2021-05-03 PROCEDURE — 83036 HEMOGLOBIN GLYCOSYLATED A1C: CPT

## 2021-05-03 NOTE — TELEPHONE ENCOUNTER
Patient said Dr Jenkins Agent advised her to let you know her Triglycerides are high      # [phone]

## 2021-05-03 NOTE — TELEPHONE ENCOUNTER
Triglyceride result is high and I explained that to the patient and advised her to get that managed by her primary physician

## 2021-05-13 DIAGNOSIS — Z17.0 MALIGNANT NEOPLASM OF LOWER-INNER QUADRANT OF LEFT BREAST IN FEMALE, ESTROGEN RECEPTOR POSITIVE (HCC): Primary | ICD-10-CM

## 2021-05-13 DIAGNOSIS — C50.312 MALIGNANT NEOPLASM OF LOWER-INNER QUADRANT OF LEFT BREAST IN FEMALE, ESTROGEN RECEPTOR POSITIVE (HCC): Primary | ICD-10-CM

## 2021-05-13 RX ORDER — TAMOXIFEN CITRATE 20 MG/1
20 TABLET ORAL 2 TIMES DAILY
Qty: 90 TABLET | Refills: 1 | Status: SHIPPED | OUTPATIENT
Start: 2021-05-13 | End: 2021-11-15 | Stop reason: SDUPTHER

## 2021-06-15 ENCOUNTER — OFFICE VISIT (OUTPATIENT)
Dept: SURGICAL ONCOLOGY | Facility: CLINIC | Age: 71
End: 2021-06-15
Payer: COMMERCIAL

## 2021-06-15 VITALS
TEMPERATURE: 97.3 F | HEIGHT: 65 IN | WEIGHT: 186 LBS | DIASTOLIC BLOOD PRESSURE: 84 MMHG | SYSTOLIC BLOOD PRESSURE: 124 MMHG | HEART RATE: 72 BPM | BODY MASS INDEX: 30.99 KG/M2

## 2021-06-15 DIAGNOSIS — Z17.0 MALIGNANT NEOPLASM OF LOWER-INNER QUADRANT OF LEFT BREAST IN FEMALE, ESTROGEN RECEPTOR POSITIVE (HCC): Primary | ICD-10-CM

## 2021-06-15 DIAGNOSIS — C50.312 MALIGNANT NEOPLASM OF LOWER-INNER QUADRANT OF LEFT BREAST IN FEMALE, ESTROGEN RECEPTOR POSITIVE (HCC): Primary | ICD-10-CM

## 2021-06-15 DIAGNOSIS — Z79.810 USE OF TAMOXIFEN (NOLVADEX): ICD-10-CM

## 2021-06-15 PROCEDURE — 99214 OFFICE O/P EST MOD 30 MIN: CPT | Performed by: SURGERY

## 2021-06-15 NOTE — PROGRESS NOTES
Surgical Oncology Follow Up       [facility] SURGICAL ONCOLOGY [facility] [id number]    Fermin Hatch  1950  [id number]  [facility] SURGICAL ONCOLOGY [place]  [place] [address]    Chief Complaint   Patient presents with    Follow-up       Assessment/Plan   Diagnoses and all orders for this visit:    Malignant neoplasm of lower-inner quadrant of left breast in female, estrogen receptor positive ([facility] )    Use of tamoxifen (Nolvadex)        Advance Care Planning/Advance Directives:  Discussed disease status, cancer treatment plans and/or cancer treatment goals with the patient  Oncology History:    Oncology History   Malignant neoplasm of lower-inner quadrant of left breast in female, estrogen receptor positive ([facility] [address] )   1/2/2019 Initial Diagnosis    Malignant neoplasm of lower-inner quadrant of left breast in female, estrogen receptor positive [facility])      Chemotherapy    April 2019:    Adjuvant  Taxotere and Cytoxan once every 3 weeks for 4 cycles followed by aromatase inhibitor     4/11/2019 - 7/3/2019 Chemotherapy    pegfilgrastim (NEULASTA) subcutaneous injection 6 mg, 6 mg, Subcutaneous, Once, 4 of 4 cycles  Dose modification: 4 mg (original dose 6 mg, Cycle 3)  Administration: 4 mg (5/24/2019), 4 mg (6/14/2019)  cyclophosphamide (CYTOXAN) 1,176 mg in sodium chloride 0 9 % 250 mL IVPB, 600 mg/m2 = 1,176 mg, Intravenous, Once, 4 of 4 cycles  Administration: 1,176 mg (5/23/2019), 1,176 mg (6/13/2019)  DOCEtaxel (TAXOTERE) 147 mg in sodium chloride 0 9 % 250 mL chemo infusion, 75 mg/m2 = 147 mg, Intravenous, Once, 4 of 4 cycles  Administration: 147 mg (5/23/2019), 147 mg (6/13/2019)         History of Present Illness:  Breast cancer follow-up, reports occasional discomfort in the left reconstructed breast, no additional concerns other than noted in ROS  -Interval History: none    Review of Systems:  Review of Systems   Constitutional: Positive for unexpected weight change (wt gain attributed to bell)  Negative for appetite change and fever  Eyes: Negative  Respiratory: Negative for shortness of breath  Cardiovascular: Negative  Gastrointestinal: Negative  Endocrine: Negative  Genitourinary: Negative  Musculoskeletal: Negative  Negative for arthralgias and myalgias  Skin: Negative  Allergic/Immunologic: Negative  Neurological: Positive for numbness (in feet)  Hematological: Negative  Negative for adenopathy  Does not bruise/bleed easily  Psychiatric/Behavioral: Negative          Patient Active Problem List   Diagnosis    Plantar fasciitis of right foot    Atypical chest pain    Epigastric pain    Elevated LFTs    Nausea    Excessive VIP secretion    Thyroid nodule    Malignant neoplasm of lower-inner quadrant of left breast in female, estrogen receptor positive ([facility] [address] )    Family history of breast cancer in sister    Folliculitis    Chemotherapy induced neutropenia ([facility] [address] )    Urinary tract infection with hematuria    Dysuria    Vitamin D deficiency    Impaired fasting blood sugar    Bilateral carpal tunnel syndrome    Osteoarthritis of multiple joints    Drug-induced polyneuropathy (HCC)    Arthritis    Use of tamoxifen (Nolvadex)    Gastroesophageal reflux disease    Breast reconstruction deformity    Osteopenia    Urinary tract infection without hematuria     Past Medical History:   Diagnosis Date    Allergies     Arthritis     Carpal tunnel syndrome on right     Chemotherapy induced neutropenia (HCC) 5/23/2019    Gastroesophageal reflux disease 8/6/2020    GERD (gastroesophageal reflux disease)     Lumbar disc disease     Trigger thumb     right    Vitamin D deficiency     Wears glasses      Past Surgical History:   Procedure Laterality Date    BREAST BIOPSY Left 01/02/2019    invasive ductal carcinoma    COLONOSCOPY      HYSTERECTOMY age 41 - abdominal    LIPOSUCTION W/ FAT INJECTION Bilateral 8/6/2020    Procedure: FAT GRAFTING;  Surgeon: Darcie Gonsales MD;  [facility];  Service: Plastics    OOPHORECTOMY Bilateral     age 41    IA BIOPSY/EXCISION, LYMPH NODE(S) Left 2/8/2019    Procedure: BIOPSY LYMPH NODE SENTINEL;  Surgeon: Veto Eisenmenger, MD;  [facility];  Service: Surgical Oncology    IA EDG US EXAM SURGICAL ALTER STOM DUODENUM/JEJUNUM N/A 2/2/2017    Procedure: RADIAL ENDOSCOPIC U/S;  Surgeon: Hua Milligan MD;  [facility]: [facility]; Service: Gastroenterology    IA IMPLNT BIO IMPLNT FOR SOFT TISSUE REINFORCEMENT Bilateral 2/8/2019    Procedure: RECONSTRUCTION BREAST W/ IMPLANT;  Surgeon: Darcie Gonsales MD;  [facility];  Service: Plastics    IA INSJ/RPLCMT BREAST IMPLANT SEP DAY MASTECTOMY Bilateral 9/27/2019    Procedure: IMPLANT EXPANDER EXCHANGE;  Surgeon: Darcie Gonsales MD;  [facility];  Service: Plastics    IA MASTECTOMY, SIMPLE, COMPLETE N/A 2/8/2019    Procedure: LEFT breast mastectomy; RIGHT breast prophylactic mastectomy;  Surgeon: Veto Eisenmenger, MD;  [facility];  Service: Surgical Oncology    IA PART EXCIS PLANTAR FASCIA Right 10/20/2016    Procedure:  OPEN RELEASE FASCIA PLANTAR ,CUTTING BOTTOM OF ARCH,INSTEP ;  Surgeon: Maryuri Blancas DPM;  [facility];  Service: Podiatry    IA ZUNILDA-IMPLANT CAPSULECTOMY BREAST COMPLETE Bilateral 9/27/2019    Procedure: CAPSULECTOMY;  Surgeon: Darcie Gonsales MD;  [facility];  Service: Plastics    IA REVISION OF RECONSTRUCTED BREAST Bilateral 8/6/2020    Procedure: BREAST LATERAL STANDING EXCISION SKIN DEFORMITIES POST RECON ;  Surgeon: Darcie Gonsales MD;  [facility];  Service: Plastics    IA TISSUE EXPANDER PLACEMENT BREAST RECONSTRUCTION Bilateral 2/8/2019    Procedure: INSERTION/PLACEMENT TISSUE EXPANDER (EXCHANGE);   Surgeon: Darcie Gonsales MD;  [facility];  Service: Plastics    THUMB ARTHROSCOPY     [address] [place] [address] BIOPSY LEFT COMPLETE Left 1/2/2019    WISDOM TOOTH EXTRACTION       Family History   Problem Relation Age of Onset    Brain cancer Mother 67    Prostate cancer Father 66    Breast cancer Sister 54    Breast cancer additional onset Sister 72    Breast cancer Sister 72    Prostate cancer Brother 62     Social History     Socioeconomic History    Marital status: Single     Spouse name: Not on file    Number of children: Not on file    Years of education: Not on file    Highest education level: Not on file   Occupational History    Not on file   Tobacco Use    Smoking status: Never Smoker    Smokeless tobacco: Never Used   Substance and Sexual Activity    Alcohol use: Yes     Alcohol/week: 1 0 standard drinks     Types: 1 Glasses of wine per week     Comment: 3 x yearly    Drug use: No    Sexual activity: Not on file   Other Topics Concern    Not on file   Social History Narrative    Not on file     Social Determinants of Health     Financial Resource Strain:     Difficulty of Paying Living Expenses:    Food Insecurity:     Worried About Running Out of Food in the Last Year:     [address] in the Last Year:    Transportation Needs:     Lack of Transportation (Medical):      Lack of Transportation (Non-Medical):    Physical Activity:     Days of Exercise per Week:     Minutes of Exercise per Session:    Stress:     Feeling of Stress :    Social Connections:     Frequency of Communication with Friends and Family:     Frequency of Social Gatherings with Friends and Family:     Attends [employer] Services:     Active Member of Clubs or Organizations:     Attends Club or Organization Meetings:     Marital Status:    Intimate Partner Violence:     Fear of Current or Ex-Partner:     Emotionally Abused:     Physically Abused:     Sexually Abused:        Current Outpatient Medications:     CALCIUM PO, Take 1,200 mg by mouth daily, Disp: , Rfl:     Cholecalciferol (VITAMIN D PO), Take 1 25 mg by mouth every 30 (thirty) days, Disp: , Rfl:     COLLAGEN PO, Take by mouth Powder - mixes with tea daily, Disp: , Rfl:     Cyanocobalamin (VITAMIN B 12 PO), Take 500 mcg by mouth daily , Disp: , Rfl:     ergocalciferol (VITAMIN D2) 50,000 units, TAKE 1 CAPSULE MONTHLY, Disp: 3 capsule, Rfl: 3    Multiple Vitamins-Minerals (MULTIVITAL PO), Take by mouth daily , Disp: , Rfl:     pantoprazole (PROTONIX) 40 mg tablet, Take 40 mg by mouth daily as needed , Disp: , Rfl:     Soolantra 1 % CREA, , Disp: , Rfl:     tamoxifen (NOLVADEX) 20 mg tablet, Take 1 tablet (20 mg total) by mouth 2 (two) times a day Take 1 tablet 20mg daily, Disp: 90 tablet, Rfl: 1    triamcinolone (KENALOG) 0 1 % ointment, , Disp: , Rfl:     Turmeric 400 MG CAPS, Take 1 capsule by mouth daily, Disp: , Rfl:   Allergies   Allergen Reactions    Latex Anaphylaxis    Shellfish Allergy - Food Allergy Anaphylaxis    Shellfish-Derived Products - Food Allergy Anaphylaxis       The following portions of the patient's history were reviewed and updated as appropriate: allergies, current medications, past family history, past medical history, past social history, past surgical history and problem list         Vitals:    06/15/21 1513   BP: 124/84   Pulse: 72   Temp: (!) 97 3 °F (36 3 °C)       Physical Exam  Constitutional:       General: She is not in acute distress  Appearance: She is well-developed  HENT:      Head: Normocephalic and atraumatic  Cardiovascular:      Heart sounds: Normal heart sounds  Pulmonary:      Breath sounds: Normal breath sounds  Chest:      Breasts:         Right: Skin change (  Mastectomy scar with implant) present  No mass or tenderness  Left: Skin change ( mastectomy scar with implant) present  No mass or tenderness  Abdominal:      Palpations: Abdomen is soft  Lymphadenopathy:      Upper Body:      Right upper body: No supraclavicular, axillary or pectoral adenopathy        Left upper body: No supraclavicular, axillary or pectoral adenopathy  Neurological:      Mental Status: She is alert and oriented to person, place, and time  Psychiatric:         Mood and Affect: Mood normal              Discussion/Summary: 51-year-old female status post bilateral mastectomy and reconstruction for a left-sided invasive ductal carcinoma  She did have adjuvant chemotherapy  She was not able to tolerate the aromatase inhibitor  She is currently on tamoxifen and is doing much better on this  There is no evidence of disease based on examination today  I will plan to see her again in six months or sooner should the need arise

## 2021-06-19 ENCOUNTER — HOSPITAL ENCOUNTER (OUTPATIENT)
Dept: RADIOLOGY | Age: 71
Discharge: HOME | End: 2021-06-19
Payer: COMMERCIAL

## 2021-06-19 DIAGNOSIS — C50.312 MALIGNANT NEOPLASM OF LOWER-INNER QUADRANT OF LEFT BREAST IN FEMALE, ESTROGEN RECEPTOR POSITIVE (HCC): ICD-10-CM

## 2021-06-19 DIAGNOSIS — T38.6X5A OSTEOPOROSIS DUE TO AROMATASE INHIBITOR: ICD-10-CM

## 2021-06-19 DIAGNOSIS — Z17.0 MALIGNANT NEOPLASM OF LOWER-INNER QUADRANT OF LEFT BREAST IN FEMALE, ESTROGEN RECEPTOR POSITIVE (HCC): ICD-10-CM

## 2021-06-19 DIAGNOSIS — M81.8 OSTEOPOROSIS DUE TO AROMATASE INHIBITOR: ICD-10-CM

## 2021-06-19 PROCEDURE — 77080 DXA BONE DENSITY AXIAL: CPT

## 2021-07-15 ENCOUNTER — APPOINTMENT (OUTPATIENT)
Dept: LAB | Facility: HOSPITAL | Age: 71
End: 2021-07-15
Attending: INTERNAL MEDICINE
Payer: COMMERCIAL

## 2021-07-15 DIAGNOSIS — C50.312 MALIGNANT NEOPLASM OF LOWER-INNER QUADRANT OF LEFT BREAST IN FEMALE, ESTROGEN RECEPTOR POSITIVE (HCC): ICD-10-CM

## 2021-07-15 DIAGNOSIS — Z17.0 MALIGNANT NEOPLASM OF LOWER-INNER QUADRANT OF LEFT BREAST IN FEMALE, ESTROGEN RECEPTOR POSITIVE (HCC): ICD-10-CM

## 2021-07-15 DIAGNOSIS — E04.1 THYROID NODULE: ICD-10-CM

## 2021-07-15 LAB
ALBUMIN SERPL BCP-MCNC: 3.2 G/DL (ref 3.5–5)
ALP SERPL-CCNC: 53 U/L (ref 46–116)
ALT SERPL W P-5'-P-CCNC: 23 U/L (ref 12–78)
ANION GAP SERPL CALCULATED.3IONS-SCNC: 5 MMOL/L (ref 4–13)
AST SERPL W P-5'-P-CCNC: 18 U/L (ref 5–45)
BASOPHILS # BLD AUTO: 0.04 THOUSANDS/ΜL (ref 0–0.1)
BASOPHILS NFR BLD AUTO: 1 % (ref 0–1)
BILIRUB SERPL-MCNC: 0.54 MG/DL (ref 0.2–1)
BUN SERPL-MCNC: 14 MG/DL (ref 5–25)
CALCIUM ALBUM COR SERPL-MCNC: 9.9 MG/DL (ref 8.3–10.1)
CALCIUM SERPL-MCNC: 9.3 MG/DL (ref 8.3–10.1)
CANCER AG27-29 SERPL-ACNC: 26.4 U/ML (ref 0–42.3)
CHLORIDE SERPL-SCNC: 106 MMOL/L (ref 100–108)
CO2 SERPL-SCNC: 27 MMOL/L (ref 21–32)
CREAT SERPL-MCNC: 0.68 MG/DL (ref 0.6–1.3)
EOSINOPHIL # BLD AUTO: 0.22 THOUSAND/ΜL (ref 0–0.61)
EOSINOPHIL NFR BLD AUTO: 4 % (ref 0–6)
ERYTHROCYTE [DISTWIDTH] IN BLOOD BY AUTOMATED COUNT: 13.1 % (ref 11.6–15.1)
GFR SERPL CREATININE-BSD FRML MDRD: 90 ML/MIN/1.73SQ M
GLUCOSE P FAST SERPL-MCNC: 92 MG/DL (ref 65–99)
HCT VFR BLD AUTO: 42.8 % (ref 34.8–46.1)
HGB BLD-MCNC: 14.1 G/DL (ref 11.5–15.4)
IMM GRANULOCYTES # BLD AUTO: 0.01 THOUSAND/UL (ref 0–0.2)
IMM GRANULOCYTES NFR BLD AUTO: 0 % (ref 0–2)
LYMPHOCYTES # BLD AUTO: 2.14 THOUSANDS/ΜL (ref 0.6–4.47)
LYMPHOCYTES NFR BLD AUTO: 41 % (ref 14–44)
MCH RBC QN AUTO: 29.1 PG (ref 26.8–34.3)
MCHC RBC AUTO-ENTMCNC: 32.9 G/DL (ref 31.4–37.4)
MCV RBC AUTO: 88 FL (ref 82–98)
MONOCYTES # BLD AUTO: 0.48 THOUSAND/ΜL (ref 0.17–1.22)
MONOCYTES NFR BLD AUTO: 9 % (ref 4–12)
NEUTROPHILS # BLD AUTO: 2.28 THOUSANDS/ΜL (ref 1.85–7.62)
NEUTS SEG NFR BLD AUTO: 45 % (ref 43–75)
NRBC BLD AUTO-RTO: 0 /100 WBCS
PLATELET # BLD AUTO: 180 THOUSANDS/UL (ref 149–390)
PMV BLD AUTO: 9.9 FL (ref 8.9–12.7)
POTASSIUM SERPL-SCNC: 4.2 MMOL/L (ref 3.5–5.3)
PROT SERPL-MCNC: 7.2 G/DL (ref 6.4–8.2)
RBC # BLD AUTO: 4.85 MILLION/UL (ref 3.81–5.12)
SODIUM SERPL-SCNC: 138 MMOL/L (ref 136–145)
T4 FREE SERPL-MCNC: 0.92 NG/DL (ref 0.76–1.46)
TSH SERPL DL<=0.05 MIU/L-ACNC: 2.01 UIU/ML (ref 0.36–3.74)
WBC # BLD AUTO: 5.17 THOUSAND/UL (ref 4.31–10.16)

## 2021-07-15 PROCEDURE — 84443 ASSAY THYROID STIM HORMONE: CPT

## 2021-07-15 PROCEDURE — 86300 IMMUNOASSAY TUMOR CA 15-3: CPT

## 2021-07-15 PROCEDURE — 85025 COMPLETE CBC W/AUTO DIFF WBC: CPT

## 2021-07-15 PROCEDURE — 36415 COLL VENOUS BLD VENIPUNCTURE: CPT

## 2021-07-15 PROCEDURE — 80053 COMPREHEN METABOLIC PANEL: CPT

## 2021-07-15 PROCEDURE — 84439 ASSAY OF FREE THYROXINE: CPT

## 2021-07-22 ENCOUNTER — OFFICE VISIT (OUTPATIENT)
Dept: HEMATOLOGY ONCOLOGY | Facility: CLINIC | Age: 71
End: 2021-07-22
Payer: COMMERCIAL

## 2021-07-22 VITALS
HEART RATE: 79 BPM | SYSTOLIC BLOOD PRESSURE: 120 MMHG | BODY MASS INDEX: 31.16 KG/M2 | HEIGHT: 65 IN | RESPIRATION RATE: 18 BRPM | OXYGEN SATURATION: 97 % | DIASTOLIC BLOOD PRESSURE: 72 MMHG | TEMPERATURE: 97.7 F | WEIGHT: 187 LBS

## 2021-07-22 DIAGNOSIS — G62.0 DRUG-INDUCED POLYNEUROPATHY (HCC): ICD-10-CM

## 2021-07-22 DIAGNOSIS — E04.1 THYROID NODULE: ICD-10-CM

## 2021-07-22 DIAGNOSIS — Z17.0 MALIGNANT NEOPLASM OF LOWER-INNER QUADRANT OF LEFT BREAST IN FEMALE, ESTROGEN RECEPTOR POSITIVE (HCC): Primary | ICD-10-CM

## 2021-07-22 DIAGNOSIS — C50.312 MALIGNANT NEOPLASM OF LOWER-INNER QUADRANT OF LEFT BREAST IN FEMALE, ESTROGEN RECEPTOR POSITIVE (HCC): Primary | ICD-10-CM

## 2021-07-22 DIAGNOSIS — E78.2 ELEVATED CHOLESTEROL WITH HIGH TRIGLYCERIDES: ICD-10-CM

## 2021-07-22 DIAGNOSIS — Z79.810 USE OF TAMOXIFEN (NOLVADEX): ICD-10-CM

## 2021-07-22 DIAGNOSIS — M15.9 OSTEOARTHRITIS OF MULTIPLE JOINTS, UNSPECIFIED OSTEOARTHRITIS TYPE: ICD-10-CM

## 2021-07-22 PROCEDURE — 99214 OFFICE O/P EST MOD 30 MIN: CPT | Performed by: INTERNAL MEDICINE

## 2021-07-22 NOTE — PROGRESS NOTES
HPI:   In January 2019 patient had bilateral mastectomies and left axillary lymph node  sampling for   hormone receptor-positive and HER2 negative stage I invasive left breast cancer    Tumor was 1 2 cm, grade 3, strongly positive ER, positive [place], negative HER 2, positive lymphovascular invasion, negative 3 sentinel lymph nodes, and Oncotype score was 25   Patient had 4 cycles of adjuvant chemotherapy, combination of Taxotere and Cytoxan and Neulasta and that was followed by aromatase inhibitors and she had problems with all 3 aromatase inhibitors   Lot of musculoskeletal symptoms  Since May 2020 she has been on tamoxifen and has less musculoskeletal symptoms  She has tolerable hot flashes  Has some tiredness     She also has bilateral carpal tunnel problem but has not have surgeries for carpal for problem     She states she is not ready for carpal tunnel surgeries     She follows with her rheumatologist      There is family history of breast cancer  Patient states that she tested negative for BRCA   Sherre Soulier Recently found to have high triglycerides and she contacted her primary physician for management  She is watching her diet and she wants lipid profile recheck prior to next visit in 4 months                          Current Outpatient Medications:     CALCIUM PO, Take 1,200 mg by mouth daily, Disp: , Rfl:     Cholecalciferol (VITAMIN D PO), Take 1 25 mg by mouth every 30 (thirty) days, Disp: , Rfl:     Cyanocobalamin (VITAMIN B 12 PO), Take 500 mcg by mouth daily , Disp: , Rfl:     ergocalciferol (VITAMIN D2) 50,000 units, TAKE 1 CAPSULE MONTHLY, Disp: 3 capsule, Rfl: 3    Multiple Vitamins-Minerals (MULTIVITAL PO), Take by mouth daily , Disp: , Rfl:     pantoprazole (PROTONIX) 40 mg tablet, Take 40 mg by mouth daily as needed , Disp: , Rfl:     Soolantra 1 % CREA, , Disp: , Rfl:     tamoxifen (NOLVADEX) 20 mg tablet, Take 1 tablet (20 mg total) by mouth 2 (two) times a day Take 1 tablet 20mg daily, Disp: 90 tablet, Rfl: 1    triamcinolone (KENALOG) 0 1 % ointment, , Disp: , Rfl:     COLLAGEN PO, Take by mouth Powder - mixes with tea daily, Disp: , Rfl:     Turmeric 400 MG CAPS, Take 1 capsule by mouth daily, Disp: , Rfl:     Allergies   Allergen Reactions    Latex Anaphylaxis    Shellfish Allergy - Food Allergy Anaphylaxis    Shellfish-Derived Products - Food Allergy Anaphylaxis       Oncology History   Malignant neoplasm of lower-inner quadrant of left breast in female, estrogen receptor positive ([facility] [address] )   1/2/2019 Initial Diagnosis    Malignant neoplasm of lower-inner quadrant of left breast in female, estrogen receptor positive [facility])      Chemotherapy    April 2019:  Adjuvant  Taxotere and Cytoxan once every 3 weeks for 4 cycles followed by aromatase inhibitor     4/11/2019 - 7/3/2019 Chemotherapy    pegfilgrastim (NEULASTA) subcutaneous injection 6 mg, 6 mg, Subcutaneous, Once, 4 of 4 cycles  Dose modification: 4 mg (original dose 6 mg, Cycle 3)  Administration: 4 mg (5/24/2019), 4 mg (6/14/2019)  cyclophosphamide (CYTOXAN) 1,176 mg in sodium chloride 0 9 % 250 mL IVPB, 600 mg/m2 = 1,176 mg, Intravenous, Once, 4 of 4 cycles  Administration: 1,176 mg (5/23/2019), 1,176 mg (6/13/2019)  DOCEtaxel (TAXOTERE) 147 mg in sodium chloride 0 9 % 250 mL chemo infusion, 75 mg/m2 = 147 mg, Intravenous, Once, 4 of 4 cycles  Administration: 147 mg (5/23/2019), 147 mg (6/13/2019)         ROS:  07/22/21 Reviewed 12 systems: See symptoms in HPI:   Presently no other neurological, cardiac, pulmonary, GI and  symptoms other than listed in HPI  No  fever, chills, bleeding, bone pains, skin rash, weight loss,  weakness, numbness,  claudication and gait problem  No frequent infections    No swelling of the ankles  No swollen glands  Patient is not anxious       No new symptoms since her last visit      /72 (BP Location: Right arm, Patient Position: Sitting, Cuff Size: Adult)   Pulse 79   Temp 97 7 °F (36 5 °C)   Resp 18   Ht 5' 5" (1 651 m)   Wt 84 8 kg (187 lb)   SpO2 97%   BMI 31 12 kg/m²     Physical Exam:  Our medical assistant Douglas Campa was in the room with me during examination    Patient is alert and oriented and not in distress and has stable vital signs, no icterus, no oral thrush, no palpable neck mass, clear lung fields, regular heart rate, abdomen  soft and non tender, no palpable abdominal mass, no ascites, no edema of ankles, no calf tenderness, no focal neurological deficit, no skin rash, no palpable lymphadenopathy in the neck and axillary areas,  no clubbing  Patient is not anxious  Performance status 1   No lymphedema    IMAGING:    RESULTS:  On 06/19/2021  LUMBAR SPINE L1-L4 :   BMD  0 938  gm/cm2   T-score -1 0         LEFT  TOTAL HIP:   BMD:  0 722  gm/cm2   T-score:  -1 8     LEFT  FEMORAL NECK:   BMD:  0 629  gm/cm2   T score: -2 0               IMPRESSION:  1  Low bone mass (OSTEOPENIA)   [Based on the left femoral neck]  LABS:    Results for orders placed or performed in visit on 07/15/21   CBC and differential   Result Value Ref Range    WBC 5 17 4 31 - 10 16 Thousand/uL    RBC 4 85 3 81 - 5 12 Million/uL    Hemoglobin 14 1 11 5 - 15 4 g/dL    Hematocrit 42 8 34 8 - 46 1 %    MCV 88 82 - 98 fL    MCH 29 1 26 8 - 34 3 pg    MCHC 32 9 31 4 - 37 4 g/dL    RDW 13 1 11 6 - 15 1 %    MPV 9 9 8 9 - 12 7 fL    Platelets 154 667 - 929 Thousands/uL    nRBC 0 /100 WBCs    Neutrophils Relative 45 43 - 75 %    Immat GRANS % 0 0 - 2 %    Lymphocytes Relative 41 14 - 44 %    Monocytes Relative 9 4 - 12 %    Eosinophils Relative 4 0 - 6 %    Basophils Relative 1 0 - 1 %    Neutrophils Absolute 2 28 1 85 - 7 62 Thousands/µL    Immature Grans Absolute 0 01 0 00 - 0 20 Thousand/uL    Lymphocytes Absolute 2 14 0 60 - 4 47 Thousands/µL    Monocytes Absolute 0 48 0 17 - 1 22 Thousand/µL    Eosinophils Absolute 0 22 0 00 - 0 61 Thousand/µL    Basophils Absolute 0 04 0 00 - 0 10 Thousands/µL Comprehensive metabolic panel   Result Value Ref Range    Sodium 138 136 - 145 mmol/L    Potassium 4 2 3 5 - 5 3 mmol/L    Chloride 106 100 - 108 mmol/L    CO2 27 21 - 32 mmol/L    ANION GAP 5 4 - 13 mmol/L    BUN 14 5 - 25 mg/dL    Creatinine 0 68 0 60 - 1 30 mg/dL    Glucose, Fasting 92 65 - 99 mg/dL    Calcium 9 3 8 3 - 10 1 mg/dL    Corrected Calcium 9 9 8 3 - 10 1 mg/dL    AST 18 5 - 45 U/L    ALT 23 12 - 78 U/L    Alkaline Phosphatase 53 46 - 116 U/L    Total Protein 7 2 6 4 - 8 2 g/dL    Albumin 3 2 (L) 3 5 - 5 0 g/dL    Total Bilirubin 0 54 0 20 - 1 00 mg/dL    eGFR 90 ml/min/1 73sq m   Cancer antigen 27 29   Result Value Ref Range    CA 27 29 26 4 0 0 - 42 3 U/mL   TSH, 3rd generation Lab Collect   Result Value Ref Range    TSH 3RD GENERATON 2 010 0 358 - 3 740 uIU/mL   T4, free Lab Collect   Result Value Ref Range    Free T4 0 92 0 76 - 1 46 ng/dL     Labs, Imaging, & Other studies:   All pertinent labs and imaging studies were personally reviewed      Reviewed CBC, CMP and CA 27-29 and  TSH and discussed with patient  Assessment and plan: In January 2019 patient had bilateral mastectomies and left axillary lymph node  sampling for   hormone receptor-positive and HER2 negative stage I invasive left breast cancer    Tumor was 1 2 cm, grade 3, strongly positive ER, positive [place], negative HER 2, positive lymphovascular invasion, negative 3 sentinel lymph nodes, and Oncotype score was 25   Patient had 4 cycles of adjuvant chemotherapy, combination of Taxotere and Cytoxan and Neulasta and that was followed by aromatase inhibitors and she had problems with all 3 aromatase inhibitors   Lot of musculoskeletal symptoms  Since May 2020 she has been on tamoxifen and has less musculoskeletal symptoms  She has tolerable hot flashes  Has some tiredness     She also has bilateral carpal tunnel problem but has not have surgeries for carpal for problem     She states she is not ready for carpal tunnel surgeries     She follows with her rheumatologist      There is family history of breast cancer  Patient states that she tested negative for BRCA   Maalaea Piles Recently found to have high triglycerides and she contacted her primary physician for management  She is watching her diet and she wants lipid profile recheck prior to next visit in 4 months          Juan Carlos Chance examination and test results are as recorded and discussed     Patient will continue to take tamoxifen  20 mg daily  Patient is taking vitamin-D and calcium and is staying active   For thyroid nodule she goes to her endocrinologist       For arthritic symptoms she follows with her rheumatologist   She follows with her breast surgeon for examination  [employer] discussed in detail   Questions answered     Discussed the importance of eating healthy foods, staying active and health screening tests   Patient is capable of self-care     Discussed precautions against coronavirus     Patient will continue to follow with her primary physician and other consultants  See diagnoses, orders and instructions  1  Malignant neoplasm of lower-inner quadrant of left breast in female, estrogen receptor positive ([address] )      2  Use of tamoxifen (Nolvadex)      3  Osteoarthritis of multiple joints, unspecified osteoarthritis type      4  Drug-induced polyneuropathy ([address] )      5  Thyroid nodule    6  High triglycerides      Blood work prior to next visit in 4 months  Fasting specimen            Patient voiced understanding and agreed      Counseling / Coordination of Care        Provided counseling and support

## 2021-08-18 ENCOUNTER — TELEPHONE (OUTPATIENT)
Dept: HEMATOLOGY ONCOLOGY | Facility: CLINIC | Age: 71
End: 2021-08-18

## 2021-08-18 NOTE — TELEPHONE ENCOUNTER
Patient calling to see if Dr Alejandra Peterson recommends she get the COVID booster shot  Per patient she completed chemo over 1 year ago (in chart looks like last treatment was 2019) and has been on Tamoxifen since May 2020    Will send to RN to confirm recommendation with Dr Alejandra Peterson  Patient requesting a call back @ [phone]

## 2021-08-19 NOTE — TELEPHONE ENCOUNTER
Spoke with patient, stating that Dr Tung Hardy recommends she follow CDC guidelines and when it is available for her, if she would like to have it she can make that decision  Patient verbalized understanding

## 2021-11-04 ENCOUNTER — IMMUNIZATIONS (OUTPATIENT)
Dept: FAMILY MEDICINE CLINIC | Facility: CLINIC | Age: 71
End: 2021-11-04

## 2021-11-04 DIAGNOSIS — Z23 ENCOUNTER FOR IMMUNIZATION: Primary | ICD-10-CM

## 2021-11-04 PROCEDURE — 91306 PR SARSCOV2 VAC 50MCG/0.25ML IM: CPT

## 2021-11-10 ENCOUNTER — APPOINTMENT (OUTPATIENT)
Dept: LAB | Facility: HOSPITAL | Age: 71
End: 2021-11-10
Attending: INTERNAL MEDICINE
Payer: COMMERCIAL

## 2021-11-10 DIAGNOSIS — C50.312 MALIGNANT NEOPLASM OF LOWER-INNER QUADRANT OF LEFT BREAST IN FEMALE, ESTROGEN RECEPTOR POSITIVE (HCC): ICD-10-CM

## 2021-11-10 DIAGNOSIS — Z17.0 MALIGNANT NEOPLASM OF LOWER-INNER QUADRANT OF LEFT BREAST IN FEMALE, ESTROGEN RECEPTOR POSITIVE (HCC): ICD-10-CM

## 2021-11-10 DIAGNOSIS — E78.2 ELEVATED CHOLESTEROL WITH HIGH TRIGLYCERIDES: ICD-10-CM

## 2021-11-10 LAB
ALBUMIN SERPL BCP-MCNC: 3.3 G/DL (ref 3.5–5)
ALP SERPL-CCNC: 55 U/L (ref 46–116)
ALT SERPL W P-5'-P-CCNC: 24 U/L (ref 12–78)
ANION GAP SERPL CALCULATED.3IONS-SCNC: 3 MMOL/L (ref 4–13)
AST SERPL W P-5'-P-CCNC: 22 U/L (ref 5–45)
BASOPHILS # BLD AUTO: 0.06 THOUSANDS/ΜL (ref 0–0.1)
BASOPHILS NFR BLD AUTO: 1 % (ref 0–1)
BILIRUB SERPL-MCNC: 0.38 MG/DL (ref 0.2–1)
BUN SERPL-MCNC: 11 MG/DL (ref 5–25)
CALCIUM ALBUM COR SERPL-MCNC: 9.9 MG/DL (ref 8.3–10.1)
CALCIUM SERPL-MCNC: 9.3 MG/DL (ref 8.3–10.1)
CANCER AG27-29 SERPL-ACNC: 20.3 U/ML (ref 0–42.3)
CHLORIDE SERPL-SCNC: 107 MMOL/L (ref 100–108)
CHOLEST SERPL-MCNC: 192 MG/DL (ref 50–200)
CO2 SERPL-SCNC: 29 MMOL/L (ref 21–32)
CREAT SERPL-MCNC: 0.66 MG/DL (ref 0.6–1.3)
EOSINOPHIL # BLD AUTO: 0.31 THOUSAND/ΜL (ref 0–0.61)
EOSINOPHIL NFR BLD AUTO: 6 % (ref 0–6)
ERYTHROCYTE [DISTWIDTH] IN BLOOD BY AUTOMATED COUNT: 13.1 % (ref 11.6–15.1)
GFR SERPL CREATININE-BSD FRML MDRD: 91 ML/MIN/1.73SQ M
GLUCOSE P FAST SERPL-MCNC: 82 MG/DL (ref 65–99)
HCT VFR BLD AUTO: 43.8 % (ref 34.8–46.1)
HDLC SERPL-MCNC: 62 MG/DL
HGB BLD-MCNC: 14.1 G/DL (ref 11.5–15.4)
IMM GRANULOCYTES # BLD AUTO: 0.02 THOUSAND/UL (ref 0–0.2)
IMM GRANULOCYTES NFR BLD AUTO: 0 % (ref 0–2)
LDLC SERPL CALC-MCNC: 101 MG/DL (ref 0–100)
LYMPHOCYTES # BLD AUTO: 2.56 THOUSANDS/ΜL (ref 0.6–4.47)
LYMPHOCYTES NFR BLD AUTO: 45 % (ref 14–44)
MCH RBC QN AUTO: 28.5 PG (ref 26.8–34.3)
MCHC RBC AUTO-ENTMCNC: 32.2 G/DL (ref 31.4–37.4)
MCV RBC AUTO: 89 FL (ref 82–98)
MONOCYTES # BLD AUTO: 0.48 THOUSAND/ΜL (ref 0.17–1.22)
MONOCYTES NFR BLD AUTO: 9 % (ref 4–12)
NEUTROPHILS # BLD AUTO: 2.23 THOUSANDS/ΜL (ref 1.85–7.62)
NEUTS SEG NFR BLD AUTO: 39 % (ref 43–75)
NONHDLC SERPL-MCNC: 130 MG/DL
NRBC BLD AUTO-RTO: 0 /100 WBCS
PLATELET # BLD AUTO: 193 THOUSANDS/UL (ref 149–390)
PMV BLD AUTO: 10.2 FL (ref 8.9–12.7)
POTASSIUM SERPL-SCNC: 4 MMOL/L (ref 3.5–5.3)
PROT SERPL-MCNC: 7.1 G/DL (ref 6.4–8.2)
RBC # BLD AUTO: 4.94 MILLION/UL (ref 3.81–5.12)
SODIUM SERPL-SCNC: 139 MMOL/L (ref 136–145)
TRIGL SERPL-MCNC: 146 MG/DL
WBC # BLD AUTO: 5.66 THOUSAND/UL (ref 4.31–10.16)

## 2021-11-10 PROCEDURE — 85025 COMPLETE CBC W/AUTO DIFF WBC: CPT

## 2021-11-10 PROCEDURE — 80053 COMPREHEN METABOLIC PANEL: CPT

## 2021-11-10 PROCEDURE — 86300 IMMUNOASSAY TUMOR CA 15-3: CPT

## 2021-11-10 PROCEDURE — 80061 LIPID PANEL: CPT

## 2021-11-10 PROCEDURE — 36415 COLL VENOUS BLD VENIPUNCTURE: CPT

## 2021-11-15 ENCOUNTER — OFFICE VISIT (OUTPATIENT)
Dept: HEMATOLOGY ONCOLOGY | Facility: CLINIC | Age: 71
End: 2021-11-15
Payer: COMMERCIAL

## 2021-11-15 VITALS
OXYGEN SATURATION: 97 % | WEIGHT: 198 LBS | BODY MASS INDEX: 32.99 KG/M2 | HEIGHT: 65 IN | HEART RATE: 78 BPM | RESPIRATION RATE: 18 BRPM | TEMPERATURE: 98.2 F | DIASTOLIC BLOOD PRESSURE: 70 MMHG | SYSTOLIC BLOOD PRESSURE: 122 MMHG

## 2021-11-15 DIAGNOSIS — Z17.0 MALIGNANT NEOPLASM OF LOWER-INNER QUADRANT OF LEFT BREAST IN FEMALE, ESTROGEN RECEPTOR POSITIVE (HCC): Primary | ICD-10-CM

## 2021-11-15 DIAGNOSIS — E78.49 OTHER HYPERLIPIDEMIA: ICD-10-CM

## 2021-11-15 DIAGNOSIS — G62.0 DRUG-INDUCED POLYNEUROPATHY (HCC): ICD-10-CM

## 2021-11-15 DIAGNOSIS — C50.312 MALIGNANT NEOPLASM OF LOWER-INNER QUADRANT OF LEFT BREAST IN FEMALE, ESTROGEN RECEPTOR POSITIVE (HCC): Primary | ICD-10-CM

## 2021-11-15 DIAGNOSIS — Z79.810 USE OF TAMOXIFEN (NOLVADEX): ICD-10-CM

## 2021-11-15 DIAGNOSIS — M19.90 ARTHRITIS: ICD-10-CM

## 2021-11-15 PROCEDURE — 99214 OFFICE O/P EST MOD 30 MIN: CPT | Performed by: INTERNAL MEDICINE

## 2021-11-15 RX ORDER — TAMOXIFEN CITRATE 20 MG/1
20 TABLET ORAL 2 TIMES DAILY
Qty: 90 TABLET | Refills: 1 | Status: SHIPPED | OUTPATIENT
Start: 2021-11-15 | End: 2022-05-16 | Stop reason: SDUPTHER

## 2021-11-22 ENCOUNTER — TELEPHONE (OUTPATIENT)
Dept: INTERNAL MEDICINE CLINIC | Facility: CLINIC | Age: 71
End: 2021-11-22

## 2021-11-22 DIAGNOSIS — Z11.9 ENCOUNTER FOR SCREENING FOR INFECTIOUS AND PARASITIC DISEASES, UNSPECIFIED: ICD-10-CM

## 2021-11-22 DIAGNOSIS — Z12.11 ENCOUNTER FOR SCREENING COLONOSCOPY: Primary | ICD-10-CM

## 2021-11-28 ENCOUNTER — APPOINTMENT (OUTPATIENT)
Dept: LAB | Facility: HOSPITAL | Age: 71
End: 2021-11-28
Payer: COMMERCIAL

## 2021-11-28 DIAGNOSIS — Z11.9 ENCOUNTER FOR SCREENING FOR INFECTIOUS AND PARASITIC DISEASES, UNSPECIFIED: ICD-10-CM

## 2021-11-28 DIAGNOSIS — Z12.11 ENCOUNTER FOR SCREENING COLONOSCOPY: ICD-10-CM

## 2021-11-28 PROCEDURE — U0003 INFECTIOUS AGENT DETECTION BY NUCLEIC ACID (DNA OR RNA); SEVERE ACUTE RESPIRATORY SYNDROME CORONAVIRUS 2 (SARS-COV-2) (CORONAVIRUS DISEASE [COVID-19]), AMPLIFIED PROBE TECHNIQUE, MAKING USE OF HIGH THROUGHPUT TECHNOLOGIES AS DESCRIBED BY CMS-2020-01-R: HCPCS

## 2021-11-28 PROCEDURE — U0005 INFEC AGEN DETEC AMPLI PROBE: HCPCS

## 2021-11-29 LAB — SARS-COV-2 RNA RESP QL NAA+PROBE: NEGATIVE

## 2021-12-15 ENCOUNTER — APPOINTMENT (OUTPATIENT)
Dept: LAB | Facility: HOSPITAL | Age: 71
End: 2021-12-15
Attending: INTERNAL MEDICINE
Payer: COMMERCIAL

## 2021-12-15 DIAGNOSIS — Z17.0 MALIGNANT NEOPLASM OF LOWER-INNER QUADRANT OF LEFT BREAST IN FEMALE, ESTROGEN RECEPTOR POSITIVE (HCC): ICD-10-CM

## 2021-12-15 DIAGNOSIS — C50.312 MALIGNANT NEOPLASM OF LOWER-INNER QUADRANT OF LEFT BREAST IN FEMALE, ESTROGEN RECEPTOR POSITIVE (HCC): ICD-10-CM

## 2021-12-15 LAB
BASOPHILS # BLD AUTO: 0.03 THOUSANDS/ΜL (ref 0–0.1)
BASOPHILS NFR BLD AUTO: 1 % (ref 0–1)
EOSINOPHIL # BLD AUTO: 0.2 THOUSAND/ΜL (ref 0–0.61)
EOSINOPHIL NFR BLD AUTO: 4 % (ref 0–6)
ERYTHROCYTE [DISTWIDTH] IN BLOOD BY AUTOMATED COUNT: 13.2 % (ref 11.6–15.1)
HCT VFR BLD AUTO: 44.1 % (ref 34.8–46.1)
HGB BLD-MCNC: 14 G/DL (ref 11.5–15.4)
IMM GRANULOCYTES # BLD AUTO: 0.01 THOUSAND/UL (ref 0–0.2)
IMM GRANULOCYTES NFR BLD AUTO: 0 % (ref 0–2)
LYMPHOCYTES # BLD AUTO: 1.36 THOUSANDS/ΜL (ref 0.6–4.47)
LYMPHOCYTES NFR BLD AUTO: 24 % (ref 14–44)
MCH RBC QN AUTO: 28.6 PG (ref 26.8–34.3)
MCHC RBC AUTO-ENTMCNC: 31.7 G/DL (ref 31.4–37.4)
MCV RBC AUTO: 90 FL (ref 82–98)
MONOCYTES # BLD AUTO: 0.4 THOUSAND/ΜL (ref 0.17–1.22)
MONOCYTES NFR BLD AUTO: 7 % (ref 4–12)
NEUTROPHILS # BLD AUTO: 3.64 THOUSANDS/ΜL (ref 1.85–7.62)
NEUTS SEG NFR BLD AUTO: 64 % (ref 43–75)
NRBC BLD AUTO-RTO: 0 /100 WBCS
PLATELET # BLD AUTO: 185 THOUSANDS/UL (ref 149–390)
PMV BLD AUTO: 10 FL (ref 8.9–12.7)
RBC # BLD AUTO: 4.9 MILLION/UL (ref 3.81–5.12)
WBC # BLD AUTO: 5.64 THOUSAND/UL (ref 4.31–10.16)

## 2021-12-15 PROCEDURE — 85025 COMPLETE CBC W/AUTO DIFF WBC: CPT

## 2021-12-15 PROCEDURE — 36415 COLL VENOUS BLD VENIPUNCTURE: CPT

## 2021-12-20 ENCOUNTER — OFFICE VISIT (OUTPATIENT)
Dept: URGENT CARE | Age: 71
End: 2021-12-20
Payer: COMMERCIAL

## 2021-12-20 VITALS — OXYGEN SATURATION: 96 % | TEMPERATURE: 97.6 F | HEART RATE: 91 BPM | RESPIRATION RATE: 18 BRPM

## 2021-12-20 DIAGNOSIS — J02.9 SORE THROAT: Primary | ICD-10-CM

## 2021-12-20 DIAGNOSIS — R09.81 NASAL CONGESTION: ICD-10-CM

## 2021-12-20 DIAGNOSIS — H92.09 EARACHE: ICD-10-CM

## 2021-12-20 PROCEDURE — G0382 LEV 3 HOSP TYPE B ED VISIT: HCPCS | Performed by: PHYSICIAN ASSISTANT

## 2021-12-20 PROCEDURE — 87636 SARSCOV2 & INF A&B AMP PRB: CPT | Performed by: PHYSICIAN ASSISTANT

## 2021-12-20 RX ORDER — AMOXICILLIN 500 MG/1
500 CAPSULE ORAL EVERY 12 HOURS SCHEDULED
Qty: 20 CAPSULE | Refills: 0 | Status: SHIPPED | OUTPATIENT
Start: 2021-12-20 | End: 2021-12-30

## 2021-12-20 RX ORDER — AZITHROMYCIN 250 MG/1
TABLET, FILM COATED ORAL
Qty: 6 TABLET | Refills: 0 | Status: SHIPPED | OUTPATIENT
Start: 2021-12-20 | End: 2021-12-20 | Stop reason: ALTCHOICE

## 2021-12-22 ENCOUNTER — OFFICE VISIT (OUTPATIENT)
Dept: SURGICAL ONCOLOGY | Facility: CLINIC | Age: 71
End: 2021-12-22
Payer: COMMERCIAL

## 2021-12-22 VITALS
BODY MASS INDEX: 32.82 KG/M2 | WEIGHT: 197 LBS | HEART RATE: 74 BPM | RESPIRATION RATE: 17 BRPM | DIASTOLIC BLOOD PRESSURE: 70 MMHG | OXYGEN SATURATION: 96 % | SYSTOLIC BLOOD PRESSURE: 112 MMHG | HEIGHT: 65 IN

## 2021-12-22 DIAGNOSIS — C50.312 MALIGNANT NEOPLASM OF LOWER-INNER QUADRANT OF LEFT BREAST IN FEMALE, ESTROGEN RECEPTOR POSITIVE (HCC): Primary | ICD-10-CM

## 2021-12-22 DIAGNOSIS — Z79.810 USE OF TAMOXIFEN (NOLVADEX): ICD-10-CM

## 2021-12-22 DIAGNOSIS — Z17.0 MALIGNANT NEOPLASM OF LOWER-INNER QUADRANT OF LEFT BREAST IN FEMALE, ESTROGEN RECEPTOR POSITIVE (HCC): Primary | ICD-10-CM

## 2021-12-22 LAB
FLUAV RNA RESP QL NAA+PROBE: NEGATIVE
FLUBV RNA RESP QL NAA+PROBE: NEGATIVE
SARS-COV-2 RNA RESP QL NAA+PROBE: NEGATIVE

## 2021-12-22 PROCEDURE — 99214 OFFICE O/P EST MOD 30 MIN: CPT | Performed by: SURGERY

## 2022-03-18 ENCOUNTER — TELEPHONE (OUTPATIENT)
Dept: HEMATOLOGY ONCOLOGY | Facility: CLINIC | Age: 72
End: 2022-03-18

## 2022-03-18 NOTE — TELEPHONE ENCOUNTER
CALL RETURN FORM   Reason for patient call? Symptom call    Patient feels as if she may need another iron infusion due to feeling weak      Patient's primary oncologist?  [facility] [address] [id number]   Name of person the patient was calling for? Garo Lemons    Any additional information to add, if applicable? n/a   Informed patient that the message will be forwarded to the team and someone will get back to them as soon as possible    Did you relay this information to the patient?   yes

## 2022-03-18 NOTE — TELEPHONE ENCOUNTER
Returned telephone call left a voice message to call our office back    I do not see in the chart where pt had iv iron in the past

## 2022-03-23 ENCOUNTER — APPOINTMENT (OUTPATIENT)
Dept: LAB | Facility: HOSPITAL | Age: 72
End: 2022-03-23

## 2022-03-23 DIAGNOSIS — Z00.8 ENCOUNTER FOR OTHER GENERAL EXAMINATION: ICD-10-CM

## 2022-03-23 LAB
CHOLEST SERPL-MCNC: 176 MG/DL
EST. AVERAGE GLUCOSE BLD GHB EST-MCNC: 108 MG/DL
HBA1C MFR BLD: 5.4 %
HDLC SERPL-MCNC: 59 MG/DL
LDLC SERPL CALC-MCNC: 99 MG/DL (ref 0–100)
NONHDLC SERPL-MCNC: 117 MG/DL
TRIGL SERPL-MCNC: 91 MG/DL

## 2022-03-23 PROCEDURE — 36415 COLL VENOUS BLD VENIPUNCTURE: CPT

## 2022-03-23 PROCEDURE — 80061 LIPID PANEL: CPT

## 2022-03-23 PROCEDURE — 83036 HEMOGLOBIN GLYCOSYLATED A1C: CPT

## 2022-04-27 ENCOUNTER — OFFICE VISIT (OUTPATIENT)
Dept: INTERNAL MEDICINE CLINIC | Facility: CLINIC | Age: 72
End: 2022-04-27
Payer: COMMERCIAL

## 2022-04-27 VITALS
BODY MASS INDEX: 32.82 KG/M2 | HEART RATE: 95 BPM | TEMPERATURE: 97 F | HEIGHT: 65 IN | SYSTOLIC BLOOD PRESSURE: 120 MMHG | OXYGEN SATURATION: 98 % | DIASTOLIC BLOOD PRESSURE: 66 MMHG | WEIGHT: 197 LBS

## 2022-04-27 DIAGNOSIS — G47.09 OTHER INSOMNIA: ICD-10-CM

## 2022-04-27 DIAGNOSIS — E55.9 VITAMIN D DEFICIENCY: ICD-10-CM

## 2022-04-27 DIAGNOSIS — J04.10 TRACHEITIS: Primary | ICD-10-CM

## 2022-04-27 DIAGNOSIS — B34.9 VIRAL SYNDROME: ICD-10-CM

## 2022-04-27 PROCEDURE — 99214 OFFICE O/P EST MOD 30 MIN: CPT | Performed by: INTERNAL MEDICINE

## 2022-04-27 RX ORDER — DEXTROMETHORPHAN HYDROBROMIDE AND PROMETHAZINE HYDROCHLORIDE 15; 6.25 MG/5ML; MG/5ML
5 SOLUTION ORAL 4 TIMES DAILY PRN
Qty: 150 ML | Refills: 0 | Status: SHIPPED | OUTPATIENT
Start: 2022-04-27 | End: 2022-04-27

## 2022-04-27 RX ORDER — PROMETHAZINE HYDROCHLORIDE AND CODEINE PHOSPHATE 6.25; 1 MG/5ML; MG/5ML
5 SYRUP ORAL EVERY 6 HOURS PRN
Qty: 150 ML | Refills: 0 | Status: SHIPPED | OUTPATIENT
Start: 2022-04-27

## 2022-04-27 NOTE — PROGRESS NOTES
Assessment/Plan:           1  Tracheitis  -     promethazine-codeine (PHENERGAN WITH CODEINE) 6 25-10 mg/5 mL syrup; Take 5 mL by mouth every 6 (six) hours as needed for cough    2  Viral syndrome  Comments:  Vitamin-C vitamin-D zinc and plenty of fluids recommended  3  Other insomnia  Comments:  She has tried melatonin  Will add Unisom 25 mg at bedtime  Orders:  -     doxylamine (UNISOM) 25 MG tablet; Take 1 tablet (25 mg total) by mouth daily at bedtime as needed for sleep    4  Vitamin D deficiency           1  Tracheitis              Subjective:      Patient ID: Elyse Lynne is a 73 y o  female  This is a 79-year-old lady with a history of breast cancer GERD impaired fasting sugar thyroid nodule who complains of cough congestion and body aches  Home COVID test was negative  The following portions of the patient's history were reviewed and updated as appropriate: She  has a past medical history of Allergies, Arthritis, Carpal tunnel syndrome on right, Chemotherapy induced neutropenia ([address] ) (5/23/2019), Gastroesophageal reflux disease (8/6/2020), GERD (gastroesophageal reflux disease), Lumbar disc disease, Trigger thumb, Vitamin D deficiency, and Wears glasses    She   Patient Active Problem List    Diagnosis Date Noted    Other insomnia 05/01/2022    Urinary tract infection without hematuria 04/28/2021    Osteopenia 03/05/2021    Gastroesophageal reflux disease 08/06/2020    Breast reconstruction deformity     Use of tamoxifen (Nolvadex) 06/17/2020    Bilateral carpal tunnel syndrome 01/27/2020    Osteoarthritis of multiple joints 01/27/2020    Drug-induced polyneuropathy ([address] ) 01/27/2020    Arthritis 01/27/2020    Vitamin D deficiency 01/08/2020    Impaired fasting blood sugar 01/08/2020    Urinary tract infection with hematuria 11/10/2019    Dysuria 11/10/2019    Chemotherapy induced neutropenia ([address] ) 15/69/0619    Folliculitis 44/33/3006    Family history of breast cancer in sister 01/10/2019    Malignant neoplasm of lower-inner quadrant of left breast in female, estrogen receptor positive ([facility] [address] ) 01/02/2019    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    Thyroid nodule 12/15/2016    Elevated LFTs 10/26/2016    Nausea 10/26/2016    Atypical chest pain 10/24/2016    Epigastric pain 10/24/2016    Plantar fasciitis of right foot 10/20/2016     She  has a past surgical history that includes Thumb arthroscopy; pr edg us exam surgical alter stom duodenum/jejunum (N/A, 2/2/2017); pr part excis plantar fascia (Right, 10/20/2016); Oophorectomy (Bilateral); US guided breast biopsy left complete (Left, 1/2/2019); Breast biopsy (Left, 01/02/2019); Colonoscopy; pr mastectomy, simple, complete (N/A, 2/8/2019); pr biopsy/excision, lymph node(s) (Left, 2/8/2019); pr tissue expander placement breast reconstruction (Bilateral, 2/8/2019); pr implnt bio implnt for soft tissue reinforcement (Bilateral, 2/8/2019); pr huong-implant capsulectomy breast complete (Bilateral, 9/27/2019); pr insj/rplcmt breast implant sep day mastectomy (Bilateral, 9/27/2019); pr revision of reconstructed breast (Bilateral, 8/6/2020); Liposuction w/ fat injection (Bilateral, 8/6/2020); Hysterectomy; and [place] tooth extraction  Her family history includes Brain cancer (age of onset: 67) in her mother; Breast cancer (age of onset: 54) in her sister; Breast cancer (age of onset: 72) in her sister; Breast cancer additional onset (age of onset: 72) in her sister; Prostate cancer (age of onset: 62) in her brother; Prostate cancer (age of onset: 66) in her father  She  reports that she has never smoked  She has never used smokeless tobacco  She reports current alcohol use of about 1 0 standard drink of alcohol per week  She reports that she does not use drugs    Current Outpatient Medications   Medication Sig Dispense Refill    CALCIUM PO Take 1,200 mg by mouth daily      Cholecalciferol (VITAMIN D PO) Take 1 25 mg by mouth every 30 (thirty) days      Cyanocobalamin (VITAMIN B 12 PO) Take 500 mcg by mouth daily       ergocalciferol (VITAMIN D2) 50,000 units TAKE 1 CAPSULE MONTHLY 3 capsule 3    Multiple Vitamins-Minerals (MULTIVITAL PO) Take by mouth daily       pantoprazole (PROTONIX) 40 mg tablet Take 40 mg by mouth daily as needed       Soolantra 1 % CREA        tamoxifen (NOLVADEX) 20 mg tablet Take 1 tablet (20 mg total) by mouth 2 (two) times a day Take 1 tablet 20mg daily (Patient taking differently: Take 20 mg by mouth daily  ) 90 tablet 1    doxylamine (UNISOM) 25 MG tablet Take 1 tablet (25 mg total) by mouth daily at bedtime as needed for sleep 30 tablet 0    promethazine-codeine (PHENERGAN WITH CODEINE) 6 25-10 mg/5 mL syrup Take 5 mL by mouth every 6 (six) hours as needed for cough 150 mL 0    triamcinolone (KENALOG) 0 1 % ointment   (Patient not taking: Reported on 4/27/2022 )       No current facility-administered medications for this visit  Current Outpatient Medications on File Prior to Visit   Medication Sig    CALCIUM PO Take 1,200 mg by mouth daily    Cholecalciferol (VITAMIN D PO) Take 1 25 mg by mouth every 30 (thirty) days    Cyanocobalamin (VITAMIN B 12 PO) Take 500 mcg by mouth daily     ergocalciferol (VITAMIN D2) 50,000 units TAKE 1 CAPSULE MONTHLY    Multiple Vitamins-Minerals (MULTIVITAL PO) Take by mouth daily     pantoprazole (PROTONIX) 40 mg tablet Take 40 mg by mouth daily as needed     Soolantra 1 % CREA      tamoxifen (NOLVADEX) 20 mg tablet Take 1 tablet (20 mg total) by mouth 2 (two) times a day Take 1 tablet 20mg daily (Patient taking differently: Take 20 mg by mouth daily  )    triamcinolone (KENALOG) 0 1 % ointment   (Patient not taking: Reported on 4/27/2022 )     No current facility-administered medications on file prior to visit  She is allergic to latex, shellfish allergy - food allergy, and shellfish-derived products - food allergy       Review of Systems   Constitutional: Negative for appetite change, chills, fatigue and fever  HENT: Negative for sore throat and trouble swallowing  Eyes: Negative for redness  Respiratory: Positive for cough  Negative for shortness of breath  Cardiovascular: Negative for chest pain and palpitations  Gastrointestinal: Negative for abdominal pain, constipation and diarrhea  Genitourinary: Negative for dysuria and hematuria  Musculoskeletal: Negative for back pain and neck pain  Skin: Negative for rash  Neurological: Negative for seizures, weakness and headaches  Hematological: Negative for adenopathy  Psychiatric/Behavioral: Negative for confusion  The patient is not nervous/anxious  Objective:      /66 (BP Location: Left arm, Patient Position: Sitting, Cuff Size: Adult)   Pulse 95   Temp (!) 97 °F (36 1 °C)   Ht 5' 5" (1 651 m)   Wt 89 4 kg (197 lb)   SpO2 98%   BMI 32 78 kg/m²     Results Reviewed     None          Recent Results (from the past 1344 hour(s))   Lipid panel    Collection Time: 03/23/22 11:43 AM   Result Value Ref Range    Cholesterol 176 See Comment mg/dL    Triglycerides 91 See Comment mg/dL    HDL, Direct 59 >=50 mg/dL    LDL Calculated 99 0 - 100 mg/dL    Non-HDL-Chol (CHOL-HDL) 117 mg/dl   Hemoglobin A1C    Collection Time: 03/23/22 11:43 AM   Result Value Ref Range    Hemoglobin A1C 5 4 Normal 3 8-5 6%; PreDiabetic 5 7-6 4%; Diabetic >=6 5%; Glycemic control for adults with diabetes <7 0% %     mg/dl        Physical Exam  Constitutional:       General: She is not in acute distress  Appearance: Normal appearance  HENT:      Head: Normocephalic and atraumatic  Nose: Nose normal       Mouth/Throat:      Mouth: Mucous membranes are moist    Eyes:      Extraocular Movements: Extraocular movements intact  Pupils: Pupils are equal, round, and reactive to light  Cardiovascular:      Rate and Rhythm: Normal rate and regular rhythm  Pulses: Normal pulses        Heart sounds: Normal heart sounds  No murmur heard  No friction rub  Pulmonary:      Effort: Pulmonary effort is normal  No respiratory distress  Breath sounds: Normal breath sounds  No wheezing  Abdominal:      General: Abdomen is flat  Bowel sounds are normal  There is no distension  Palpations: Abdomen is soft  There is no mass  Tenderness: There is no abdominal tenderness  There is no guarding  Musculoskeletal:         General: Normal range of motion  Cervical back: Normal range of motion  Skin:     General: Skin is warm  Neurological:      General: No focal deficit present  Mental Status: She is alert and oriented to person, place, and time  Mental status is at baseline  Cranial Nerves: No cranial nerve deficit  Psychiatric:         Mood and Affect: Mood normal          Behavior: Behavior normal        BMI Counseling: Body mass index is 32 78 kg/m²  The BMI is above normal  Nutrition recommendations include reducing portion sizes

## 2022-05-01 PROBLEM — G47.09 OTHER INSOMNIA: Status: ACTIVE | Noted: 2022-05-01

## 2022-05-02 ENCOUNTER — TELEPHONE (OUTPATIENT)
Dept: INTERNAL MEDICINE CLINIC | Facility: CLINIC | Age: 72
End: 2022-05-02

## 2022-05-02 DIAGNOSIS — J04.10 TRACHEITIS: Primary | ICD-10-CM

## 2022-05-02 RX ORDER — AZITHROMYCIN 250 MG/1
TABLET, FILM COATED ORAL
Qty: 6 TABLET | Refills: 0 | Status: SHIPPED | OUTPATIENT
Start: 2022-05-02 | End: 2022-05-06

## 2022-05-02 RX ORDER — AZITHROMYCIN 250 MG/1
TABLET, FILM COATED ORAL
Qty: 6 TABLET | Refills: 0 | Status: SHIPPED | OUTPATIENT
Start: 2022-05-02 | End: 2022-05-02

## 2022-05-02 NOTE — TELEPHONE ENCOUNTER
Called pt to let her know you sent prescription to pharmacy but she then asked if it could be sent to [facility] [address] in [place] since shes out a car and cant get to her normal pharmacy

## 2022-05-02 NOTE — TELEPHONE ENCOUNTER
Pt called saying she was seen Wednesday 4/27/22 says shes not feeling any better and would like you to prescribe her an antibiotic

## 2022-05-03 NOTE — TELEPHONE ENCOUNTER
Called pt and left vm asking if she was able to get the prescription filled at the [facility]

## 2022-05-05 ENCOUNTER — OFFICE VISIT (OUTPATIENT)
Dept: INTERNAL MEDICINE CLINIC | Facility: CLINIC | Age: 72
End: 2022-05-05
Payer: COMMERCIAL

## 2022-05-05 VITALS
HEART RATE: 75 BPM | HEIGHT: 65 IN | OXYGEN SATURATION: 96 % | WEIGHT: 171 LBS | DIASTOLIC BLOOD PRESSURE: 70 MMHG | TEMPERATURE: 97.4 F | BODY MASS INDEX: 28.49 KG/M2 | SYSTOLIC BLOOD PRESSURE: 110 MMHG

## 2022-05-05 DIAGNOSIS — Z00.00 ANNUAL PHYSICAL EXAM: Primary | ICD-10-CM

## 2022-05-05 DIAGNOSIS — E55.9 VITAMIN D DEFICIENCY: ICD-10-CM

## 2022-05-05 DIAGNOSIS — K59.00 CONSTIPATION, UNSPECIFIED CONSTIPATION TYPE: ICD-10-CM

## 2022-05-05 DIAGNOSIS — G47.09 OTHER INSOMNIA: ICD-10-CM

## 2022-05-05 PROCEDURE — 99397 PER PM REEVAL EST PAT 65+ YR: CPT | Performed by: INTERNAL MEDICINE

## 2022-05-05 RX ORDER — DOCUSATE SODIUM 100 MG/1
100 CAPSULE, LIQUID FILLED ORAL 2 TIMES DAILY
Qty: 200 CAPSULE | Refills: 3 | Status: SHIPPED | OUTPATIENT
Start: 2022-05-05 | End: 2022-08-13

## 2022-05-05 RX ORDER — ERGOCALCIFEROL 1.25 MG/1
50000 CAPSULE ORAL
Qty: 3 CAPSULE | Refills: 3 | Status: SHIPPED | OUTPATIENT
Start: 2022-05-05

## 2022-05-05 NOTE — PROGRESS NOTES
[facility] INTERNAL MEDICINE    NAME: Chico Huber  AGE: 73 y o  SEX: female  : 1950     DATE: 2022     Assessment and Plan:     Problem List Items Addressed This Visit        Other    Vitamin D deficiency    Relevant Medications    ergocalciferol (VITAMIN D2) 50,000 units    Other insomnia      Other Visit Diagnoses     Annual physical exam    -  Primary    Relevant Orders    TSH + Free T4    Constipation, unspecified constipation type        Will try using docusate and fiber  Also check TSH  Relevant Medications    docusate sodium (COLACE) 100 mg capsule    Other Relevant Orders    Ambulatory referral for colonoscopy          Immunizations and preventive care screenings were discussed with patient today  Appropriate education was printed on patient's after visit summary  Counseling:  · Exercise: the importance of regular exercise/physical activity was discussed  Recommend exercise 3-5 times per week for at least 30 minutes  Return in about 4 months (around 2022)  Chief Complaint:     Chief Complaint   Patient presents with    Physical Exam     pt is here for physical     vitmain D refill      History of Present Illness:     Adult Annual Physical   Patient here for a comprehensive physical exam  The patient reports no problems  Diet and Physical Activity  · Diet/Nutrition: well balanced diet and consuming 3-5 servings of fruits/vegetables daily  · Exercise: no formal exercise  Depression Screening  PHQ-2/9 Depression Screening    Little interest or pleasure in doing things: 0 - not at all  Feeling down, depressed, or hopeless: 0 - not at all       General Health  · Sleep: gets 7-8 hours of sleep on average  · Hearing: normal - bilateral   · Vision: no vision problems, most recent eye exam >1 year ago and wears glasses  · Dental: regular dental visits and brushes teeth twice daily  /GYN Health  · Patient is: postmenopausal  · Last menstrual period: s/p hysterectomy  · Contraceptive method: N/A  Review of Systems:     Review of Systems   Constitutional: Negative for appetite change, chills, fatigue and fever  HENT: Negative for sore throat and trouble swallowing  Eyes: Negative for redness  Respiratory: Negative for shortness of breath  Cardiovascular: Negative for chest pain and palpitations  Gastrointestinal: Negative for abdominal pain, constipation and diarrhea  Genitourinary: Negative for dysuria and hematuria  Musculoskeletal: Negative for back pain and neck pain  Skin: Negative for rash  Neurological: Negative for seizures, weakness and headaches  Hematological: Negative for adenopathy  Psychiatric/Behavioral: Negative for confusion  The patient is not nervous/anxious  Past Medical History:     Past Medical History:   Diagnosis Date    Allergies     Arthritis     Carpal tunnel syndrome on right     Chemotherapy induced neutropenia ([facility] [address] ) 5/23/2019    Gastroesophageal reflux disease 8/6/2020    GERD (gastroesophageal reflux disease)     Lumbar disc disease     Trigger thumb     right    Vitamin D deficiency     Wears glasses       Past Surgical History:     Past Surgical History:   Procedure Laterality Date    BREAST BIOPSY Left 01/02/2019    invasive ductal carcinoma    COLONOSCOPY      HYSTERECTOMY      age 41 - abdominal    LIPOSUCTION W/ FAT INJECTION Bilateral 8/6/2020    Procedure: FAT GRAFTING;  Surgeon: Yousif Cole MD;  [facility];  Service: Plastics    OOPHORECTOMY Bilateral     age 41    [place] BIOPSY/EXCISION, LYMPH NODE(S) Left 2/8/2019    Procedure: BIOPSY LYMPH NODE SENTINEL;  Surgeon: Amie Pires MD;  [facility];  Service: Surgical Oncology    [place] EDG US EXAM SURGICAL ALTER STOM DUODENUM/JEJUNUM N/A 2/2/2017    Procedure: RADIAL ENDOSCOPIC U/S;  Surgeon: Bita Schulz MD;  [facility];   Service: Gastroenterology    MN IMPLNT BIO IMPLNT FOR SOFT TISSUE REINFORCEMENT Bilateral 2/8/2019    Procedure: RECONSTRUCTION BREAST W/ IMPLANT;  Surgeon: Jade Montiel MD;  [facility];  Service: Plastics    MN INSJ/RPLCMT BREAST IMPLANT SEP DAY MASTECTOMY Bilateral 9/27/2019    Procedure: IMPLANT EXPANDER EXCHANGE;  Surgeon: Jade Montiel MD;  [facility];  Service: Plastics    MN MASTECTOMY, SIMPLE, COMPLETE N/A 2/8/2019    Procedure: LEFT breast mastectomy; RIGHT breast prophylactic mastectomy;  Surgeon: Doris Galan MD;  [facility];  Service: Surgical Oncology    MN PART EXCIS PLANTAR FASCIA Right 10/20/2016    Procedure:  OPEN RELEASE FASCIA PLANTAR ,CUTTING BOTTOM OF ARCH,INSTEP ;  Surgeon: Viki Hernandez DPM;  [facility];  Service: Podiatry    MN ZUNILDA-IMPLANT CAPSULECTOMY BREAST COMPLETE Bilateral 9/27/2019    Procedure: CAPSULECTOMY;  Surgeon: Jade Montiel MD;  [facility];  Service: Plastics    MN REVISION OF RECONSTRUCTED BREAST Bilateral 8/6/2020    Procedure: BREAST LATERAL STANDING EXCISION SKIN DEFORMITIES POST RECON ;  Surgeon: Jade Montiel MD;  [facility];  Service: Plastics    MN TISSUE EXPANDER PLACEMENT BREAST RECONSTRUCTION Bilateral 2/8/2019    Procedure: INSERTION/PLACEMENT TISSUE EXPANDER (EXCHANGE); Surgeon: Jade Montiel MD;  [facility];  Service: Plastics    THUMB ARTHROSCOPY      US GUIDED BREAST BIOPSY LEFT COMPLETE Left 1/2/2019    WISDOM TOOTH EXTRACTION        Social History:     Social History     Socioeconomic History    Marital status: Single     Spouse name: None    Number of children: None    Years of education: None    Highest education level: None   Occupational History    None   Tobacco Use    Smoking status: Never Smoker    Smokeless tobacco: Never Used   Substance and Sexual Activity    Alcohol use:  Yes     Alcohol/week: 1 0 standard drink     Types: 1 Glasses of wine per week     Comment: 3 x yearly    Drug use: No    Sexual activity: None   Other Topics Concern    None   Social History Narrative    None     Social Determinants of Health     Financial Resource Strain: Not on file   Food Insecurity: Not on file   Transportation Needs: Not on file   Physical Activity: Not on file   Stress: Not on file   Social Connections: Not on file   Intimate Partner Violence: Not on file   Housing Stability: Not on file      Family History:     Family History   Problem Relation Age of Onset    Brain cancer Mother 67    Prostate cancer Father 66    Breast cancer Sister 54    Breast cancer additional onset Sister 72    Breast cancer Sister 72    Prostate cancer Brother 62      Current Medications:     Current Outpatient Medications   Medication Sig Dispense Refill    CALCIUM PO Take 1,200 mg by mouth daily      Cholecalciferol (VITAMIN D PO) Take 1 25 mg by mouth every 30 (thirty) days      Cyanocobalamin (VITAMIN B 12 PO) Take 500 mcg by mouth daily       doxylamine (UNISOM) 25 MG tablet Take 1 tablet (25 mg total) by mouth daily at bedtime as needed for sleep 30 tablet 0    Multiple Vitamins-Minerals (MULTIVITAL PO) Take by mouth daily       promethazine-codeine (PHENERGAN WITH CODEINE) 6 25-10 mg/5 mL syrup Take 5 mL by mouth every 6 (six) hours as needed for cough 150 mL 0    tamoxifen (NOLVADEX) 20 mg tablet Take 1 tablet (20 mg total) by mouth 2 (two) times a day Take 1 tablet 20mg daily (Patient taking differently: Take 20 mg by mouth daily  ) 90 tablet 1    azithromycin (ZITHROMAX) 250 mg tablet Take 2 tablets today then 1 tablet daily x 4 days (Patient not taking: Reported on 5/5/2022 ) 6 tablet 0    docusate sodium (COLACE) 100 mg capsule Take 1 capsule (100 mg total) by mouth 2 (two) times a day 200 capsule 3    ergocalciferol (VITAMIN D2) 50,000 units Take 1 capsule (50,000 Units total) by mouth every 30 (thirty) days 3 capsule 3    Soolantra 1 % CREA   (Patient not taking: Reported on 5/5/2022 )      triamcinolone (KENALOG) 0 1 % ointment   (Patient not taking: Reported on 4/27/2022 )       No current facility-administered medications for this visit  Allergies: Allergies   Allergen Reactions    Latex Anaphylaxis    Shellfish Allergy - Food Allergy Anaphylaxis    Shellfish-Derived Products - Food Allergy Anaphylaxis      Physical Exam:     /70 (BP Location: Left arm, Patient Position: Sitting, Cuff Size: Adult)   Pulse 75   Temp (!) 97 4 °F (36 3 °C) (Temporal)   Ht 5' 5" (1 651 m)   Wt 77 6 kg (171 lb)   SpO2 96%   BMI 28 46 kg/m²     Physical Exam  Vitals and nursing note reviewed  Constitutional:       General: She is not in acute distress  Appearance: She is well-developed  HENT:      Head: Normocephalic and atraumatic  Eyes:      Conjunctiva/sclera: Conjunctivae normal    Cardiovascular:      Rate and Rhythm: Normal rate and regular rhythm  Heart sounds: No murmur heard  Pulmonary:      Effort: Pulmonary effort is normal  No respiratory distress  Breath sounds: Normal breath sounds  Abdominal:      Palpations: Abdomen is soft  Tenderness: There is no abdominal tenderness  Musculoskeletal:      Cervical back: Normal range of motion and neck supple  Skin:     General: Skin is warm and dry  Neurological:      Mental Status: She is alert            Damien Herrera MD  [facility] INTERNAL MEDICINE

## 2022-05-05 NOTE — PATIENT INSTRUCTIONS
Copy Received From Patient Care     Wellness Visit for Adults   AMBULATORY CARE:   A wellness visit  is when you see your healthcare provider to get screened for health problems  Your healthcare provider will also give you advice on how to stay healthy  Write down your questions so you remember to ask them  Ask your healthcare provider how often you should have a wellness visit  What happens at a wellness visit:  Your healthcare provider will ask about your health, and your family history of health problems  This includes high blood pressure, heart disease, and cancer  He or she will ask if you have symptoms that concern you, if you smoke, and about your mood  You may also be asked about your intake of medicines, supplements, food, and alcohol  Any of the following may be done:  · Your weight  will be checked  Your height may also be checked so your body mass index (BMI) can be calculated  Your BMI shows if you are at a healthy weight  · Your blood pressure  and heart rate will be checked  Your temperature may also be checked  · Blood and urine tests  may be done  Blood tests may be done to check your cholesterol levels  Abnormal cholesterol levels increase your risk for heart disease and stroke  You may also need a blood or urine test to check for diabetes if you are at increased risk  Urine tests may be done to look for signs of an infection or kidney disease  · A physical exam  includes checking your heartbeat and lungs with a stethoscope  Your healthcare provider may also check your skin to look for sun damage  · Screening tests  may be recommended  A screening test is done to check for diseases that may not cause symptoms  The screening tests you may need depend on your age, gender, family history, and lifestyle habits  For example, colorectal screening may be recommended if you are 48years old or older  Screening tests you need if you are a woman:   · A Pap smear  is used to screen for cervical cancer   Pap smears are usually done every 3 to 5 years depending on your age  You may need them more often if you have had abnormal Pap smear test results in the past  Ask your healthcare provider how often you should have a Pap smear  · A mammogram  is an x-ray of your breasts to screen for breast cancer  Experts recommend mammograms every 2 years starting at age 48 years  You may need a mammogram at age 52 years or younger if you have an increased risk for breast cancer  Talk to your healthcare provider about when you should start having mammograms and how often you need them  Vaccines you may need:   · Get an influenza vaccine  every year  The influenza vaccine protects you from the flu  Several types of viruses cause the flu  The viruses change over time, so new vaccines are made each year  · Get a tetanus-diphtheria (Td) booster vaccine  every 10 years  This vaccine protects you against tetanus and diphtheria  Tetanus is a severe infection that may cause painful muscle spasms and lockjaw  Diphtheria is a severe bacterial infection that causes a thick covering in the back of your mouth and throat  · Get a human papillomavirus (HPV) vaccine  if you are female and aged 23 to 32 or male 23 to 24 and never received it  This vaccine protects you from HPV infection  HPV is the most common infection spread by sexual contact  HPV may also cause vaginal, penile, and anal cancers  · Get a pneumococcal vaccine  if you are aged 72 years or older  The pneumococcal vaccine is an injection given to protect you from pneumococcal disease  Pneumococcal disease is an infection caused by pneumococcal bacteria  The infection may cause pneumonia, meningitis, or an ear infection  · Get a shingles vaccine  if you are 60 or older, even if you have had shingles before  The shingles vaccine is an injection to protect you from the varicella-zoster virus  This is the same virus that causes chickenpox   Shingles is a painful rash that develops in people who had chickenpox or have been exposed to the virus  How to eat healthy:  My Plate is a model for planning healthy meals  It shows the types and amounts of foods that should go on your plate  Fruits and vegetables make up about half of your plate, and grains and protein make up the other half  A serving of dairy is included on the side of your plate  The amount of calories and serving sizes you need depends on your age, gender, weight, and height  Examples of healthy foods are listed below:  · Eat a variety of vegetables  such as dark green, red, and orange vegetables  You can also include canned vegetables low in sodium (salt) and frozen vegetables without added butter or sauces  · Eat a variety of fresh fruits , canned fruit in 100% juice, frozen fruit, and dried fruit  · Include whole grains  At least half of the grains you eat should be whole grains  Examples include whole-wheat bread, wheat pasta, brown rice, and whole-grain cereals such as oatmeal     · Eat a variety of protein foods such as seafood (fish and shellfish), lean meat, and poultry without skin (turkey and chicken)  Examples of lean meats include pork leg, shoulder, or tenderloin, and beef round, sirloin, tenderloin, and extra lean ground beef  Other protein foods include eggs and egg substitutes, beans, peas, soy products, nuts, and seeds  · Choose low-fat dairy products such as skim or 1% milk or low-fat yogurt, cheese, and cottage cheese  · Limit unhealthy fats  such as butter, hard margarine, and shortening  Exercise:  Exercise at least 30 minutes per day on most days of the week  Some examples of exercise include walking, biking, dancing, and swimming  You can also fit in more physical activity by taking the stairs instead of the elevator or parking farther away from stores  Include muscle strengthening activities 2 days each week  Regular exercise provides many health benefits   It helps you manage your weight, and decreases your risk for type 2 diabetes, heart disease, stroke, and high blood pressure  Exercise can also help improve your mood  Ask your healthcare provider about the best exercise plan for you  General health and safety guidelines:   · Do not smoke  Nicotine and other chemicals in cigarettes and cigars can cause lung damage  Ask your healthcare provider for information if you currently smoke and need help to quit  E-cigarettes or smokeless tobacco still contain nicotine  Talk to your healthcare provider before you use these products  · Limit alcohol  A drink of alcohol is 12 ounces of beer, 5 ounces of wine, or 1½ ounces of liquor  · Lose weight, if needed  Being overweight increases your risk of certain health conditions  These include heart disease, high blood pressure, type 2 diabetes, and certain types of cancer  · Protect your skin  Do not sunbathe or use tanning beds  Use sunscreen with a SPF 15 or higher  Apply sunscreen at least 15 minutes before you go outside  Reapply sunscreen every 2 hours  Wear protective clothing, hats, and sunglasses when you are outside  · Drive safely  Always wear your seatbelt  Make sure everyone in your car wears a seatbelt  A seatbelt can save your life if you are in an accident  Do not use your cell phone when you are driving  This could distract you and cause an accident  Pull over if you need to make a call or send a text message  · Practice safe sex  Use latex condoms if are sexually active and have more than one partner  Your healthcare provider may recommend screening tests for sexually transmitted infections (STIs)  · Wear helmets, lifejackets, and protective gear  Always wear a helmet when you ride a bike or motorcycle, go skiing, or play sports that could cause a head injury  Wear protective equipment when you play sports  Wear a lifejacket when you are on a boat or doing water sports      © Copyright "[employer] 2022 Information is for End User's use only and may not be sold, redistributed or otherwise used for commercial purposes  All illustrations and images included in CareNotes® are the copyrighted property of [employer]  or Paulo Jackson  The above information is an  only  It is not intended as medical advice for individual conditions or treatments  Talk to your doctor, nurse or pharmacist before following any medical regimen to see if it is safe and effective for you

## 2022-05-12 ENCOUNTER — LAB (OUTPATIENT)
Dept: LAB | Facility: HOSPITAL | Age: 72
End: 2022-05-12
Attending: INTERNAL MEDICINE
Payer: COMMERCIAL

## 2022-05-12 DIAGNOSIS — E78.49 OTHER HYPERLIPIDEMIA: ICD-10-CM

## 2022-05-12 DIAGNOSIS — Z17.0 MALIGNANT NEOPLASM OF LOWER-INNER QUADRANT OF LEFT BREAST IN FEMALE, ESTROGEN RECEPTOR POSITIVE (HCC): ICD-10-CM

## 2022-05-12 DIAGNOSIS — Z00.00 ANNUAL PHYSICAL EXAM: ICD-10-CM

## 2022-05-12 DIAGNOSIS — C50.312 MALIGNANT NEOPLASM OF LOWER-INNER QUADRANT OF LEFT BREAST IN FEMALE, ESTROGEN RECEPTOR POSITIVE (HCC): ICD-10-CM

## 2022-05-12 LAB
ALBUMIN SERPL BCP-MCNC: 3.3 G/DL (ref 3.5–5)
ALP SERPL-CCNC: 48 U/L (ref 46–116)
ALT SERPL W P-5'-P-CCNC: 28 U/L (ref 12–78)
ANION GAP SERPL CALCULATED.3IONS-SCNC: 2 MMOL/L (ref 4–13)
AST SERPL W P-5'-P-CCNC: 21 U/L (ref 5–45)
BASOPHILS # BLD AUTO: 0.05 THOUSANDS/ΜL (ref 0–0.1)
BASOPHILS NFR BLD AUTO: 1 % (ref 0–1)
BILIRUB SERPL-MCNC: 0.44 MG/DL (ref 0.2–1)
BUN SERPL-MCNC: 12 MG/DL (ref 5–25)
CALCIUM ALBUM COR SERPL-MCNC: 10.2 MG/DL (ref 8.3–10.1)
CALCIUM SERPL-MCNC: 9.6 MG/DL (ref 8.3–10.1)
CANCER AG27-29 SERPL-ACNC: 21.3 U/ML (ref 0–42.3)
CHLORIDE SERPL-SCNC: 107 MMOL/L (ref 100–108)
CHOLEST SERPL-MCNC: 182 MG/DL
CO2 SERPL-SCNC: 30 MMOL/L (ref 21–32)
CREAT SERPL-MCNC: 0.75 MG/DL (ref 0.6–1.3)
EOSINOPHIL # BLD AUTO: 0.22 THOUSAND/ΜL (ref 0–0.61)
EOSINOPHIL NFR BLD AUTO: 4 % (ref 0–6)
ERYTHROCYTE [DISTWIDTH] IN BLOOD BY AUTOMATED COUNT: 13.2 % (ref 11.6–15.1)
GFR SERPL CREATININE-BSD FRML MDRD: 81 ML/MIN/1.73SQ M
GLUCOSE P FAST SERPL-MCNC: 95 MG/DL (ref 65–99)
HCT VFR BLD AUTO: 44.9 % (ref 34.8–46.1)
HDLC SERPL-MCNC: 60 MG/DL
HGB BLD-MCNC: 14.3 G/DL (ref 11.5–15.4)
IMM GRANULOCYTES # BLD AUTO: 0.01 THOUSAND/UL (ref 0–0.2)
IMM GRANULOCYTES NFR BLD AUTO: 0 % (ref 0–2)
LDLC SERPL CALC-MCNC: 102 MG/DL (ref 0–100)
LYMPHOCYTES # BLD AUTO: 2.31 THOUSANDS/ΜL (ref 0.6–4.47)
LYMPHOCYTES NFR BLD AUTO: 41 % (ref 14–44)
MCH RBC QN AUTO: 28.7 PG (ref 26.8–34.3)
MCHC RBC AUTO-ENTMCNC: 31.8 G/DL (ref 31.4–37.4)
MCV RBC AUTO: 90 FL (ref 82–98)
MONOCYTES # BLD AUTO: 0.38 THOUSAND/ΜL (ref 0.17–1.22)
MONOCYTES NFR BLD AUTO: 7 % (ref 4–12)
NEUTROPHILS # BLD AUTO: 2.7 THOUSANDS/ΜL (ref 1.85–7.62)
NEUTS SEG NFR BLD AUTO: 47 % (ref 43–75)
NONHDLC SERPL-MCNC: 122 MG/DL
NRBC BLD AUTO-RTO: 0 /100 WBCS
PLATELET # BLD AUTO: 190 THOUSANDS/UL (ref 149–390)
PMV BLD AUTO: 10.4 FL (ref 8.9–12.7)
POTASSIUM SERPL-SCNC: 4.1 MMOL/L (ref 3.5–5.3)
PROT SERPL-MCNC: 7.2 G/DL (ref 6.4–8.2)
RBC # BLD AUTO: 4.99 MILLION/UL (ref 3.81–5.12)
SODIUM SERPL-SCNC: 139 MMOL/L (ref 136–145)
T4 FREE SERPL-MCNC: 0.99 NG/DL (ref 0.76–1.46)
TRIGL SERPL-MCNC: 101 MG/DL
TSH SERPL DL<=0.05 MIU/L-ACNC: 1.93 UIU/ML (ref 0.45–4.5)
WBC # BLD AUTO: 5.67 THOUSAND/UL (ref 4.31–10.16)

## 2022-05-12 PROCEDURE — 84439 ASSAY OF FREE THYROXINE: CPT

## 2022-05-12 PROCEDURE — 84443 ASSAY THYROID STIM HORMONE: CPT

## 2022-05-12 PROCEDURE — 86300 IMMUNOASSAY TUMOR CA 15-3: CPT

## 2022-05-12 PROCEDURE — 80061 LIPID PANEL: CPT

## 2022-05-12 PROCEDURE — 36415 COLL VENOUS BLD VENIPUNCTURE: CPT

## 2022-05-12 PROCEDURE — 80053 COMPREHEN METABOLIC PANEL: CPT

## 2022-05-12 PROCEDURE — 85025 COMPLETE CBC W/AUTO DIFF WBC: CPT

## 2022-05-16 ENCOUNTER — OFFICE VISIT (OUTPATIENT)
Dept: HEMATOLOGY ONCOLOGY | Facility: CLINIC | Age: 72
End: 2022-05-16
Payer: COMMERCIAL

## 2022-05-16 VITALS
WEIGHT: 171 LBS | HEART RATE: 73 BPM | TEMPERATURE: 97.4 F | BODY MASS INDEX: 28.49 KG/M2 | OXYGEN SATURATION: 95 % | RESPIRATION RATE: 17 BRPM | HEIGHT: 65 IN | DIASTOLIC BLOOD PRESSURE: 78 MMHG | SYSTOLIC BLOOD PRESSURE: 122 MMHG

## 2022-05-16 DIAGNOSIS — Z17.0 MALIGNANT NEOPLASM OF LOWER-INNER QUADRANT OF LEFT BREAST IN FEMALE, ESTROGEN RECEPTOR POSITIVE (HCC): Primary | ICD-10-CM

## 2022-05-16 DIAGNOSIS — C50.312 MALIGNANT NEOPLASM OF LOWER-INNER QUADRANT OF LEFT BREAST IN FEMALE, ESTROGEN RECEPTOR POSITIVE (HCC): Primary | ICD-10-CM

## 2022-05-16 DIAGNOSIS — G62.0 DRUG-INDUCED POLYNEUROPATHY (HCC): ICD-10-CM

## 2022-05-16 PROCEDURE — 99214 OFFICE O/P EST MOD 30 MIN: CPT | Performed by: INTERNAL MEDICINE

## 2022-05-16 RX ORDER — TAMOXIFEN CITRATE 20 MG/1
20 TABLET ORAL DAILY
Qty: 90 TABLET | Refills: 3 | Status: SHIPPED | OUTPATIENT
Start: 2022-05-16

## 2022-05-16 NOTE — PROGRESS NOTES
HPI:  Follow-up visit for hormone receptor-positive and HER2 negative stage I left breast cancer that was diagnosed in   January 2019  Patient had bilateral mastectomies and left axillary lymph node  sampling for    Tumor was 1 2 cm, grade 3, strongly positive ER, positive [place], negative HER 2, positive lymphovascular invasion, negative 3 sentinel lymph nodes, and Oncotype score was 25   Patient had 4 cycles of adjuvant chemotherapy, combination of Taxotere and Cytoxan and Neulasta and that was followed by aromatase inhibitors and she had problems with all 3 aromatase inhibitors   Lot of musculoskeletal symptoms  Since May 2020 she has been on tamoxifen and she does not have musculoskeletal symptoms  No hot flashes  Has burning sensation in her feet, part of peripheral neuropathy  Has some tiredness occasionally     She is not symptomatic bilateral carpal tunnel   There is family history of breast cancer  Patient states that she tested negative for BRCA   Erasto Albert   Recently she was found to have high triglycerides and she has controlled that with diet and triglyceride level is normal now                  Current Outpatient Medications:     CALCIUM PO, Take 1,200 mg by mouth daily, Disp: , Rfl:     Cholecalciferol (VITAMIN D PO), Take 1 25 mg by mouth every 30 (thirty) days, Disp: , Rfl:     Cyanocobalamin (VITAMIN B 12 PO), Take 500 mcg by mouth daily , Disp: , Rfl:     docusate sodium (COLACE) 100 mg capsule, Take 1 capsule (100 mg total) by mouth 2 (two) times a day, Disp: 200 capsule, Rfl: 3    doxylamine (UNISOM) 25 MG tablet, Take 1 tablet (25 mg total) by mouth daily at bedtime as needed for sleep, Disp: 30 tablet, Rfl: 0    ergocalciferol (VITAMIN D2) 50,000 units, Take 1 capsule (50,000 Units total) by mouth every 30 (thirty) days, Disp: 3 capsule, Rfl: 3    Multiple Vitamins-Minerals (MULTIVITAL PO), Take by mouth daily , Disp: , Rfl:     promethazine-codeine (PHENERGAN WITH CODEINE) 6 25-10 mg/5 mL syrup, Take 5 mL by mouth every 6 (six) hours as needed for cough, Disp: 150 mL, Rfl: 0    tamoxifen (NOLVADEX) 20 mg tablet, Take 1 tablet (20 mg total) by mouth 2 (two) times a day Take 1 tablet 20mg daily (Patient taking differently: Take 20 mg by mouth in the morning ), Disp: 90 tablet, Rfl: 1    Soolantra 1 % CREA,   (Patient not taking: No sig reported), Disp: , Rfl:     triamcinolone (KENALOG) 0 1 % ointment,   (Patient not taking: No sig reported), Disp: , Rfl:     Allergies   Allergen Reactions    Latex Anaphylaxis    Shellfish Allergy - Food Allergy Anaphylaxis    Shellfish-Derived Products - Food Allergy Anaphylaxis       Oncology History   Malignant neoplasm of lower-inner quadrant of left breast in female, estrogen receptor positive ([facility] [address] )   1/2/2019 Initial Diagnosis    Malignant neoplasm of lower-inner quadrant of left breast in female, estrogen receptor positive [facility])      Chemotherapy    April 2019:  Adjuvant  Taxotere and Cytoxan once every 3 weeks for 4 cycles followed by aromatase inhibitor     4/11/2019 - 7/3/2019 Chemotherapy    pegfilgrastim (NEULASTA) subcutaneous injection 6 mg, 6 mg, Subcutaneous, Once, 4 of 4 cycles  Dose modification: 4 mg (original dose 6 mg, Cycle 3)  Administration: 4 mg (5/24/2019), 4 mg (6/14/2019)  cyclophosphamide (CYTOXAN) 1,176 mg in sodium chloride 0 9 % 250 mL IVPB, 600 mg/m2 = 1,176 mg, Intravenous, Once, 4 of 4 cycles  Administration: 1,176 mg (5/23/2019), 1,176 mg (6/13/2019)  DOCEtaxel (TAXOTERE) 147 mg in sodium chloride 0 9 % 250 mL chemo infusion, 75 mg/m2 = 147 mg, Intravenous, Once, 4 of 4 cycles  Administration: 147 mg (5/23/2019), 147 mg (6/13/2019)         ROS:  05/16/22 Reviewed 12 systems: See symptoms in HPI:   Presently no other neurological, cardiac, pulmonary, GI and  symptoms other than listed in HPI     Other symptoms are in HPI  No other symptoms like fever, chills, bleeding, bone pains, skin rash, weight loss,  weakness, claudication and gait problem  No frequent infections    No swelling of the ankles  No swollen glands  Patient is not anxious  No new symptoms since last visit       /78 (BP Location: Right arm, Patient Position: Sitting, Cuff Size: Adult)   Pulse 73   Temp (!) 97 4 °F (36 3 °C)   Resp 17   Ht 5' 5" (1 651 m)   Wt 77 6 kg (171 lb)   SpO2 95%   BMI 28 46 kg/m²     Physical Exam:      Patient is alert and oriented  Patient is not in distress  Vital signs are stable  No icterus, no oral thrush, no palpable neck mass,  lung fields clear to P&A , regular heart rate,   soft and non tender abdomen, no palpable abdominal mass, no ascites, no edema of ankles, no calf tenderness, no focal neurological deficit, no skin rash, no palpable lymphadenopathy in the neck and axillary areas,  no clubbing  Patient is not anxious  Performance status 0  No lymphedema    IMAGING:    RESULTS:  On 06/19/2021  LUMBAR SPINE L1-L4 :   BMD  0 938  gm/cm2   T-score -1 0         LEFT  TOTAL HIP:   BMD:  0 722  gm/cm2   T-score:  -1 8     LEFT  FEMORAL NECK:   BMD:  0 629  gm/cm2   T score: -2 0               IMPRESSION:  1  Low bone mass (OSTEOPENIA)   [Based on the left femoral neck]  LABS:    Results for orders placed or performed in visit on 05/12/22   CBC and differential   Result Value Ref Range    WBC 5 67 4 31 - 10 16 Thousand/uL    RBC 4 99 3 81 - 5 12 Million/uL    Hemoglobin 14 3 11 5 - 15 4 g/dL    Hematocrit 44 9 34 8 - 46 1 %    MCV 90 82 - 98 fL    MCH 28 7 26 8 - 34 3 pg    MCHC 31 8 31 4 - 37 4 g/dL    RDW 13 2 11 6 - 15 1 %    MPV 10 4 8 9 - 12 7 fL    Platelets 026 340 - 408 Thousands/uL    nRBC 0 /100 WBCs    Neutrophils Relative 47 43 - 75 %    Immat GRANS % 0 0 - 2 %    Lymphocytes Relative 41 14 - 44 %    Monocytes Relative 7 4 - 12 %    Eosinophils Relative 4 0 - 6 %    Basophils Relative 1 0 - 1 %    Neutrophils Absolute 2 70 1 85 - 7 62 Thousands/µL    Immature Grans Absolute 0 01 0 00 - 0 20 Thousand/uL    Lymphocytes Absolute 2 31 0 60 - 4 47 Thousands/µL    Monocytes Absolute 0 38 0 17 - 1 22 Thousand/µL    Eosinophils Absolute 0 22 0 00 - 0 61 Thousand/µL    Basophils Absolute 0 05 0 00 - 0 10 Thousands/µL   Comprehensive metabolic panel   Result Value Ref Range    Sodium 139 136 - 145 mmol/L    Potassium 4 1 3 5 - 5 3 mmol/L    Chloride 107 100 - 108 mmol/L    CO2 30 21 - 32 mmol/L    ANION GAP 2 (L) 4 - 13 mmol/L    BUN 12 5 - 25 mg/dL    Creatinine 0 75 0 60 - 1 30 mg/dL    Glucose, Fasting 95 65 - 99 mg/dL    Calcium 9 6 8 3 - 10 1 mg/dL    Corrected Calcium 10 2 (H) 8 3 - 10 1 mg/dL    AST 21 5 - 45 U/L    ALT 28 12 - 78 U/L    Alkaline Phosphatase 48 46 - 116 U/L    Total Protein 7 2 6 4 - 8 2 g/dL    Albumin 3 3 (L) 3 5 - 5 0 g/dL    Total Bilirubin 0 44 0 20 - 1 00 mg/dL    eGFR 81 ml/min/1 73sq m   Cancer antigen 27 29   Result Value Ref Range    CA 27 29 21 3 0 0 - 42 3 U/mL   Lipid panel   Result Value Ref Range    Cholesterol 182 See Comment mg/dL    Triglycerides 101 See Comment mg/dL    HDL, Direct 60 >=50 mg/dL    LDL Calculated 102 (H) 0 - 100 mg/dL    Non-HDL-Chol (CHOL-HDL) 122 mg/dl   TSH, 3rd generation   Result Value Ref Range    TSH 3RD GENERATON 1 930 0 450 - 4 500 uIU/mL   T4, free   Result Value Ref Range    Free T4 0 99 0 76 - 1 46 ng/dL     Labs, Imaging, & Other studies:   All pertinent labs and imaging studies were personally reviewed      Reviewed above test results  and discussed with patient  Assessment and plan: Follow-up visit for hormone receptor-positive and HER2 negative stage I left breast cancer that was diagnosed in   January 2019  Patient had bilateral mastectomies and left axillary lymph node  sampling for     Tumor was 1 2 cm, grade 3, strongly positive ER, positive [place], negative HER 2, positive lymphovascular invasion, negative 3 sentinel lymph nodes, and Oncotype score was 25   Patient had 4 cycles of adjuvant chemotherapy, combination of Taxotere and Cytoxan and Neulasta and that was followed by aromatase inhibitors and she had problems with all 3 aromatase inhibitors   Lot of musculoskeletal symptoms  Since May 2020 she has been on tamoxifen and she does not have musculoskeletal symptoms  No hot flashes  Has burning sensation in her feet, part of peripheral neuropathy  Has some tiredness occasionally     She is not symptomatic bilateral carpal tunnel   She follows with her rheumatologist      There is family history of breast cancer  Patient states that she tested negative for BRCA   Narciso Hardy Recently she was found to have high triglycerides and she has controlled that with diet and triglyceride level is normal now     Physical examination and test results are as recorded and discussed     Breast cancer remains in remission and goal is cure from breast cancer  Patient will continue to take tamoxifen  20 mg daily  Patient is taking vitamin-D and calcium and is staying active   For thyroid nodule she goes to her endocrinologist    She follows with her breast surgeon for examination  Chance Romeo discussed in detail   Questions answered     Discussed the importance of eating healthy foods, staying active and health screening tests  Iram Roldan is capable of self-care     Discussed precautions against coronavirus     Patient will continue to follow with her primary physician and other consultants  See diagnoses, orders and instructions  1  Malignant neoplasm of lower-inner quadrant of left breast in female, estrogen receptor positive (HCC)    - tamoxifen (NOLVADEX) 20 mg tablet; Take 1 tablet (20 mg total) by mouth in the morning  Dispense: 90 tablet; Refill: 3  - CBC and differential; Future  - Comprehensive metabolic panel; Future  - Cancer antigen 27 29; Future    2  Drug-induced polyneuropathy (HCC)            Renewed tamoxifen prescription  Blood work prior to next visit in 6 months            Patient voiced understanding and agreed      Counseling / Coordination of Care        Provided counseling and support

## 2022-07-14 ENCOUNTER — CONSULT (OUTPATIENT)
Dept: GASTROENTEROLOGY | Facility: AMBULARY SURGERY CENTER | Age: 72
End: 2022-07-14
Payer: COMMERCIAL

## 2022-07-14 ENCOUNTER — TELEPHONE (OUTPATIENT)
Dept: GASTROENTEROLOGY | Facility: AMBULARY SURGERY CENTER | Age: 72
End: 2022-07-14

## 2022-07-14 VITALS
SYSTOLIC BLOOD PRESSURE: 110 MMHG | BODY MASS INDEX: 28.46 KG/M2 | HEIGHT: 65 IN | WEIGHT: 170.8 LBS | HEART RATE: 88 BPM | OXYGEN SATURATION: 97 % | DIASTOLIC BLOOD PRESSURE: 64 MMHG

## 2022-07-14 DIAGNOSIS — Z12.11 COLON CANCER SCREENING: Primary | ICD-10-CM

## 2022-07-14 DIAGNOSIS — Z85.3 HISTORY OF BREAST CANCER: ICD-10-CM

## 2022-07-14 PROCEDURE — 99203 OFFICE O/P NEW LOW 30 MIN: CPT | Performed by: PHYSICIAN ASSISTANT

## 2022-07-14 RX ORDER — SODIUM, POTASSIUM,MAG SULFATES 17.5-3.13G
1 SOLUTION, RECONSTITUTED, ORAL ORAL ONCE
Qty: 354 ML | Refills: 0 | Status: SHIPPED | OUTPATIENT
Start: 2022-07-14 | End: 2022-07-14

## 2022-07-14 NOTE — TELEPHONE ENCOUNTER
Patient is scheduled for colonoscopy on September 8 , 2022 at [address] [place] [address] with [address] [place] [address],   Patient is aware of pre-procedure prep of Suprep and they will be called the day prior between 2 and 6 pm for time to report for procedure  Pre-procedure prep has been given to the patient  in person  on July 14 , 2022

## 2022-07-14 NOTE — PROGRESS NOTES
[facility] - Outpatient Consultation  Jacqueline Goodman 73 y o  female MRN: [id number]  Encounter: [id number]          ASSESSMENT AND PLAN:      #1  Colon cancer screening: last colonoscopy was likely about 7 years ago, states that she may have had polyps a long time ago but was told by her PCP she is overdue for screening colonoscopy  We have no records of previous colonoscopy reports or pathology  -plan for colonoscopy to further assess, -discussed procedure, risks including perforation, infection, and bleeding, and benefits in detail with patient   -suprep ordered  -patient lives closer to [employer] and was requesting that she have her procedure there for ease of being picked up        ______________________________________________________________________    HPI:  This is a 70-year-old female with a history of osteoarthritis, neuropathy, breast cancer on tamoxifen who presents for new patient visit for colon cancer screening  Patient had been seen by Dr Masha Gagnon several years ago  It appears that her last colonoscopy was prior to seeing him prior to 2014  However patient believes she may have had another colonoscopy since that time at [facility] but there is no record of this  She reports that a very long time ago she may have had a small polyp but states that she was told everything was fine  Unfortunately we have no previous records or pathology from previous colonoscopies  No family history of colon cancer, stomach cancer, or esophagus cancer  Reports that she recently went on a diet where she was only eating meat, vegetables, and fruits  She had some change with her bowels with this and states that she will move her bowels about once every 2 days  She takes Colace occasionally but denies any significant constipation or straining  Denies any blood in the stool or black tarry stool  Denies any diarrhea  Denies any abdominal pain    Denies any nausea, vomiting, heartburn, trouble swallowing  Reports she lost about 30 lb intentionally with her dieting  She had a history of having EGD in the past which was relatively unremarkable  Denies any excessive tobacco, alcohol, or NSAID use  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      REVIEW OF SYSTEMS:    CONSTITUTIONAL: Denies any fever, chills, rigors, +intentional weight loss  HEENT: No earache or tinnitus  Denies hearing loss or visual disturbances  CARDIOVASCULAR: No chest pain or palpitations  RESPIRATORY: Denies any cough, hemoptysis, shortness of breath or dyspnea on exertion  GASTROINTESTINAL: As noted in the History of Present Illness  GENITOURINARY: No problems with urination  Denies any hematuria or dysuria  NEUROLOGIC: No dizziness or vertigo, denies headaches  MUSCULOSKELETAL: Denies any muscle or joint pain  SKIN: Denies skin rashes or itching  ENDOCRINE: Denies excessive thirst  Denies intolerance to heat or cold  PSYCHOSOCIAL: Denies depression or anxiety  Denies any recent memory loss         Historical Information   Past Medical History:   Diagnosis Date    Allergies     Arthritis     Carpal tunnel syndrome on right     Chemotherapy induced neutropenia ([facility] [address] ) 5/23/2019    Gastroesophageal reflux disease 8/6/2020    GERD (gastroesophageal reflux disease)     Lumbar disc disease     Trigger thumb     right    Vitamin D deficiency     Wears glasses      Past Surgical History:   Procedure Laterality Date    BREAST BIOPSY Left 01/02/2019    invasive ductal carcinoma    COLONOSCOPY      HYSTERECTOMY      age 41 - abdominal    LIPOSUCTION W/ FAT INJECTION Bilateral 08/06/2020    Procedure: FAT GRAFTING;  Surgeon: Arminda Grimes MD;  [facility];  Service: Plastics    OOPHORECTOMY Bilateral     age 41    NY BIOPSY/EXCISION, LYMPH NODE(S) Left 02/08/2019    Procedure: BIOPSY LYMPH NODE SENTINEL;  Surgeon: Ladan Newell MD;  [facility];  Service: Surgical Oncology    OK EDG US EXAM SURGICAL ALTER STOM DUODENUM/JEJUNUM N/A 02/02/2017    Procedure: RADIAL ENDOSCOPIC U/S;  Surgeon: Lidia Valderrama MD;  [facility]; Service: Gastroenterology    OK IMPLNT BIO IMPLNT FOR SOFT TISSUE REINFORCEMENT Bilateral 02/08/2019    Procedure: RECONSTRUCTION BREAST W/ IMPLANT;  Surgeon: Rad Randolph MD;  [facility];  Service: Plastics    OK INSJ/RPLCMT BREAST IMPLANT SEP DAY MASTECTOMY Bilateral 09/27/2019    Procedure: IMPLANT EXPANDER EXCHANGE;  Surgeon: Rad Randolph MD;  [facility];  Service: Plastics    OK MASTECTOMY, SIMPLE, COMPLETE N/A 02/08/2019    Procedure: LEFT breast mastectomy; RIGHT breast prophylactic mastectomy;  Surgeon: Laron Early MD;  [facility];  Service: Surgical Oncology    OK PART EXCIS PLANTAR FASCIA Right 10/20/2016    Procedure:  OPEN RELEASE FASCIA PLANTAR ,CUTTING BOTTOM OF ARCH,INSTEP ;  Surgeon: Linda Stoddard DPM;  [facility];  Service: Podiatry    OK ZUNILDA-IMPLANT CAPSULECTOMY BREAST COMPLETE Bilateral 09/27/2019    Procedure: CAPSULECTOMY;  Surgeon: Rad Randolph MD;  [facility];  Service: Plastics    OK REVISION OF RECONSTRUCTED BREAST Bilateral 08/06/2020    Procedure: BREAST LATERAL STANDING EXCISION SKIN DEFORMITIES POST RECON ;  Surgeon: Rad Randolph MD;  [facility];  Service: Plastics    OK TISSUE EXPANDER PLACEMENT BREAST RECONSTRUCTION Bilateral 02/08/2019    Procedure: INSERTION/PLACEMENT TISSUE EXPANDER (EXCHANGE);   Surgeon: Rad Randolph MD;  [facility];  Service: Plastics    THUMB ARTHROSCOPY      UPPER GASTROINTESTINAL ENDOSCOPY      US GUIDED BREAST BIOPSY LEFT COMPLETE Left 01/02/2019    WISDOM TOOTH EXTRACTION       Social History   Social History     Substance and Sexual Activity   Alcohol Use Yes    Alcohol/week: 1 0 standard drink    Types: 1 Glasses of wine per week    Comment: 3 x yearly     Social History     Substance and Sexual Activity   Drug Use No     Social History     Tobacco Use   Smoking Status Never Smoker   Smokeless Tobacco Never Used     Family History   Problem Relation Age of Onset    Brain cancer Mother 67    Prostate cancer Father 66    Breast cancer Sister 54    Breast cancer additional onset Sister 72    Breast cancer Sister 72    Prostate cancer Brother 62       Meds/Allergies       Current Outpatient Medications:     CALCIUM PO    Cholecalciferol (VITAMIN D PO)    Cyanocobalamin (VITAMIN B 12 PO)    docusate sodium (COLACE) 100 mg capsule    ergocalciferol (VITAMIN D2) 50,000 units    Multiple Vitamins-Minerals (MULTIVITAL PO)    Na Sulfate-K Sulfate-Mg Sulf 17 5-3 13-1 6 GM/177ML SOLN    doxylamine (UNISOM) 25 MG tablet    promethazine-codeine (PHENERGAN WITH CODEINE) 6 25-10 mg/5 mL syrup    Soolantra 1 % CREA    tamoxifen (NOLVADEX) 20 mg tablet    triamcinolone (KENALOG) 0 1 % ointment    Allergies   Allergen Reactions    Latex Anaphylaxis    Shellfish Allergy - Food Allergy Anaphylaxis    Shellfish-Derived Products - Food Allergy Anaphylaxis           Objective     Blood pressure 110/64, pulse 88, height 5' 5" (1 651 m), weight 77 5 kg (170 lb 12 8 oz), SpO2 97 %, not currently breastfeeding  Body mass index is 28 42 kg/m²  PHYSICAL EXAM:      General Appearance:   Alert, cooperative, no distress   HEENT:   Normocephalic, atraumatic, anicteric      Neck:  Supple, symmetrical, trachea midline   Lungs:   Clear to auscultation bilaterally; no rales, rhonchi or wheezing; respirations unlabored    Heart[de-identified]   Regular rate and rhythm; no murmur, rub, or gallop     Abdomen:   Soft, non-tender, non-distended; normal bowel sounds; no masses, no organomegaly    Genitalia:   Deferred    Rectal:   Deferred    Extremities:  No cyanosis, clubbing or edema    Pulses:  2+ and symmetric    Skin:  No jaundice, rashes, or lesions    Lymph nodes:  No palpable cervical lymphadenopathy        Lab Results: No visits with results within 1 Day(s) from this visit  Latest known visit with results is:   Lab on 05/12/2022   Component Date Value    WBC 05/12/2022 5 67     RBC 05/12/2022 4 99     Hemoglobin 05/12/2022 14 3     Hematocrit 05/12/2022 44 9     MCV 05/12/2022 90     MCH 05/12/2022 28 7     MCHC 05/12/2022 31 8     RDW 05/12/2022 13 2     MPV 05/12/2022 10 4     Platelets 47/40/1333 190     nRBC 05/12/2022 0     Neutrophils Relative 05/12/2022 47     Immat GRANS % 05/12/2022 0     Lymphocytes Relative 05/12/2022 41     Monocytes Relative 05/12/2022 7     Eosinophils Relative 05/12/2022 4     Basophils Relative 05/12/2022 1     Neutrophils Absolute 05/12/2022 2 70     Immature Grans Absolute 05/12/2022 0 01     Lymphocytes Absolute 05/12/2022 2 31     Monocytes Absolute 05/12/2022 0 38     Eosinophils Absolute 05/12/2022 0 22     Basophils Absolute 05/12/2022 0 05     Sodium 05/12/2022 139     Potassium 05/12/2022 4 1     Chloride 05/12/2022 107     CO2 05/12/2022 30     ANION GAP 05/12/2022 2 (A)    BUN 05/12/2022 12     Creatinine 05/12/2022 0 75     Glucose, Fasting 05/12/2022 95     Calcium 05/12/2022 9 6     Corrected Calcium 05/12/2022 10 2 (A)    AST 05/12/2022 21     ALT 05/12/2022 28     Alkaline Phosphatase 05/12/2022 48     Total Protein 05/12/2022 7 2     Albumin 05/12/2022 3 3 (A)    Total Bilirubin 05/12/2022 0 44     eGFR 05/12/2022 81     CA 27 29 05/12/2022 21 3     Cholesterol 05/12/2022 182     Triglycerides 05/12/2022 101     HDL, Direct 05/12/2022 60     LDL Calculated 05/12/2022 102 (A)    Non-HDL-Chol (CHOL-HDL) 05/12/2022 122     TSH 3RD GENERATON 05/12/2022 1 930     Free T4 05/12/2022 0 99          Radiology Results:   No results found

## 2022-07-14 NOTE — PATIENT INSTRUCTIONS
Scheduled date of colonoscopy (as of today): 9/8/2022  Physician performing colonoscopy: Dr Timur Fitzgerald  Location of colonoscopy: [facility]  Bowel prep reviewed with patient: Suprep  Instructions reviewed with patient by: Ang Read  Clearances:  None

## 2022-08-09 ENCOUNTER — OFFICE VISIT (OUTPATIENT)
Dept: INTERNAL MEDICINE CLINIC | Facility: CLINIC | Age: 72
End: 2022-08-09
Payer: COMMERCIAL

## 2022-08-09 VITALS
DIASTOLIC BLOOD PRESSURE: 70 MMHG | HEART RATE: 86 BPM | OXYGEN SATURATION: 98 % | WEIGHT: 170 LBS | TEMPERATURE: 97.5 F | SYSTOLIC BLOOD PRESSURE: 116 MMHG | BODY MASS INDEX: 28.32 KG/M2 | HEIGHT: 65 IN

## 2022-08-09 DIAGNOSIS — J06.9 VIRAL UPPER RESPIRATORY TRACT INFECTION: Primary | ICD-10-CM

## 2022-08-09 PROCEDURE — 99213 OFFICE O/P EST LOW 20 MIN: CPT | Performed by: INTERNAL MEDICINE

## 2022-08-09 RX ORDER — CYCLOSPORINE 0.5 MG/ML
EMULSION OPHTHALMIC
COMMUNITY
Start: 2022-07-20

## 2022-08-09 NOTE — PROGRESS NOTES
Assessment/Plan:           1  Viral upper respiratory tract infection  Comments:  COVID test was negative  Cannot rule out COVID for sure  1  Viral upper respiratory tract infection         No problem-specific Assessment & Plan notes found for this encounter  Subjective:      Patient ID: Pako Gomez is a 73 y o  female  HPI    The following portions of the patient's history were reviewed and updated as appropriate: She  has a past medical history of Allergies, Arthritis, Carpal tunnel syndrome on right, Chemotherapy induced neutropenia ([address] ) (5/23/2019), Gastroesophageal reflux disease (8/6/2020), GERD (gastroesophageal reflux disease), Lumbar disc disease, Trigger thumb, Vitamin D deficiency, and Wears glasses    She   Patient Active Problem List    Diagnosis Date Noted    Other insomnia 05/01/2022    Urinary tract infection without hematuria 04/28/2021    Osteopenia 03/05/2021    Gastroesophageal reflux disease 08/06/2020    Breast reconstruction deformity     Use of tamoxifen (Nolvadex) 06/17/2020    Bilateral carpal tunnel syndrome 01/27/2020    Osteoarthritis of multiple joints 01/27/2020    Drug-induced polyneuropathy ([address] ) 01/27/2020    Arthritis 01/27/2020    Vitamin D deficiency 01/08/2020    Impaired fasting blood sugar 01/08/2020    Urinary tract infection with hematuria 11/10/2019    Dysuria 11/10/2019    Chemotherapy induced neutropenia ([address] ) 19/41/4246    Folliculitis 62/52/5260    Family history of breast cancer in sister 01/10/2019    Malignant neoplasm of lower-inner quadrant of left breast in female, estrogen receptor positive ([facility] [address] ) 01/02/2019    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    Thyroid nodule 12/15/2016    Elevated LFTs 10/26/2016    Nausea 10/26/2016    Atypical chest pain 10/24/2016    Epigastric pain 10/24/2016    Plantar fasciitis of right foot 10/20/2016     She  has a past surgical history that includes Thumb arthroscopy; pr edg us exam surgical alter stom duodenum/jejunum (N/A, 02/02/2017); pr part excis plantar fascia (Right, 10/20/2016); Oophorectomy (Bilateral); US guided breast biopsy left complete (Left, 01/02/2019); Breast biopsy (Left, 01/02/2019); Colonoscopy; pr mastectomy, simple, complete (N/A, 02/08/2019); pr biopsy/excision, lymph node(s) (Left, 02/08/2019); pr tissue expander placement breast reconstruction (Bilateral, 02/08/2019); pr implnt bio implnt for soft tissue reinforcement (Bilateral, 02/08/2019); pr huong-implant capsulectomy breast complete (Bilateral, 09/27/2019); pr insj/rplcmt breast implant sep day mastectomy (Bilateral, 09/27/2019); pr revision of reconstructed breast (Bilateral, 08/06/2020); Liposuction w/ fat injection (Bilateral, 08/06/2020); Hysterectomy; [place] tooth extraction; and Upper gastrointestinal endoscopy  Her family history includes Brain cancer (age of onset: 67) in her mother; Breast cancer (age of onset: 54) in her sister; Breast cancer (age of onset: 72) in her sister; Breast cancer additional onset (age of onset: 72) in her sister; Prostate cancer (age of onset: 62) in her brother; Prostate cancer (age of onset: 66) in her father  She  reports that she has never smoked  She has never used smokeless tobacco  She reports current alcohol use of about 1 0 standard drink of alcohol per week  She reports that she does not use drugs    Current Outpatient Medications   Medication Sig Dispense Refill    CALCIUM PO Take 1,200 mg by mouth daily      Cholecalciferol (VITAMIN D PO) Take 1 25 mg by mouth every 30 (thirty) days      Cyanocobalamin (VITAMIN B 12 PO) Take 500 mcg by mouth daily       docusate sodium (COLACE) 100 mg capsule Take 1 capsule (100 mg total) by mouth 2 (two) times a day 200 capsule 3    ergocalciferol (VITAMIN D2) 50,000 units Take 1 capsule (50,000 Units total) by mouth every 30 (thirty) days 3 capsule 3    Multiple Vitamins-Minerals (MULTIVITAL PO) Take by mouth daily       Restasis 0 05 % ophthalmic emulsion       tamoxifen (NOLVADEX) 20 mg tablet Take 1 tablet (20 mg total) by mouth in the morning  90 tablet 3    doxylamine (UNISOM) 25 MG tablet Take 1 tablet (25 mg total) by mouth daily at bedtime as needed for sleep (Patient not taking: No sig reported) 30 tablet 0    Na Sulfate-K Sulfate-Mg Sulf 17 5-3 13-1 6 GM/177ML SOLN Take 1 kit by mouth once for 1 dose (Patient not taking: Reported on 8/9/2022) 354 mL 0    promethazine-codeine (PHENERGAN WITH CODEINE) 6 25-10 mg/5 mL syrup Take 5 mL by mouth every 6 (six) hours as needed for cough (Patient not taking: No sig reported) 150 mL 0    Soolantra 1 % CREA   (Patient not taking: No sig reported)      triamcinolone (KENALOG) 0 1 % ointment   (Patient not taking: No sig reported)       No current facility-administered medications for this visit  Current Outpatient Medications on File Prior to Visit   Medication Sig    CALCIUM PO Take 1,200 mg by mouth daily    Cholecalciferol (VITAMIN D PO) Take 1 25 mg by mouth every 30 (thirty) days    Cyanocobalamin (VITAMIN B 12 PO) Take 500 mcg by mouth daily     docusate sodium (COLACE) 100 mg capsule Take 1 capsule (100 mg total) by mouth 2 (two) times a day    ergocalciferol (VITAMIN D2) 50,000 units Take 1 capsule (50,000 Units total) by mouth every 30 (thirty) days    Multiple Vitamins-Minerals (MULTIVITAL PO) Take by mouth daily     Restasis 0 05 % ophthalmic emulsion     tamoxifen (NOLVADEX) 20 mg tablet Take 1 tablet (20 mg total) by mouth in the morning      doxylamine (UNISOM) 25 MG tablet Take 1 tablet (25 mg total) by mouth daily at bedtime as needed for sleep (Patient not taking: No sig reported)    Na Sulfate-K Sulfate-Mg Sulf 17 5-3 13-1 6 GM/177ML SOLN Take 1 kit by mouth once for 1 dose (Patient not taking: Reported on 8/9/2022)    promethazine-codeine (PHENERGAN WITH CODEINE) 6 25-10 mg/5 mL syrup Take 5 mL by mouth every 6 (six) hours as needed for cough (Patient not taking: No sig reported)    Soolantra 1 % CREA   (Patient not taking: No sig reported)    triamcinolone (KENALOG) 0 1 % ointment   (Patient not taking: No sig reported)     No current facility-administered medications on file prior to visit  She is allergic to latex, shellfish allergy - food allergy, and shellfish-derived products - food allergy       Review of Systems   Constitutional: Positive for chills  Negative for appetite change, fatigue and fever  HENT: Negative for sore throat and trouble swallowing  Eyes: Negative for redness  Respiratory: Negative for shortness of breath  Cardiovascular: Negative for chest pain and palpitations  Gastrointestinal: Negative for abdominal pain, constipation and diarrhea  Genitourinary: Negative for dysuria and hematuria  Musculoskeletal: Positive for myalgias  Negative for back pain and neck pain  Skin: Negative for rash  Neurological: Positive for headaches  Negative for seizures and weakness  Hematological: Negative for adenopathy  Psychiatric/Behavioral: Negative for confusion  The patient is not nervous/anxious  Objective:      /70 (BP Location: Right arm, Patient Position: Sitting, Cuff Size: Adult)   Pulse 86   Temp 97 5 °F (36 4 °C) (Temporal)   Ht 5' 5" (1 651 m)   Wt 77 1 kg (170 lb)   SpO2 98%   BMI 28 29 kg/m²     Results Reviewed     None          No results found for this or any previous visit (from the past 1344 hour(s))  Physical Exam  Constitutional:       General: She is not in acute distress  Appearance: Normal appearance  HENT:      Head: Normocephalic and atraumatic  Nose: Nose normal       Mouth/Throat:      Mouth: Mucous membranes are moist    Eyes:      Extraocular Movements: Extraocular movements intact  Pupils: Pupils are equal, round, and reactive to light  Cardiovascular:      Rate and Rhythm: Normal rate and regular rhythm  Pulses: Normal pulses        Heart sounds: Normal heart sounds  No murmur heard  No friction rub  Pulmonary:      Effort: Pulmonary effort is normal  No respiratory distress  Breath sounds: Normal breath sounds  No wheezing  Abdominal:      General: Abdomen is flat  Bowel sounds are normal  There is no distension  Palpations: Abdomen is soft  There is no mass  Tenderness: There is no abdominal tenderness  There is no guarding  Musculoskeletal:         General: Normal range of motion  Cervical back: Normal range of motion  Neurological:      General: No focal deficit present  Mental Status: She is alert and oriented to person, place, and time  Mental status is at baseline  Cranial Nerves: No cranial nerve deficit     Psychiatric:         Mood and Affect: Mood normal          Behavior: Behavior normal

## 2022-08-16 ENCOUNTER — TELEPHONE (OUTPATIENT)
Dept: INTERNAL MEDICINE CLINIC | Facility: CLINIC | Age: 72
End: 2022-08-16

## 2022-08-16 DIAGNOSIS — M54.50 ACUTE MIDLINE LOW BACK PAIN WITHOUT SCIATICA: Primary | ICD-10-CM

## 2022-08-16 NOTE — TELEPHONE ENCOUNTER
Patient was seen by you last week  She is using Lanacane pain patches (over the counter 4%)  She is asking if you are able to order stronger ones through her pharmacy? If so, she would it sent to [facility]

## 2022-08-16 NOTE — TELEPHONE ENCOUNTER
She is using the patches on her back  She is currently using over the counter ones, but is asking if you are able to order patches that are stronger?

## 2022-08-17 RX ORDER — LIDOCAINE 50 MG/G
1 PATCH TOPICAL DAILY
Qty: 15 PATCH | Refills: 1 | Status: SHIPPED | OUTPATIENT
Start: 2022-08-17

## 2022-08-18 ENCOUNTER — TELEPHONE (OUTPATIENT)
Dept: INTERNAL MEDICINE CLINIC | Facility: CLINIC | Age: 72
End: 2022-08-18

## 2022-08-18 NOTE — TELEPHONE ENCOUNTER
Patient called about her lidocaine patch , called her back left a message told her it was called into [facility] yesterday and was confirmed receipt by them to call and check with them again

## 2022-09-08 ENCOUNTER — ANESTHESIA EVENT (OUTPATIENT)
Dept: GASTROENTEROLOGY | Facility: HOSPITAL | Age: 72
End: 2022-09-08

## 2022-09-08 ENCOUNTER — HOSPITAL ENCOUNTER (OUTPATIENT)
Dept: GASTROENTEROLOGY | Facility: HOSPITAL | Age: 72
Setting detail: OUTPATIENT SURGERY
End: 2022-09-08
Attending: INTERNAL MEDICINE
Payer: COMMERCIAL

## 2022-09-08 ENCOUNTER — ANESTHESIA (OUTPATIENT)
Dept: GASTROENTEROLOGY | Facility: HOSPITAL | Age: 72
End: 2022-09-08

## 2022-09-08 VITALS
HEART RATE: 66 BPM | WEIGHT: 168 LBS | OXYGEN SATURATION: 97 % | DIASTOLIC BLOOD PRESSURE: 58 MMHG | TEMPERATURE: 96.9 F | SYSTOLIC BLOOD PRESSURE: 100 MMHG | HEIGHT: 65 IN | RESPIRATION RATE: 18 BRPM | BODY MASS INDEX: 27.99 KG/M2

## 2022-09-08 DIAGNOSIS — Z85.3 HISTORY OF BREAST CANCER: ICD-10-CM

## 2022-09-08 DIAGNOSIS — Z12.11 COLON CANCER SCREENING: ICD-10-CM

## 2022-09-08 PROCEDURE — 45385 COLONOSCOPY W/LESION REMOVAL: CPT | Performed by: INTERNAL MEDICINE

## 2022-09-08 PROCEDURE — 88305 TISSUE EXAM BY PATHOLOGIST: CPT | Performed by: PATHOLOGY

## 2022-09-08 PROCEDURE — 45380 COLONOSCOPY AND BIOPSY: CPT | Performed by: INTERNAL MEDICINE

## 2022-09-08 RX ORDER — SODIUM CHLORIDE 9 MG/ML
INJECTION, SOLUTION INTRAVENOUS CONTINUOUS PRN
Status: DISCONTINUED | OUTPATIENT
Start: 2022-09-08 | End: 2022-09-08

## 2022-09-08 RX ORDER — SODIUM CHLORIDE 9 MG/ML
100 INJECTION, SOLUTION INTRAVENOUS CONTINUOUS
Status: CANCELLED | OUTPATIENT
Start: 2022-09-08

## 2022-09-08 RX ORDER — SODIUM CHLORIDE 9 MG/ML
125 INJECTION, SOLUTION INTRAVENOUS CONTINUOUS
Status: DISCONTINUED | OUTPATIENT
Start: 2022-09-08 | End: 2022-09-12 | Stop reason: HOSPADM

## 2022-09-08 RX ORDER — PROPOFOL 10 MG/ML
INJECTION, EMULSION INTRAVENOUS AS NEEDED
Status: DISCONTINUED | OUTPATIENT
Start: 2022-09-08 | End: 2022-09-08

## 2022-09-08 RX ADMIN — PROPOFOL 50 MG: 10 INJECTION, EMULSION INTRAVENOUS at 12:05

## 2022-09-08 RX ADMIN — SODIUM CHLORIDE: 0.9 INJECTION, SOLUTION INTRAVENOUS at 11:37

## 2022-09-08 RX ADMIN — PROPOFOL 50 MG: 10 INJECTION, EMULSION INTRAVENOUS at 11:47

## 2022-09-08 RX ADMIN — PROPOFOL 50 MG: 10 INJECTION, EMULSION INTRAVENOUS at 11:58

## 2022-09-08 RX ADMIN — PROPOFOL 50 MG: 10 INJECTION, EMULSION INTRAVENOUS at 11:51

## 2022-09-08 RX ADMIN — SODIUM CHLORIDE 125 ML/HR: 0.9 INJECTION, SOLUTION INTRAVENOUS at 11:29

## 2022-09-08 RX ADMIN — PROPOFOL 50 MG: 10 INJECTION, EMULSION INTRAVENOUS at 11:56

## 2022-09-08 RX ADMIN — PROPOFOL 100 MG: 10 INJECTION, EMULSION INTRAVENOUS at 11:43

## 2022-09-08 RX ADMIN — PROPOFOL 50 MG: 10 INJECTION, EMULSION INTRAVENOUS at 12:01

## 2022-09-08 NOTE — H&P
History and Physical - [facility]  Suzette Abreu 73 y o  female MRN: [id number]                  HPI: Suzette Abreu is a 73y o  year old female who presents for colonoscopy      REVIEW OF SYSTEMS: Per the HPI, and otherwise unremarkable  Historical Information   Past Medical History:   Diagnosis Date    Allergies     Arthritis     Carpal tunnel syndrome on right     Chemotherapy induced neutropenia ([facility] [address] ) 5/23/2019    Gastroesophageal reflux disease 8/6/2020    GERD (gastroesophageal reflux disease)     Lumbar disc disease     Trigger thumb     right    Vitamin D deficiency     Wears glasses      Past Surgical History:   Procedure Laterality Date    BREAST BIOPSY Left 01/02/2019    invasive ductal carcinoma    COLONOSCOPY      HYSTERECTOMY      age 41 - abdominal    LIPOSUCTION W/ FAT INJECTION Bilateral 08/06/2020    Procedure: FAT GRAFTING;  Surgeon: Felecia Asif MD;  [facility];  Service: Plastics    OOPHORECTOMY Bilateral     age 41    OR BIOPSY/EXCISION, LYMPH NODE(S) Left 02/08/2019    Procedure: BIOPSY LYMPH NODE SENTINEL;  Surgeon: Lauren Randolph MD;  [facility];  Service: Surgical Oncology    OR EDG US EXAM SURGICAL ALTER STOM DUODENUM/JEJUNUM N/A 02/02/2017    Procedure: RADIAL ENDOSCOPIC U/S;  Surgeon: Francesca Alfaro MD;  [facility];   Service: Gastroenterology    OR IMPLNT BIO IMPLNT FOR SOFT TISSUE REINFORCEMENT Bilateral 02/08/2019    Procedure: RECONSTRUCTION BREAST W/ IMPLANT;  Surgeon: Felecia Asif MD;  [facility];  Service: Plastics    OR INSJ/RPLCMT BREAST IMPLANT SEP DAY MASTECTOMY Bilateral 09/27/2019    Procedure: IMPLANT EXPANDER EXCHANGE;  Surgeon: Felecia Asif MD;  [facility];  Service: Plastics    OR MASTECTOMY, SIMPLE, COMPLETE N/A 02/08/2019    Procedure: LEFT breast mastectomy; RIGHT breast prophylactic mastectomy;  Surgeon: Lauren Randolph MD;  [facility];  Service: Surgical Oncology    OR PART EXCIS PLANTAR FASCIA Right 10/20/2016    Procedure:  OPEN RELEASE FASCIA PLANTAR ,CUTTING BOTTOM OF ARCH,INSTEP ;  Surgeon: Terrance Bender DPM;  [facility];  Service: Podiatry    MD ZUNILDA-IMPLANT CAPSULECTOMY BREAST COMPLETE Bilateral 09/27/2019    Procedure: CAPSULECTOMY;  Surgeon: Reny Landaverde MD;  [facility];  Service: Plastics    MD REVISION OF RECONSTRUCTED BREAST Bilateral 08/06/2020    Procedure: BREAST LATERAL STANDING EXCISION SKIN DEFORMITIES POST RECON ;  Surgeon: Reny Landaverde MD;  [facility];  Service: Plastics    MD TISSUE EXPANDER PLACEMENT BREAST RECONSTRUCTION Bilateral 02/08/2019    Procedure: INSERTION/PLACEMENT TISSUE EXPANDER (EXCHANGE); Surgeon: Reny Landaverde MD;  [facility];  Service: Plastics    THUMB ARTHROSCOPY      UPPER GASTROINTESTINAL ENDOSCOPY      US GUIDED BREAST BIOPSY LEFT COMPLETE Left 01/02/2019    WISDOM TOOTH EXTRACTION       Social History   Social History     Substance and Sexual Activity   Alcohol Use Yes    Alcohol/week: 1 0 standard drink    Types: 1 Glasses of wine per week    Comment: 3 x yearly     Social History     Substance and Sexual Activity   Drug Use No     Social History     Tobacco Use   Smoking Status Never Smoker   Smokeless Tobacco Never Used     Family History   Problem Relation Age of Onset    Brain cancer Mother 67    Prostate cancer Father 66    Breast cancer Sister 54    Breast cancer additional onset Sister 72    Breast cancer Sister 72    Prostate cancer Brother 62       Meds/Allergies     (Not in a hospital admission)      Allergies   Allergen Reactions    Latex Anaphylaxis    Shellfish Allergy - Food Allergy Anaphylaxis    Shellfish-Derived Products - Food Allergy Anaphylaxis       Objective     not currently breastfeeding        PHYSICAL EXAM    Gen: NAD  CV: RRR  CHEST: Clear  ABD: soft, NT/ND  EXT: no edema      ASSESSMENT/PLAN:  This is a 73y o  year old female here for colonoscopy    PLAN: Procedure: colonoscopy

## 2022-09-08 NOTE — ANESTHESIA POSTPROCEDURE EVALUATION
Post-Op Assessment Note    CV Status:  Stable  Pain Score: 0    Pain management: adequate     Mental Status:  Arousable and sleepy   Hydration Status:  Euvolemic   PONV Controlled:  Controlled   Airway Patency:  Patent      Post Op Vitals Reviewed: Yes      Staff: Anesthesiologist, CRNA         No complications documented      BP (!) 88/53 (09/08/22 1212)    Temp (!) 96 9 °F (36 1 °C) (09/08/22 1212)    Pulse 77 (09/08/22 1212)   Resp 16 (09/08/22 1212)    SpO2 97 % (09/08/22 1212)

## 2022-09-08 NOTE — ANESTHESIA PREPROCEDURE EVALUATION
Procedure:  COLONOSCOPY    Relevant Problems   CARDIO   (+) Atypical chest pain      GI/HEPATIC   (+) Excessive VIP secretion   (+) Gastroesophageal reflux disease      GYN   (+) Malignant neoplasm of lower-inner quadrant of left breast in female, estrogen receptor positive (HCC)      MUSCULOSKELETAL   (+) Arthritis   (+) Osteoarthritis of multiple joints        Physical Exam    Airway    Mallampati score: II  TM Distance: >3 FB  Neck ROM: full     Dental   No notable dental hx     Cardiovascular      Pulmonary      Other Findings        Anesthesia Plan  ASA Score- 2     Anesthesia Type- IV sedation with anesthesia with ASA Monitors  Additional Monitors:   Airway Plan:           Plan Factors-Exercise tolerance (METS): >4 METS  Chart reviewed  Patient summary reviewed  Patient is not a current smoker  Induction- intravenous  Postoperative Plan-     Informed Consent- Anesthetic plan and risks discussed with patient  I personally reviewed this patient with the CRNA  Discussed and agreed on the Anesthesia Plan with the VALERIE Ponce

## 2022-09-13 PROCEDURE — 88305 TISSUE EXAM BY PATHOLOGIST: CPT | Performed by: PATHOLOGY

## 2022-09-23 ENCOUNTER — OFFICE VISIT (OUTPATIENT)
Dept: INTERNAL MEDICINE CLINIC | Facility: CLINIC | Age: 72
End: 2022-09-23
Payer: COMMERCIAL

## 2022-09-23 VITALS
BODY MASS INDEX: 28.49 KG/M2 | HEART RATE: 78 BPM | WEIGHT: 171 LBS | HEIGHT: 65 IN | OXYGEN SATURATION: 97 % | DIASTOLIC BLOOD PRESSURE: 76 MMHG | SYSTOLIC BLOOD PRESSURE: 124 MMHG | TEMPERATURE: 97.3 F

## 2022-09-23 DIAGNOSIS — J30.89 NON-SEASONAL ALLERGIC RHINITIS DUE TO OTHER ALLERGIC TRIGGER: Primary | ICD-10-CM

## 2022-09-23 DIAGNOSIS — H65.06 RECURRENT ACUTE SEROUS OTITIS MEDIA OF BOTH EARS: ICD-10-CM

## 2022-09-23 DIAGNOSIS — J04.10 TRACHEITIS: ICD-10-CM

## 2022-09-23 DIAGNOSIS — L30.9 ECZEMA, UNSPECIFIED TYPE: ICD-10-CM

## 2022-09-23 PROCEDURE — 99214 OFFICE O/P EST MOD 30 MIN: CPT | Performed by: INTERNAL MEDICINE

## 2022-09-23 RX ORDER — AZITHROMYCIN 250 MG/1
TABLET, FILM COATED ORAL
Qty: 6 TABLET | Refills: 0 | Status: SHIPPED | OUTPATIENT
Start: 2022-09-23 | End: 2022-09-23

## 2022-09-23 RX ORDER — AZITHROMYCIN 250 MG/1
TABLET, FILM COATED ORAL
Qty: 6 TABLET | Refills: 0 | Status: SHIPPED | OUTPATIENT
Start: 2022-09-23 | End: 2022-09-27

## 2022-09-23 RX ORDER — DIPHENHYDRAMINE HCL 25 MG
25 TABLET ORAL
Qty: 30 TABLET | Refills: 0 | Status: SHIPPED | OUTPATIENT
Start: 2022-09-23

## 2022-09-23 NOTE — PROGRESS NOTES
Assessment/Plan:           1  Non-seasonal allergic rhinitis due to other allergic trigger  Comments:  Zyrtec 10 mg daily advised  Montelukast discussed  2  Tracheitis  Comments:  Zithromax prescribed  COVID test was negative  Orders:  -     azithromycin (ZITHROMAX) 250 mg tablet; Take 2 tablets today then 1 tablet daily x 4 days    3  Eczema, unspecified type  Comments:  Prescribed triamcinolone b i d  p r n   Orders:  -     triamcinolone (KENALOG) 0 1 % ointment; Apply topically 2 (two) times a day for 7 days    4  Recurrent acute serous otitis media of both ears  Comments:  Benadryl 25 or 50 mg at bedtime advised  Orders:  -     diphenhydrAMINE (BENADRYL) 25 mg tablet; Take 1 tablet (25 mg total) by mouth daily at bedtime         1  Tracheitis      2  Eczema, unspecified type    - triamcinolone (KENALOG) 0 1 % ointment; Apply topically 2 (two) times a day for 7 days  Dispense: 80 g; Refill: 0       No problem-specific Assessment & Plan notes found for this encounter  Subjective:      Patient ID: Trenton Chung is a 73 y o  female  HPI    The following portions of the patient's history were reviewed and updated as appropriate: She  has a past medical history of Allergies, Arthritis, Breast cancer ([address] ), Carpal tunnel syndrome on right, Chemotherapy induced neutropenia ([address] ) (05/23/2019), Gastroesophageal reflux disease (08/06/2020), GERD (gastroesophageal reflux disease), Lumbar disc disease, Trigger thumb, Vitamin D deficiency, and Wears glasses    She   Patient Active Problem List    Diagnosis Date Noted    Other insomnia 05/01/2022    Urinary tract infection without hematuria 04/28/2021    Osteopenia 03/05/2021    Gastroesophageal reflux disease 08/06/2020    Breast reconstruction deformity     Use of tamoxifen (Nolvadex) 06/17/2020    Bilateral carpal tunnel syndrome 01/27/2020    Osteoarthritis of multiple joints 01/27/2020    Drug-induced polyneuropathy ([address] ) 01/27/2020    Arthritis 01/27/2020    Vitamin D deficiency 01/08/2020    Impaired fasting blood sugar 01/08/2020    Urinary tract infection with hematuria 11/10/2019    Dysuria 11/10/2019    Chemotherapy induced neutropenia ([facility] ) 12/56/6336    Folliculitis 82/59/1011    Family history of breast cancer in sister 01/10/2019    Malignant neoplasm of lower-inner quadrant of left breast in female, estrogen receptor positive ([facility] ) 01/02/2019    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    Thyroid nodule 12/15/2016    Elevated LFTs 10/26/2016    Nausea 10/26/2016    Atypical chest pain 10/24/2016    Epigastric pain 10/24/2016    Plantar fasciitis of right foot 10/20/2016     She  has a past surgical history that includes Thumb arthroscopy; pr edg us exam surgical alter stom duodenum/jejunum (N/A, 02/02/2017); pr part excis plantar fascia (Right, 10/20/2016); Oophorectomy (Bilateral); US guided breast biopsy left complete (Left, 01/02/2019); Breast biopsy (Left, 01/02/2019); Colonoscopy; pr mastectomy, simple, complete (N/A, 02/08/2019); pr biopsy/excision, lymph node(s) (Left, 02/08/2019); pr tissue expander placement breast reconstruction (Bilateral, 02/08/2019); pr implnt bio implnt for soft tissue reinforcement (Bilateral, 02/08/2019); pr huong-implant capsulectomy breast complete (Bilateral, 09/27/2019); pr insj/rplcmt breast implant sep day mastectomy (Bilateral, 09/27/2019); pr revision of reconstructed breast (Bilateral, 08/06/2020); Liposuction w/ fat injection (Bilateral, 08/06/2020); Hysterectomy; [place] tooth extraction; and Upper gastrointestinal endoscopy  Her family history includes Brain cancer (age of onset: 67) in her mother; Breast cancer (age of onset: 54) in her sister; Breast cancer (age of onset: 72) in her sister; Breast cancer additional onset (age of onset: 72) in her sister; Prostate cancer (age of onset: 62) in her brother; Prostate cancer (age of onset: 66) in her father    She  reports that she has never smoked  She has never used smokeless tobacco  She reports previous alcohol use of about 1 0 standard drink of alcohol per week  She reports that she does not use drugs  Current Outpatient Medications   Medication Sig Dispense Refill    azithromycin (ZITHROMAX) 250 mg tablet Take 2 tablets today then 1 tablet daily x 4 days 6 tablet 0    CALCIUM PO Take 1,200 mg by mouth daily      Cholecalciferol (VITAMIN D PO) Take 1 25 mg by mouth every 30 (thirty) days      Cyanocobalamin (VITAMIN B 12 PO) Take 500 mcg by mouth daily       diphenhydrAMINE (BENADRYL) 25 mg tablet Take 1 tablet (25 mg total) by mouth daily at bedtime 30 tablet 0    docusate sodium (COLACE) 100 mg capsule Take 1 capsule (100 mg total) by mouth 2 (two) times a day (Patient taking differently: Take 100 mg by mouth if needed) 200 capsule 3    ergocalciferol (VITAMIN D2) 50,000 units Take 1 capsule (50,000 Units total) by mouth every 30 (thirty) days 3 capsule 3    lidocaine (LIDODERM) 5 % Apply 1 patch topically daily Remove & Discard patch within 12 hours or as directed by MD 15 patch 1    Multiple Vitamins-Minerals (MULTIVITAL PO) Take by mouth daily       Restasis 0 05 % ophthalmic emulsion       tamoxifen (NOLVADEX) 20 mg tablet Take 1 tablet (20 mg total) by mouth in the morning   90 tablet 3    triamcinolone (KENALOG) 0 1 % ointment Apply topically 2 (two) times a day for 7 days 80 g 0    doxylamine (UNISOM) 25 MG tablet Take 1 tablet (25 mg total) by mouth daily at bedtime as needed for sleep (Patient not taking: No sig reported) 30 tablet 0    Na Sulfate-K Sulfate-Mg Sulf 17 5-3 13-1 6 GM/177ML SOLN Take 1 kit by mouth once for 1 dose (Patient not taking: No sig reported) 354 mL 0    promethazine-codeine (PHENERGAN WITH CODEINE) 6 25-10 mg/5 mL syrup Take 5 mL by mouth every 6 (six) hours as needed for cough (Patient not taking: No sig reported) 150 mL 0    Soolantra 1 % CREA   (Patient not taking: No sig reported) No current facility-administered medications for this visit  Current Outpatient Medications on File Prior to Visit   Medication Sig    CALCIUM PO Take 1,200 mg by mouth daily    Cholecalciferol (VITAMIN D PO) Take 1 25 mg by mouth every 30 (thirty) days    Cyanocobalamin (VITAMIN B 12 PO) Take 500 mcg by mouth daily     docusate sodium (COLACE) 100 mg capsule Take 1 capsule (100 mg total) by mouth 2 (two) times a day (Patient taking differently: Take 100 mg by mouth if needed)    ergocalciferol (VITAMIN D2) 50,000 units Take 1 capsule (50,000 Units total) by mouth every 30 (thirty) days    lidocaine (LIDODERM) 5 % Apply 1 patch topically daily Remove & Discard patch within 12 hours or as directed by MD    Multiple Vitamins-Minerals (MULTIVITAL PO) Take by mouth daily     Restasis 0 05 % ophthalmic emulsion     tamoxifen (NOLVADEX) 20 mg tablet Take 1 tablet (20 mg total) by mouth in the morning   doxylamine (UNISOM) 25 MG tablet Take 1 tablet (25 mg total) by mouth daily at bedtime as needed for sleep (Patient not taking: No sig reported)    Na Sulfate-K Sulfate-Mg Sulf 17 5-3 13-1 6 GM/177ML SOLN Take 1 kit by mouth once for 1 dose (Patient not taking: No sig reported)    promethazine-codeine (PHENERGAN WITH CODEINE) 6 25-10 mg/5 mL syrup Take 5 mL by mouth every 6 (six) hours as needed for cough (Patient not taking: No sig reported)    Soolantra 1 % CREA   (Patient not taking: No sig reported)    [DISCONTINUED] triamcinolone (KENALOG) 0 1 % ointment   (Patient not taking: No sig reported)     No current facility-administered medications on file prior to visit  She is allergic to latex and shellfish-derived products - food allergy       Review of Systems   Constitutional: Negative for appetite change, chills, fatigue and fever  HENT: Positive for sinus pressure  Negative for sore throat and trouble swallowing  Eyes: Negative for redness     Respiratory: Negative for shortness of breath  Cardiovascular: Negative for chest pain and palpitations  Gastrointestinal: Negative for abdominal pain, constipation and diarrhea  Genitourinary: Negative for dysuria and hematuria  Musculoskeletal: Negative for back pain and neck pain  Skin: Negative for rash  Neurological: Positive for headaches  Negative for seizures and weakness  Hematological: Negative for adenopathy  Psychiatric/Behavioral: Negative for confusion  The patient is not nervous/anxious  Objective:      /76 (BP Location: Left arm, Patient Position: Sitting, Cuff Size: Large)   Pulse 78   Temp (!) 97 3 °F (36 3 °C) (Temporal)   Ht 5' 5" (1 651 m)   Wt 77 6 kg (171 lb)   SpO2 97% Comment: RA  BMI 28 46 kg/m²     Results Reviewed     None          Recent Results (from the past 1344 hour(s))   Tissue Exam    Collection Time: 09/08/22 11:53 AM   Result Value Ref Range    Case Report       Surgical Pathology Report                         Case: [id number]                                   Authorizing Provider:  Clovia Cabot, MD  Collected:           09/08/2022 1153              Ordering Location:     [address] [place] [address]      Received:            09/08/2022 [facility]  Endoscopy                                                           Pathologist:           Kalyn Campbell DO                                                     Specimens:   A) - Polyp, Colorectal, ascending colon polyp - cold bx                                             B) - Polyp, Colorectal, sigmoid colon polyp - cold bx                                               C) - Polyp, Colorectal, rectal polyp - cold snare                                          Final Diagnosis       A  Colon, Ascending (Polyp), Biopsy:  - Tubular adenoma  B  Colon, Sigmoid (Polyp), Biopsy:  - Hyperplastic polyp  C  Colon, Rectum (Polyp), Biopsy:  - Tubular adenoma        Additional Information       All reported additional testing was performed with appropriately reactive controls  These tests were developed and their performance characteristics determined by [address] [place] [address] [facility] or appropriate performing facility, though some tests may be performed on tissues which have not been validated for performance characteristics (such as staining performed on alcohol exposed cell blocks and decalcified tissues)  Results should be interpreted with caution and in the context of the patients clinical condition  These tests may not be cleared or approved by the [place]  Food and Drug Administration, though the FDA has determined that such clearance or approval is not necessary  These tests are used for clinical purposes and they should not be regarded as investigational or for research  This laboratory has been approved by [facility], designated as a high-complexity laboratory and is qualified to perform these tests  Interpretation performed at [facility], [address] [facility] - GI - All Specimens          :    Adenoma(s)       : Other      Gross Description          A  The specimen is received in formalin, labeled with the patient's name and hospital number, and is designated "ascending colon polyp"  The specimen consists of a single pink tissue fragment measuring 0 4 cm in greatest dimension  The specimen is entirely submitted in a screened cassette  B  The specimen is received in formalin, labeled with the patient's name and hospital number, and is designated "sigmoid colon polyp biopsy"  The specimen consists of a single tan tissue fragment measuring 0 6 x 0 4 x 0 1 cm  The specimen is entirely submitted in a screened cassette  C  The specimen is received in formalin, labeled with the patient's name and hospital number, and is designated "rectal polyp"    The specimen consists of a single tan-pink tissue fragments measuring 0 8 x 0 4 x 0 3 cm  The specimen is entirely submitted in a screened cassette  Note: The estimated total formalin fixation time based upon information provided by the submitting clinician and the standard processing schedule is under 72 hours  Cecilia Sandoval                   Physical Exam  Constitutional:       General: She is not in acute distress  Appearance: Normal appearance  HENT:      Head: Normocephalic and atraumatic  Ears:      Comments: Fullness of the right tympanic membrane  Nose: Nose normal       Mouth/Throat:      Mouth: Mucous membranes are moist    Eyes:      Extraocular Movements: Extraocular movements intact  Pupils: Pupils are equal, round, and reactive to light  Cardiovascular:      Rate and Rhythm: Normal rate and regular rhythm  Pulses: Normal pulses  Heart sounds: Normal heart sounds  No murmur heard  No friction rub  Pulmonary:      Effort: Pulmonary effort is normal  No respiratory distress  Breath sounds: Normal breath sounds  No wheezing  Abdominal:      General: Abdomen is flat  Bowel sounds are normal  There is no distension  Palpations: Abdomen is soft  There is no mass  Tenderness: There is no abdominal tenderness  There is no guarding  Musculoskeletal:         General: Normal range of motion  Cervical back: Normal range of motion  Skin:     General: Skin is warm  Neurological:      General: No focal deficit present  Mental Status: She is alert and oriented to person, place, and time  Mental status is at baseline  Cranial Nerves: No cranial nerve deficit     Psychiatric:         Mood and Affect: Mood normal          Behavior: Behavior normal

## 2022-10-12 PROBLEM — N39.0 URINARY TRACT INFECTION WITHOUT HEMATURIA: Status: RESOLVED | Noted: 2021-04-28 | Resolved: 2022-10-12

## 2022-10-21 ENCOUNTER — OFFICE VISIT (OUTPATIENT)
Dept: INTERNAL MEDICINE CLINIC | Facility: CLINIC | Age: 72
End: 2022-10-21
Payer: COMMERCIAL

## 2022-10-21 VITALS
HEART RATE: 64 BPM | OXYGEN SATURATION: 96 % | TEMPERATURE: 97.5 F | HEIGHT: 65 IN | BODY MASS INDEX: 28.99 KG/M2 | SYSTOLIC BLOOD PRESSURE: 122 MMHG | WEIGHT: 174 LBS | DIASTOLIC BLOOD PRESSURE: 82 MMHG

## 2022-10-21 DIAGNOSIS — E55.9 VITAMIN D DEFICIENCY: ICD-10-CM

## 2022-10-21 DIAGNOSIS — R42 DIZZINESS: ICD-10-CM

## 2022-10-21 DIAGNOSIS — J30.89 NON-SEASONAL ALLERGIC RHINITIS DUE TO OTHER ALLERGIC TRIGGER: Primary | ICD-10-CM

## 2022-10-21 DIAGNOSIS — M85.88 OSTEOPENIA OF LUMBAR SPINE: ICD-10-CM

## 2022-10-21 PROCEDURE — 99214 OFFICE O/P EST MOD 30 MIN: CPT | Performed by: INTERNAL MEDICINE

## 2022-10-21 RX ORDER — MONTELUKAST SODIUM 10 MG/1
10 TABLET ORAL
Qty: 30 TABLET | Refills: 5 | Status: SHIPPED | OUTPATIENT
Start: 2022-10-21

## 2022-10-21 NOTE — PROGRESS NOTES
Assessment/Plan:           1  Non-seasonal allergic rhinitis due to other allergic trigger  Comments:  Advised montelukast 10 mg at night  Home humidity level between 10:00 p m  and 7:00 a m  discussed  Does have cats  Orders:  -     montelukast (SINGULAIR) 10 mg tablet; Take 1 tablet (10 mg total) by mouth daily at bedtime    2  Osteopenia of lumbar spine  Comments:  Continue calcium and vitamin-D supplementation  3  Vitamin D deficiency  -     Vitamin D 25 hydroxy; Future    4  Dizziness         1  Non-seasonal allergic rhinitis due to other allergic trigger      2  Osteopenia of lumbar spine      3  Vitamin D deficiency    - Vitamin D 25 hydroxy; Future       No problem-specific Assessment & Plan notes found for this encounter  Subjective:      Patient ID: Florentino Huber is a 73 y o  female  HPI    The following portions of the patient's history were reviewed and updated as appropriate: She  has a past medical history of Allergies, Arthritis, Breast cancer ([address] ), Carpal tunnel syndrome on right, Chemotherapy induced neutropenia ([address] ) (05/23/2019), Gastroesophageal reflux disease (08/06/2020), GERD (gastroesophageal reflux disease), Lumbar disc disease, Trigger thumb, Vitamin D deficiency, and Wears glasses    She   Patient Active Problem List    Diagnosis Date Noted   • Other insomnia 05/01/2022   • Osteopenia 03/05/2021   • Gastroesophageal reflux disease 08/06/2020   • Breast reconstruction deformity    • Use of tamoxifen (Nolvadex) 06/17/2020   • Bilateral carpal tunnel syndrome 01/27/2020   • Osteoarthritis of multiple joints 01/27/2020   • Drug-induced polyneuropathy ([address] ) 01/27/2020   • Arthritis 01/27/2020   • Vitamin D deficiency 01/08/2020   • Impaired fasting blood sugar 01/08/2020   • Urinary tract infection with hematuria 11/10/2019   • Dysuria 11/10/2019   • Chemotherapy induced neutropenia ([address] ) 45/56/9351   • Folliculitis 91/46/2177   • Family history of breast cancer in sister 01/10/2019   • Malignant neoplasm of lower-inner quadrant of left breast in female, estrogen receptor positive ([facility] ) 01/02/2019   • 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   • Thyroid nodule 12/15/2016   • Elevated LFTs 10/26/2016   • Nausea 10/26/2016   • Atypical chest pain 10/24/2016   • Epigastric pain 10/24/2016   • Plantar fasciitis of right foot 10/20/2016     She  has a past surgical history that includes Thumb arthroscopy; pr edg us exam surgical alter stom duodenum/jejunum (N/A, 02/02/2017); pr part excis plantar fascia (Right, 10/20/2016); Oophorectomy (Bilateral); US guided breast biopsy left complete (Left, 01/02/2019); Breast biopsy (Left, 01/02/2019); Colonoscopy; pr mastectomy, simple, complete (N/A, 02/08/2019); pr biopsy/excision, lymph node(s) (Left, 02/08/2019); pr tissue expander placement breast reconstruction (Bilateral, 02/08/2019); pr implnt bio implnt for soft tissue reinforcement (Bilateral, 02/08/2019); pr huong-implant capsulectomy breast complete (Bilateral, 09/27/2019); pr insj/rplcmt breast implant sep day mastectomy (Bilateral, 09/27/2019); pr revision of reconstructed breast (Bilateral, 08/06/2020); Liposuction w/ fat injection (Bilateral, 08/06/2020); Hysterectomy; [place] tooth extraction; and Upper gastrointestinal endoscopy  Her family history includes Brain cancer (age of onset: 67) in her mother; Breast cancer (age of onset: 54) in her sister; Breast cancer (age of onset: 72) in her sister; Breast cancer additional onset (age of onset: 72) in her sister; Prostate cancer (age of onset: 62) in her brother; Prostate cancer (age of onset: 66) in her father  She  reports that she has never smoked  She has never used smokeless tobacco  She reports previous alcohol use of about 1 0 standard drink of alcohol per week  She reports that she does not use drugs    Current Outpatient Medications   Medication Sig Dispense Refill   • CALCIUM PO Take 1,200 mg by mouth daily     • Cholecalciferol (VITAMIN D PO) Take 1 25 mg by mouth every 30 (thirty) days     • Cyanocobalamin (VITAMIN B 12 PO) Take 500 mcg by mouth daily      • diphenhydrAMINE (BENADRYL) 25 mg tablet Take 1 tablet (25 mg total) by mouth daily at bedtime 30 tablet 0   • docusate sodium (COLACE) 100 mg capsule Take 1 capsule (100 mg total) by mouth 2 (two) times a day (Patient taking differently: Take 100 mg by mouth if needed) 200 capsule 3   • ergocalciferol (VITAMIN D2) 50,000 units Take 1 capsule (50,000 Units total) by mouth every 30 (thirty) days 3 capsule 3   • lidocaine (LIDODERM) 5 % Apply 1 patch topically daily Remove & Discard patch within 12 hours or as directed by MD 15 patch 1   • montelukast (SINGULAIR) 10 mg tablet Take 1 tablet (10 mg total) by mouth daily at bedtime 30 tablet 5   • Multiple Vitamins-Minerals (MULTIVITAL PO) Take by mouth daily      • Restasis 0 05 % ophthalmic emulsion      • tamoxifen (NOLVADEX) 20 mg tablet Take 1 tablet (20 mg total) by mouth in the morning  90 tablet 3   • triamcinolone (KENALOG) 0 1 % ointment Apply topically 2 (two) times a day for 7 days 80 g 0   • doxylamine (UNISOM) 25 MG tablet Take 1 tablet (25 mg total) by mouth daily at bedtime as needed for sleep (Patient not taking: No sig reported) 30 tablet 0   • Na Sulfate-K Sulfate-Mg Sulf 17 5-3 13-1 6 GM/177ML SOLN Take 1 kit by mouth once for 1 dose (Patient not taking: No sig reported) 354 mL 0   • Soolantra 1 % CREA   (Patient not taking: No sig reported)       No current facility-administered medications for this visit       Current Outpatient Medications on File Prior to Visit   Medication Sig   • CALCIUM PO Take 1,200 mg by mouth daily   • Cholecalciferol (VITAMIN D PO) Take 1 25 mg by mouth every 30 (thirty) days   • Cyanocobalamin (VITAMIN B 12 PO) Take 500 mcg by mouth daily    • diphenhydrAMINE (BENADRYL) 25 mg tablet Take 1 tablet (25 mg total) by mouth daily at bedtime   • docusate sodium (COLACE) 100 mg capsule Take 1 capsule (100 mg total) by mouth 2 (two) times a day (Patient taking differently: Take 100 mg by mouth if needed)   • ergocalciferol (VITAMIN D2) 50,000 units Take 1 capsule (50,000 Units total) by mouth every 30 (thirty) days   • lidocaine (LIDODERM) 5 % Apply 1 patch topically daily Remove & Discard patch within 12 hours or as directed by MD   • Multiple Vitamins-Minerals (MULTIVITAL PO) Take by mouth daily    • Restasis 0 05 % ophthalmic emulsion    • tamoxifen (NOLVADEX) 20 mg tablet Take 1 tablet (20 mg total) by mouth in the morning  • triamcinolone (KENALOG) 0 1 % ointment Apply topically 2 (two) times a day for 7 days   • doxylamine (UNISOM) 25 MG tablet Take 1 tablet (25 mg total) by mouth daily at bedtime as needed for sleep (Patient not taking: No sig reported)   • Na Sulfate-K Sulfate-Mg Sulf 17 5-3 13-1 6 GM/177ML SOLN Take 1 kit by mouth once for 1 dose (Patient not taking: No sig reported)   • Soolantra 1 % CREA   (Patient not taking: No sig reported)   • [DISCONTINUED] promethazine-codeine (PHENERGAN WITH CODEINE) 6 25-10 mg/5 mL syrup Take 5 mL by mouth every 6 (six) hours as needed for cough (Patient not taking: No sig reported)     No current facility-administered medications on file prior to visit  She is allergic to latex and shellfish-derived products - food allergy       Review of Systems   Constitutional: Negative for appetite change, chills, fatigue and fever  HENT: Negative for sore throat and trouble swallowing  Eyes: Negative for redness  Respiratory: Negative for shortness of breath  Cardiovascular: Negative for chest pain and palpitations  Gastrointestinal: Negative for abdominal pain, constipation and diarrhea  Genitourinary: Negative for dysuria and hematuria  Musculoskeletal: Negative for back pain and neck pain  Skin: Negative for rash  Neurological: Negative for seizures, weakness and headaches  Hematological: Negative for adenopathy     Psychiatric/Behavioral: Negative for confusion  The patient is not nervous/anxious  Objective:      /82 (BP Location: Right arm, Patient Position: Sitting, Cuff Size: Adult)   Pulse 64   Temp 97 5 °F (36 4 °C) (Temporal)   Ht 5' 5" (1 651 m)   Wt 78 9 kg (174 lb)   SpO2 96%   BMI 28 96 kg/m²     Results Reviewed     None          Recent Results (from the past 1344 hour(s))   Tissue Exam    Collection Time: 09/08/22 11:53 AM   Result Value Ref Range    Case Report       Surgical Pathology Report                         Case: [id number]                                   Authorizing Provider:  Ingrid Ramsey MD  Collected:           09/08/2022 1153              Ordering Location:     [address] [place] [address]      Received:            09/08/2022 [id number] [address] [facility]                                                           Pathologist:           Shanon Nunez DO                                                     Specimens:   A) - Polyp, Colorectal, ascending colon polyp - cold bx                                             B) - Polyp, Colorectal, sigmoid colon polyp - cold bx                                               C) - Polyp, Colorectal, rectal polyp - cold snare                                          Final Diagnosis       A  Colon, Ascending (Polyp), Biopsy:  - Tubular adenoma  B  Colon, Sigmoid (Polyp), Biopsy:  - Hyperplastic polyp  C  Colon, Rectum (Polyp), Biopsy:  - Tubular adenoma  Additional Information       All reported additional testing was performed with appropriately reactive controls  These tests were developed and their performance characteristics determined by [facility] or appropriate performing facility, though some tests may be performed on tissues which have not been validated for performance characteristics (such as staining performed on alcohol exposed cell blocks and decalcified tissues)    Results should be interpreted with caution and in the context of the patients’ clinical condition  These tests may not be cleared or approved by the [place]  Food and Drug Administration, though the FDA has determined that such clearance or approval is not necessary  These tests are used for clinical purposes and they should not be regarded as investigational or for research  This laboratory has been approved by [place], designated as a high-complexity laboratory and is qualified to perform these tests  Interpretation performed at [facility], [address] - GI - All Specimens          :    Adenoma(s)       : Other      Gross Description          A  The specimen is received in formalin, labeled with the patient's name and hospital number, and is designated "ascending colon polyp"  The specimen consists of a single pink tissue fragment measuring 0 4 cm in greatest dimension  The specimen is entirely submitted in a screened cassette  B  The specimen is received in formalin, labeled with the patient's name and hospital number, and is designated "sigmoid colon polyp biopsy"  The specimen consists of a single tan tissue fragment measuring 0 6 x 0 4 x 0 1 cm  The specimen is entirely submitted in a screened cassette  C  The specimen is received in formalin, labeled with the patient's name and hospital number, and is designated "rectal polyp"  The specimen consists of a single tan-pink tissue fragments measuring 0 8 x 0 4 x 0 3 cm  The specimen is entirely submitted in a screened cassette  Note: The estimated total formalin fixation time based upon information provided by the submitting clinician and the standard processing schedule is under 72 hours  Cecilia Sandoval                   Physical Exam  Constitutional:       General: She is not in acute distress  Appearance: Normal appearance     HENT:      Head: Normocephalic and atraumatic  Nose: Nose normal       Mouth/Throat:      Mouth: Mucous membranes are moist    Eyes:      Extraocular Movements: Extraocular movements intact  Pupils: Pupils are equal, round, and reactive to light  Cardiovascular:      Rate and Rhythm: Normal rate and regular rhythm  Pulses: Normal pulses  Heart sounds: Normal heart sounds  No murmur heard  No friction rub  Pulmonary:      Effort: Pulmonary effort is normal  No respiratory distress  Breath sounds: Normal breath sounds  No wheezing  Abdominal:      General: Abdomen is flat  Bowel sounds are normal  There is no distension  Palpations: Abdomen is soft  There is no mass  Tenderness: There is no abdominal tenderness  There is no guarding  Musculoskeletal:         General: Normal range of motion  Neurological:      General: No focal deficit present  Mental Status: She is alert and oriented to person, place, and time  Mental status is at baseline  Cranial Nerves: No cranial nerve deficit     Psychiatric:         Mood and Affect: Mood normal          Behavior: Behavior normal

## 2022-11-04 ENCOUNTER — APPOINTMENT (OUTPATIENT)
Dept: LAB | Facility: CLINIC | Age: 72
End: 2022-11-04

## 2022-11-04 DIAGNOSIS — Z17.0 MALIGNANT NEOPLASM OF LOWER-INNER QUADRANT OF LEFT BREAST IN FEMALE, ESTROGEN RECEPTOR POSITIVE (HCC): ICD-10-CM

## 2022-11-04 DIAGNOSIS — C50.312 MALIGNANT NEOPLASM OF LOWER-INNER QUADRANT OF LEFT BREAST IN FEMALE, ESTROGEN RECEPTOR POSITIVE (HCC): ICD-10-CM

## 2022-11-04 DIAGNOSIS — E55.9 VITAMIN D DEFICIENCY: ICD-10-CM

## 2022-11-04 LAB
25(OH)D3 SERPL-MCNC: 36.2 NG/ML (ref 30–100)
ALBUMIN SERPL BCP-MCNC: 3.5 G/DL (ref 3.5–5)
ALP SERPL-CCNC: 53 U/L (ref 46–116)
ALT SERPL W P-5'-P-CCNC: 22 U/L (ref 12–78)
ANION GAP SERPL CALCULATED.3IONS-SCNC: 5 MMOL/L (ref 4–13)
AST SERPL W P-5'-P-CCNC: 18 U/L (ref 5–45)
BASOPHILS # BLD AUTO: 0.05 THOUSANDS/ÂΜL (ref 0–0.1)
BASOPHILS NFR BLD AUTO: 1 % (ref 0–1)
BILIRUB SERPL-MCNC: 0.5 MG/DL (ref 0.2–1)
BUN SERPL-MCNC: 13 MG/DL (ref 5–25)
CALCIUM SERPL-MCNC: 9.7 MG/DL (ref 8.3–10.1)
CANCER AG27-29 SERPL-ACNC: 21.6 U/ML (ref 0–42.3)
CHLORIDE SERPL-SCNC: 108 MMOL/L (ref 96–108)
CO2 SERPL-SCNC: 28 MMOL/L (ref 21–32)
CREAT SERPL-MCNC: 0.67 MG/DL (ref 0.6–1.3)
EOSINOPHIL # BLD AUTO: 0.2 THOUSAND/ÂΜL (ref 0–0.61)
EOSINOPHIL NFR BLD AUTO: 4 % (ref 0–6)
ERYTHROCYTE [DISTWIDTH] IN BLOOD BY AUTOMATED COUNT: 12.8 % (ref 11.6–15.1)
GFR SERPL CREATININE-BSD FRML MDRD: 89 ML/MIN/1.73SQ M
GLUCOSE P FAST SERPL-MCNC: 95 MG/DL (ref 65–99)
HCT VFR BLD AUTO: 45.5 % (ref 34.8–46.1)
HGB BLD-MCNC: 14.5 G/DL (ref 11.5–15.4)
IMM GRANULOCYTES # BLD AUTO: 0.01 THOUSAND/UL (ref 0–0.2)
IMM GRANULOCYTES NFR BLD AUTO: 0 % (ref 0–2)
LYMPHOCYTES # BLD AUTO: 2.31 THOUSANDS/ÂΜL (ref 0.6–4.47)
LYMPHOCYTES NFR BLD AUTO: 45 % (ref 14–44)
MCH RBC QN AUTO: 29 PG (ref 26.8–34.3)
MCHC RBC AUTO-ENTMCNC: 31.9 G/DL (ref 31.4–37.4)
MCV RBC AUTO: 91 FL (ref 82–98)
MONOCYTES # BLD AUTO: 0.33 THOUSAND/ÂΜL (ref 0.17–1.22)
MONOCYTES NFR BLD AUTO: 6 % (ref 4–12)
NEUTROPHILS # BLD AUTO: 2.27 THOUSANDS/ÂΜL (ref 1.85–7.62)
NEUTS SEG NFR BLD AUTO: 44 % (ref 43–75)
NRBC BLD AUTO-RTO: 0 /100 WBCS
PLATELET # BLD AUTO: 178 THOUSANDS/UL (ref 149–390)
PMV BLD AUTO: 9.6 FL (ref 8.9–12.7)
POTASSIUM SERPL-SCNC: 3.9 MMOL/L (ref 3.5–5.3)
PROT SERPL-MCNC: 7.7 G/DL (ref 6.4–8.4)
RBC # BLD AUTO: 5 MILLION/UL (ref 3.81–5.12)
SODIUM SERPL-SCNC: 141 MMOL/L (ref 135–147)
WBC # BLD AUTO: 5.17 THOUSAND/UL (ref 4.31–10.16)

## 2022-11-11 ENCOUNTER — CONSULT (OUTPATIENT)
Dept: INTERNAL MEDICINE CLINIC | Facility: CLINIC | Age: 72
End: 2022-11-11

## 2022-11-11 VITALS
HEIGHT: 65 IN | WEIGHT: 178 LBS | HEART RATE: 74 BPM | DIASTOLIC BLOOD PRESSURE: 76 MMHG | TEMPERATURE: 98.1 F | BODY MASS INDEX: 29.66 KG/M2 | SYSTOLIC BLOOD PRESSURE: 112 MMHG | OXYGEN SATURATION: 97 %

## 2022-11-11 DIAGNOSIS — C50.312 MALIGNANT NEOPLASM OF LOWER-INNER QUADRANT OF LEFT BREAST IN FEMALE, ESTROGEN RECEPTOR POSITIVE (HCC): ICD-10-CM

## 2022-11-11 DIAGNOSIS — R21 RASH: ICD-10-CM

## 2022-11-11 DIAGNOSIS — Z01.818 PRE-OP EXAMINATION: Primary | ICD-10-CM

## 2022-11-11 DIAGNOSIS — Z17.0 MALIGNANT NEOPLASM OF LOWER-INNER QUADRANT OF LEFT BREAST IN FEMALE, ESTROGEN RECEPTOR POSITIVE (HCC): ICD-10-CM

## 2022-11-11 DIAGNOSIS — M54.50 ACUTE MIDLINE LOW BACK PAIN WITHOUT SCIATICA: ICD-10-CM

## 2022-11-11 RX ORDER — PREDNISOLONE ACETATE 10 MG/ML
SUSPENSION/ DROPS OPHTHALMIC
COMMUNITY
Start: 2022-10-27

## 2022-11-11 RX ORDER — LIDOCAINE 50 MG/G
1 PATCH TOPICAL DAILY
Qty: 15 PATCH | Refills: 1 | Status: SHIPPED | OUTPATIENT
Start: 2022-11-11

## 2022-11-11 RX ORDER — OFLOXACIN 3 MG/ML
SOLUTION/ DROPS OPHTHALMIC
COMMUNITY
Start: 2022-10-27

## 2022-11-11 NOTE — PROGRESS NOTES
Assessment/Plan:    Diagnoses and all orders for this visit:    Pre-op examination    Acute midline low back pain without sciatica  -     lidocaine (LIDODERM) 5 %; Apply 1 patch topically daily Remove & Discard patch within 12 hours or as directed by MD    Rash  -     Ambulatory Referral to Dermatology; Future    Malignant neoplasm of lower-inner quadrant of left breast in female, estrogen receptor positive (HCC)    Other orders  -     ofloxacin (OCUFLOX) 0 3 % ophthalmic solution  -     prednisoLONE acetate (PRED FORTE) 1 % ophthalmic suspension       Recent blood work reviewed, unremarkable     Patient is at a low risk for low risk procedure  There are no Patient Instructions on file for this visit  Subjective:      Patient ID: Mary Kay is a 73 y o  female    Patient comes for preoperative evaluation prior to cataract surgery on 11/16/22 and 11/30/22 by Dr Misa Sen  Patient denies any subjective complaints today          Current Outpatient Medications:   •  CALCIUM PO, Take 1,200 mg by mouth daily, Disp: , Rfl:   •  Cholecalciferol (VITAMIN D PO), Take 1 25 mg by mouth every 30 (thirty) days, Disp: , Rfl:   •  Cyanocobalamin (VITAMIN B 12 PO), Take 500 mcg by mouth daily , Disp: , Rfl:   •  ergocalciferol (VITAMIN D2) 50,000 units, Take 1 capsule (50,000 Units total) by mouth every 30 (thirty) days, Disp: 3 capsule, Rfl: 3  •  lidocaine (LIDODERM) 5 %, Apply 1 patch topically daily Remove & Discard patch within 12 hours or as directed by MD, Disp: 15 patch, Rfl: 1  •  montelukast (SINGULAIR) 10 mg tablet, Take 1 tablet (10 mg total) by mouth daily at bedtime, Disp: 30 tablet, Rfl: 5  •  Multiple Vitamins-Minerals (MULTIVITAL PO), Take by mouth daily , Disp: , Rfl:   •  ofloxacin (OCUFLOX) 0 3 % ophthalmic solution, , Disp: , Rfl:   •  prednisoLONE acetate (PRED FORTE) 1 % ophthalmic suspension, , Disp: , Rfl:   •  Restasis 0 05 % ophthalmic emulsion, , Disp: , Rfl:   •  tamoxifen (NOLVADEX) 20 mg tablet, Take 1 tablet (20 mg total) by mouth in the morning , Disp: 90 tablet, Rfl: 3  •  diphenhydrAMINE (BENADRYL) 25 mg tablet, Take 1 tablet (25 mg total) by mouth daily at bedtime (Patient not taking: Reported on 11/11/2022), Disp: 30 tablet, Rfl: 0  •  docusate sodium (COLACE) 100 mg capsule, Take 1 capsule (100 mg total) by mouth 2 (two) times a day (Patient taking differently: Take 100 mg by mouth if needed), Disp: 200 capsule, Rfl: 3  •  doxylamine (UNISOM) 25 MG tablet, Take 1 tablet (25 mg total) by mouth daily at bedtime as needed for sleep (Patient not taking: No sig reported), Disp: 30 tablet, Rfl: 0  •  Na Sulfate-K Sulfate-Mg Sulf 17 5-3 13-1 6 GM/177ML SOLN, Take 1 kit by mouth once for 1 dose (Patient not taking: No sig reported), Disp: 354 mL, Rfl: 0  •  Soolantra 1 % CREA,   (Patient not taking: No sig reported), Disp: , Rfl:   •  triamcinolone (KENALOG) 0 1 % ointment, Apply topically 2 (two) times a day for 7 days, Disp: 80 g, Rfl: 0     Past Medical History:   Diagnosis Date   • Allergies    • Arthritis    • Breast cancer ([facility] [address] )    • Carpal tunnel syndrome on right    • Chemotherapy induced neutropenia (HCC) 05/23/2019   • Gastroesophageal reflux disease 08/06/2020   • GERD (gastroesophageal reflux disease)    • Lumbar disc disease    • Trigger thumb     right   • Vitamin D deficiency    • Wears glasses          Past Surgical History:   Procedure Laterality Date   • BREAST BIOPSY Left 01/02/2019    invasive ductal carcinoma   • COLONOSCOPY     • HYSTERECTOMY      age 41 - abdominal   • LIPOSUCTION W/ FAT INJECTION Bilateral 08/06/2020    Procedure: FAT GRAFTING;  Surgeon: Angelica Simpson MD;  [facility]: [facility];  Service: Plastics   • OOPHORECTOMY Bilateral     age 41   • HI BIOPSY/EXCISION, LYMPH NODE(S) Left 02/08/2019    Procedure: BIOPSY LYMPH NODE SENTINEL;  Surgeon: Jt Montana MD;  [facility];  Service: Surgical Oncology   • HI EDG US EXAM SURGICAL ALTER STOM DUODENUM/JEJUNUM N/A 02/02/2017    Procedure: RADIAL ENDOSCOPIC U/S;  Surgeon: Celina Daniels MD;  [facility]: [facility]; Service: Gastroenterology   • AR IMPLNT BIO IMPLNT FOR SOFT TISSUE REINFORCEMENT Bilateral 02/08/2019    Procedure: RECONSTRUCTION BREAST W/ IMPLANT;  Surgeon: Chas Coto MD;  [facility];  Service: Plastics   • AR INSJ/RPLCMT BREAST IMPLANT SEP DAY MASTECTOMY Bilateral 09/27/2019    Procedure: IMPLANT EXPANDER EXCHANGE;  Surgeon: Chas Coto MD;  [facility];  Service: Plastics   • AR MASTECTOMY, SIMPLE, COMPLETE N/A 02/08/2019    Procedure: LEFT breast mastectomy; RIGHT breast prophylactic mastectomy;  Surgeon: Gilda Grier MD;  [facility];  Service: Surgical Oncology   • AR PART EXCIS PLANTAR FASCIA Right 10/20/2016    Procedure:  OPEN RELEASE FASCIA PLANTAR ,CUTTING BOTTOM OF ARCH,INSTEP ;  Surgeon: Genet Dennis DPM;  [facility];  Service: Podiatry   • AR ZUNILDA-IMPLANT CAPSULECTOMY BREAST COMPLETE Bilateral 09/27/2019    Procedure: CAPSULECTOMY;  Surgeon: Chas Coto MD;  [facility];  Service: Plastics   • AR REVISION OF RECONSTRUCTED BREAST Bilateral 08/06/2020    Procedure: BREAST LATERAL STANDING EXCISION SKIN DEFORMITIES POST RECON ;  Surgeon: Chas Coto MD;  [facility];  Service: Plastics   • AR TISSUE EXPANDER PLACEMENT BREAST RECONSTRUCTION Bilateral 02/08/2019    Procedure: INSERTION/PLACEMENT TISSUE EXPANDER (EXCHANGE);   Surgeon: Chas Coto MD;  [facility];  Service: Plastics   • THUMB ARTHROSCOPY     • UPPER GASTROINTESTINAL ENDOSCOPY     • US GUIDED BREAST BIOPSY LEFT COMPLETE Left 01/02/2019   • WISDOM TOOTH EXTRACTION           Allergies   Allergen Reactions   • Latex Anaphylaxis   • Shellfish-Derived Products - Food Allergy Anaphylaxis       Recent Results (from the past 1008 hour(s))   CBC and differential    Collection Time: 11/04/22  7:44 AM   Result Value Ref Range    WBC 5 17 4 31 - 10 16 Thousand/uL    RBC 5 00 3 81 - 5 12 Million/uL    Hemoglobin 14 5 11 5 - 15 4 g/dL    Hematocrit 45 5 34 8 - 46 1 %    MCV 91 82 - 98 fL    MCH 29 0 26 8 - 34 3 pg    MCHC 31 9 31 4 - 37 4 g/dL    RDW 12 8 11 6 - 15 1 %    MPV 9 6 8 9 - 12 7 fL    Platelets 717 411 - 189 Thousands/uL    nRBC 0 /100 WBCs    Neutrophils Relative 44 43 - 75 %    Immat GRANS % 0 0 - 2 %    Lymphocytes Relative 45 (H) 14 - 44 %    Monocytes Relative 6 4 - 12 %    Eosinophils Relative 4 0 - 6 %    Basophils Relative 1 0 - 1 %    Neutrophils Absolute 2 27 1 85 - 7 62 Thousands/µL    Immature Grans Absolute 0 01 0 00 - 0 20 Thousand/uL    Lymphocytes Absolute 2 31 0 60 - 4 47 Thousands/µL    Monocytes Absolute 0 33 0 17 - 1 22 Thousand/µL    Eosinophils Absolute 0 20 0 00 - 0 61 Thousand/µL    Basophils Absolute 0 05 0 00 - 0 10 Thousands/µL   Comprehensive metabolic panel    Collection Time: 11/04/22  7:44 AM   Result Value Ref Range    Sodium 141 135 - 147 mmol/L    Potassium 3 9 3 5 - 5 3 mmol/L    Chloride 108 96 - 108 mmol/L    CO2 28 21 - 32 mmol/L    ANION GAP 5 4 - 13 mmol/L    BUN 13 5 - 25 mg/dL    Creatinine 0 67 0 60 - 1 30 mg/dL    Glucose, Fasting 95 65 - 99 mg/dL    Calcium 9 7 8 3 - 10 1 mg/dL    AST 18 5 - 45 U/L    ALT 22 12 - 78 U/L    Alkaline Phosphatase 53 46 - 116 U/L    Total Protein 7 7 6 4 - 8 4 g/dL    Albumin 3 5 3 5 - 5 0 g/dL    Total Bilirubin 0 50 0 20 - 1 00 mg/dL    eGFR 89 ml/min/1 73sq m   Cancer antigen 27 29    Collection Time: 11/04/22  7:44 AM   Result Value Ref Range    CA 27 29 21 6 0 0 - 42 3 U/mL   Vitamin D 25 hydroxy    Collection Time: 11/04/22  7:44 AM   Result Value Ref Range    Vit D, 25-Hydroxy 36 2 30 0 - 100 0 ng/mL       The following portions of the patient's history were reviewed and updated as appropriate: allergies, current medications, past family history, past medical history, past social history, past surgical history and problem list      Review of Systems   Constitutional: Negative for appetite change, chills, diaphoresis, fatigue, fever and unexpected weight change  Respiratory: Negative for apnea, cough, choking, chest tightness, shortness of breath, wheezing and stridor  Cardiovascular: Negative for chest pain, palpitations and leg swelling  Gastrointestinal: Negative for abdominal distention, abdominal pain, anal bleeding, blood in stool, constipation, diarrhea, nausea and vomiting  Genitourinary: Negative for decreased urine volume, difficulty urinating, frequency and urgency  Musculoskeletal: Negative for arthralgias, back pain and myalgias  Skin: Positive for rash  Neurological: Negative for dizziness, light-headedness, numbness and headaches  Objective:      Vitals:    11/11/22 1459   BP: 112/76   Pulse: 74   Temp: 98 1 °F (36 7 °C)   SpO2: 97%          Physical Exam  Vitals reviewed  Constitutional:       General: She is not in acute distress  Appearance: Normal appearance  She is not ill-appearing, toxic-appearing or diaphoretic  HENT:      Mouth/Throat:      Mouth: Mucous membranes are moist    Cardiovascular:      Rate and Rhythm: Normal rate and regular rhythm  Pulses: Normal pulses  Heart sounds: Normal heart sounds  No murmur heard  No friction rub  No gallop  Pulmonary:      Effort: Pulmonary effort is normal  No respiratory distress  Breath sounds: Normal breath sounds  No stridor  No wheezing, rhonchi or rales  Chest:      Chest wall: No tenderness  Musculoskeletal:         General: No swelling or tenderness  Right lower leg: No edema  Left lower leg: No edema  Skin:     General: Skin is warm and dry  Findings: Rash present  No lesion  Neurological:      Mental Status: She is alert and oriented to person, place, and time

## 2022-11-18 ENCOUNTER — OFFICE VISIT (OUTPATIENT)
Dept: HEMATOLOGY ONCOLOGY | Facility: CLINIC | Age: 72
End: 2022-11-18

## 2022-11-18 VITALS
OXYGEN SATURATION: 98 % | RESPIRATION RATE: 18 BRPM | HEIGHT: 65 IN | SYSTOLIC BLOOD PRESSURE: 114 MMHG | HEART RATE: 67 BPM | TEMPERATURE: 97.8 F | BODY MASS INDEX: 30.16 KG/M2 | DIASTOLIC BLOOD PRESSURE: 70 MMHG | WEIGHT: 181 LBS

## 2022-11-18 DIAGNOSIS — C50.312 MALIGNANT NEOPLASM OF LOWER-INNER QUADRANT OF LEFT BREAST IN FEMALE, ESTROGEN RECEPTOR POSITIVE (HCC): Primary | ICD-10-CM

## 2022-11-18 DIAGNOSIS — G62.0 DRUG-INDUCED POLYNEUROPATHY (HCC): ICD-10-CM

## 2022-11-18 DIAGNOSIS — E04.1 THYROID NODULE: ICD-10-CM

## 2022-11-18 DIAGNOSIS — M19.90 ARTHRITIS: ICD-10-CM

## 2022-11-18 DIAGNOSIS — Z17.0 MALIGNANT NEOPLASM OF LOWER-INNER QUADRANT OF LEFT BREAST IN FEMALE, ESTROGEN RECEPTOR POSITIVE (HCC): Primary | ICD-10-CM

## 2022-11-18 RX ORDER — TAMOXIFEN CITRATE 20 MG/1
20 TABLET ORAL DAILY
Qty: 90 TABLET | Refills: 3 | Status: SHIPPED | OUTPATIENT
Start: 2022-11-18

## 2022-11-18 NOTE — PROGRESS NOTES
HPI:  In 2019 patient was diagnosed to have  hormone receptor-positive and HER2 negative stage I, 1 2 cm, grade 3 left breast cancer , strongly positive ER, positive UT, negative HER 2, positive lymphovascular invasion, negative 3 sentinel lymph nodes, and Oncotype score was 25  Patient had bilateral mastectomies and left axillary lymph node sampling   Patient had 4 cycles of adjuvant chemotherapy, combination of Taxotere and Cytoxan and Neulasta and that was followed by aromatase inhibitors and she had problems with all 3 aromatase inhibitors   Lot of musculoskeletal symptoms  Since May 2020 she has been on tamoxifen and she does not have musculoskeletal symptoms  No hot flashes  Has burning sensation in her feet, part of peripheral neuropathy  Occasionally she has some tiredness    She has bilateral carpal tunnel   There is family history of breast cancer  Patient states that she tested negative for BRCA   Margi Serum History of high triglycerides and she has controlled that with diet and triglyceride level came down   Patient is trying to eat healthy and stay active  She had left eye cataract surgery on 11/16/2022 and that went well and she will have right eye cataract surgery on 12/30/22    She states she is up-to-date with her medical checkups                Current Outpatient Medications:   •  CALCIUM PO, Take 1,200 mg by mouth daily, Disp: , Rfl:   •  Cholecalciferol (VITAMIN D PO), Take 1 25 mg by mouth every 30 (thirty) days, Disp: , Rfl:   •  Cyanocobalamin (VITAMIN B 12 PO), Take 500 mcg by mouth daily , Disp: , Rfl:   •  docusate sodium (COLACE) 100 mg capsule, Take 1 capsule (100 mg total) by mouth 2 (two) times a day (Patient taking differently: Take 100 mg by mouth if needed), Disp: 200 capsule, Rfl: 3  •  ergocalciferol (VITAMIN D2) 50,000 units, Take 1 capsule (50,000 Units total) by mouth every 30 (thirty) days, Disp: 3 capsule, Rfl: 3  •  lidocaine (LIDODERM) 5 %, Apply 1 patch topically daily Remove & Discard patch within 12 hours or as directed by MD, Disp: 15 patch, Rfl: 1  •  montelukast (SINGULAIR) 10 mg tablet, Take 1 tablet (10 mg total) by mouth daily at bedtime, Disp: 30 tablet, Rfl: 5  •  Multiple Vitamins-Minerals (MULTIVITAL PO), Take by mouth daily , Disp: , Rfl:   •  ofloxacin (OCUFLOX) 0 3 % ophthalmic solution, , Disp: , Rfl:   •  prednisoLONE acetate (PRED FORTE) 1 % ophthalmic suspension, , Disp: , Rfl:   •  Restasis 0 05 % ophthalmic emulsion, , Disp: , Rfl:   •  tamoxifen (NOLVADEX) 20 mg tablet, Take 1 tablet (20 mg total) by mouth in the morning , Disp: 90 tablet, Rfl: 3  •  diphenhydrAMINE (BENADRYL) 25 mg tablet, Take 1 tablet (25 mg total) by mouth daily at bedtime (Patient not taking: Reported on 11/11/2022), Disp: 30 tablet, Rfl: 0  •  doxylamine (UNISOM) 25 MG tablet, Take 1 tablet (25 mg total) by mouth daily at bedtime as needed for sleep (Patient not taking: Reported on 9/23/2022), Disp: 30 tablet, Rfl: 0  •  Na Sulfate-K Sulfate-Mg Sulf 17 5-3 13-1 6 GM/177ML SOLN, Take 1 kit by mouth once for 1 dose (Patient not taking: Reported on 8/9/2022), Disp: 354 mL, Rfl: 0  •  Soolantra 1 % CREA,   (Patient not taking: Reported on 5/5/2022), Disp: , Rfl:   •  triamcinolone (KENALOG) 0 1 % ointment, Apply topically 2 (two) times a day for 7 days, Disp: 80 g, Rfl: 0    Allergies   Allergen Reactions   • Latex Anaphylaxis   • Shellfish-Derived Products - Food Allergy Anaphylaxis       Oncology History   Malignant neoplasm of lower-inner quadrant of left breast in female, estrogen receptor positive ([address] )   1/2/2019 Initial Diagnosis    Malignant neoplasm of lower-inner quadrant of left breast in female, estrogen receptor positive [facility])      Chemotherapy    April 2019:    Adjuvant  Taxotere and Cytoxan once every 3 weeks for 4 cycles followed by aromatase inhibitor     4/11/2019 - 7/3/2019 Chemotherapy    pegfilgrastim (NEULASTA) subcutaneous injection 6 mg, 6 mg, Subcutaneous, Once, 4 of 4 cycles  Dose modification: 4 mg (original dose 6 mg, Cycle 3)  Administration: 4 mg (5/24/2019), 4 mg (6/14/2019)  cyclophosphamide (CYTOXAN) 1,176 mg in sodium chloride 0 9 % 250 mL IVPB, 600 mg/m2 = 1,176 mg, Intravenous, Once, 4 of 4 cycles  Administration: 1,176 mg (5/23/2019), 1,176 mg (6/13/2019)  DOCEtaxel (TAXOTERE) 147 mg in sodium chloride 0 9 % 250 mL chemo infusion, 75 mg/m2 = 147 mg, Intravenous, Once, 4 of 4 cycles  Administration: 147 mg (5/23/2019), 147 mg (6/13/2019)         ROS:  11/18/22 Reviewed 12 systems: See symptoms in HPI:   Presently no other neurological, cardiac, pulmonary, GI and  symptoms other than listed in HPI  Other symptoms are in HPI  No  fever, chills, bleeding, bone pains, skin rash, weight loss,  weakness, night sweats  claudication and gait problem  No frequent infections  Not unusually sensitive to heat or cold  No swelling of the ankles  No swollen glands  Patient is not anxious  /70 (BP Location: Right arm, Patient Position: Sitting, Cuff Size: Adult)   Pulse 67   Temp 97 8 °F (36 6 °C)   Resp 18   Ht 5' 5" (1 651 m)   Wt 82 1 kg (181 lb)   SpO2 98%   BMI 30 12 kg/m²     Physical Exam:    Alert and oriented and not in distress  Stable vitals  There is no icterus   no oral thrush, no palpable neck mass, clear lung fields  to P&A , regular heart rate, abdomen   soft and non tender , no palpable abdominal mass, no ascites, no edema of ankles, no calf tenderness, no focal neurological deficit, no skin rash, no palpable lymphadenopathy in the neck and axillary areas,  no clubbing  Patient is not anxious  Performance status 0  No lymphedema    IMAGING:    RESULTS:  On 06/19/2021  LUMBAR SPINE L1-L4 :   BMD  0 938  gm/cm2   T-score -1 0         LEFT  TOTAL HIP:   BMD:  0 722  gm/cm2   T-score:  -1 8     LEFT  FEMORAL NECK:   BMD:  0 629  gm/cm2   T score: -2 0               IMPRESSION:  1  Low bone mass (OSTEOPENIA)   [Based on the left femoral neck]  LABS:    Results for orders placed or performed in visit on 11/04/22   CBC and differential   Result Value Ref Range    WBC 5 17 4 31 - 10 16 Thousand/uL    RBC 5 00 3 81 - 5 12 Million/uL    Hemoglobin 14 5 11 5 - 15 4 g/dL    Hematocrit 45 5 34 8 - 46 1 %    MCV 91 82 - 98 fL    MCH 29 0 26 8 - 34 3 pg    MCHC 31 9 31 4 - 37 4 g/dL    RDW 12 8 11 6 - 15 1 %    MPV 9 6 8 9 - 12 7 fL    Platelets 690 119 - 841 Thousands/uL    nRBC 0 /100 WBCs    Neutrophils Relative 44 43 - 75 %    Immat GRANS % 0 0 - 2 %    Lymphocytes Relative 45 (H) 14 - 44 %    Monocytes Relative 6 4 - 12 %    Eosinophils Relative 4 0 - 6 %    Basophils Relative 1 0 - 1 %    Neutrophils Absolute 2 27 1 85 - 7 62 Thousands/µL    Immature Grans Absolute 0 01 0 00 - 0 20 Thousand/uL    Lymphocytes Absolute 2 31 0 60 - 4 47 Thousands/µL    Monocytes Absolute 0 33 0 17 - 1 22 Thousand/µL    Eosinophils Absolute 0 20 0 00 - 0 61 Thousand/µL    Basophils Absolute 0 05 0 00 - 0 10 Thousands/µL   Comprehensive metabolic panel   Result Value Ref Range    Sodium 141 135 - 147 mmol/L    Potassium 3 9 3 5 - 5 3 mmol/L    Chloride 108 96 - 108 mmol/L    CO2 28 21 - 32 mmol/L    ANION GAP 5 4 - 13 mmol/L    BUN 13 5 - 25 mg/dL    Creatinine 0 67 0 60 - 1 30 mg/dL    Glucose, Fasting 95 65 - 99 mg/dL    Calcium 9 7 8 3 - 10 1 mg/dL    AST 18 5 - 45 U/L    ALT 22 12 - 78 U/L    Alkaline Phosphatase 53 46 - 116 U/L    Total Protein 7 7 6 4 - 8 4 g/dL    Albumin 3 5 3 5 - 5 0 g/dL    Total Bilirubin 0 50 0 20 - 1 00 mg/dL    eGFR 89 ml/min/1 73sq m   Cancer antigen 27 29   Result Value Ref Range    CA 27 29 21 6 0 0 - 42 3 U/mL   Vitamin D 25 hydroxy   Result Value Ref Range    Vit D, 25-Hydroxy 36 2 30 0 - 100 0 ng/mL     Labs, Imaging, & Other studies:   All pertinent labs and imaging studies were personally reviewed      Reviewed above test results  and discussed with patient  Assessment and plan:       In 2019 patient was diagnosed to have  hormone receptor-positive and HER2 negative stage I, 1 2 cm, grade 3 left breast cancer , strongly positive ER, positive [place], negative HER 2, positive lymphovascular invasion, negative 3 sentinel lymph nodes, and Oncotype score was 25  Patient had bilateral mastectomies and left axillary lymph node sampling   Patient had 4 cycles of adjuvant chemotherapy, combination of Taxotere and Cytoxan and Neulasta and that was followed by aromatase inhibitors and she had problems with all 3 aromatase inhibitors   Lot of musculoskeletal symptoms  Since May 2020 she has been on tamoxifen and she does not have musculoskeletal symptoms  No hot flashes  Has burning sensation in her feet, part of peripheral neuropathy  Occasionally she has some tiredness    She has bilateral carpal tunnel   There is family history of breast cancer  Patient states that she tested negative for BRCA   Navin Alonso History of high triglycerides and she has controlled that with diet and triglyceride level came down   Patient is trying to eat healthy and stay active  She had left eye cataract surgery on 11/16/2022 and that went well and she will have right eye cataract surgery on 12/30/22  She states she is up-to-date with her medical checkups           Physical examination and test results are as recorded and discussed     Breast cancer remains in remission and goal is cure from breast cancer  Patient will continue to take tamoxifen  20 mg daily  Patient is taking vitamin-D and calcium and is staying active   For thyroid nodule she goes to her endocrinologist    She follows with her breast surgeon for examination  Darell Desouza discussed in detail   Questions answered     Discussed the importance of eating healthy foods, staying active and health screening tests  Jayadrebecca Luz is capable of self-care     Discussed precautions against coronavirus and flu     Patient will continue to follow with her primary physician and other consultants      See diagnoses, orders and instructions  1  Malignant neoplasm of lower-inner quadrant of left breast in female, estrogen receptor positive (HCC)    - tamoxifen (NOLVADEX) 20 mg tablet; Take 1 tablet (20 mg total) by mouth daily  Dispense: 90 tablet; Refill: 3  - CBC and differential; Future  - Comprehensive metabolic panel; Future  - Cancer antigen 27 29; Future    2  Drug-induced polyneuropathy ([facility] [address] )      3  Arthritis      4  Thyroid nodule    - US thyroid; Future    Blood work and ultrasound thyroid prior to next visit in 6 months  Renewed tamoxifen  I used dictation device to dictate this note and there could be mistakes in the note          Patient voiced understanding and agreed      Counseling / Coordination of Care        Provided counseling and support

## 2022-12-06 ENCOUNTER — TELEPHONE (OUTPATIENT)
Dept: INTERNAL MEDICINE CLINIC | Facility: CLINIC | Age: 72
End: 2022-12-06

## 2022-12-06 DIAGNOSIS — J04.10 TRACHEITIS: Primary | ICD-10-CM

## 2022-12-06 RX ORDER — GUAIFENESIN AND CODEINE PHOSPHATE 100; 10 MG/5ML; MG/5ML
5 SOLUTION ORAL 3 TIMES DAILY PRN
Qty: 120 ML | Refills: 0 | Status: SHIPPED | OUTPATIENT
Start: 2022-12-06

## 2022-12-06 NOTE — TELEPHONE ENCOUNTER
Patient called and ask for cough meds to be faxed to [facility] she said that she tried OTC meds but nothing is working

## 2022-12-21 ENCOUNTER — OFFICE VISIT (OUTPATIENT)
Dept: SURGICAL ONCOLOGY | Facility: CLINIC | Age: 72
End: 2022-12-21

## 2022-12-21 VITALS
HEART RATE: 73 BPM | DIASTOLIC BLOOD PRESSURE: 78 MMHG | TEMPERATURE: 95.9 F | BODY MASS INDEX: 29.16 KG/M2 | SYSTOLIC BLOOD PRESSURE: 110 MMHG | HEIGHT: 65 IN | RESPIRATION RATE: 16 BRPM | WEIGHT: 175 LBS | OXYGEN SATURATION: 97 %

## 2022-12-21 DIAGNOSIS — Z13.71 BRCA NEGATIVE: ICD-10-CM

## 2022-12-21 DIAGNOSIS — Z79.810 USE OF TAMOXIFEN (NOLVADEX): ICD-10-CM

## 2022-12-21 DIAGNOSIS — Z17.0 MALIGNANT NEOPLASM OF LOWER-INNER QUADRANT OF LEFT BREAST IN FEMALE, ESTROGEN RECEPTOR POSITIVE (HCC): Primary | ICD-10-CM

## 2022-12-21 DIAGNOSIS — C50.312 MALIGNANT NEOPLASM OF LOWER-INNER QUADRANT OF LEFT BREAST IN FEMALE, ESTROGEN RECEPTOR POSITIVE (HCC): Primary | ICD-10-CM

## 2022-12-21 RX ORDER — BROMFENAC SODIUM 0.7 MG/ML
SOLUTION/ DROPS OPHTHALMIC
COMMUNITY
Start: 2022-12-08

## 2023-02-24 ENCOUNTER — OFFICE VISIT (OUTPATIENT)
Dept: INTERNAL MEDICINE CLINIC | Facility: CLINIC | Age: 73
End: 2023-02-24
Payer: COMMERCIAL

## 2023-02-24 VITALS
SYSTOLIC BLOOD PRESSURE: 95 MMHG | OXYGEN SATURATION: 97 % | HEART RATE: 74 BPM | BODY MASS INDEX: 30.99 KG/M2 | DIASTOLIC BLOOD PRESSURE: 57 MMHG | HEIGHT: 65 IN | WEIGHT: 186 LBS | TEMPERATURE: 97.5 F

## 2023-02-24 DIAGNOSIS — M54.50 ACUTE MIDLINE LOW BACK PAIN WITHOUT SCIATICA: Primary | ICD-10-CM

## 2023-02-24 DIAGNOSIS — J04.10 TRACHEITIS: ICD-10-CM

## 2023-02-24 DIAGNOSIS — J30.89 NON-SEASONAL ALLERGIC RHINITIS DUE TO OTHER ALLERGIC TRIGGER: ICD-10-CM

## 2023-02-24 DIAGNOSIS — Z17.0 MALIGNANT NEOPLASM OF LOWER-INNER QUADRANT OF LEFT BREAST IN FEMALE, ESTROGEN RECEPTOR POSITIVE (HCC): ICD-10-CM

## 2023-02-24 DIAGNOSIS — C50.312 MALIGNANT NEOPLASM OF LOWER-INNER QUADRANT OF LEFT BREAST IN FEMALE, ESTROGEN RECEPTOR POSITIVE (HCC): ICD-10-CM

## 2023-02-24 PROCEDURE — 99214 OFFICE O/P EST MOD 30 MIN: CPT | Performed by: INTERNAL MEDICINE

## 2023-02-24 RX ORDER — DOXYCYCLINE HYCLATE 100 MG/1
100 CAPSULE ORAL EVERY 12 HOURS SCHEDULED
Qty: 10 CAPSULE | Refills: 0 | Status: SHIPPED | OUTPATIENT
Start: 2023-02-24 | End: 2023-03-01

## 2023-02-24 RX ORDER — LIDOCAINE 50 MG/G
1 PATCH TOPICAL DAILY
Qty: 15 PATCH | Refills: 1 | Status: SHIPPED | OUTPATIENT
Start: 2023-02-24

## 2023-02-24 NOTE — PROGRESS NOTES
Assessment/Plan:           1  Acute midline low back pain without sciatica  Comments:  Lidoderm patch prescribed and physical therapy recommended  Orders:  -     lidocaine (LIDODERM) 5 %; Apply 1 patch topically over 12 hours daily Remove & Discard patch within 12 hours or as directed by MD    2  Malignant neoplasm of lower-inner quadrant of left breast in female, estrogen receptor positive ([facility] )    3  Non-seasonal allergic rhinitis due to other allergic trigger    4  Tracheitis  Comments:  Doxycycline prescribed  Warm salt water gargling and Tylenol advised  Orders:  -     doxycycline hyclate (VIBRAMYCIN) 100 mg capsule; Take 1 capsule (100 mg total) by mouth every 12 (twelve) hours for 5 days           1  Acute midline low back pain without sciatica    - lidocaine (LIDODERM) 5 %; Apply 1 patch topically over 12 hours daily Remove & Discard patch within 12 hours or as directed by MD  Dispense: 15 patch; Refill: 1    2  Malignant neoplasm of lower-inner quadrant of left breast in female, estrogen receptor positive ([facility] )      3  Non-seasonal allergic rhinitis due to other allergic trigger      4  Tracheitis    - doxycycline hyclate (VIBRAMYCIN) 100 mg capsule; Take 1 capsule (100 mg total) by mouth every 12 (twelve) hours for 5 days  Dispense: 10 capsule; Refill: 0       No problem-specific Assessment & Plan notes found for this encounter  Subjective:      Patient ID: Karin Reina is a 81 y o  female  HPI    The following portions of the patient's history were reviewed and updated as appropriate: She  has a past medical history of Allergic, Allergies, Arthritis, Breast cancer ([facility] ), Carpal tunnel syndrome on right, Chemotherapy induced neutropenia ([facility] [facility] ) (05/23/2019), Gastroesophageal reflux disease (08/06/2020), GERD (gastroesophageal reflux disease), Lumbar disc disease, Trigger thumb, Vitamin D deficiency, and Wears glasses    She   Patient Active Problem List    Diagnosis Date Noted   • Other insomnia 05/01/2022   • Osteopenia 03/05/2021   • Gastroesophageal reflux disease 08/06/2020   • Breast reconstruction deformity    • Use of tamoxifen (Nolvadex) 06/17/2020   • Bilateral carpal tunnel syndrome 01/27/2020   • Osteoarthritis of multiple joints 01/27/2020   • Drug-induced polyneuropathy ([facility] ) 01/27/2020   • Arthritis 01/27/2020   • Vitamin D deficiency 01/08/2020   • Impaired fasting blood sugar 01/08/2020   • Urinary tract infection with hematuria 11/10/2019   • Dysuria 11/10/2019   • Chemotherapy induced neutropenia ([facility] ) 49/44/4343   • Folliculitis 51/02/2175   • BRCA negative 01/23/2019   • Family history of breast cancer in sister 01/10/2019   • Malignant neoplasm of lower-inner quadrant of left breast in female, estrogen receptor positive ([facility] ) 01/02/2019   • 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   • Thyroid nodule 12/15/2016   • Elevated LFTs 10/26/2016   • Nausea 10/26/2016   • Atypical chest pain 10/24/2016   • Epigastric pain 10/24/2016   • Plantar fasciitis of right foot 10/20/2016     She  has a past surgical history that includes Thumb arthroscopy; pr edg us exam surgical alter stom duodenum/jejunum (N/A, 02/02/2017); pr fasciectomy plantar fascia partial spx (Right, 10/20/2016); Oophorectomy (Bilateral); US guided breast biopsy left complete (Left, 01/02/2019); Breast biopsy (Left, 01/02/2019); Colonoscopy; pr mastectomy simple complete (N/A, 02/08/2019); pr bx/exc lymph node open superficial (Left, 02/08/2019); pr tissue expander placement breast reconstruction (Bilateral, 02/08/2019); pr implnt bio implnt for soft tissue reinforcement (Bilateral, 02/08/2019); pr huong-implant capsulectomy breast complete (Bilateral, 09/27/2019); pr insj/rplcmt breast implant sep day mastectomy (Bilateral, 09/27/2019); pr revision of reconstructed breast (Bilateral, 08/06/2020); Liposuction w/ fat injection (Bilateral, 08/06/2020);  Hysterectomy; [place] tooth extraction; and Upper gastrointestinal endoscopy  Her family history includes Brain cancer (age of onset: 67) in her mother; Breast cancer in her sister and sister; Breast cancer additional onset in her sister; Prostate cancer in her brother and father  She  reports that she has never smoked  She has never used smokeless tobacco  She reports that she does not currently use alcohol after a past usage of about 1 0 standard drink of alcohol per week  She reports that she does not use drugs    Current Outpatient Medications   Medication Sig Dispense Refill   • CALCIUM PO Take 1,200 mg by mouth daily     • Cholecalciferol (VITAMIN D PO) Take 1 25 mg by mouth every 30 (thirty) days     • Cyanocobalamin (VITAMIN B 12 PO) Take 500 mcg by mouth daily      • ergocalciferol (VITAMIN D2) 50,000 units Take 1 capsule (50,000 Units total) by mouth every 30 (thirty) days 3 capsule 3   • lidocaine (LIDODERM) 5 % Apply 1 patch topically over 12 hours daily Remove & Discard patch within 12 hours or as directed by MD 15 patch 1   • montelukast (SINGULAIR) 10 mg tablet Take 1 tablet (10 mg total) by mouth daily at bedtime (Patient not taking: Reported on 6/22/2023) 30 tablet 5   • Multiple Vitamins-Minerals (MULTIVITAL PO) Take by mouth daily      • albuterol (ProAir HFA) 90 mcg/act inhaler Inhale 2 puffs every 6 (six) hours as needed for wheezing (Patient not taking: Reported on 6/22/2023) 8 5 g 0   • benzonatate (TESSALON) 200 MG capsule Take 1 capsule (200 mg total) by mouth 3 (three) times a day as needed for cough (Patient not taking: Reported on 5/17/2023) 20 capsule 0   • diphenhydrAMINE (BENADRYL) 25 mg tablet Take 1 tablet (25 mg total) by mouth daily at bedtime (Patient not taking: Reported on 6/22/2023) 30 tablet 0   • docusate sodium (COLACE) 100 mg capsule Take 1 capsule (100 mg total) by mouth 2 (two) times a day (Patient not taking: Reported on 6/22/2023) 200 capsule 3   • doxylamine (UNISOM) 25 MG tablet Take 1 tablet (25 mg total) by mouth daily at bedtime as needed for sleep (Patient not taking: Reported on 6/22/2023) 30 tablet 0   • guaiFENesin-codeine (ROBITUSSIN AC) 100-10 mg/5 mL oral solution Take 5 mL by mouth 3 (three) times a day as needed for cough (Patient not taking: Reported on 4/26/2023) 150 mL 0   • Na Sulfate-K Sulfate-Mg Sulf 17 5-3 13-1 6 GM/177ML SOLN Take 1 kit by mouth once for 1 dose (Patient not taking: Reported on 8/9/2022) 354 mL 0   • ofloxacin (OCUFLOX) 0 3 % ophthalmic solution  (Patient not taking: Reported on 2/24/2023)     • pimecrolimus (ELIDEL) 1 % cream Apply topically 2 (two) times a day (Patient not taking: Reported on 6/22/2023) 30 g 0   • prednisoLONE acetate (PRED FORTE) 1 % ophthalmic suspension  (Patient not taking: Reported on 2/24/2023)     • pregabalin (LYRICA) 75 mg capsule Take 1 capsule (75 mg total) by mouth 2 (two) times a day (Patient not taking: Reported on 6/22/2023) 60 capsule 2   • Prolensa 0 07 % SOLN PLACE 1 DROP IN SURGICAL EYE DAILY POSTOPERATIVELY ACCORDING TO WRITTEN INSTRUCTIONS (Patient not taking: Reported on 2/24/2023)     • Restasis 0 05 % ophthalmic emulsion      • sodium chloride (OCEAN) 0 65 % nasal spray 1 spray into each nostril every 2 (two) hours while awake 60 mL 3   • Soolantra 1 % CREA   (Patient not taking: Reported on 5/5/2022)     • tamoxifen (NOLVADEX) 20 mg tablet Take 1 tablet (20 mg total) by mouth daily 90 tablet 3   • triamcinolone (KENALOG) 0 1 % ointment Apply topically 2 (two) times a day for 7 days (Patient taking differently: Apply topically if needed) 80 g 0     No current facility-administered medications for this visit       Current Outpatient Medications on File Prior to Visit   Medication Sig   • CALCIUM PO Take 1,200 mg by mouth daily   • Cholecalciferol (VITAMIN D PO) Take 1 25 mg by mouth every 30 (thirty) days   • Cyanocobalamin (VITAMIN B 12 PO) Take 500 mcg by mouth daily    • ergocalciferol (VITAMIN D2) 50,000 units Take 1 capsule (50,000 Units total) by mouth every 30 (thirty) days   • montelukast (SINGULAIR) 10 mg tablet Take 1 tablet (10 mg total) by mouth daily at bedtime (Patient not taking: Reported on 6/22/2023)   • Multiple Vitamins-Minerals (MULTIVITAL PO) Take by mouth daily    • diphenhydrAMINE (BENADRYL) 25 mg tablet Take 1 tablet (25 mg total) by mouth daily at bedtime (Patient not taking: Reported on 6/22/2023)   • docusate sodium (COLACE) 100 mg capsule Take 1 capsule (100 mg total) by mouth 2 (two) times a day (Patient not taking: Reported on 6/22/2023)   • doxylamine (UNISOM) 25 MG tablet Take 1 tablet (25 mg total) by mouth daily at bedtime as needed for sleep (Patient not taking: Reported on 6/22/2023)   • Na Sulfate-K Sulfate-Mg Sulf 17 5-3 13-1 6 GM/177ML SOLN Take 1 kit by mouth once for 1 dose (Patient not taking: Reported on 8/9/2022)   • ofloxacin (OCUFLOX) 0 3 % ophthalmic solution  (Patient not taking: Reported on 2/24/2023)   • prednisoLONE acetate (PRED FORTE) 1 % ophthalmic suspension  (Patient not taking: Reported on 2/24/2023)   • Prolensa 0 07 % SOLN PLACE 1 DROP IN SURGICAL EYE DAILY POSTOPERATIVELY ACCORDING TO WRITTEN INSTRUCTIONS (Patient not taking: Reported on 2/24/2023)   • Restasis 0 05 % ophthalmic emulsion    • Soolantra 1 % CREA   (Patient not taking: Reported on 5/5/2022)   • triamcinolone (KENALOG) 0 1 % ointment Apply topically 2 (two) times a day for 7 days (Patient taking differently: Apply topically if needed)     No current facility-administered medications on file prior to visit  She is allergic to latex and shellfish-derived products - food allergy       Review of Systems   Constitutional: Negative for appetite change, chills, fatigue and fever  HENT: Negative for sore throat and trouble swallowing  Eyes: Negative for redness  Respiratory: Positive for cough  Negative for shortness of breath  Cardiovascular: Negative for chest pain and palpitations     Gastrointestinal: Negative for abdominal pain, constipation and "diarrhea  Genitourinary: Negative for dysuria and hematuria  Musculoskeletal: Positive for back pain  Negative for neck pain  Skin: Negative for rash  Neurological: Negative for seizures, weakness and headaches  Hematological: Negative for adenopathy  Psychiatric/Behavioral: Negative for confusion  The patient is not nervous/anxious            Objective:      BP 95/57 (BP Location: Right arm, Patient Position: Sitting, Cuff Size: Large)   Pulse 74   Temp 97 5 °F (36 4 °C) (Temporal)   Ht 5' 5\" (1 651 m)   Wt 84 4 kg (186 lb)   SpO2 97%   BMI 30 95 kg/m²     Results Reviewed     None          Recent Results (from the past 1344 hour(s))   CBC and differential    Collection Time: 05/05/23  7:25 AM   Result Value Ref Range    WBC 6 41 4 31 - 10 16 Thousand/uL    RBC 4 93 3 81 - 5 12 Million/uL    Hemoglobin 13 7 11 5 - 15 4 g/dL    Hematocrit 43 4 34 8 - 46 1 %    MCV 88 82 - 98 fL    MCH 27 8 26 8 - 34 3 pg    MCHC 31 6 31 4 - 37 4 g/dL    RDW 12 9 11 6 - 15 1 %    MPV 10 2 8 9 - 12 7 fL    Platelets 988 038 - 756 Thousands/uL    nRBC 0 /100 WBCs    Neutrophils Relative 44 43 - 75 %    Immat GRANS % 0 0 - 2 %    Lymphocytes Relative 43 14 - 44 %    Monocytes Relative 7 4 - 12 %    Eosinophils Relative 5 0 - 6 %    Basophils Relative 1 0 - 1 %    Neutrophils Absolute 2 83 1 85 - 7 62 Thousands/µL    Immature Grans Absolute 0 01 0 00 - 0 20 Thousand/uL    Lymphocytes Absolute 2 77 0 60 - 4 47 Thousands/µL    Monocytes Absolute 0 45 0 17 - 1 22 Thousand/µL    Eosinophils Absolute 0 30 0 00 - 0 61 Thousand/µL    Basophils Absolute 0 05 0 00 - 0 10 Thousands/µL   Comprehensive metabolic panel    Collection Time: 05/05/23  7:25 AM   Result Value Ref Range    Sodium 139 135 - 147 mmol/L    Potassium 3 9 3 5 - 5 3 mmol/L    Chloride 110 (H) 96 - 108 mmol/L    CO2 25 21 - 32 mmol/L    ANION GAP 4 4 - 13 mmol/L    BUN 13 5 - 25 mg/dL    Creatinine 0 70 0 60 - 1 30 mg/dL    Glucose, Fasting 89 65 - 99 mg/dL    " Calcium 9 0 8 3 - 10 1 mg/dL    Corrected Calcium 9 5 8 3 - 10 1 mg/dL    AST 17 5 - 45 U/L    ALT 22 12 - 78 U/L    Alkaline Phosphatase 53 46 - 116 U/L    Total Protein 7 1 6 4 - 8 4 g/dL    Albumin 3 4 (L) 3 5 - 5 0 g/dL    Total Bilirubin 0 35 0 20 - 1 00 mg/dL    eGFR 88 ml/min/1 73sq m   Cancer antigen 27 29    Collection Time: 05/05/23  7:25 AM   Result Value Ref Range    CA 27 29 20 1 0 0 - 42 3 U/mL   Lipid panel    Collection Time: 05/18/23  9:04 AM   Result Value Ref Range    Cholesterol 209 (H) See Comment mg/dL    Triglycerides 128 See Comment mg/dL    HDL, Direct 75 >=50 mg/dL    LDL Calculated 108 (H) 0 - 100 mg/dL    Non-HDL-Chol (CHOL-HDL) 134 mg/dl   Hemoglobin A1C    Collection Time: 05/18/23  9:04 AM   Result Value Ref Range    Hemoglobin A1C 5 6 Normal 3 8-5 6%; PreDiabetic 5 7-6 4%; Diabetic >=6 5%; Glycemic control for adults with diabetes <7 0% %     mg/dl   TSH, 3rd generation with Free T4 reflex    Collection Time: 05/18/23  9:04 AM   Result Value Ref Range    TSH 3RD GENERATON 1 726 0 450 - 4 500 uIU/mL        Physical Exam  Constitutional:       General: She is not in acute distress  Appearance: Normal appearance  HENT:      Head: Normocephalic and atraumatic  Nose: Nose normal       Mouth/Throat:      Mouth: Mucous membranes are moist    Eyes:      Extraocular Movements: Extraocular movements intact  Pupils: Pupils are equal, round, and reactive to light  Cardiovascular:      Rate and Rhythm: Normal rate and regular rhythm  Pulses: Normal pulses  Heart sounds: Normal heart sounds  No murmur heard  No friction rub  Pulmonary:      Effort: Pulmonary effort is normal  No respiratory distress  Breath sounds: Normal breath sounds  No wheezing  Abdominal:      General: Abdomen is flat  Bowel sounds are normal  There is no distension  Palpations: Abdomen is soft  There is no mass  Tenderness: There is no abdominal tenderness   There is no guarding  Musculoskeletal:         General: Tenderness present  Normal range of motion  Cervical back: Normal range of motion  Neurological:      General: No focal deficit present  Mental Status: She is alert and oriented to person, place, and time  Mental status is at baseline  Cranial Nerves: No cranial nerve deficit     Psychiatric:         Mood and Affect: Mood normal          Behavior: Behavior normal

## 2023-02-27 ENCOUNTER — TELEPHONE (OUTPATIENT)
Dept: INTERNAL MEDICINE CLINIC | Facility: CLINIC | Age: 73
End: 2023-02-27

## 2023-02-27 NOTE — TELEPHONE ENCOUNTER
Cover my meds from [facility] received for lidocaine patches, scanned into media

## 2023-02-28 ENCOUNTER — TELEPHONE (OUTPATIENT)
Dept: INTERNAL MEDICINE CLINIC | Facility: CLINIC | Age: 73
End: 2023-02-28

## 2023-02-28 NOTE — TELEPHONE ENCOUNTER
[facility] Prior Authorization Team is unable to review this request for prior authorization as the medication has been previously approved  This approval will  2023  No further action is needed at this time

## 2023-04-26 ENCOUNTER — OFFICE VISIT (OUTPATIENT)
Dept: INTERNAL MEDICINE CLINIC | Facility: CLINIC | Age: 73
End: 2023-04-26

## 2023-04-26 ENCOUNTER — HOSPITAL ENCOUNTER (OUTPATIENT)
Dept: RADIOLOGY | Facility: HOSPITAL | Age: 73
Discharge: HOME | End: 2023-04-26
Attending: INTERNAL MEDICINE

## 2023-04-26 VITALS
TEMPERATURE: 97.7 F | DIASTOLIC BLOOD PRESSURE: 78 MMHG | SYSTOLIC BLOOD PRESSURE: 122 MMHG | BODY MASS INDEX: 29.99 KG/M2 | OXYGEN SATURATION: 97 % | HEART RATE: 66 BPM | WEIGHT: 180 LBS | HEIGHT: 65 IN

## 2023-04-26 DIAGNOSIS — R06.2 WHEEZE: ICD-10-CM

## 2023-04-26 DIAGNOSIS — R05.1 ACUTE COUGH: ICD-10-CM

## 2023-04-26 DIAGNOSIS — R06.2 WHEEZE: Primary | ICD-10-CM

## 2023-04-26 RX ORDER — BENZONATATE 200 MG/1
200 CAPSULE ORAL 3 TIMES DAILY PRN
Qty: 20 CAPSULE | Refills: 0 | Status: SHIPPED | OUTPATIENT
Start: 2023-04-26

## 2023-04-26 RX ORDER — ALBUTEROL SULFATE 90 UG/1
2 AEROSOL, METERED RESPIRATORY (INHALATION) EVERY 6 HOURS PRN
Qty: 8.5 G | Refills: 0 | Status: SHIPPED | OUTPATIENT
Start: 2023-04-26

## 2023-04-26 NOTE — PROGRESS NOTES
Assessment/Plan:    Diagnoses and all orders for this visit:    Wheeze  -     albuterol (ProAir HFA) 90 mcg/act inhaler; Inhale 2 puffs every 6 (six) hours as needed for wheezing  -     XR chest pa & lateral; Future    Acute cough  -     benzonatate (TESSALON) 200 MG capsule; Take 1 capsule (200 mg total) by mouth 3 (three) times a day as needed for cough  -     XR chest pa & lateral; Future              There are no Patient Instructions on file for this visit      Subjective:      Patient ID: Mikhail Slade is a 81 y o  female    C/o persistent dry cough following URI 3 weeks ago, no fever, SOB,CP,NVD        Current Outpatient Medications:   •  albuterol (ProAir HFA) 90 mcg/act inhaler, Inhale 2 puffs every 6 (six) hours as needed for wheezing, Disp: 8 5 g, Rfl: 0  •  benzonatate (TESSALON) 200 MG capsule, Take 1 capsule (200 mg total) by mouth 3 (three) times a day as needed for cough, Disp: 20 capsule, Rfl: 0  •  CALCIUM PO, Take 1,200 mg by mouth daily, Disp: , Rfl:   •  Cholecalciferol (VITAMIN D PO), Take 1 25 mg by mouth every 30 (thirty) days, Disp: , Rfl:   •  Cyanocobalamin (VITAMIN B 12 PO), Take 500 mcg by mouth daily , Disp: , Rfl:   •  diphenhydrAMINE (BENADRYL) 25 mg tablet, Take 1 tablet (25 mg total) by mouth daily at bedtime (Patient taking differently: Take 25 mg by mouth if needed), Disp: 30 tablet, Rfl: 0  •  docusate sodium (COLACE) 100 mg capsule, Take 1 capsule (100 mg total) by mouth 2 (two) times a day (Patient taking differently: Take 100 mg by mouth if needed), Disp: 200 capsule, Rfl: 3  •  doxylamine (UNISOM) 25 MG tablet, Take 1 tablet (25 mg total) by mouth daily at bedtime as needed for sleep, Disp: 30 tablet, Rfl: 0  •  ergocalciferol (VITAMIN D2) 50,000 units, Take 1 capsule (50,000 Units total) by mouth every 30 (thirty) days, Disp: 3 capsule, Rfl: 3  •  lidocaine (LIDODERM) 5 %, Apply 1 patch topically over 12 hours daily Remove & Discard patch within 12 hours or as directed by MD, Disp: 15 patch, Rfl: 1  •  montelukast (SINGULAIR) 10 mg tablet, Take 1 tablet (10 mg total) by mouth daily at bedtime (Patient taking differently: Take 10 mg by mouth if needed), Disp: 30 tablet, Rfl: 5  •  Multiple Vitamins-Minerals (MULTIVITAL PO), Take by mouth daily , Disp: , Rfl:   •  tamoxifen (NOLVADEX) 20 mg tablet, Take 1 tablet (20 mg total) by mouth daily, Disp: 90 tablet, Rfl: 3  •  triamcinolone (KENALOG) 0 1 % ointment, Apply topically 2 (two) times a day for 7 days (Patient taking differently: Apply topically if needed), Disp: 80 g, Rfl: 0  •  guaiFENesin-codeine (ROBITUSSIN AC) 100-10 mg/5 mL oral solution, Take 5 mL by mouth 3 (three) times a day as needed for cough (Patient not taking: Reported on 4/26/2023), Disp: 150 mL, Rfl: 0  •  Na Sulfate-K Sulfate-Mg Sulf 17 5-3 13-1 6 GM/177ML SOLN, Take 1 kit by mouth once for 1 dose (Patient not taking: Reported on 8/9/2022), Disp: 354 mL, Rfl: 0  •  ofloxacin (OCUFLOX) 0 3 % ophthalmic solution, , Disp: , Rfl:   •  prednisoLONE acetate (PRED FORTE) 1 % ophthalmic suspension, , Disp: , Rfl:   •  Prolensa 0 07 % SOLN, PLACE 1 DROP IN SURGICAL EYE DAILY POSTOPERATIVELY ACCORDING TO WRITTEN INSTRUCTIONS (Patient not taking: Reported on 2/24/2023), Disp: , Rfl:   •  Restasis 0 05 % ophthalmic emulsion, , Disp: , Rfl:   •  Soolantra 1 % CREA,   (Patient not taking: Reported on 5/5/2022), Disp: , Rfl:      Past Medical History:   Diagnosis Date   • Allergic    • Allergies    • Arthritis    • Breast cancer ([address] )    • Carpal tunnel syndrome on right    • Chemotherapy induced neutropenia ([address] ) 05/23/2019   • Gastroesophageal reflux disease 08/06/2020   • GERD (gastroesophageal reflux disease)    • Lumbar disc disease    • Trigger thumb     right   • Vitamin D deficiency    • Wears glasses          Past Surgical History:   Procedure Laterality Date   • BREAST BIOPSY Left 01/02/2019    invasive ductal carcinoma   • COLONOSCOPY     • HYSTERECTOMY      age 41 - abdominal   • LIPOSUCTION W/ FAT INJECTION Bilateral 08/06/2020    Procedure: FAT GRAFTING;  Surgeon: Ozzy Busch MD;  [facility]: [facility];  Service: Plastics   • OOPHORECTOMY Bilateral     age 41   • OH BX/EXC LYMPH NODE OPEN SUPERFICIAL Left 02/08/2019    Procedure: BIOPSY LYMPH NODE SENTINEL;  Surgeon: Ruth Edge MD;  [facility]: [facility];  Service: Surgical Oncology   • OH EDG US EXAM SURGICAL ALTER STOM DUODENUM/JEJUNUM N/A 02/02/2017    Procedure: RADIAL ENDOSCOPIC U/S;  Surgeon: Mary Harris MD;  Location: [facility]; Service: Gastroenterology   • OH FASCIECTOMY PLANTAR FASCIA PARTIAL SPX Right 10/20/2016    Procedure:  OPEN RELEASE FASCIA PLANTAR ,CUTTING BOTTOM OF ARCH,INSTEP ;  Surgeon: Krupa Huffman DPM;  [facility];  Service: Podiatry   • OH IMPLNT BIO IMPLNT FOR SOFT TISSUE REINFORCEMENT Bilateral 02/08/2019    Procedure: RECONSTRUCTION BREAST W/ IMPLANT;  Surgeon: Ozzy Busch MD;  [facility];  Service: Plastics   • OH INSJ/RPLCMT BREAST IMPLANT SEP DAY MASTECTOMY Bilateral 09/27/2019    Procedure: IMPLANT EXPANDER EXCHANGE;  Surgeon: Ozzy Busch MD;  [facility];  Service: Plastics   • OH MASTECTOMY SIMPLE COMPLETE N/A 02/08/2019    Procedure: LEFT breast mastectomy; RIGHT breast prophylactic mastectomy;  Surgeon: Ruth Edge MD;  [facility];  Service: Surgical Oncology   • OH ZUNILDA-IMPLANT CAPSULECTOMY BREAST COMPLETE Bilateral 09/27/2019    Procedure: CAPSULECTOMY;  Surgeon: Ozzy Busch MD;  [facility];  Service: Plastics   • OH REVISION OF RECONSTRUCTED BREAST Bilateral 08/06/2020    Procedure: BREAST LATERAL STANDING EXCISION SKIN DEFORMITIES POST RECON ;  Surgeon: Ozzy Busch MD;  [facility];  Service: Plastics   • OH TISSUE EXPANDER PLACEMENT BREAST RECONSTRUCTION Bilateral 02/08/2019    Procedure: INSERTION/PLACEMENT TISSUE EXPANDER (EXCHANGE);   Surgeon: Ozzy Busch MD;  [facility];  Service: Plastics   • THUMB ARTHROSCOPY     • UPPER GASTROINTESTINAL ENDOSCOPY     • US GUIDED BREAST BIOPSY LEFT COMPLETE Left 01/02/2019   • WISDOM TOOTH EXTRACTION           Allergies   Allergen Reactions   • Latex Anaphylaxis   • Shellfish-Derived Products - Food Allergy Anaphylaxis       No results found for this or any previous visit (from the past 1008 hour(s))  The following portions of the patient's history were reviewed and updated as appropriate: allergies, current medications, past family history, past medical history, past social history, past surgical history and problem list      Review of Systems   Constitutional: Negative for activity change, appetite change, chills, diaphoresis, fatigue, fever and unexpected weight change  HENT: Negative for congestion, drooling, ear discharge, ear pain, facial swelling, hearing loss, postnasal drip, rhinorrhea, sinus pressure, sinus pain, sneezing, sore throat, tinnitus, trouble swallowing and voice change  Eyes: Negative for discharge  Respiratory: Positive for cough  Negative for apnea, choking, chest tightness, shortness of breath, wheezing and stridor  Cardiovascular: Negative for chest pain, palpitations and leg swelling  Gastrointestinal: Negative for abdominal distention, abdominal pain, anal bleeding, blood in stool, constipation, diarrhea, nausea and vomiting  Genitourinary: Negative for decreased urine volume, difficulty urinating, frequency and urgency  Musculoskeletal: Negative for arthralgias, back pain and myalgias  Skin: Negative for color change  Neurological: Negative for dizziness, light-headedness, numbness and headaches  Objective:      Vitals:    04/26/23 1012   BP: 122/78   Pulse: 66   Temp: 97 7 °F (36 5 °C)   SpO2: 97%          Physical Exam  Vitals reviewed  Constitutional:       General: She is not in acute distress  Appearance: Normal appearance  She is not ill-appearing, toxic-appearing or diaphoretic     HENT:      Mouth/Throat:      Mouth: Mucous membranes are moist       Pharynx: No oropharyngeal exudate or posterior oropharyngeal erythema  Cardiovascular:      Rate and Rhythm: Normal rate and regular rhythm  Pulses: Normal pulses  Heart sounds: Normal heart sounds  No murmur heard  No friction rub  No gallop  Pulmonary:      Effort: Pulmonary effort is normal  No respiratory distress  Breath sounds: No stridor  Wheezing present  No rhonchi or rales  Chest:      Chest wall: No tenderness  Musculoskeletal:      Cervical back: No rigidity  Right lower leg: No edema  Left lower leg: No edema  Skin:     General: Skin is warm and dry  Findings: No lesion or rash  Neurological:      Mental Status: She is alert

## 2023-05-05 ENCOUNTER — APPOINTMENT (OUTPATIENT)
Dept: LAB | Facility: CLINIC | Age: 73
End: 2023-05-05

## 2023-05-05 DIAGNOSIS — C50.312 MALIGNANT NEOPLASM OF LOWER-INNER QUADRANT OF LEFT BREAST IN FEMALE, ESTROGEN RECEPTOR POSITIVE (HCC): ICD-10-CM

## 2023-05-05 DIAGNOSIS — Z17.0 MALIGNANT NEOPLASM OF LOWER-INNER QUADRANT OF LEFT BREAST IN FEMALE, ESTROGEN RECEPTOR POSITIVE (HCC): ICD-10-CM

## 2023-05-05 LAB
ALBUMIN SERPL BCP-MCNC: 3.4 G/DL (ref 3.5–5)
ALP SERPL-CCNC: 53 U/L (ref 46–116)
ALT SERPL W P-5'-P-CCNC: 22 U/L (ref 12–78)
ANION GAP SERPL CALCULATED.3IONS-SCNC: 4 MMOL/L (ref 4–13)
AST SERPL W P-5'-P-CCNC: 17 U/L (ref 5–45)
BASOPHILS # BLD AUTO: 0.05 THOUSANDS/ÂΜL (ref 0–0.1)
BASOPHILS NFR BLD AUTO: 1 % (ref 0–1)
BILIRUB SERPL-MCNC: 0.35 MG/DL (ref 0.2–1)
BUN SERPL-MCNC: 13 MG/DL (ref 5–25)
CALCIUM ALBUM COR SERPL-MCNC: 9.5 MG/DL (ref 8.3–10.1)
CALCIUM SERPL-MCNC: 9 MG/DL (ref 8.3–10.1)
CANCER AG27-29 SERPL-ACNC: 20.1 U/ML (ref 0–42.3)
CHLORIDE SERPL-SCNC: 110 MMOL/L (ref 96–108)
CO2 SERPL-SCNC: 25 MMOL/L (ref 21–32)
CREAT SERPL-MCNC: 0.7 MG/DL (ref 0.6–1.3)
EOSINOPHIL # BLD AUTO: 0.3 THOUSAND/ÂΜL (ref 0–0.61)
EOSINOPHIL NFR BLD AUTO: 5 % (ref 0–6)
ERYTHROCYTE [DISTWIDTH] IN BLOOD BY AUTOMATED COUNT: 12.9 % (ref 11.6–15.1)
GFR SERPL CREATININE-BSD FRML MDRD: 88 ML/MIN/1.73SQ M
GLUCOSE P FAST SERPL-MCNC: 89 MG/DL (ref 65–99)
HCT VFR BLD AUTO: 43.4 % (ref 34.8–46.1)
HGB BLD-MCNC: 13.7 G/DL (ref 11.5–15.4)
IMM GRANULOCYTES # BLD AUTO: 0.01 THOUSAND/UL (ref 0–0.2)
IMM GRANULOCYTES NFR BLD AUTO: 0 % (ref 0–2)
LYMPHOCYTES # BLD AUTO: 2.77 THOUSANDS/ÂΜL (ref 0.6–4.47)
LYMPHOCYTES NFR BLD AUTO: 43 % (ref 14–44)
MCH RBC QN AUTO: 27.8 PG (ref 26.8–34.3)
MCHC RBC AUTO-ENTMCNC: 31.6 G/DL (ref 31.4–37.4)
MCV RBC AUTO: 88 FL (ref 82–98)
MONOCYTES # BLD AUTO: 0.45 THOUSAND/ÂΜL (ref 0.17–1.22)
MONOCYTES NFR BLD AUTO: 7 % (ref 4–12)
NEUTROPHILS # BLD AUTO: 2.83 THOUSANDS/ÂΜL (ref 1.85–7.62)
NEUTS SEG NFR BLD AUTO: 44 % (ref 43–75)
NRBC BLD AUTO-RTO: 0 /100 WBCS
PLATELET # BLD AUTO: 190 THOUSANDS/UL (ref 149–390)
PMV BLD AUTO: 10.2 FL (ref 8.9–12.7)
POTASSIUM SERPL-SCNC: 3.9 MMOL/L (ref 3.5–5.3)
PROT SERPL-MCNC: 7.1 G/DL (ref 6.4–8.4)
RBC # BLD AUTO: 4.93 MILLION/UL (ref 3.81–5.12)
SODIUM SERPL-SCNC: 139 MMOL/L (ref 135–147)
WBC # BLD AUTO: 6.41 THOUSAND/UL (ref 4.31–10.16)

## 2023-05-09 ENCOUNTER — HOSPITAL ENCOUNTER (OUTPATIENT)
Dept: RADIOLOGY | Facility: HOSPITAL | Age: 73
Discharge: HOME | End: 2023-05-09
Attending: INTERNAL MEDICINE

## 2023-05-09 DIAGNOSIS — E04.1 THYROID NODULE: ICD-10-CM

## 2023-05-15 ENCOUNTER — TELEPHONE (OUTPATIENT)
Dept: HEMATOLOGY ONCOLOGY | Facility: CLINIC | Age: 73
End: 2023-05-15

## 2023-05-17 ENCOUNTER — OFFICE VISIT (OUTPATIENT)
Dept: INTERNAL MEDICINE CLINIC | Facility: CLINIC | Age: 73
End: 2023-05-17

## 2023-05-17 VITALS
SYSTOLIC BLOOD PRESSURE: 126 MMHG | HEART RATE: 102 BPM | BODY MASS INDEX: 31.49 KG/M2 | HEIGHT: 65 IN | DIASTOLIC BLOOD PRESSURE: 72 MMHG | TEMPERATURE: 97.4 F | OXYGEN SATURATION: 96 % | WEIGHT: 189 LBS

## 2023-05-17 DIAGNOSIS — M85.852 OSTEOPENIA OF NECK OF LEFT FEMUR: ICD-10-CM

## 2023-05-17 DIAGNOSIS — T78.40XD ALLERGY, SUBSEQUENT ENCOUNTER: Primary | ICD-10-CM

## 2023-05-17 DIAGNOSIS — K21.9 GASTROESOPHAGEAL REFLUX DISEASE, UNSPECIFIED WHETHER ESOPHAGITIS PRESENT: ICD-10-CM

## 2023-05-17 DIAGNOSIS — R09.82 POST-NASAL DRAINAGE: ICD-10-CM

## 2023-05-17 DIAGNOSIS — C50.312 MALIGNANT NEOPLASM OF LOWER-INNER QUADRANT OF LEFT BREAST IN FEMALE, ESTROGEN RECEPTOR POSITIVE (HCC): ICD-10-CM

## 2023-05-17 DIAGNOSIS — Z17.0 MALIGNANT NEOPLASM OF LOWER-INNER QUADRANT OF LEFT BREAST IN FEMALE, ESTROGEN RECEPTOR POSITIVE (HCC): ICD-10-CM

## 2023-05-17 DIAGNOSIS — Z00.8 HEALTH EXAMINATION IN POPULATION SURVEY: ICD-10-CM

## 2023-05-17 DIAGNOSIS — L30.9 ECZEMA, UNSPECIFIED TYPE: ICD-10-CM

## 2023-05-17 DIAGNOSIS — M79.2 NEUROPATHIC PAIN: ICD-10-CM

## 2023-05-17 DIAGNOSIS — G62.9 NEUROPATHY: ICD-10-CM

## 2023-05-17 RX ORDER — PIMECROLIMUS 10 MG/G
CREAM TOPICAL 2 TIMES DAILY
Qty: 30 G | Refills: 0 | Status: SHIPPED | OUTPATIENT
Start: 2023-05-17

## 2023-05-17 RX ORDER — PREGABALIN 75 MG/1
75 CAPSULE ORAL 2 TIMES DAILY
Qty: 60 CAPSULE | Refills: 2 | Status: SHIPPED | OUTPATIENT
Start: 2023-05-17

## 2023-05-17 NOTE — PROGRESS NOTES
Assessment/Plan:    Problem List Items Addressed This Visit        Digestive    Gastroesophageal reflux disease (Chronic)       Musculoskeletal and Integument    Osteopenia    Relevant Orders    DXA bone density spine hip and pelvis       Other    Malignant neoplasm of lower-inner quadrant of left breast in female, estrogen receptor positive ([facility] )   Other Visit Diagnoses     Allergy, subsequent encounter    -  Primary    Claritin and montelukast recommended  Also nasal saline spray 4 times daily  Eczema, unspecified type        Elidel prescribed  Relevant Medications    pimecrolimus (ELIDEL) 1 % cream    Neuropathic pain        Lyrica prescribed to see if it is able to control her symptoms  Health examination in population survey        Relevant Orders    Lipid panel    Hemoglobin A1C    Post-nasal drainage        Trial of nasal saline 4 times a day recommended  Relevant Medications    sodium chloride (OCEAN) 0 65 % nasal spray    Neuropathy        Relevant Medications    pregabalin (LYRICA) 75 mg capsule             Diagnoses and all orders for this visit:    Allergy, subsequent encounter  Comments:  Claritin and montelukast recommended  Also nasal saline spray 4 times daily  Eczema, unspecified type  Comments:  Elidel prescribed  Orders:  -     pimecrolimus (ELIDEL) 1 % cream; Apply topically 2 (two) times a day    Neuropathic pain  Comments:  Lyrica prescribed to see if it is able to control her symptoms  Malignant neoplasm of lower-inner quadrant of left breast in female, estrogen receptor positive ([facility] )    Gastroesophageal reflux disease, unspecified whether esophagitis present    Health examination in population survey  -     Lipid panel; Future  -     Hemoglobin A1C; Future    Post-nasal drainage  Comments:  Trial of nasal saline 4 times a day recommended    Orders:  -     sodium chloride (OCEAN) 0 65 % nasal spray; 1 spray into each nostril every 2 (two) hours while awake    Osteopenia of neck of left femur  -     DXA bone density spine hip and pelvis; Future    Neuropathy  -     pregabalin (LYRICA) 75 mg capsule; Take 1 capsule (75 mg total) by mouth 2 (two) times a day        Subjective:   Chief Complaint   Patient presents with   • Follow-up     3 month follow up  Review Labs 5/5/23  Patient was given a lotion years ago and is looking for the name  The kenalog doesn't help too much  Still has pain in her feet  • GERD     Doing ok with heartburn   • Allergies     Patient states her allergies are flared up         Patient ID: Jayleen Canchola is a 81 y o  female  HPI   81year old pleasant female here for follow up  1  GERD - no issues with acid reflex, heart burn symptoms  Well-controlled     2  Allergies - feels the tessalon perles and albuterol has helped with postviral cough  Takes sudafed/benadryl occasionally  Would like another refill of tessalon perles  3  Eczema - flaring up from time to time  Kenalog has tried but not helpful  Had a cream from a while back that has helped but cannot remember the name  Unsure if it was a different steroid cream      4  Burning and pain in feet has been having this constantly since chemo 3 years ago  Fluctuates  4-6/10 pain especially when walking  Better when not as much pressure  No numbness and tingling  4  Lab work reviewed with patient  Lipid panel and A1C last done 5/12/2022, can get bloodwork  5  Vaccinations   - Pneumoccal vaccine 12/17/2019 x1 dose    6  Screenings   - DXA last 6/19/2021: Low bone mass (OSTEOPENIA)  [Based on the left femoral neck]  Will need repeat this year   - Colonoscopy 7/14/2022 completed    7  Thyroid US reviewed on 5/9/2023 - RIGHT midgland nodule measuring 2 1 x 1 6 x 1 2 cm  No significant change RIGHT midgland nodule measuring 2 1 x 1 6 x 1 2 cm  No significant change       The following portions of the patient's history were reviewed and updated as appropriate: current medications, past medical history and problem list     Review of Systems   Constitutional: Negative for activity change, appetite change, chills and fever  HENT: Positive for congestion and sore throat  Respiratory: Positive for cough (improving)  Negative for chest tightness and shortness of breath  Cardiovascular: Negative for chest pain and leg swelling  Gastrointestinal: Negative for nausea and vomiting  Allergic/Immunologic: Positive for environmental allergies           Past Medical History:   Diagnosis Date   • Allergic    • Allergies    • Arthritis    • Breast cancer [facility])    • Carpal tunnel syndrome on right    • Chemotherapy induced neutropenia ([facility] ) 05/23/2019   • Gastroesophageal reflux disease 08/06/2020   • GERD (gastroesophageal reflux disease)    • Lumbar disc disease    • Trigger thumb     right   • Vitamin D deficiency    • Wears glasses          Current Outpatient Medications:   •  albuterol (ProAir HFA) 90 mcg/act inhaler, Inhale 2 puffs every 6 (six) hours as needed for wheezing, Disp: 8 5 g, Rfl: 0  •  CALCIUM PO, Take 1,200 mg by mouth daily, Disp: , Rfl:   •  Cholecalciferol (VITAMIN D PO), Take 1 25 mg by mouth every 30 (thirty) days, Disp: , Rfl:   •  Cyanocobalamin (VITAMIN B 12 PO), Take 500 mcg by mouth daily , Disp: , Rfl:   •  diphenhydrAMINE (BENADRYL) 25 mg tablet, Take 1 tablet (25 mg total) by mouth daily at bedtime (Patient taking differently: Take 25 mg by mouth if needed), Disp: 30 tablet, Rfl: 0  •  doxylamine (UNISOM) 25 MG tablet, Take 1 tablet (25 mg total) by mouth daily at bedtime as needed for sleep, Disp: 30 tablet, Rfl: 0  •  ergocalciferol (VITAMIN D2) 50,000 units, Take 1 capsule (50,000 Units total) by mouth every 30 (thirty) days, Disp: 3 capsule, Rfl: 3  •  lidocaine (LIDODERM) 5 %, Apply 1 patch topically over 12 hours daily Remove & Discard patch within 12 hours or as directed by MD, Disp: 15 patch, Rfl: 1  •  montelukast (SINGULAIR) 10 mg tablet, Take 1 tablet (10 mg total) by mouth daily at bedtime (Patient taking differently: Take 10 mg by mouth if needed), Disp: 30 tablet, Rfl: 5  •  Multiple Vitamins-Minerals (MULTIVITAL PO), Take by mouth daily , Disp: , Rfl:   •  pimecrolimus (ELIDEL) 1 % cream, Apply topically 2 (two) times a day, Disp: 30 g, Rfl: 0  •  pregabalin (LYRICA) 75 mg capsule, Take 1 capsule (75 mg total) by mouth 2 (two) times a day, Disp: 60 capsule, Rfl: 2  •  sodium chloride (OCEAN) 0 65 % nasal spray, 1 spray into each nostril every 2 (two) hours while awake, Disp: 60 mL, Rfl: 3  •  tamoxifen (NOLVADEX) 20 mg tablet, Take 1 tablet (20 mg total) by mouth daily, Disp: 90 tablet, Rfl: 3  •  triamcinolone (KENALOG) 0 1 % ointment, Apply topically 2 (two) times a day for 7 days (Patient taking differently: Apply topically if needed), Disp: 80 g, Rfl: 0  •  benzonatate (TESSALON) 200 MG capsule, Take 1 capsule (200 mg total) by mouth 3 (three) times a day as needed for cough (Patient not taking: Reported on 5/17/2023), Disp: 20 capsule, Rfl: 0  •  docusate sodium (COLACE) 100 mg capsule, Take 1 capsule (100 mg total) by mouth 2 (two) times a day (Patient taking differently: Take 100 mg by mouth if needed), Disp: 200 capsule, Rfl: 3  •  guaiFENesin-codeine (ROBITUSSIN AC) 100-10 mg/5 mL oral solution, Take 5 mL by mouth 3 (three) times a day as needed for cough (Patient not taking: Reported on 4/26/2023), Disp: 150 mL, Rfl: 0  •  Na Sulfate-K Sulfate-Mg Sulf 17 5-3 13-1 6 GM/177ML SOLN, Take 1 kit by mouth once for 1 dose (Patient not taking: Reported on 8/9/2022), Disp: 354 mL, Rfl: 0  •  ofloxacin (OCUFLOX) 0 3 % ophthalmic solution, , Disp: , Rfl:   •  prednisoLONE acetate (PRED FORTE) 1 % ophthalmic suspension, , Disp: , Rfl:   •  Prolensa 0 07 % SOLN, PLACE 1 DROP IN SURGICAL EYE DAILY POSTOPERATIVELY ACCORDING TO WRITTEN INSTRUCTIONS (Patient not taking: Reported on 2/24/2023), Disp: , Rfl:   •  Restasis 0 05 % ophthalmic emulsion, , Disp: , Rfl:   •  Soolantra 1 % CREA,   (Patient not taking: Reported on 5/5/2022), Disp: , Rfl:     Allergies   Allergen Reactions   • Latex Anaphylaxis   • Shellfish-Derived Products - Food Allergy Anaphylaxis       Social History   Past Surgical History:   Procedure Laterality Date   • BREAST BIOPSY Left 01/02/2019    invasive ductal carcinoma   • COLONOSCOPY     • HYSTERECTOMY      age 41 - abdominal   • LIPOSUCTION W/ FAT INJECTION Bilateral 08/06/2020    Procedure: FAT GRAFTING;  Surgeon: Rio Holt MD;  [facility];  Service: Plastics   • OOPHORECTOMY Bilateral     age 41   • MD BX/EXC LYMPH NODE OPEN SUPERFICIAL Left 02/08/2019    Procedure: BIOPSY LYMPH NODE SENTINEL;  Surgeon: Stuart Ho MD;  [facility];  Service: Surgical Oncology   • MD EDG US EXAM SURGICAL ALTER STOM DUODENUM/JEJUNUM N/A 02/02/2017    Procedure: RADIAL ENDOSCOPIC U/S;  Surgeon: Yaneth Win MD;  [facility]: [facility];   Service: Gastroenterology   • MD FASCIECTOMY PLANTAR FASCIA PARTIAL SPX Right 10/20/2016    Procedure:  OPEN RELEASE FASCIA PLANTAR ,CUTTING BOTTOM OF ARCH,INSTEP ;  Surgeon: Ines Hermosillo DPM;  [facility];  Service: Podiatry   • MD IMPLNT BIO IMPLNT FOR SOFT TISSUE REINFORCEMENT Bilateral 02/08/2019    Procedure: RECONSTRUCTION BREAST W/ IMPLANT;  Surgeon: iRo Holt MD;  [facility];  Service: Plastics   • MD INSJ/RPLCMT BREAST IMPLANT SEP DAY MASTECTOMY Bilateral 09/27/2019    Procedure: IMPLANT EXPANDER EXCHANGE;  Surgeon: Rio Holt MD;  [facility];  Service: Plastics   • MD MASTECTOMY SIMPLE COMPLETE N/A 02/08/2019    Procedure: LEFT breast mastectomy; RIGHT breast prophylactic mastectomy;  Surgeon: Stuart Ho MD;  [facility];  Service: Surgical Oncology   • MD ZUNILDA-IMPLANT CAPSULECTOMY BREAST COMPLETE Bilateral 09/27/2019    Procedure: CAPSULECTOMY;  Surgeon: Rio Holt MD;  [facility];  Service: Plastics   • MD REVISION OF RECONSTRUCTED BREAST Bilateral 08/06/2020 "Procedure: BREAST LATERAL STANDING EXCISION SKIN DEFORMITIES POST RECON ;  Surgeon: Emery Shelley MD;  [facility];  Service: Plastics   • MA TISSUE EXPANDER PLACEMENT BREAST RECONSTRUCTION Bilateral 02/08/2019    Procedure: INSERTION/PLACEMENT TISSUE EXPANDER (EXCHANGE);   Surgeon: Emery Shelley MD;  [facility];  Service: Plastics   • THUMB ARTHROSCOPY     • UPPER GASTROINTESTINAL ENDOSCOPY     • US GUIDED BREAST BIOPSY LEFT COMPLETE Left 01/02/2019   • WISDOM TOOTH EXTRACTION       Family History   Problem Relation Age of Onset   • Brain cancer Mother 67   • Prostate cancer Father    • Breast cancer Sister    • Breast cancer additional onset Sister    • Breast cancer Sister    • Prostate cancer Brother        Objective:  /72 (BP Location: Left arm, Patient Position: Sitting, Cuff Size: Large)   Pulse 102   Temp (!) 97 4 °F (36 3 °C) (Temporal)   Ht 5' 5\" (1 651 m)   Wt 85 7 kg (189 lb)   SpO2 96%   BMI 31 45 kg/m²     Recent Results (from the past 1344 hour(s))   CBC and differential    Collection Time: 05/05/23  7:25 AM   Result Value Ref Range    WBC 6 41 4 31 - 10 16 Thousand/uL    RBC 4 93 3 81 - 5 12 Million/uL    Hemoglobin 13 7 11 5 - 15 4 g/dL    Hematocrit 43 4 34 8 - 46 1 %    MCV 88 82 - 98 fL    MCH 27 8 26 8 - 34 3 pg    MCHC 31 6 31 4 - 37 4 g/dL    RDW 12 9 11 6 - 15 1 %    MPV 10 2 8 9 - 12 7 fL    Platelets 078 936 - 710 Thousands/uL    nRBC 0 /100 WBCs    Neutrophils Relative 44 43 - 75 %    Immat GRANS % 0 0 - 2 %    Lymphocytes Relative 43 14 - 44 %    Monocytes Relative 7 4 - 12 %    Eosinophils Relative 5 0 - 6 %    Basophils Relative 1 0 - 1 %    Neutrophils Absolute 2 83 1 85 - 7 62 Thousands/µL    Immature Grans Absolute 0 01 0 00 - 0 20 Thousand/uL    Lymphocytes Absolute 2 77 0 60 - 4 47 Thousands/µL    Monocytes Absolute 0 45 0 17 - 1 22 Thousand/µL    Eosinophils Absolute 0 30 0 00 - 0 61 Thousand/µL    Basophils Absolute 0 05 0 00 - 0 10 Thousands/µL " Comprehensive metabolic panel    Collection Time: 05/05/23  7:25 AM   Result Value Ref Range    Sodium 139 135 - 147 mmol/L    Potassium 3 9 3 5 - 5 3 mmol/L    Chloride 110 (H) 96 - 108 mmol/L    CO2 25 21 - 32 mmol/L    ANION GAP 4 4 - 13 mmol/L    BUN 13 5 - 25 mg/dL    Creatinine 0 70 0 60 - 1 30 mg/dL    Glucose, Fasting 89 65 - 99 mg/dL    Calcium 9 0 8 3 - 10 1 mg/dL    Corrected Calcium 9 5 8 3 - 10 1 mg/dL    AST 17 5 - 45 U/L    ALT 22 12 - 78 U/L    Alkaline Phosphatase 53 46 - 116 U/L    Total Protein 7 1 6 4 - 8 4 g/dL    Albumin 3 4 (L) 3 5 - 5 0 g/dL    Total Bilirubin 0 35 0 20 - 1 00 mg/dL    eGFR 88 ml/min/1 73sq m   Cancer antigen 27 29    Collection Time: 05/05/23  7:25 AM   Result Value Ref Range    CA 27 29 20 1 0 0 - 42 3 U/mL            Physical Exam  Vitals reviewed  Constitutional:       General: She is not in acute distress  Appearance: Normal appearance  She is not ill-appearing or toxic-appearing  HENT:      Head: Normocephalic and atraumatic  Nose: Nose normal       Mouth/Throat:      Mouth: Mucous membranes are moist       Pharynx: Oropharynx is clear  Posterior oropharyngeal erythema present  Eyes:      General: No scleral icterus  Right eye: No discharge  Left eye: No discharge  Conjunctiva/sclera: Conjunctivae normal    Cardiovascular:      Rate and Rhythm: Normal rate and regular rhythm  Pulses: Normal pulses  Heart sounds: Normal heart sounds  No murmur heard  No gallop  Pulmonary:      Effort: Pulmonary effort is normal  No respiratory distress  Breath sounds: Normal breath sounds  Abdominal:      General: Bowel sounds are normal  There is no distension  Palpations: Abdomen is soft  Tenderness: There is no abdominal tenderness  Musculoskeletal:      Right lower leg: No edema  Left lower leg: No edema  Skin:     General: Skin is warm and dry  Findings: No erythema, lesion or rash     Neurological: Mental Status: She is alert and oriented to person, place, and time  Psychiatric:         Mood and Affect: Mood normal          Behavior: Behavior normal           BMI Counseling: Body mass index is 31 45 kg/m²  The BMI is above normal  Nutrition recommendations include reducing portion sizes

## 2023-05-18 ENCOUNTER — OFFICE VISIT (OUTPATIENT)
Dept: HEMATOLOGY ONCOLOGY | Facility: CLINIC | Age: 73
End: 2023-05-18

## 2023-05-18 ENCOUNTER — APPOINTMENT (OUTPATIENT)
Dept: LAB | Facility: CLINIC | Age: 73
End: 2023-05-18

## 2023-05-18 VITALS
BODY MASS INDEX: 31.49 KG/M2 | WEIGHT: 189 LBS | RESPIRATION RATE: 17 BRPM | HEART RATE: 68 BPM | DIASTOLIC BLOOD PRESSURE: 80 MMHG | OXYGEN SATURATION: 97 % | SYSTOLIC BLOOD PRESSURE: 136 MMHG | HEIGHT: 65 IN

## 2023-05-18 DIAGNOSIS — E78.49 OTHER HYPERLIPIDEMIA: ICD-10-CM

## 2023-05-18 DIAGNOSIS — E07.9 THYROID DISORDER: ICD-10-CM

## 2023-05-18 DIAGNOSIS — M19.90 ARTHRITIS: ICD-10-CM

## 2023-05-18 DIAGNOSIS — E04.1 THYROID NODULE: ICD-10-CM

## 2023-05-18 DIAGNOSIS — G62.0 DRUG-INDUCED POLYNEUROPATHY (HCC): ICD-10-CM

## 2023-05-18 DIAGNOSIS — Z00.8 HEALTH EXAMINATION IN POPULATION SURVEY: ICD-10-CM

## 2023-05-18 DIAGNOSIS — Z17.0 MALIGNANT NEOPLASM OF LOWER-INNER QUADRANT OF LEFT BREAST IN FEMALE, ESTROGEN RECEPTOR POSITIVE (HCC): Primary | ICD-10-CM

## 2023-05-18 DIAGNOSIS — C50.312 MALIGNANT NEOPLASM OF LOWER-INNER QUADRANT OF LEFT BREAST IN FEMALE, ESTROGEN RECEPTOR POSITIVE (HCC): Primary | ICD-10-CM

## 2023-05-18 DIAGNOSIS — Z79.810 USE OF TAMOXIFEN (NOLVADEX): ICD-10-CM

## 2023-05-18 LAB
CHOLEST SERPL-MCNC: 209 MG/DL
EST. AVERAGE GLUCOSE BLD GHB EST-MCNC: 114 MG/DL
HBA1C MFR BLD: 5.6 %
HDLC SERPL-MCNC: 75 MG/DL
LDLC SERPL CALC-MCNC: 108 MG/DL (ref 0–100)
NONHDLC SERPL-MCNC: 134 MG/DL
TRIGL SERPL-MCNC: 128 MG/DL
TSH SERPL DL<=0.05 MIU/L-ACNC: 1.73 UIU/ML (ref 0.45–4.5)

## 2023-05-18 RX ORDER — TAMOXIFEN CITRATE 20 MG/1
20 TABLET ORAL DAILY
Qty: 90 TABLET | Refills: 3 | Status: SHIPPED | OUTPATIENT
Start: 2023-05-18

## 2023-05-18 NOTE — PROGRESS NOTES
HPI: Follow-up visit for breast cancer and patient is on adjuvant tamoxifen  She has musculoskeletal symptoms  She has residual peripheral neuropathy  Patient did not go for carpal tunnel surgery  She states she is not having that much problem with carpal tunnel issue  She has some tiredness  She would like to have thyroid functions checked  She has a tiny thyroid nodules  Breast cancer was diagnosed in 2019 and that was  hormone receptor-positive and HER2 negative stage I, 1 2 cm, grade 3 left breast cancer , strongly positive ER, positive [place], negative HER 2, positive lymphovascular invasion, negative 3 sentinel lymph nodes, and Oncotype score was 25  Patient had bilateral mastectomies and left axillary lymph node sampling   Patient had 4 cycles of adjuvant chemotherapy, combination of Taxotere and Cytoxan and Neulasta and that was followed by aromatase inhibitors and she had problems with all 3 aromatase inhibitors   Lot of musculoskeletal symptoms  Since May 2020 she has been on tamoxifen and she has much less problem with musculoskeletal symptoms  No hot flashes  Has burning sensation in her feet, part of peripheral neuropathy  Occasionally she has some tiredness    She has bilateral carpal tunnel   There is family history of breast cancer  Patient states that she tested negative for BRCA   Yessica Sport History of high triglycerides and she has controlled that with diet and triglyceride level came down   Patient is trying to eat healthy and stay active  She had left eye cataract surgery on 11/16/2022 and that went well and she will have right eye cataract surgery on 12/30/22    She states she is up-to-date with her medical checkups                Current Outpatient Medications:   •  albuterol (ProAir HFA) 90 mcg/act inhaler, Inhale 2 puffs every 6 (six) hours as needed for wheezing, Disp: 8 5 g, Rfl: 0  •  CALCIUM PO, Take 1,200 mg by mouth daily, Disp: , Rfl:   •  Cholecalciferol (VITAMIN D PO), Take 1 25 mg by mouth every 30 (thirty) days, Disp: , Rfl:   •  Cyanocobalamin (VITAMIN B 12 PO), Take 500 mcg by mouth daily , Disp: , Rfl:   •  diphenhydrAMINE (BENADRYL) 25 mg tablet, Take 1 tablet (25 mg total) by mouth daily at bedtime (Patient taking differently: Take 25 mg by mouth if needed), Disp: 30 tablet, Rfl: 0  •  doxylamine (UNISOM) 25 MG tablet, Take 1 tablet (25 mg total) by mouth daily at bedtime as needed for sleep, Disp: 30 tablet, Rfl: 0  •  ergocalciferol (VITAMIN D2) 50,000 units, Take 1 capsule (50,000 Units total) by mouth every 30 (thirty) days, Disp: 3 capsule, Rfl: 3  •  lidocaine (LIDODERM) 5 %, Apply 1 patch topically over 12 hours daily Remove & Discard patch within 12 hours or as directed by MD, Disp: 15 patch, Rfl: 1  •  montelukast (SINGULAIR) 10 mg tablet, Take 1 tablet (10 mg total) by mouth daily at bedtime (Patient taking differently: Take 10 mg by mouth if needed), Disp: 30 tablet, Rfl: 5  •  Multiple Vitamins-Minerals (MULTIVITAL PO), Take by mouth daily , Disp: , Rfl:   •  pimecrolimus (ELIDEL) 1 % cream, Apply topically 2 (two) times a day, Disp: 30 g, Rfl: 0  •  pregabalin (LYRICA) 75 mg capsule, Take 1 capsule (75 mg total) by mouth 2 (two) times a day, Disp: 60 capsule, Rfl: 2  •  sodium chloride (OCEAN) 0 65 % nasal spray, 1 spray into each nostril every 2 (two) hours while awake, Disp: 60 mL, Rfl: 3  •  tamoxifen (NOLVADEX) 20 mg tablet, Take 1 tablet (20 mg total) by mouth daily, Disp: 90 tablet, Rfl: 3  •  benzonatate (TESSALON) 200 MG capsule, Take 1 capsule (200 mg total) by mouth 3 (three) times a day as needed for cough (Patient not taking: Reported on 5/17/2023), Disp: 20 capsule, Rfl: 0  •  docusate sodium (COLACE) 100 mg capsule, Take 1 capsule (100 mg total) by mouth 2 (two) times a day (Patient taking differently: Take 100 mg by mouth if needed), Disp: 200 capsule, Rfl: 3  •  guaiFENesin-codeine (ROBITUSSIN AC) 100-10 mg/5 mL oral solution, Take 5 mL by mouth 3 (three) times a day as needed for cough (Patient not taking: Reported on 4/26/2023), Disp: 150 mL, Rfl: 0  •  Na Sulfate-K Sulfate-Mg Sulf 17 5-3 13-1 6 GM/177ML SOLN, Take 1 kit by mouth once for 1 dose (Patient not taking: Reported on 8/9/2022), Disp: 354 mL, Rfl: 0  •  ofloxacin (OCUFLOX) 0 3 % ophthalmic solution, , Disp: , Rfl:   •  prednisoLONE acetate (PRED FORTE) 1 % ophthalmic suspension, , Disp: , Rfl:   •  Prolensa 0 07 % SOLN, PLACE 1 DROP IN SURGICAL EYE DAILY POSTOPERATIVELY ACCORDING TO WRITTEN INSTRUCTIONS (Patient not taking: Reported on 2/24/2023), Disp: , Rfl:   •  Restasis 0 05 % ophthalmic emulsion, , Disp: , Rfl:   •  Soolantra 1 % CREA,   (Patient not taking: Reported on 5/5/2022), Disp: , Rfl:   •  triamcinolone (KENALOG) 0 1 % ointment, Apply topically 2 (two) times a day for 7 days (Patient taking differently: Apply topically if needed), Disp: 80 g, Rfl: 0    Allergies   Allergen Reactions   • Latex Anaphylaxis   • Shellfish-Derived Products - Food Allergy Anaphylaxis       Oncology History   Malignant neoplasm of lower-inner quadrant of left breast in female, estrogen receptor positive ([facility] [address] )   1/2/2019 Initial Diagnosis    Malignant neoplasm of lower-inner quadrant of left breast in female, estrogen receptor positive [facility])      Chemotherapy    April 2019:    Adjuvant  Taxotere and Cytoxan once every 3 weeks for 4 cycles followed by aromatase inhibitor     4/11/2019 - 7/3/2019 Chemotherapy    pegfilgrastim (NEULASTA) subcutaneous injection 6 mg, 6 mg, Subcutaneous, Once, 4 of 4 cycles  Dose modification: 4 mg (original dose 6 mg, Cycle 3)  Administration: 4 mg (5/24/2019), 4 mg (6/14/2019)  cyclophosphamide (CYTOXAN) 1,176 mg in sodium chloride 0 9 % 250 mL IVPB, 600 mg/m2 = 1,176 mg, Intravenous, Once, 4 of 4 cycles  Administration: 1,176 mg (5/23/2019), 1,176 mg (6/13/2019)  DOCEtaxel (TAXOTERE) 147 mg in sodium chloride 0 9 % 250 mL chemo infusion, 75 mg/m2 = 147 mg, Intravenous, Once, 4 "of 4 cycles  Administration: 147 mg (5/23/2019), 147 mg (6/13/2019)         ROS:  05/18/23 Reviewed 12 systems: See symptoms in HPI:   Presently no other neurological, cardiac, pulmonary, GI and  symptoms other than listed in HPI  Other symptoms are in HPI  No symptoms like fever, chills, bleeding, bone pains, skin rash, weight loss,  weakness, night sweats  claudication and gait problem  No frequent infections  Not unusually sensitive to heat or cold  No swelling of the ankles  No swollen glands  Patient is not anxious  /80 (BP Location: Right arm, Patient Position: Sitting, Cuff Size: Adult)   Pulse 68   Resp 17   Ht 5' 5\" (1 651 m)   Wt 85 7 kg (189 lb)   SpO2 97%   BMI 31 45 kg/m²     Physical Exam:    Patient is alert  Patient is not in distress  Vital signs are above  There is no icterus  There is no oral thrush  There is no palpable neck mass  Lungs are clear to percussion and auscultation  Heart rate is regular  There is no palpable abdominal mass  Abdomen is soft and nontender  There is no ascites  There is no edema of the ankles  There is no calf tenderness, no focal neurological deficit, no skin rash, no palpable lymphadenopathy in the neck and axillary areas,  no clubbing  Patient is not anxious  Performance status 0  No lymphedema    IMAGING:  IMPRESSION:     No nodule meets current ACR criteria for requiring biopsy or followup ultrasounds            Reference: ACR Thyroid Imaging, Reporting and Data System (TI-RADS): White Paper of the [employer]   J AM Galdino Radiol 1412;97:236-524  (additional recommendations based on American Thyroid Association 2015 guidelines )        Workstation performed: [id number]        Imaging    US thyroid (Order: [id number]) - 5/9/2023    RESULTS:  On 06/19/2021  LUMBAR SPINE L1-L4 :   BMD  0 938  gm/cm2   T-score -1 0         LEFT  TOTAL HIP:   BMD:  0 722  gm/cm2   T-score:  -1 8     LEFT  FEMORAL NECK:   BMD:  0 629  gm/cm2 " T score: -2 0               IMPRESSION:  1  Low bone mass (OSTEOPENIA)  [Based on the left femoral neck]  LABS:    Results for orders placed or performed in visit on 05/05/23   CBC and differential   Result Value Ref Range    WBC 6 41 4 31 - 10 16 Thousand/uL    RBC 4 93 3 81 - 5 12 Million/uL    Hemoglobin 13 7 11 5 - 15 4 g/dL    Hematocrit 43 4 34 8 - 46 1 %    MCV 88 82 - 98 fL    MCH 27 8 26 8 - 34 3 pg    MCHC 31 6 31 4 - 37 4 g/dL    RDW 12 9 11 6 - 15 1 %    MPV 10 2 8 9 - 12 7 fL    Platelets 951 515 - 011 Thousands/uL    nRBC 0 /100 WBCs    Neutrophils Relative 44 43 - 75 %    Immat GRANS % 0 0 - 2 %    Lymphocytes Relative 43 14 - 44 %    Monocytes Relative 7 4 - 12 %    Eosinophils Relative 5 0 - 6 %    Basophils Relative 1 0 - 1 %    Neutrophils Absolute 2 83 1 85 - 7 62 Thousands/µL    Immature Grans Absolute 0 01 0 00 - 0 20 Thousand/uL    Lymphocytes Absolute 2 77 0 60 - 4 47 Thousands/µL    Monocytes Absolute 0 45 0 17 - 1 22 Thousand/µL    Eosinophils Absolute 0 30 0 00 - 0 61 Thousand/µL    Basophils Absolute 0 05 0 00 - 0 10 Thousands/µL   Comprehensive metabolic panel   Result Value Ref Range    Sodium 139 135 - 147 mmol/L    Potassium 3 9 3 5 - 5 3 mmol/L    Chloride 110 (H) 96 - 108 mmol/L    CO2 25 21 - 32 mmol/L    ANION GAP 4 4 - 13 mmol/L    BUN 13 5 - 25 mg/dL    Creatinine 0 70 0 60 - 1 30 mg/dL    Glucose, Fasting 89 65 - 99 mg/dL    Calcium 9 0 8 3 - 10 1 mg/dL    Corrected Calcium 9 5 8 3 - 10 1 mg/dL    AST 17 5 - 45 U/L    ALT 22 12 - 78 U/L    Alkaline Phosphatase 53 46 - 116 U/L    Total Protein 7 1 6 4 - 8 4 g/dL    Albumin 3 4 (L) 3 5 - 5 0 g/dL    Total Bilirubin 0 35 0 20 - 1 00 mg/dL    eGFR 88 ml/min/1 73sq m   Cancer antigen 27 29   Result Value Ref Range    CA 27 29 20 1 0 0 - 42 3 U/mL     Labs, Imaging, & Other studies:   All pertinent labs and imaging studies were personally reviewed      Reviewed above test results  and discussed with patient      Assessment and plan:     Follow-up visit for breast cancer and patient is on adjuvant tamoxifen  She has musculoskeletal symptoms  She has residual peripheral neuropathy  Patient did not go for carpal tunnel surgery  She states she is not having that much problem with carpal tunnel issue  She has some tiredness  She would like to have thyroid functions checked  She has a tiny thyroid nodules  Breast cancer was diagnosed in 2019 and that was  hormone receptor-positive and HER2 negative stage I, 1 2 cm, grade 3 left breast cancer , strongly positive ER, positive [place], negative HER 2, positive lymphovascular invasion, negative 3 sentinel lymph nodes, and Oncotype score was 25  Patient had bilateral mastectomies and left axillary lymph node sampling   Patient had 4 cycles of adjuvant chemotherapy, combination of Taxotere and Cytoxan and Neulasta and that was followed by aromatase inhibitors and she had problems with all 3 aromatase inhibitors   Lot of musculoskeletal symptoms  Since May 2020 she has been on tamoxifen and she has much less problem with musculoskeletal symptoms  No hot flashes  Has burning sensation in her feet, part of peripheral neuropathy  Occasionally she has some tiredness    She has bilateral carpal tunnel   There is family history of breast cancer  Patient states that she tested negative for BRCA   Philippe Nose History of high triglycerides and she has controlled that with diet and triglyceride level came down   Patient is trying to eat healthy and stay active  She had left eye cataract surgery on 11/16/2022 and that went well and she will have right eye cataract surgery on 12/30/22  She states she is up-to-date with her medical checkups                 Physical examination and test results are as recorded and discussed     Breast cancer remains in remission and goal is cure from breast cancer  Patient will continue to take tamoxifen  20 mg daily    Patient is taking vitamin-D and calcium and is staying active   For thyroid nodule she goes to her endocrinologist   To check thyroid functions as she requested  She follows with her breast surgeon for examination  Maria T Enriquez discussed in detail   Questions answered     Discussed the importance of eating healthy foods, staying active and health screening tests   Patient is capable of self-care     Discussed precautions against coronavirus and flu infections     Patient will continue to follow with her primary physician and other consultants  See diagnoses, orders and instructions  1  Malignant neoplasm of lower-inner quadrant of left breast in female, estrogen receptor positive (HCC)    - tamoxifen (NOLVADEX) 20 mg tablet; Take 1 tablet (20 mg total) by mouth daily  Dispense: 90 tablet; Refill: 3  - CBC and differential; Future  - Comprehensive metabolic panel; Future  - Cancer antigen 27 29; Future    2  Drug-induced polyneuropathy ([facility] [address] )      3  Arthritis      4  Thyroid nodule    - TSH, 3rd generation with Free T4 reflex; Future    5  Use of tamoxifen (Nolvadex)      6  Other hyperlipidemia      7  Thyroid disorder    - TSH, 3rd generation with Free T4 reflex; Future    Checking thyroid functions  Continue tamoxifen  Blood work prior to next visit in 6 months  I used dictation device to dictate this note and there could be mistakes in the note and patient may contact my office for that          Patient voiced understanding and agreed      Counseling / Coordination of Care        Provided counseling and support

## 2023-06-22 ENCOUNTER — ONCOLOGY SURVIVORSHIP (OUTPATIENT)
Dept: SURGICAL ONCOLOGY | Facility: CLINIC | Age: 73
End: 2023-06-22
Payer: COMMERCIAL

## 2023-06-22 VITALS
SYSTOLIC BLOOD PRESSURE: 128 MMHG | BODY MASS INDEX: 31.49 KG/M2 | RESPIRATION RATE: 16 BRPM | WEIGHT: 189 LBS | DIASTOLIC BLOOD PRESSURE: 80 MMHG | OXYGEN SATURATION: 98 % | TEMPERATURE: 97.4 F | HEIGHT: 65 IN | HEART RATE: 95 BPM

## 2023-06-22 DIAGNOSIS — Z17.0 MALIGNANT NEOPLASM OF LOWER-INNER QUADRANT OF LEFT BREAST IN FEMALE, ESTROGEN RECEPTOR POSITIVE (HCC): Primary | ICD-10-CM

## 2023-06-22 DIAGNOSIS — Z79.810 USE OF TAMOXIFEN (NOLVADEX): ICD-10-CM

## 2023-06-22 DIAGNOSIS — C50.312 MALIGNANT NEOPLASM OF LOWER-INNER QUADRANT OF LEFT BREAST IN FEMALE, ESTROGEN RECEPTOR POSITIVE (HCC): Primary | ICD-10-CM

## 2023-06-22 PROCEDURE — 99215 OFFICE O/P EST HI 40 MIN: CPT | Performed by: NURSE PRACTITIONER

## 2023-06-22 NOTE — PROGRESS NOTES
Assessment/Plan:    Diagnoses and all orders for this visit:    Malignant neoplasm of lower-inner quadrant of left breast in female, estrogen receptor positive [facility]    Patient is a 27-year-old female that was diagnosed with a left-sided breast cancer in January 2019  Her pathology revealed invasive ductal carcinoma, ER 90%, VT 40%, HER2 negative  She underwent genetic testing which was negative  She underwent a left mastectomy and sentinel node biopsy and a right prophylactic mastectomy  Her Oncotype score was 25  She received adjuvant chemotherapy with TC x4  She was unable to tolerate aromatase inhibitors and is therefore maintained on tamoxifen  She offers no new complaints today and there are no concerns on today's exam   Breast cancer survivorship visit performed today and treatment summary and care plan were reviewed with patient  She is up-to-date on colorectal cancer screening and will call to schedule her osteoporosis screening  She is not currently interested in the strength ABC program   Reviewed this could help with balance issues which she has been experiencing since chemotherapy  We also discussed the option of meeting with medical weight management as patient has reported some weight gain  She will think about this and contact me if she is interested in a referral   We will plan to see her back in 6 months for a routine follow-up visit or sooner should the need arise  She was instructed to contact me with any changes or concerns in the interim  All of her questions were answered today  -     Ambulatory referral to oncology social worker;  Future    Use of tamoxifen (Nolvadex)      REASON FOR VISIT:   Survivorship      Previous therapy:  Oncology History   Malignant neoplasm of lower-inner quadrant of left breast in female, estrogen receptor positive ([facility] [address] )   1/2/2019 Biopsy    Left breast biopsy:  - Invasive ductal carcinoma  Grade 2  ER 90%  VT 40%  HER2 negative     1/2019 Genetic Testing    [employer]  APC, ZAC, AXIN2, BARD1, BMPR1A, BRCA1, BRCA2, BRIP1, CDH1, CDK4, CDKN2A, CHEK2, EPCAM (large rearrangment only), HOXB13 (sequencing only), GALNT12, MLH1, MSH2, MSH3 (excluding repetitive portions of exon 1), MSH6, MUTYH, NBN, NTHL1, PALB2, PMS2, PTEN, RAD51C, RAD51D, RNF43, RPS20, SMAD4, STK11, TP53, Sequencing was performed for select regions of POLE and POLD1, and large rearrangement analysis was performed for select regions of GREM1      Negative     2/8/2019 Surgery    Left mastectomy with sentinel lymph node biopsy  - clear margins   - 0/3 lymph nodes    Right prophylactic mastectomy, bilateral expander placement  Dr Sy Wills and Dr Wing Cruz     2/2019 Genomic Testing    Oncotype Dx: 25     4/2019 -  Chemotherapy    Adjuvant  Taxotere and Cytoxan once every 3 weeks for 4 cycles   Dr Noah Samano     4/11/2019 - 7/3/2019 Chemotherapy    pegfilgrastim (NEULASTA) subcutaneous injection 6 mg, 6 mg, Subcutaneous, Once, 4 of 4 cycles  Dose modification: 4 mg (original dose 6 mg, Cycle 3)  Administration: 4 mg (5/24/2019), 4 mg (6/14/2019)  cyclophosphamide (CYTOXAN) 1,176 mg in sodium chloride 0 9 % 250 mL IVPB, 600 mg/m2 = 1,176 mg, Intravenous, Once, 4 of 4 cycles  Administration: 1,176 mg (5/23/2019), 1,176 mg (6/13/2019)  DOCEtaxel (TAXOTERE) 147 mg in sodium chloride 0 9 % 250 mL chemo infusion, 75 mg/m2 = 147 mg, Intravenous, Once, 4 of 4 cycles  Administration: 147 mg (5/23/2019), 147 mg (6/13/2019)     7/2019 -  Hormone Therapy    Femara 07/2019- 10/2019  Anstrozole 10/2019- 5/2020  Tamoxifen- 5/2020- present     9/27/2019 Surgery    Bilateral implant exhange     8/6/2020 Surgery    Bilateral fat grafting           Patient ID: Ellis Saleem is a 81 y o  female  Presenting today for breast cancer survivorship visit    She notices no changes on her self breast exam   Reports some weight gain but otherwise denies persistent headaches, back pain or bone pain, cough or shortness of breath, abdominal "pain         Review of Systems   Constitutional: Negative for activity change, appetite change, chills, fatigue, fever and unexpected weight change  Respiratory: Negative for cough and shortness of breath  Cardiovascular: Negative for chest pain  Gastrointestinal: Negative for abdominal pain, constipation, diarrhea, nausea and vomiting  Musculoskeletal: Negative for arthralgias, back pain, gait problem and myalgias  Skin: Negative for color change and rash  Neurological: Positive for numbness (neuropathy of feet)  Negative for dizziness and headaches  Hematological: Negative for adenopathy  Psychiatric/Behavioral: Negative for agitation and confusion  All other systems reviewed and are negative  Objective:    Blood pressure 128/80, pulse 95, temperature (!) 97 4 °F (36 3 °C), temperature source Temporal, resp  rate 16, height 5' 5\" (1 651 m), weight 85 7 kg (189 lb), SpO2 98 %, not currently breastfeeding  Body mass index is 31 45 kg/m²  Physical Exam  Vitals reviewed  Constitutional:       General: She is not in acute distress  Appearance: Normal appearance  She is well-developed  She is not diaphoretic  HENT:      Head: Normocephalic and atraumatic  Cardiovascular:      Rate and Rhythm: Normal rate and regular rhythm  Heart sounds: Normal heart sounds  Pulmonary:      Effort: Pulmonary effort is normal       Breath sounds: Normal breath sounds  Chest:      Comments: Bilateral reconstructed breast with implants present  No masses, skin changes or nodules  Abdominal:      Palpations: Abdomen is soft  There is no mass  Tenderness: There is no abdominal tenderness  Musculoskeletal:         General: Normal range of motion  Cervical back: Normal range of motion  Lymphadenopathy:      Upper Body:      Right upper body: No supraclavicular or axillary adenopathy  Left upper body: No supraclavicular or axillary adenopathy     Skin:     General: Skin is warm " and dry  Findings: No rash  Neurological:      General: No focal deficit present  Mental Status: She is alert and oriented to person, place, and time  Psychiatric:         Speech: Speech normal            Discussed symptoms related to disease recurrence, Yes    Evaluated for late effects related to cancer treatment, Yes, describe: discussed effects of surgery, chemo, SERM therapy    Screening current for cervical cancer, Yes, describe: n/a     Screening current for colon cancer, Yes, describe: colonoscopy9/2022, repeat in 5 years    Cancer rehabilitation services addressed, Yes, describe: declines [facility]    Screening current for osteoporosis, Yes, describe: ordered by PCP, patient to call to schedule    Oncology Treatment Summary reviewed with patient and copy provided, Yes    Referral placed for psychosocial evaluation/screening to oncology social work  Yes    I have spent 45 minutes with Patient  today in which greater than 50% of this time was spent in counseling/coordination of care regarding breast cancer survivorship

## 2023-06-27 ENCOUNTER — PATIENT OUTREACH (OUTPATIENT)
Dept: CASE MANAGEMENT | Facility: OTHER | Age: 73
End: 2023-06-27

## 2023-06-27 NOTE — PROGRESS NOTES
OSW received SW referral for survivorship  OSW will place outreach TC to complete the distress thermometer and psychosocial assessment

## 2023-06-28 ENCOUNTER — PATIENT OUTREACH (OUTPATIENT)
Dept: CASE MANAGEMENT | Facility: OTHER | Age: 73
End: 2023-06-28

## 2023-07-14 ENCOUNTER — PATIENT OUTREACH (OUTPATIENT)
Dept: CASE MANAGEMENT | Facility: OTHER | Age: 73
End: 2023-07-14

## 2023-07-14 NOTE — PROGRESS NOTES
Biopsychosocial and Barriers Assessment Survivorship     Home/Cell Phone: [phone]  Emergency Contact: Juarez Richards  Marital Status: Single  Interpretation concerns, speaks another language, preferred language: English  Cultural concerns: none  Ability to read or write: yes    Housin [place]: 12 steps to enter  Lives With: Granddaughter and her child  Daily Living Activities: independent  Durable Medical Equipment: none  Ambulation: independent    Preferred Pharmacy: [facility]  Medication coverage: yes    Employment: employed   Status/Location: n/a  Ability to pay bills: yes  POA/LW/AD: none    Additional Comments:  OSW placed outreach TC to pt this afternoon. Pt returned the TC. OSW introduced self and reason for the call. Pt is a very pleasant woman who is in survivorship. She completed a Dt, where she scored a 0/10. She expressed that she does have some pain, which is caused by neuropathy in her feet. She also does not sleep very well. Other then that she is doing well. She continues to keep busy and remain active. She works for [employer] 2 days per week. She was appreciative of the outreach.

## 2023-08-18 ENCOUNTER — OFFICE VISIT (OUTPATIENT)
Dept: INTERNAL MEDICINE CLINIC | Facility: CLINIC | Age: 73
End: 2023-08-18
Payer: COMMERCIAL

## 2023-08-18 VITALS
WEIGHT: 187.2 LBS | TEMPERATURE: 97.7 F | HEIGHT: 65 IN | DIASTOLIC BLOOD PRESSURE: 66 MMHG | BODY MASS INDEX: 31.19 KG/M2 | OXYGEN SATURATION: 96 % | HEART RATE: 80 BPM | SYSTOLIC BLOOD PRESSURE: 120 MMHG

## 2023-08-18 DIAGNOSIS — Z00.00 ANNUAL PHYSICAL EXAM: Primary | ICD-10-CM

## 2023-08-18 DIAGNOSIS — E78.2 MIXED HYPERLIPIDEMIA: ICD-10-CM

## 2023-08-18 DIAGNOSIS — R00.2 PALPITATION: ICD-10-CM

## 2023-08-18 PROCEDURE — 99397 PER PM REEVAL EST PAT 65+ YR: CPT | Performed by: INTERNAL MEDICINE

## 2023-08-18 PROCEDURE — 99214 OFFICE O/P EST MOD 30 MIN: CPT | Performed by: INTERNAL MEDICINE

## 2023-08-18 NOTE — PROGRESS NOTES
[facility],[address] INTERNAL MEDICINE    NAME: Sherren Silk  AGE: 81 y.o. SEX: female  : 1950     DATE: 2023     Assessment and Plan:     Problem List Items Addressed This Visit    None  Visit Diagnoses     Annual physical exam    -  Primary    Mixed hyperlipidemia        CT coronary calcium scoring advised    Relevant Orders    CT coronary calcium score    CBC and differential    Comprehensive metabolic panel    Urinalysis with microscopic    Lipid panel    Palpitation        EKG showed normal sinus rhythm. Relevant Orders    POCT ECG          Immunizations and preventive care screenings were discussed with patient today. Appropriate education was printed on patient's after visit summary. Counseling:  · Exercise: the importance of regular exercise/physical activity was discussed. Recommend exercise 3-5 times per week for at least 30 minutes. No follow-ups on file. Chief Complaint:     Chief Complaint   Patient presents with   • Follow-up     3 month       History of Present Illness:     Adult Annual Physical   Patient here for a comprehensive physical exam. The patient reports no problems. Diet and Physical Activity  · Diet/Nutrition: well balanced diet and consuming 3-5 servings of fruits/vegetables daily. · Exercise: walking and 5-7 times a week on average. Depression Screening  PHQ-2/9 Depression Screening         General Health  · Sleep: sleeps well and gets 4-6 hours of sleep on average. · Hearing: normal - bilateral.  · Vision: vision problems: R eye issue and most recent eye exam <1 year ago. · Dental: regular dental visits and brushes teeth twice daily. /GYN Health  · Patient is: postmenopausal  · Last menstrual period: hysterectomy 25 years  · Contraceptive method: N/A.      Review of Systems:     Review of Systems   Constitutional: Negative for appetite change, chills, fatigue and fever.   HENT: Negative for sore throat and trouble swallowing. Eyes: Negative for redness. Respiratory: Negative for shortness of breath. Cardiovascular: Negative for chest pain and palpitations. Gastrointestinal: Negative for abdominal pain, constipation and diarrhea. Genitourinary: Negative for dysuria and hematuria. Musculoskeletal: Negative for back pain and neck pain. Skin: Negative for rash. Neurological: Negative for seizures, weakness and headaches. Hematological: Negative for adenopathy. Psychiatric/Behavioral: Negative for confusion. The patient is not nervous/anxious. Past Medical History:     Past Medical History:   Diagnosis Date   • Allergic    • Allergies    • Arthritis    • Breast cancer ([address])    • Carpal tunnel syndrome on right    • Chemotherapy induced neutropenia ([address]) 05/23/2019   • Gastroesophageal reflux disease 08/06/2020   • GERD (gastroesophageal reflux disease)    • Lumbar disc disease    • Trigger thumb     right   • Vitamin D deficiency    • Wears glasses       Past Surgical History:     Past Surgical History:   Procedure Laterality Date   • BREAST BIOPSY Left 01/02/2019    invasive ductal carcinoma   • COLONOSCOPY     • HYSTERECTOMY      age 41 - abdominal   • LIPOSUCTION W/ FAT INJECTION Bilateral 08/06/2020    Procedure: FAT GRAFTING;  Surgeon: iTara White MD;  [facility];  Service: Plastics   • OOPHORECTOMY Bilateral     age 41   • KS BX/EXC LYMPH NODE OPEN SUPERFICIAL Left 02/08/2019    Procedure: BIOPSY LYMPH NODE SENTINEL;  Surgeon: Trisha Amaya MD;  [facility];  Service: Surgical Oncology   • KS EDG US EXAM SURGICAL ALTER STOM DUODENUM/JEJUNUM N/A 02/02/2017    Procedure: RADIAL ENDOSCOPIC U/S;  Surgeon: Arielle Scanlon MD;  [facility]: [facility];   Service: Gastroenterology   • KS FASCIECTOMY PLANTAR FASCIA PARTIAL SPX Right 10/20/2016    Procedure:  OPEN RELEASE FASCIA PLANTAR ,CUTTING BOTTOM OF ARCH,INSTEP ;  Surgeon: Zahra Dsouza, DPM;  [facility];  Service: Podiatry   • UT IMPLNT BIO IMPLNT FOR SOFT TISSUE REINFORCEMENT Bilateral 02/08/2019    Procedure: RECONSTRUCTION BREAST W/ IMPLANT;  Surgeon: Jax Colvin MD;  [facility];  Service: Plastics   • UT INSJ/RPLCMT BREAST IMPLANT SEP DAY MASTECTOMY Bilateral 09/27/2019    Procedure: IMPLANT EXPANDER EXCHANGE;  Surgeon: Jax Colvin MD;  [facility];  Service: Plastics   • UT MASTECTOMY SIMPLE COMPLETE N/A 02/08/2019    Procedure: LEFT breast mastectomy; RIGHT breast prophylactic mastectomy;  Surgeon: Tonja Torres MD;  [facility];  Service: Surgical Oncology   • UT ZUNILDA-IMPLANT CAPSULECTOMY BREAST COMPLETE Bilateral 09/27/2019    Procedure: CAPSULECTOMY;  Surgeon: Jax Colvin MD;  [facility];  Service: Plastics   • UT REVISION OF RECONSTRUCTED BREAST Bilateral 08/06/2020    Procedure: BREAST LATERAL STANDING EXCISION SKIN DEFORMITIES POST RECON.;  Surgeon: Jax Colvin MD;  [facility];  Service: Plastics   • UT TISSUE EXPANDER PLACEMENT BREAST RECONSTRUCTION Bilateral 02/08/2019    Procedure: INSERTION/PLACEMENT TISSUE EXPANDER (EXCHANGE);   Surgeon: Jax Colvin MD;  [facility];  Service: Plastics   • THUMB ARTHROSCOPY     • UPPER GASTROINTESTINAL ENDOSCOPY     • US GUIDED BREAST BIOPSY LEFT COMPLETE Left 01/02/2019   • WISDOM TOOTH EXTRACTION        Social History:     Social History     Socioeconomic History   • Marital status: Single     Spouse name: None   • Number of children: None   • Years of education: None   • Highest education level: None   Occupational History   • None   Tobacco Use   • Smoking status: Never   • Smokeless tobacco: Never   Vaping Use   • Vaping Use: Never used   Substance and Sexual Activity   • Alcohol use: Not Currently     Alcohol/week: 1.0 standard drink of alcohol     Types: 1 Glasses of wine per week   • Drug use: No   • Sexual activity: Never   Other Topics Concern   • None   Social History Narrative • None     Social Determinants of Health     Financial Resource Strain: Not on file   Food Insecurity: Not on file   Transportation Needs: Not on file   Physical Activity: Not on file   Stress: Not on file   Social Connections: Not on file   Intimate Partner Violence: Not on file   Housing Stability: Not on file      Family History:     Family History   Problem Relation Age of Onset   • Brain cancer Mother 67   • Prostate cancer Father    • Breast cancer Sister    • Breast cancer additional onset Sister    • Breast cancer Sister    • Prostate cancer Brother       Current Medications:     Current Outpatient Medications   Medication Sig Dispense Refill   • CALCIUM PO Take 1,200 mg by mouth daily     • Cholecalciferol (VITAMIN D PO) Take 1.25 mg by mouth every 30 (thirty) days     • Cyanocobalamin (VITAMIN B 12 PO) Take 500 mcg by mouth daily      • ergocalciferol (VITAMIN D2) 50,000 units Take 1 capsule (50,000 Units total) by mouth every 30 (thirty) days 3 capsule 3   • lidocaine (LIDODERM) 5 % Apply 1 patch topically over 12 hours daily Remove & Discard patch within 12 hours or as directed by MD (Patient taking differently: Apply 1 patch topically as needed Remove & Discard patch within 12 hours or as directed by MD) 15 patch 1   • Multiple Vitamins-Minerals (MULTIVITAL PO) Take by mouth daily      • tamoxifen (NOLVADEX) 20 mg tablet Take 1 tablet (20 mg total) by mouth daily 90 tablet 3   • triamcinolone (KENALOG) 0.1 % ointment Apply topically 2 (two) times a day for 7 days (Patient taking differently: Apply topically if needed) 80 g 0   • Na Sulfate-K Sulfate-Mg Sulf 17.5-3.13-1.6 GM/177ML SOLN Take 1 kit by mouth once for 1 dose (Patient not taking: Reported on 8/9/2022) 354 mL 0     No current facility-administered medications for this visit. Allergies:      Allergies   Allergen Reactions   • Latex Anaphylaxis   • Shellfish-Derived Products - Food Allergy Anaphylaxis      Physical Exam:     /66 (BP Location: Left arm, Patient Position: Sitting, Cuff Size: Standard)   Pulse 80   Temp 97.7 °F (36.5 °C) (Temporal)   Ht 5' 5" (1.651 m)   Wt 84.9 kg (187 lb 3.2 oz)   SpO2 96%   BMI 31.15 kg/m²     Physical Exam  Vitals and nursing note reviewed. Constitutional:       General: She is not in acute distress. Appearance: She is well-developed. HENT:      Head: Normocephalic and atraumatic. Eyes:      Conjunctiva/sclera: Conjunctivae normal.   Cardiovascular:      Rate and Rhythm: Normal rate and regular rhythm. Heart sounds: No murmur heard. Pulmonary:      Effort: Pulmonary effort is normal. No respiratory distress. Breath sounds: Normal breath sounds. Abdominal:      Palpations: Abdomen is soft. Tenderness: There is no abdominal tenderness. Musculoskeletal:         General: No swelling. Cervical back: Neck supple. Skin:     General: Skin is warm and dry. Capillary Refill: Capillary refill takes less than 2 seconds. Neurological:      Mental Status: She is alert. Psychiatric:         Mood and Affect: Mood normal.      EKG: normal EKG, normal sinus rhythm.     Martin Rodrigez MD  [facility] INTERNAL MEDICINE

## 2023-08-20 PROCEDURE — 93000 ELECTROCARDIOGRAM COMPLETE: CPT | Performed by: INTERNAL MEDICINE

## 2023-08-23 ENCOUNTER — HOSPITAL ENCOUNTER (OUTPATIENT)
Dept: RADIOLOGY | Facility: HOSPITAL | Age: 73
Discharge: HOME | End: 2023-08-23
Attending: INTERNAL MEDICINE
Payer: COMMERCIAL

## 2023-08-23 DIAGNOSIS — E78.2 MIXED HYPERLIPIDEMIA: ICD-10-CM

## 2023-08-23 PROCEDURE — G1004 CDSM NDSC: HCPCS

## 2023-08-23 PROCEDURE — 75571 CT HRT W/O DYE W/CA TEST: CPT

## 2023-09-27 ENCOUNTER — TELEPHONE (OUTPATIENT)
Dept: INTERNAL MEDICINE CLINIC | Facility: CLINIC | Age: 73
End: 2023-09-27

## 2023-09-27 ENCOUNTER — OFFICE VISIT (OUTPATIENT)
Dept: INTERNAL MEDICINE CLINIC | Facility: CLINIC | Age: 73
End: 2023-09-27
Payer: COMMERCIAL

## 2023-09-27 VITALS
HEART RATE: 88 BPM | SYSTOLIC BLOOD PRESSURE: 120 MMHG | WEIGHT: 186 LBS | BODY MASS INDEX: 30.99 KG/M2 | TEMPERATURE: 97.5 F | HEIGHT: 65 IN | OXYGEN SATURATION: 98 % | DIASTOLIC BLOOD PRESSURE: 60 MMHG

## 2023-09-27 DIAGNOSIS — Z00.00 WELL ADULT EXAM: ICD-10-CM

## 2023-09-27 DIAGNOSIS — E55.9 VITAMIN D DEFICIENCY: ICD-10-CM

## 2023-09-27 DIAGNOSIS — L30.9 ECZEMA, UNSPECIFIED TYPE: ICD-10-CM

## 2023-09-27 DIAGNOSIS — J04.10 TRACHEITIS: Primary | ICD-10-CM

## 2023-09-27 DIAGNOSIS — J06.9 UPPER RESPIRATORY INFECTION, ACUTE: ICD-10-CM

## 2023-09-27 PROBLEM — J01.90 ACUTE SINUSITIS: Status: ACTIVE | Noted: 2023-09-27

## 2023-09-27 LAB
SARS-COV-2 AG UPPER RESP QL IA: NEGATIVE
VALID CONTROL: NORMAL

## 2023-09-27 PROCEDURE — 99214 OFFICE O/P EST MOD 30 MIN: CPT | Performed by: INTERNAL MEDICINE

## 2023-09-27 PROCEDURE — 87811 SARS-COV-2 COVID19 W/OPTIC: CPT | Performed by: INTERNAL MEDICINE

## 2023-09-27 RX ORDER — DOXYCYCLINE HYCLATE 100 MG/1
100 CAPSULE ORAL EVERY 12 HOURS SCHEDULED
Qty: 10 CAPSULE | Refills: 0 | Status: SHIPPED | OUTPATIENT
Start: 2023-09-27 | End: 2023-09-27

## 2023-09-27 RX ORDER — DOXYCYCLINE HYCLATE 100 MG/1
100 CAPSULE ORAL EVERY 12 HOURS SCHEDULED
Qty: 10 CAPSULE | Refills: 0 | Status: SHIPPED | OUTPATIENT
Start: 2023-09-27 | End: 2023-10-02

## 2023-09-27 RX ORDER — ERGOCALCIFEROL 1.25 MG/1
50000 CAPSULE ORAL
Qty: 3 CAPSULE | Refills: 3 | Status: SHIPPED | OUTPATIENT
Start: 2023-09-27

## 2023-09-27 RX ORDER — CLOBETASOL PROPIONATE 0.5 MG/G
OINTMENT TOPICAL 2 TIMES DAILY
Qty: 45 G | Refills: 0 | Status: SHIPPED | OUTPATIENT
Start: 2023-09-27

## 2023-09-27 NOTE — PROGRESS NOTES
Assessment/Plan:           1. Tracheitis  -     doxycycline hyclate (VIBRAMYCIN) 100 mg capsule; Take 1 capsule (100 mg total) by mouth every 12 (twelve) hours for 5 days    2. Upper respiratory infection, acute  -     POCT Rapid Covid Ag    3. Vitamin D deficiency  -     ergocalciferol (VITAMIN D2) 50,000 units; Take 1 capsule (50,000 Units total) by mouth every 30 (thirty) days    4. Eczema, unspecified type  -     clobetasol (TEMOVATE) 0.05 % ointment; Apply topically 2 (two) times a day    5. Well adult exam  Comments:  form signed           1. Vitamin D deficiency         No problem-specific Assessment & Plan notes found for this encounter. Subjective:      Patient ID: Kenny Rodriges is a 81 y.o. female. Patient states that last Sunday throat started to feel scratchy. Then she later became congested, and feels a lot of sinus pressure, as well as post nasal drip which causes her to cough. It makes it hard for her to sleep laying flat because of the drainage. 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 Her covid test today was negative. She states that this happens to her a couple of times a year. Her symptoms have been consistent without initial improvement. She denies any sore throat currently, fevers, chills, myalgias, SOB, chest pain, nausea, vomiting, dysuria, urgency. The following portions of the patient's history were reviewed and updated as appropriate: She  has a past medical history of Allergic, Allergies, Arthritis, Breast cancer ([address]), Carpal tunnel syndrome on right, Chemotherapy induced neutropenia ([address]) (05/23/2019), Gastroesophageal reflux disease (08/06/2020), GERD (gastroesophageal reflux disease), Lumbar disc disease, Trigger thumb, Vitamin D deficiency, and Wears glasses.   She   Patient Active Problem List    Diagnosis Date Noted   • Other insomnia 05/01/2022   • Osteopenia 03/05/2021   • Gastroesophageal reflux disease 08/06/2020   • Breast reconstruction deformity    • Use of tamoxifen (Nolvadex) 06/17/2020   • Bilateral carpal tunnel syndrome 01/27/2020   • Osteoarthritis of multiple joints 01/27/2020   • Drug-induced polyneuropathy ([address]) 01/27/2020   • Arthritis 01/27/2020   • Vitamin D deficiency 01/08/2020   • Impaired fasting blood sugar 01/08/2020   • Urinary tract infection with hematuria 11/10/2019   • Dysuria 11/10/2019   • Chemotherapy induced neutropenia ([address]) 30/95/0180   • Folliculitis 88/20/6562   • BRCA negative 01/23/2019   • Family history of breast cancer in sister 01/10/2019   • Malignant neoplasm of lower-inner quadrant of left breast in female, estrogen receptor positive ([address]) 01/02/2019   • 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   • Thyroid nodule 12/15/2016   • Elevated LFTs 10/26/2016   • Nausea 10/26/2016   • Atypical chest pain 10/24/2016   • Epigastric pain 10/24/2016   • Plantar fasciitis of right foot 10/20/2016     She  has a past surgical history that includes Thumb arthroscopy; pr edg us exam surgical alter stom duodenum/jejunum (N/A, 02/02/2017); pr fasciectomy plantar fascia partial spx (Right, 10/20/2016); Oophorectomy (Bilateral); US guided breast biopsy left complete (Left, 01/02/2019); Breast biopsy (Left, 01/02/2019); Colonoscopy; pr mastectomy simple complete (N/A, 02/08/2019); pr bx/exc lymph node open superficial (Left, 02/08/2019); pr tissue expander placement breast reconstruction (Bilateral, 02/08/2019); pr implnt bio implnt for soft tissue reinforcement (Bilateral, 02/08/2019); pr huong-implant capsulectomy breast complete (Bilateral, 09/27/2019); pr insj/rplcmt breast implant sep day mastectomy (Bilateral, 09/27/2019); pr revision of reconstructed breast (Bilateral, 08/06/2020); Liposuction w/ fat injection (Bilateral, 08/06/2020); Hysterectomy; [place] tooth extraction; and Upper gastrointestinal endoscopy.   Her family history includes Brain cancer (age of onset: 67) in her mother; Breast cancer in her sister and sister; Breast cancer additional onset in her sister; Prostate cancer in her brother and father. She  reports that she has never smoked. She has never used smokeless tobacco. She reports that she does not currently use alcohol after a past usage of about 1.0 standard drink of alcohol per week. She reports that she does not use drugs. Current Outpatient Medications   Medication Sig Dispense Refill   • CALCIUM PO Take 1,200 mg by mouth daily     • Cholecalciferol (VITAMIN D PO) Take 1.25 mg by mouth every 30 (thirty) days     • clobetasol (TEMOVATE) 0.05 % ointment Apply topically 2 (two) times a day 45 g 0   • Cyanocobalamin (VITAMIN B 12 PO) Take 500 mcg by mouth daily      • doxycycline hyclate (VIBRAMYCIN) 100 mg capsule Take 1 capsule (100 mg total) by mouth every 12 (twelve) hours for 5 days 10 capsule 0   • ergocalciferol (VITAMIN D2) 50,000 units Take 1 capsule (50,000 Units total) by mouth every 30 (thirty) days 3 capsule 3   • lidocaine (LIDODERM) 5 % Apply 1 patch topically over 12 hours daily Remove & Discard patch within 12 hours or as directed by MD (Patient taking differently: Apply 1 patch topically as needed Remove & Discard patch within 12 hours or as directed by MD) 15 patch 1   • Multiple Vitamins-Minerals (MULTIVITAL PO) Take by mouth daily      • tamoxifen (NOLVADEX) 20 mg tablet Take 1 tablet (20 mg total) by mouth daily 90 tablet 3   • triamcinolone (KENALOG) 0.1 % ointment Apply topically 2 (two) times a day for 7 days (Patient taking differently: Apply topically if needed) 80 g 0   • Na Sulfate-K Sulfate-Mg Sulf 17.5-3.13-1.6 GM/177ML SOLN Take 1 kit by mouth once for 1 dose (Patient not taking: Reported on 8/9/2022) 354 mL 0     No current facility-administered medications for this visit.      Current Outpatient Medications on File Prior to Visit   Medication Sig   • CALCIUM PO Take 1,200 mg by mouth daily   • Cholecalciferol (VITAMIN D PO) Take 1.25 mg by mouth every 30 (thirty) days   • Cyanocobalamin (VITAMIN B 12 PO) Take 500 mcg by mouth daily    • lidocaine (LIDODERM) 5 % Apply 1 patch topically over 12 hours daily Remove & Discard patch within 12 hours or as directed by MD (Patient taking differently: Apply 1 patch topically as needed Remove & Discard patch within 12 hours or as directed by MD)   • Multiple Vitamins-Minerals (MULTIVITAL PO) Take by mouth daily    • tamoxifen (NOLVADEX) 20 mg tablet Take 1 tablet (20 mg total) by mouth daily   • triamcinolone (KENALOG) 0.1 % ointment Apply topically 2 (two) times a day for 7 days (Patient taking differently: Apply topically if needed)   • [DISCONTINUED] ergocalciferol (VITAMIN D2) 50,000 units Take 1 capsule (50,000 Units total) by mouth every 30 (thirty) days   • Na Sulfate-K Sulfate-Mg Sulf 17.5-3.13-1.6 GM/177ML SOLN Take 1 kit by mouth once for 1 dose (Patient not taking: Reported on 8/9/2022)     No current facility-administered medications on file prior to visit. Medications Discontinued During This Encounter   Medication Reason   • ergocalciferol (VITAMIN D2) 50,000 units Reorder   • doxycycline hyclate (VIBRAMYCIN) 100 mg capsule       She is allergic to latex and shellfish-derived products - food allergy. .    Review of Systems   Constitutional: Negative for chills and fever. HENT: Positive for congestion, postnasal drip, sinus pressure and sore throat. Negative for ear pain and trouble swallowing. Eyes: Negative for redness. Respiratory: Negative for shortness of breath and wheezing. Cardiovascular: Negative for chest pain and palpitations. Gastrointestinal: Negative for abdominal pain, nausea and vomiting. Genitourinary: Negative for dysuria and urgency. Skin: Positive for rash. Neurological: Negative for weakness and light-headedness.          Objective:      /60 (BP Location: Left arm, Patient Position: Sitting, Cuff Size: Standard)   Pulse 88   Temp 97.5 °F (36.4 °C) (Temporal)   Ht 5' 5" (1.651 m)   Wt 84.4 kg (186 lb)   SpO2 98%   BMI 30.95 kg/m²     Results Reviewed     None          Recent Results (from the past 1344 hour(s))   POCT Rapid Covid Ag    Collection Time: 09/27/23 12:15 PM   Result Value Ref Range    POCT SARS-CoV-2 Ag Negative Negative    VALID CONTROL Valid         Physical Exam  Constitutional:       General: She is not in acute distress. Appearance: Normal appearance. HENT:      Head: Normocephalic and atraumatic. Right Ear: Tympanic membrane, ear canal and external ear normal.      Left Ear: There is impacted cerumen. Nose: Congestion present. Comments: Mildly erythematous turbinates      Mouth/Throat:      Mouth: Mucous membranes are moist.      Comments: Mucus noted in back of throat, mild erythema, no exudates  Eyes:      Conjunctiva/sclera: Conjunctivae normal.      Pupils: Pupils are equal, round, and reactive to light. Cardiovascular:      Rate and Rhythm: Normal rate and regular rhythm. Heart sounds: Normal heart sounds. No murmur heard. Pulmonary:      Effort: Pulmonary effort is normal.      Breath sounds: Normal breath sounds. Abdominal:      General: Abdomen is flat. Palpations: Abdomen is soft. Musculoskeletal:      Cervical back: Normal range of motion. Right lower leg: No edema. Left lower leg: No edema. Skin:     General: Skin is warm and dry. Capillary Refill: Capillary refill takes less than 2 seconds. Findings: Rash present. Neurological:      General: No focal deficit present. Mental Status: She is alert and oriented to person, place, and time. Mental status is at baseline.    Psychiatric:         Mood and Affect: Mood normal.         Behavior: Behavior normal.

## 2023-10-16 ENCOUNTER — OFFICE VISIT (OUTPATIENT)
Dept: INTERNAL MEDICINE CLINIC | Facility: CLINIC | Age: 73
End: 2023-10-16
Payer: COMMERCIAL

## 2023-10-16 VITALS
DIASTOLIC BLOOD PRESSURE: 65 MMHG | WEIGHT: 179 LBS | SYSTOLIC BLOOD PRESSURE: 108 MMHG | BODY MASS INDEX: 29.82 KG/M2 | HEIGHT: 65 IN | HEART RATE: 101 BPM | OXYGEN SATURATION: 98 %

## 2023-10-16 DIAGNOSIS — M54.50 ACUTE MIDLINE LOW BACK PAIN WITHOUT SCIATICA: ICD-10-CM

## 2023-10-16 DIAGNOSIS — M79.672 LEFT FOOT PAIN: Primary | ICD-10-CM

## 2023-10-16 DIAGNOSIS — R73.01 IMPAIRED FASTING BLOOD SUGAR: ICD-10-CM

## 2023-10-16 PROCEDURE — 99214 OFFICE O/P EST MOD 30 MIN: CPT | Performed by: INTERNAL MEDICINE

## 2023-10-16 RX ORDER — LIDOCAINE 50 MG/G
1 PATCH TOPICAL DAILY
Qty: 15 PATCH | Refills: 1 | Status: SHIPPED | OUTPATIENT
Start: 2023-10-16

## 2023-10-16 RX ORDER — CYANOCOBALAMIN (VITAMIN B-12) 500 MCG
TABLET ORAL
COMMUNITY

## 2023-10-16 NOTE — PROGRESS NOTES
Assessment/Plan:           1. Left foot pain  Comments:  Etiology of the pain is not certain but could be related to neuropathy. Lidocaine patch advised    2. Acute midline low back pain without sciatica  Comments:  Lidoderm patch prescribed and physical therapy recommended. Symptomatic treatment with Tylenol. Orders:  -     lidocaine (LIDODERM) 5 %; Apply 1 patch topically over 12 hours daily Remove & Discard patch within 12 hours or as directed by MD    3. Impaired fasting blood sugar  Comments:  Diabetic diet advised. 1. Left foot pain      2. Acute midline low back pain without sciatica  - lidocaine (LIDODERM) 5 %; Apply 1 patch topically over 12 hours daily Remove & Discard patch within 12 hours or as directed by MD  Dispense: 15 patch; Refill: 1       No problem-specific Assessment & Plan notes found for this encounter. Subjective:      Patient ID: Antonio Ochoa is a 81 y.o. female. HPI    The following portions of the patient's history were reviewed and updated as appropriate: She  has a past medical history of Allergic, Allergies, Arthritis, Breast cancer ([address]), Carpal tunnel syndrome on right, Chemotherapy induced neutropenia (05/23/2019), Gastroesophageal reflux disease (08/06/2020), GERD (gastroesophageal reflux disease), Lumbar disc disease, Trigger thumb, Vitamin D deficiency, and Wears glasses.   She   Patient Active Problem List    Diagnosis Date Noted    Other insomnia 05/01/2022    Osteopenia 03/05/2021    Gastroesophageal reflux disease 08/06/2020    Breast reconstruction deformity     Use of tamoxifen (Nolvadex) 06/17/2020    Bilateral carpal tunnel syndrome 01/27/2020    Osteoarthritis of multiple joints 01/27/2020    Drug-induced polyneuropathy ([address]) 01/27/2020    Arthritis 01/27/2020    Vitamin D deficiency 01/08/2020    Impaired fasting blood sugar 01/08/2020    Urinary tract infection with hematuria 11/10/2019    Dysuria 11/10/2019    Chemotherapy induced neutropenia  27/82/5826    Folliculitis 35/26/3784    BRCA negative 01/23/2019    Family history of breast cancer in sister 01/10/2019    Malignant neoplasm of lower-inner quadrant of left breast in female, estrogen receptor positive  01/02/2019    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    Thyroid nodule 12/15/2016    Elevated LFTs 10/26/2016    Nausea 10/26/2016    Atypical chest pain 10/24/2016    Epigastric pain 10/24/2016    Plantar fasciitis of right foot 10/20/2016     She  has a past surgical history that includes Thumb arthroscopy; pr edg us exam surgical alter stom duodenum/jejunum (N/A, 02/02/2017); pr fasciectomy plantar fascia partial spx (Right, 10/20/2016); Oophorectomy (Bilateral); US guided breast biopsy left complete (Left, 01/02/2019); Breast biopsy (Left, 01/02/2019); Colonoscopy; pr mastectomy simple complete (N/A, 02/08/2019); pr bx/exc lymph node open superficial (Left, 02/08/2019); pr tissue expander placement breast reconstruction (Bilateral, 02/08/2019); pr implnt bio implnt for soft tissue reinforcement (Bilateral, 02/08/2019); pr huong-implant capsulectomy breast complete (Bilateral, 09/27/2019); pr insj/rplcmt breast implant sep day mastectomy (Bilateral, 09/27/2019); pr revision of reconstructed breast (Bilateral, 08/06/2020); Liposuction w/ fat injection (Bilateral, 08/06/2020); Hysterectomy; [place] tooth extraction; and Upper gastrointestinal endoscopy. Her family history includes Brain cancer (age of onset: 67) in her mother; Breast cancer in her sister and sister; Breast cancer additional onset in her sister; Prostate cancer in her brother and father. She  reports that she has never smoked. She has never used smokeless tobacco. She reports that she does not currently use alcohol after a past usage of about 1.0 standard drink of alcohol per week. She reports that she does not use drugs.   Current Outpatient Medications   Medication Sig Dispense Refill    CALCIUM PO Take 1,200 mg by mouth daily      Cholecalciferol (VITAMIN D PO) Take 1.25 mg by mouth every 30 (thirty) days      clobetasol (TEMOVATE) 0.05 % ointment Apply topically 2 (two) times a day 45 g 0    Cyanocobalamin (VITAMIN B 12 PO) Take 500 mcg by mouth daily       ergocalciferol (VITAMIN D2) 50,000 units Take 1 capsule (50,000 Units total) by mouth every 30 (thirty) days 3 capsule 3    lidocaine (LIDODERM) 5 % Apply 1 patch topically over 12 hours daily Remove & Discard patch within 12 hours or as directed by MD 15 patch 1    Multiple Vitamins-Minerals (MULTIVITAL PO) Take by mouth daily       Omega-3 Fatty Acids (Fish Oil) 300 MG CAPS Take by mouth      tamoxifen (NOLVADEX) 20 mg tablet Take 1 tablet (20 mg total) by mouth daily 90 tablet 3    triamcinolone (KENALOG) 0.1 % ointment Apply topically 2 (two) times a day for 7 days (Patient taking differently: Apply topically if needed) 80 g 0    Na Sulfate-K Sulfate-Mg Sulf 17.5-3.13-1.6 GM/177ML SOLN Take 1 kit by mouth once for 1 dose (Patient not taking: Reported on 8/9/2022) 354 mL 0     No current facility-administered medications for this visit.      Current Outpatient Medications on File Prior to Visit   Medication Sig    CALCIUM PO Take 1,200 mg by mouth daily    Cholecalciferol (VITAMIN D PO) Take 1.25 mg by mouth every 30 (thirty) days    clobetasol (TEMOVATE) 0.05 % ointment Apply topically 2 (two) times a day    Cyanocobalamin (VITAMIN B 12 PO) Take 500 mcg by mouth daily     ergocalciferol (VITAMIN D2) 50,000 units Take 1 capsule (50,000 Units total) by mouth every 30 (thirty) days    Multiple Vitamins-Minerals (MULTIVITAL PO) Take by mouth daily     Omega-3 Fatty Acids (Fish Oil) 300 MG CAPS Take by mouth    tamoxifen (NOLVADEX) 20 mg tablet Take 1 tablet (20 mg total) by mouth daily    triamcinolone (KENALOG) 0.1 % ointment Apply topically 2 (two) times a day for 7 days (Patient taking differently: Apply topically if needed)    [DISCONTINUED] lidocaine (LIDODERM) 5 % Apply 1 patch topically over 12 hours daily Remove & Discard patch within 12 hours or as directed by MD (Patient taking differently: Apply 1 patch topically as needed Remove & Discard patch within 12 hours or as directed by MD)    Na Sulfate-K Sulfate-Mg Sulf 17.5-3.13-1.6 GM/177ML SOLN Take 1 kit by mouth once for 1 dose (Patient not taking: Reported on 8/9/2022)     No current facility-administered medications on file prior to visit. Medications Discontinued During This Encounter   Medication Reason    lidocaine (LIDODERM) 5 % Reorder      She is allergic to latex and shellfish-derived products - food allergy. .    Review of Systems   Constitutional:  Negative for appetite change, chills, fatigue and fever. HENT:  Negative for sore throat and trouble swallowing. Eyes:  Negative for redness. Respiratory:  Negative for shortness of breath. Cardiovascular:  Negative for chest pain and palpitations. Gastrointestinal:  Negative for abdominal pain, constipation and diarrhea. Genitourinary:  Negative for dysuria and hematuria. Musculoskeletal:  Positive for arthralgias and back pain. Negative for neck pain. Severe left foot pain on Friday which subsided. Skin:  Negative for rash. Neurological:  Negative for seizures, weakness and headaches. Hematological:  Negative for adenopathy. Psychiatric/Behavioral:  Negative for confusion. The patient is not nervous/anxious. Objective:      /65 (BP Location: Left arm, Patient Position: Sitting, Cuff Size: Standard)   Pulse 101   Ht 5' 5" (1.651 m)   Wt 81.2 kg (179 lb)   SpO2 98%   BMI 29.79 kg/m²     Results Reviewed       None            Recent Results (from the past 1344 hour(s))   POCT Rapid Covid Ag    Collection Time: 09/27/23 12:15 PM   Result Value Ref Range    POCT SARS-CoV-2 Ag Negative Negative    VALID CONTROL Valid         Physical Exam  Constitutional:       General: She is not in acute distress.      Appearance: Normal appearance. HENT:      Head: Normocephalic and atraumatic. Nose: Nose normal.      Mouth/Throat:      Mouth: Mucous membranes are moist.   Eyes:      Extraocular Movements: Extraocular movements intact. Pupils: Pupils are equal, round, and reactive to light. Cardiovascular:      Rate and Rhythm: Normal rate and regular rhythm. Pulses: Normal pulses. Heart sounds: Normal heart sounds. No murmur heard. No friction rub. Pulmonary:      Effort: Pulmonary effort is normal. No respiratory distress. Breath sounds: Normal breath sounds. No wheezing. Abdominal:      General: Abdomen is flat. Bowel sounds are normal. There is no distension. Palpations: Abdomen is soft. There is no mass. Tenderness: There is no abdominal tenderness. There is no guarding. Musculoskeletal:         General: Tenderness present. Normal range of motion. Cervical back: Normal range of motion. Neurological:      General: No focal deficit present. Mental Status: She is alert and oriented to person, place, and time. Mental status is at baseline. Cranial Nerves: No cranial nerve deficit.    Psychiatric:         Mood and Affect: Mood normal.         Behavior: Behavior normal.

## 2023-11-09 ENCOUNTER — APPOINTMENT (OUTPATIENT)
Dept: LAB | Facility: CLINIC | Age: 73
End: 2023-11-09
Payer: COMMERCIAL

## 2023-11-09 DIAGNOSIS — Z17.0 MALIGNANT NEOPLASM OF LOWER-INNER QUADRANT OF LEFT BREAST IN FEMALE, ESTROGEN RECEPTOR POSITIVE: ICD-10-CM

## 2023-11-09 DIAGNOSIS — C50.312 MALIGNANT NEOPLASM OF LOWER-INNER QUADRANT OF LEFT BREAST IN FEMALE, ESTROGEN RECEPTOR POSITIVE: ICD-10-CM

## 2023-11-09 DIAGNOSIS — E78.2 MIXED HYPERLIPIDEMIA: ICD-10-CM

## 2023-11-09 LAB
ALBUMIN SERPL BCP-MCNC: 4.1 G/DL (ref 3.5–5)
ALP SERPL-CCNC: 44 U/L (ref 34–104)
ALT SERPL W P-5'-P-CCNC: 14 U/L (ref 7–52)
ANION GAP SERPL CALCULATED.3IONS-SCNC: 3 MMOL/L
AST SERPL W P-5'-P-CCNC: 18 U/L (ref 13–39)
BACTERIA UR QL AUTO: NORMAL /HPF
BASOPHILS # BLD AUTO: 0.04 THOUSANDS/ÂΜL (ref 0–0.1)
BASOPHILS NFR BLD AUTO: 1 % (ref 0–1)
BILIRUB SERPL-MCNC: 0.42 MG/DL (ref 0.2–1)
BILIRUB UR QL STRIP: NEGATIVE
BUN SERPL-MCNC: 9 MG/DL (ref 5–25)
CALCIUM SERPL-MCNC: 9.4 MG/DL (ref 8.4–10.2)
CHLORIDE SERPL-SCNC: 107 MMOL/L (ref 96–108)
CHOLEST SERPL-MCNC: 205 MG/DL
CLARITY UR: CLEAR
CO2 SERPL-SCNC: 30 MMOL/L (ref 21–32)
COLOR UR: YELLOW
CREAT SERPL-MCNC: 0.69 MG/DL (ref 0.6–1.3)
EOSINOPHIL # BLD AUTO: 0.36 THOUSAND/ÂΜL (ref 0–0.61)
EOSINOPHIL NFR BLD AUTO: 6 % (ref 0–6)
ERYTHROCYTE [DISTWIDTH] IN BLOOD BY AUTOMATED COUNT: 13.2 % (ref 11.6–15.1)
GFR SERPL CREATININE-BSD FRML MDRD: 88 ML/MIN/1.73SQ M
GLUCOSE P FAST SERPL-MCNC: 90 MG/DL (ref 65–99)
GLUCOSE UR STRIP-MCNC: NEGATIVE MG/DL
HCT VFR BLD AUTO: 44.4 % (ref 34.8–46.1)
HDLC SERPL-MCNC: 62 MG/DL
HGB BLD-MCNC: 14.1 G/DL (ref 11.5–15.4)
HGB UR QL STRIP.AUTO: NEGATIVE
IMM GRANULOCYTES # BLD AUTO: 0.01 THOUSAND/UL (ref 0–0.2)
IMM GRANULOCYTES NFR BLD AUTO: 0 % (ref 0–2)
KETONES UR STRIP-MCNC: NEGATIVE MG/DL
LDLC SERPL CALC-MCNC: 110 MG/DL (ref 0–100)
LEUKOCYTE ESTERASE UR QL STRIP: NEGATIVE
LYMPHOCYTES # BLD AUTO: 2.45 THOUSANDS/ÂΜL (ref 0.6–4.47)
LYMPHOCYTES NFR BLD AUTO: 44 % (ref 14–44)
MCH RBC QN AUTO: 28.6 PG (ref 26.8–34.3)
MCHC RBC AUTO-ENTMCNC: 31.8 G/DL (ref 31.4–37.4)
MCV RBC AUTO: 90 FL (ref 82–98)
MONOCYTES # BLD AUTO: 0.4 THOUSAND/ÂΜL (ref 0.17–1.22)
MONOCYTES NFR BLD AUTO: 7 % (ref 4–12)
NEUTROPHILS # BLD AUTO: 2.37 THOUSANDS/ÂΜL (ref 1.85–7.62)
NEUTS SEG NFR BLD AUTO: 42 % (ref 43–75)
NITRITE UR QL STRIP: NEGATIVE
NON-SQ EPI CELLS URNS QL MICRO: NORMAL /HPF
NONHDLC SERPL-MCNC: 143 MG/DL
NRBC BLD AUTO-RTO: 0 /100 WBCS
PH UR STRIP.AUTO: 6 [PH]
PLATELET # BLD AUTO: 184 THOUSANDS/UL (ref 149–390)
PMV BLD AUTO: 10.5 FL (ref 8.9–12.7)
POTASSIUM SERPL-SCNC: 4.1 MMOL/L (ref 3.5–5.3)
PROT SERPL-MCNC: 6.8 G/DL (ref 6.4–8.4)
PROT UR STRIP-MCNC: NEGATIVE MG/DL
RBC # BLD AUTO: 4.93 MILLION/UL (ref 3.81–5.12)
RBC #/AREA URNS AUTO: NORMAL /HPF
SODIUM SERPL-SCNC: 140 MMOL/L (ref 135–147)
SP GR UR STRIP.AUTO: 1.02 (ref 1–1.03)
TRIGL SERPL-MCNC: 167 MG/DL
UROBILINOGEN UR STRIP-ACNC: <2 MG/DL
WBC # BLD AUTO: 5.63 THOUSAND/UL (ref 4.31–10.16)
WBC #/AREA URNS AUTO: NORMAL /HPF

## 2023-11-09 PROCEDURE — 36415 COLL VENOUS BLD VENIPUNCTURE: CPT

## 2023-11-09 PROCEDURE — 80061 LIPID PANEL: CPT

## 2023-11-09 PROCEDURE — 86300 IMMUNOASSAY TUMOR CA 15-3: CPT

## 2023-11-09 PROCEDURE — 80053 COMPREHEN METABOLIC PANEL: CPT

## 2023-11-09 PROCEDURE — 85025 COMPLETE CBC W/AUTO DIFF WBC: CPT

## 2023-11-10 LAB — CANCER AG27-29 SERPL-ACNC: 18.9 U/ML (ref 0–38.6)

## 2023-11-16 ENCOUNTER — OFFICE VISIT (OUTPATIENT)
Dept: HEMATOLOGY ONCOLOGY | Facility: CLINIC | Age: 73
End: 2023-11-16
Payer: COMMERCIAL

## 2023-11-16 VITALS
DIASTOLIC BLOOD PRESSURE: 74 MMHG | RESPIRATION RATE: 18 BRPM | OXYGEN SATURATION: 97 % | TEMPERATURE: 97.5 F | SYSTOLIC BLOOD PRESSURE: 122 MMHG | WEIGHT: 180 LBS | BODY MASS INDEX: 29.99 KG/M2 | HEART RATE: 68 BPM | HEIGHT: 65 IN

## 2023-11-16 DIAGNOSIS — Z17.0 MALIGNANT NEOPLASM OF LOWER-INNER QUADRANT OF LEFT BREAST IN FEMALE, ESTROGEN RECEPTOR POSITIVE: Primary | ICD-10-CM

## 2023-11-16 DIAGNOSIS — M19.90 ARTHRITIS: ICD-10-CM

## 2023-11-16 DIAGNOSIS — Z79.810 USE OF TAMOXIFEN (NOLVADEX): ICD-10-CM

## 2023-11-16 DIAGNOSIS — G62.0 DRUG-INDUCED POLYNEUROPATHY (HCC): ICD-10-CM

## 2023-11-16 DIAGNOSIS — E78.49 OTHER HYPERLIPIDEMIA: ICD-10-CM

## 2023-11-16 DIAGNOSIS — E04.1 THYROID NODULE: ICD-10-CM

## 2023-11-16 DIAGNOSIS — C50.312 MALIGNANT NEOPLASM OF LOWER-INNER QUADRANT OF LEFT BREAST IN FEMALE, ESTROGEN RECEPTOR POSITIVE: Primary | ICD-10-CM

## 2023-11-16 PROCEDURE — 99214 OFFICE O/P EST MOD 30 MIN: CPT | Performed by: INTERNAL MEDICINE

## 2023-11-16 RX ORDER — TAMOXIFEN CITRATE 20 MG/1
20 TABLET ORAL DAILY
Qty: 90 TABLET | Refills: 3 | Status: SHIPPED | OUTPATIENT
Start: 2023-11-16

## 2023-11-16 NOTE — PROGRESS NOTES
HPI: Patient is complaining of scratchy throat for the last couple of weeks and she has appointment with her family physician tomorrow. No cough, hoarseness, shortness of breath and no fevers or chills. Follow-up visit for breast cancer and patient is on adjuvant tamoxifen. Breast cancer was diagnosed in 2019 and that was  hormone receptor-positive and HER2 negative stage I, 1.2 cm, grade 3 left breast cancer , strongly positive ER, positive VA, negative HER 2, positive lymphovascular invasion, negative 3 sentinel lymph nodes, and Oncotype score was 25. Patient had bilateral mastectomies and left axillary lymph node sampling. Patient had 4 cycles of adjuvant chemotherapy, combination of Taxotere and Cytoxan and Neulasta and that was followed by aromatase inhibitors and she had problems with all 3 aromatase inhibitors. Lot of musculoskeletal symptoms. Since May 2020 she has been on tamoxifen and she has much less problem with musculoskeletal symptoms. No hot flashes. Has burning sensation in her feet, part of peripheral neuropathy. Occasionally she has some tiredness  . She has bilateral carpal tunnel . There is family history of breast cancer. Patient states that she tested negative for BRCA . Efra Thorpe History of high triglycerides and she has controlled that with diet and triglyceride level came down but has gone up again and she will discuss this with her primary physician. .  Patient is trying to eat healthy and stay active.   She states she is up-to-date with her medical checkups                Current Outpatient Medications:     CALCIUM PO, Take 1,200 mg by mouth daily, Disp: , Rfl:     Cholecalciferol (VITAMIN D PO), Take 1.25 mg by mouth every 30 (thirty) days, Disp: , Rfl:     clobetasol (TEMOVATE) 0.05 % ointment, Apply topically 2 (two) times a day, Disp: 45 g, Rfl: 0    Cyanocobalamin (VITAMIN B 12 PO), Take 500 mcg by mouth daily , Disp: , Rfl:     ergocalciferol (VITAMIN D2) 50,000 units, Take 1 capsule (50,000 Units total) by mouth every 30 (thirty) days, Disp: 3 capsule, Rfl: 3    lidocaine (LIDODERM) 5 %, Apply 1 patch topically over 12 hours daily Remove & Discard patch within 12 hours or as directed by MD, Disp: 15 patch, Rfl: 1    Multiple Vitamins-Minerals (MULTIVITAL PO), Take by mouth daily , Disp: , Rfl:     Omega-3 Fatty Acids (Fish Oil) 300 MG CAPS, Take by mouth, Disp: , Rfl:     tamoxifen (NOLVADEX) 20 mg tablet, Take 1 tablet (20 mg total) by mouth daily, Disp: 90 tablet, Rfl: 3    Na Sulfate-K Sulfate-Mg Sulf 17.5-3.13-1.6 GM/177ML SOLN, Take 1 kit by mouth once for 1 dose (Patient not taking: Reported on 8/9/2022), Disp: 354 mL, Rfl: 0    triamcinolone (KENALOG) 0.1 % ointment, Apply topically 2 (two) times a day for 7 days (Patient taking differently: Apply topically if needed), Disp: 80 g, Rfl: 0    Allergies   Allergen Reactions    Latex Anaphylaxis    Shellfish-Derived Products - Food Allergy Anaphylaxis and Other (See Comments)       Oncology History   Malignant neoplasm of lower-inner quadrant of left breast in female, estrogen receptor positive    1/2/2019 Biopsy    Left breast biopsy:  - Invasive ductal carcinoma  Grade 2  ER 90%  MS 40%  HER2 negative     1/2019 Genetic Testing    [facility]  APC, ZAC, AXIN2, BARD1, BMPR1A, BRCA1, BRCA2, BRIP1, CDH1, CDK4, CDKN2A, CHEK2, EPCAM (large rearrangment only), HOXB13 (sequencing only), GALNT12, MLH1, MSH2, MSH3 (excluding repetitive portions of exon 1), MSH6, MUTYH, NBN, NTHL1, PALB2, PMS2, PTEN, RAD51C, RAD51D, RNF43, RPS20, SMAD4, STK11, TP53, Sequencing was performed for select regions of POLE and POLD1, and large rearrangement analysis was performed for select regions of GREM1      Negative     2/8/2019 Surgery    Left mastectomy with sentinel lymph node biopsy  - clear margins   - 0/3 lymph nodes    Right prophylactic mastectomy, bilateral expander placement  Dr. Allison Hua and Dr. Raymon Tim     2/2019 Genomic Testing    Oncotype Dx: 25     4/2019 -  Chemotherapy    Adjuvant  Taxotere and Cytoxan once every 3 weeks for 4 cycles   Dr. Jennifer Turner     4/11/2019 - 7/3/2019 Chemotherapy    pegfilgrastim (NEULASTA) subcutaneous injection 6 mg, 6 mg, Subcutaneous, Once, 4 of 4 cycles  Dose modification: 4 mg (original dose 6 mg, Cycle 3)  Administration: 4 mg (5/24/2019), 4 mg (6/14/2019)  cyclophosphamide (CYTOXAN) 1,176 mg in sodium chloride 0.9 % 250 mL IVPB, 600 mg/m2 = 1,176 mg, Intravenous, Once, 4 of 4 cycles  Administration: 1,176 mg (5/23/2019), 1,176 mg (6/13/2019)  DOCEtaxel (TAXOTERE) 147 mg in sodium chloride 0.9 % 250 mL chemo infusion, 75 mg/m2 = 147 mg, Intravenous, Once, 4 of 4 cycles  Administration: 147 mg (5/23/2019), 147 mg (6/13/2019)     7/2019 -  Hormone Therapy    Femara 07/2019- 10/2019  Anstrozole 10/2019- 5/2020  Tamoxifen- 5/2020- present     9/27/2019 Surgery    Bilateral implant exhange     8/6/2020 Surgery    Bilateral fat grafting         ROS:  11/16/23 Reviewed 12 systems: See symptoms in HPI:   Presently no other neurological, cardiac, pulmonary, GI and  symptoms other than listed in HPI. Other symptoms are in HPI  No fever, chills, bleeding, bone pains, skin rash, weight loss,  weakness, night sweats  claudication and gait problem. No frequent infections. Not unusually sensitive to heat or cold. No swelling of the ankles. No swollen glands. Patient is not anxious. /74 (BP Location: Right arm, Patient Position: Sitting, Cuff Size: Adult)   Pulse 68   Temp 97.5 °F (36.4 °C) (Temporal)   Resp 18   Ht 5' 5" (1.651 m)   Wt 81.6 kg (180 lb)   SpO2 97%   BMI 29.95 kg/m²     Physical Exam:      Alert and oriented and not in distress. Vitals are above. No icterus. No oral thrush. No palpable neck mass. Clear lung fields. Regular heart rate. Soft and nontender abdomen. No palpable abdominal mass. No ascites. No edema of the ankles. No calf tenderness. No focal neurological deficit. No skin rash.   No lymphedema. No palpable lymphadenopathy in the neck and axillary areas. No clubbing. Patient is not anxious. Performance status 0. IMAGING:  IMPRESSION:     No nodule meets current ACR criteria for requiring biopsy or followup ultrasounds. Reference: ACR Thyroid Imaging, Reporting and Data System (TI-RADS): White Paper of the [employer]. J AM Galdino Radiol 0033;56:275-001. (additional recommendations based on American Thyroid Association 2015 guidelines.)        Workstation performed: [id number]        Imaging    US thyroid (Order: [id number]) - 5/9/2023    RESULTS:  On 06/19/2021  LUMBAR SPINE L1-L4 :   BMD  0.938  gm/cm2   T-score -1.0         LEFT  TOTAL HIP:   BMD:  0.722  gm/cm2   T-score:  -1.8     LEFT  FEMORAL NECK:   BMD:  0.629  gm/cm2   T score: -2.0               IMPRESSION:  1. Low bone mass (OSTEOPENIA). [Based on the left femoral neck]    CT coronary calcium score  Status: Final result     PACS Images     Show images for CT coronary calcium score  Study Result    Narrative & Impression   CT CORONARY ARTERY CALCIUM SCREENING WITHOUT INTRAVENOUS CONTRAST     INDICATION:  E78.2: Mixed hyperlipidemia. Assess for calcific coronary plaque. Patient Age:  81 years  Patient Gender:  Female. COMPARISON: CT 10/24/2016. TECHNIQUE: Low radiation dose computed tomography of the heart was performed with EKG gating, suspended respiration, and without intravenous contrast.  This examination, like all CT scans performed in the [facility], was performed   utilizing techniques to minimize radiation dose exposure, including the use of iterative reconstruction and automated exposure control. Postprocessing was performed on a 3-D computer workstation to measure the amount of calcium in the coronary arteries. Imaging was limited to the heart and the immediately adjacent lungs.      FINDINGS:     Coronary artery calcium score breakdown is as follows:     Left main coronary artery:  0  Left anterior descending coronary artery and diagonal branches:  1  Left circumflex coronary artery and left marginal branches:  0  Right coronary artery and right marginal branches:  0  Posterior descending coronary artery: 0     TOTAL coronary calcium score:  1     Calcium score PERCENTILE of age, race, and gender matched database participants in the Multi-Ethnic Study of Atherosclerosis (RONQUILLO) trial:   50     HEART/GREAT VESSELS: No significant low radiation dose noncontrast CT abnormality of the heart. No thoracic aortic aneurysm. EXTRACARDIAC FINDINGS: No significant findings identified on limited small field of view low radiation dose noncontrast images of the chest at the level of the heart. IMPRESSION:     Total coronary calcium score equals 1. For more useful information regarding the prognostic significance of the calcium score, please consult the calculator at the website [email or web address]-[email or web address] aspx. Workstation performed: [id number]        Imaging    CT coronary calcium score (Order: [id number]) - 8/23/2023  US thyroid  Status: Final result     PACS Images     Show images for US thyroid  Study Result    Narrative & Impression   THYROID ULTRASOUND     INDICATION:    E04.1: Nontoxic single thyroid nodule. COMPARISON: February 3, 2020     TECHNIQUE:   Ultrasound of the thyroid was performed with a high frequency linear transducer in transverse and sagittal planes including volumetric imaging sweeps as well as traditional still imaging technique. FINDINGS: Thyroid parenchyma is diffusely heterogeneous in echotexture and also hyperemic. Right lobe: 5.1 x 1.6 x 1.7 cm. Volume 6.7 mL  Left lobe:  4.6 x 1.2 x 1.5 cm. Volume 4.2 mL  Isthmus: 0.2  cm. Nodule #1. Image 20. RIGHT midgland nodule measuring 2.1 x 1.6 x 1.2 cm. No significant change  COMPOSITION:  1 point, mixed cystic and solid.   ECHOGENICITY:  1 point, hyperechoic or isoechoic. SHAPE:  0 points, wider-than-tall. MARGIN: 0 points, smooth. ECHOGENIC FOCI:  0 points, none or large comet-tail artifacts. TI-RADS Classification: TR 2 (2 points), Not suspious. No FNA. There are additional nodules of lesser size and/or TI-RADS score. These do not necessitate additional evaluation based on ACR criteria. IMPRESSION:     No nodule meets current ACR criteria for requiring biopsy or followup ultrasounds. Reference: ACR Thyroid Imaging, Reporting and Data System (TI-RADS): White Paper of the [employer].  J AM Galdino Radiol 1595;97:483-094. (additional recommendations based on American Thyroid Association 2015 guidelines.)        Workstation performed: [id number]        Imaging    US thyroid (Order: [id number]) - 5/9/2023      LABS:      Results for orders placed or performed in visit on 11/09/23   CBC and differential   Result Value Ref Range    WBC 5.63 4.31 - 10.16 Thousand/uL    RBC 4.93 3.81 - 5.12 Million/uL    Hemoglobin 14.1 11.5 - 15.4 g/dL    Hematocrit 44.4 34.8 - 46.1 %    MCV 90 82 - 98 fL    MCH 28.6 26.8 - 34.3 pg    MCHC 31.8 31.4 - 37.4 g/dL    RDW 13.2 11.6 - 15.1 %    MPV 10.5 8.9 - 12.7 fL    Platelets 697 098 - 109 Thousands/uL    nRBC 0 /100 WBCs    Neutrophils Relative 42 (L) 43 - 75 %    Immat GRANS % 0 0 - 2 %    Lymphocytes Relative 44 14 - 44 %    Monocytes Relative 7 4 - 12 %    Eosinophils Relative 6 0 - 6 %    Basophils Relative 1 0 - 1 %    Neutrophils Absolute 2.37 1.85 - 7.62 Thousands/µL    Immature Grans Absolute 0.01 0.00 - 0.20 Thousand/uL    Lymphocytes Absolute 2.45 0.60 - 4.47 Thousands/µL    Monocytes Absolute 0.40 0.17 - 1.22 Thousand/µL    Eosinophils Absolute 0.36 0.00 - 0.61 Thousand/µL    Basophils Absolute 0.04 0.00 - 0.10 Thousands/µL   Comprehensive metabolic panel   Result Value Ref Range    Sodium 140 135 - 147 mmol/L    Potassium 4.1 3.5 - 5.3 mmol/L    Chloride 107 96 - 108 mmol/L    CO2 30 21 - 32 mmol/L ANION GAP 3 mmol/L    BUN 9 5 - 25 mg/dL    Creatinine 0.69 0.60 - 1.30 mg/dL    Glucose, Fasting 90 65 - 99 mg/dL    Calcium 9.4 8.4 - 10.2 mg/dL    AST 18 13 - 39 U/L    ALT 14 7 - 52 U/L    Alkaline Phosphatase 44 34 - 104 U/L    Total Protein 6.8 6.4 - 8.4 g/dL    Albumin 4.1 3.5 - 5.0 g/dL    Total Bilirubin 0.42 0.20 - 1.00 mg/dL    eGFR 88 ml/min/1.73sq m   Cancer antigen 27.29   Result Value Ref Range    CA 27-29 18.9 0.0 - 38.6 U/mL   Lipid panel   Result Value Ref Range    Cholesterol 205 (H) See Comment mg/dL    Triglycerides 167 (H) See Comment mg/dL    HDL, Direct 62 >=50 mg/dL    LDL Calculated 110 (H) 0 - 100 mg/dL    Non-HDL-Chol (CHOL-HDL) 143 mg/dl     Labs, Imaging, & Other studies:   All pertinent labs and imaging studies were personally reviewed      Reviewed above test results  and discussed with patient. Assessment and plan:      Patient is complaining of scratchy throat for the last couple of weeks and she has appointment with her family physician tomorrow. No cough, hoarseness, shortness of breath and no fevers or chills. Follow-up visit for breast cancer and patient is on adjuvant tamoxifen. Breast cancer was diagnosed in 2019 and that was  hormone receptor-positive and HER2 negative stage I, 1.2 cm, grade 3 left breast cancer , strongly positive ER, positive VA, negative HER 2, positive lymphovascular invasion, negative 3 sentinel lymph nodes, and Oncotype score was 25. Patient had bilateral mastectomies and left axillary lymph node sampling. Patient had 4 cycles of adjuvant chemotherapy, combination of Taxotere and Cytoxan and Neulasta and that was followed by aromatase inhibitors and she had problems with all 3 aromatase inhibitors. Lot of musculoskeletal symptoms. Since May 2020 she has been on tamoxifen and she has much less problem with musculoskeletal symptoms. No hot flashes. Has burning sensation in her feet, part of peripheral neuropathy.   Occasionally she has some tiredness  . She has bilateral carpal tunnel . There is family history of breast cancer. Patient states that she tested negative for BRCA . Cristiano Spine History of high triglycerides and she has controlled that with diet and triglyceride level came down but has gone up again and she will discuss this with her primary physician. .  Patient is trying to eat healthy and stay active. She states she is up-to-date with her medical checkups            Physical examination and test results are as recorded and discussed. .  Breast cancer remains in remission and goal is cure from breast cancer. Patient will continue to take tamoxifen  20 mg daily. Patient is taking vitamin-D and calcium and is staying active   For thyroid nodule she goes to her endocrinologist.  No nodule meets the criteria for biopsy. She follows with her breast surgeon for examination . All discussed in detail. Questions answered. Discussed the importance of eating healthy foods, staying active and health screening tests. Patient is capable of self-care. Discussed precautions against coronavirus , flu  and other infections. .  Patient will continue to follow with her primary physician and other consultants. See diagnoses, orders and instructions. 1. Malignant neoplasm of lower-inner quadrant of left breast in female, estrogen receptor positive     - CBC and differential; Future  - Comprehensive metabolic panel; Future  - Cancer antigen 27.29; Future  - tamoxifen (NOLVADEX) 20 mg tablet; Take 1 tablet (20 mg total) by mouth daily  Dispense: 90 tablet; Refill: 3    2. Drug-induced polyneuropathy ([address])      3. Arthritis      4. Thyroid nodule      5. Use of tamoxifen (Nolvadex)      6. Other hyperlipidemia            Blood work prior to next visit in 6 months. No change in tamoxifen.       I used dictation device to dictate this note and there could be mistakes in the note and patient may contact my office for that          Patient voiced understanding and agreed      Counseling / Coordination of Care    .   Provided counseling and support

## 2023-11-17 ENCOUNTER — OFFICE VISIT (OUTPATIENT)
Dept: INTERNAL MEDICINE CLINIC | Facility: CLINIC | Age: 73
End: 2023-11-17
Payer: COMMERCIAL

## 2023-11-17 VITALS
BODY MASS INDEX: 27.99 KG/M2 | HEIGHT: 65 IN | HEART RATE: 92 BPM | WEIGHT: 168 LBS | SYSTOLIC BLOOD PRESSURE: 118 MMHG | TEMPERATURE: 97.9 F | OXYGEN SATURATION: 98 % | DIASTOLIC BLOOD PRESSURE: 72 MMHG

## 2023-11-17 DIAGNOSIS — R09.82 POST-NASAL DRAINAGE: ICD-10-CM

## 2023-11-17 DIAGNOSIS — R73.01 IMPAIRED FASTING BLOOD SUGAR: Primary | ICD-10-CM

## 2023-11-17 DIAGNOSIS — R25.2 CRAMP AND SPASM: ICD-10-CM

## 2023-11-17 PROCEDURE — 99214 OFFICE O/P EST MOD 30 MIN: CPT | Performed by: INTERNAL MEDICINE

## 2023-11-17 RX ORDER — FLUTICASONE PROPIONATE 50 MCG
1 SPRAY, SUSPENSION (ML) NASAL DAILY
Qty: 9 ML | Refills: 1 | Status: SHIPPED | OUTPATIENT
Start: 2023-11-17

## 2023-11-17 NOTE — PROGRESS NOTES
Assessment/Plan:           1. Impaired fasting blood sugar  Comments:  Low-carb diabetic diet advised. 2. Post-nasal drainage  Comments:  Ocean Eckert recommended every 2 hours when awake. Orders:  -     sodium chloride (OCEAN) 0.65 % nasal spray; 1 spray into each nostril every 2 (two) hours while awake  -     fluticasone (FLONASE) 50 mcg/act nasal spray; 1 spray into each nostril daily    3. Cramp and spasm  Comments:  magnesium orally 500 mg daily advised. Hydration discussed. 1. Impaired fasting blood sugar      2. Post-nasal drainage    - sodium chloride (OCEAN) 0.65 % nasal spray; 1 spray into each nostril every 2 (two) hours while awake  Dispense: 60 mL; Refill: 1  - fluticasone (FLONASE) 50 mcg/act nasal spray; 1 spray into each nostril daily  Dispense: 9 mL; Refill: 1       No problem-specific Assessment & Plan notes found for this encounter. Subjective:      Patient ID: Kenny Rodriges is a 81 y.o. female. HPI    The following portions of the patient's history were reviewed and updated as appropriate: She  has a past medical history of Allergic, Allergies, Arthritis, Breast cancer ([address]), Carpal tunnel syndrome on right, Chemotherapy induced neutropenia (05/23/2019), Gastroesophageal reflux disease (08/06/2020), GERD (gastroesophageal reflux disease), Lumbar disc disease, Trigger thumb, Vitamin D deficiency, and Wears glasses.   She   Patient Active Problem List    Diagnosis Date Noted    Other insomnia 05/01/2022    Osteopenia 03/05/2021    Gastroesophageal reflux disease 08/06/2020    Breast reconstruction deformity     Use of tamoxifen (Nolvadex) 06/17/2020    Bilateral carpal tunnel syndrome 01/27/2020    Osteoarthritis of multiple joints 01/27/2020    Drug-induced polyneuropathy ([address]) 01/27/2020    Arthritis 01/27/2020    Vitamin D deficiency 01/08/2020    Impaired fasting blood sugar 01/08/2020    Urinary tract infection with hematuria 11/10/2019    Dysuria 11/10/2019 Chemotherapy induced neutropenia  04/78/2287    Folliculitis 83/13/2356    BRCA negative 01/23/2019    Family history of breast cancer in sister 01/10/2019    Malignant neoplasm of lower-inner quadrant of left breast in female, estrogen receptor positive  01/02/2019    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    Thyroid nodule 12/15/2016    Elevated LFTs 10/26/2016    Nausea 10/26/2016    Atypical chest pain 10/24/2016    Epigastric pain 10/24/2016    Plantar fasciitis of right foot 10/20/2016     She  has a past surgical history that includes Thumb arthroscopy; pr edg us exam surgical alter stom duodenum/jejunum (N/A, 02/02/2017); pr fasciectomy plantar fascia partial spx (Right, 10/20/2016); Oophorectomy (Bilateral); US guided breast biopsy left complete (Left, 01/02/2019); Breast biopsy (Left, 01/02/2019); Colonoscopy; pr mastectomy simple complete (N/A, 02/08/2019); pr bx/exc lymph node open superficial (Left, 02/08/2019); pr tissue expander placement breast reconstruction (Bilateral, 02/08/2019); pr implnt bio implnt for soft tissue reinforcement (Bilateral, 02/08/2019); pr huong-implant capsulectomy breast complete (Bilateral, 09/27/2019); pr insj/rplcmt breast implant sep day mastectomy (Bilateral, 09/27/2019); pr revision of reconstructed breast (Bilateral, 08/06/2020); Liposuction w/ fat injection (Bilateral, 08/06/2020); Hysterectomy; [place] tooth extraction; and Upper gastrointestinal endoscopy. Her family history includes Brain cancer (age of onset: 67) in her mother; Breast cancer in her sister and sister; Breast cancer additional onset in her sister; Prostate cancer in her brother and father. She  reports that she has never smoked. She has never used smokeless tobacco. She reports that she does not currently use alcohol after a past usage of about 1.0 standard drink of alcohol per week. She reports that she does not use drugs.   Current Outpatient Medications   Medication Sig Dispense Refill    CALCIUM PO Take 1,200 mg by mouth daily      Cholecalciferol (VITAMIN D PO) Take 1.25 mg by mouth every 30 (thirty) days      clobetasol (TEMOVATE) 0.05 % ointment Apply topically 2 (two) times a day 45 g 0    Cyanocobalamin (VITAMIN B 12 PO) Take 500 mcg by mouth daily       ergocalciferol (VITAMIN D2) 50,000 units Take 1 capsule (50,000 Units total) by mouth every 30 (thirty) days 3 capsule 3    fluticasone (FLONASE) 50 mcg/act nasal spray 1 spray into each nostril daily 9 mL 1    lidocaine (LIDODERM) 5 % Apply 1 patch topically over 12 hours daily Remove & Discard patch within 12 hours or as directed by MD 15 patch 1    Multiple Vitamins-Minerals (MULTIVITAL PO) Take by mouth daily       Omega-3 Fatty Acids (Fish Oil) 300 MG CAPS Take by mouth      sodium chloride (OCEAN) 0.65 % nasal spray 1 spray into each nostril every 2 (two) hours while awake 60 mL 1    tamoxifen (NOLVADEX) 20 mg tablet Take 1 tablet (20 mg total) by mouth daily 90 tablet 3    Na Sulfate-K Sulfate-Mg Sulf 17.5-3.13-1.6 GM/177ML SOLN Take 1 kit by mouth once for 1 dose (Patient not taking: Reported on 8/9/2022) 354 mL 0    triamcinolone (KENALOG) 0.1 % ointment Apply topically 2 (two) times a day for 7 days (Patient taking differently: Apply topically if needed) 80 g 0     No current facility-administered medications for this visit.      Current Outpatient Medications on File Prior to Visit   Medication Sig    CALCIUM PO Take 1,200 mg by mouth daily    Cholecalciferol (VITAMIN D PO) Take 1.25 mg by mouth every 30 (thirty) days    clobetasol (TEMOVATE) 0.05 % ointment Apply topically 2 (two) times a day    Cyanocobalamin (VITAMIN B 12 PO) Take 500 mcg by mouth daily     ergocalciferol (VITAMIN D2) 50,000 units Take 1 capsule (50,000 Units total) by mouth every 30 (thirty) days    lidocaine (LIDODERM) 5 % Apply 1 patch topically over 12 hours daily Remove & Discard patch within 12 hours or as directed by MD    Multiple Vitamins-Minerals (MULTIVITAL PO) Take by mouth daily     Omega-3 Fatty Acids (Fish Oil) 300 MG CAPS Take by mouth    tamoxifen (NOLVADEX) 20 mg tablet Take 1 tablet (20 mg total) by mouth daily    Na Sulfate-K Sulfate-Mg Sulf 17.5-3.13-1.6 GM/177ML SOLN Take 1 kit by mouth once for 1 dose (Patient not taking: Reported on 8/9/2022)    triamcinolone (KENALOG) 0.1 % ointment Apply topically 2 (two) times a day for 7 days (Patient taking differently: Apply topically if needed)     No current facility-administered medications on file prior to visit. There are no discontinued medications. She is allergic to latex and shellfish-derived products - food allergy. .    Review of Systems   Constitutional:  Negative for appetite change, chills, fatigue and fever. HENT:  Positive for sore throat. Negative for trouble swallowing. Eyes:  Negative for redness. Respiratory:  Negative for shortness of breath. Cardiovascular:  Negative for chest pain and palpitations. Gastrointestinal:  Negative for abdominal pain, constipation and diarrhea. Genitourinary:  Negative for dysuria and hematuria. Musculoskeletal:  Negative for back pain and neck pain. Occasional cramps. Skin:  Negative for rash. Neurological:  Negative for seizures, weakness and headaches. Hematological:  Negative for adenopathy. Psychiatric/Behavioral:  Negative for confusion. The patient is not nervous/anxious.           Objective:      /72 (BP Location: Left arm, Patient Position: Sitting, Cuff Size: Standard)   Pulse 92   Temp 97.9 °F (36.6 °C) (Temporal)   Ht 5' 5" (1.651 m)   Wt 76.2 kg (168 lb)   SpO2 98%   BMI 27.96 kg/m²     Results Reviewed       None            Recent Results (from the past 1344 hour(s))   POCT Rapid Covid Ag    Collection Time: 09/27/23 12:15 PM   Result Value Ref Range    POCT SARS-CoV-2 Ag Negative Negative    VALID CONTROL Valid    Urinalysis with microscopic    Collection Time: 11/09/23  7:09 AM   Result Value Ref Range Color, UA Yellow     Clarity, UA Clear     Specific Gravity, UA 1.018 1.003 - 1.030    pH, UA 6.0 4.5, 5.0, 5.5, 6.0, 6.5, 7.0, 7.5, 8.0    Leukocytes, UA Negative Negative    Nitrite, UA Negative Negative    Protein, UA Negative Negative mg/dl    Glucose, UA Negative Negative mg/dl    Ketones, UA Negative Negative mg/dl    Urobilinogen, UA <2.0 <2.0 mg/dl mg/dl    Bilirubin, UA Negative Negative    Occult Blood, UA Negative Negative    RBC, UA 1-2 None Seen, 1-2 /hpf    WBC, UA 1-2 None Seen, 1-2 /hpf    Epithelial Cells Occasional None Seen, Occasional /hpf    Bacteria, UA None Seen None Seen, Occasional /hpf   CBC and differential    Collection Time: 11/09/23  7:09 AM   Result Value Ref Range    WBC 5.63 4.31 - 10.16 Thousand/uL    RBC 4.93 3.81 - 5.12 Million/uL    Hemoglobin 14.1 11.5 - 15.4 g/dL    Hematocrit 44.4 34.8 - 46.1 %    MCV 90 82 - 98 fL    MCH 28.6 26.8 - 34.3 pg    MCHC 31.8 31.4 - 37.4 g/dL    RDW 13.2 11.6 - 15.1 %    MPV 10.5 8.9 - 12.7 fL    Platelets 538 185 - 390 Thousands/uL    nRBC 0 /100 WBCs    Neutrophils Relative 42 (L) 43 - 75 %    Immat GRANS % 0 0 - 2 %    Lymphocytes Relative 44 14 - 44 %    Monocytes Relative 7 4 - 12 %    Eosinophils Relative 6 0 - 6 %    Basophils Relative 1 0 - 1 %    Neutrophils Absolute 2.37 1.85 - 7.62 Thousands/µL    Immature Grans Absolute 0.01 0.00 - 0.20 Thousand/uL    Lymphocytes Absolute 2.45 0.60 - 4.47 Thousands/µL    Monocytes Absolute 0.40 0.17 - 1.22 Thousand/µL    Eosinophils Absolute 0.36 0.00 - 0.61 Thousand/µL    Basophils Absolute 0.04 0.00 - 0.10 Thousands/µL   Comprehensive metabolic panel    Collection Time: 11/09/23  7:09 AM   Result Value Ref Range    Sodium 140 135 - 147 mmol/L    Potassium 4.1 3.5 - 5.3 mmol/L    Chloride 107 96 - 108 mmol/L    CO2 30 21 - 32 mmol/L    ANION GAP 3 mmol/L    BUN 9 5 - 25 mg/dL    Creatinine 0.69 0.60 - 1.30 mg/dL    Glucose, Fasting 90 65 - 99 mg/dL    Calcium 9.4 8.4 - 10.2 mg/dL    AST 18 13 - 39 U/L ALT 14 7 - 52 U/L    Alkaline Phosphatase 44 34 - 104 U/L    Total Protein 6.8 6.4 - 8.4 g/dL    Albumin 4.1 3.5 - 5.0 g/dL    Total Bilirubin 0.42 0.20 - 1.00 mg/dL    eGFR 88 ml/min/1.73sq m   Cancer antigen 27.29    Collection Time: 11/09/23  7:09 AM   Result Value Ref Range    CA 27-29 18.9 0.0 - 38.6 U/mL   Lipid panel    Collection Time: 11/09/23  7:09 AM   Result Value Ref Range    Cholesterol 205 (H) See Comment mg/dL    Triglycerides 167 (H) See Comment mg/dL    HDL, Direct 62 >=50 mg/dL    LDL Calculated 110 (H) 0 - 100 mg/dL    Non-HDL-Chol (CHOL-HDL) 143 mg/dl        Physical Exam  Constitutional:       Appearance: Normal appearance. She is normal weight. HENT:      Head: Normocephalic and atraumatic. Nose: Nose normal.      Mouth/Throat:      Mouth: Mucous membranes are moist.   Eyes:      Extraocular Movements: Extraocular movements intact. Pupils: Pupils are equal, round, and reactive to light. Pulmonary:      Effort: Pulmonary effort is normal.   Abdominal:      Palpations: Abdomen is soft. Musculoskeletal:         General: Normal range of motion. Cervical back: Normal range of motion and neck supple. Neurological:      General: No focal deficit present. Mental Status: She is alert and oriented to person, place, and time. Mental status is at baseline.

## 2023-12-01 ENCOUNTER — OFFICE VISIT (OUTPATIENT)
Dept: INTERNAL MEDICINE CLINIC | Facility: CLINIC | Age: 73
End: 2023-12-01
Payer: COMMERCIAL

## 2023-12-01 VITALS
SYSTOLIC BLOOD PRESSURE: 103 MMHG | BODY MASS INDEX: 29.82 KG/M2 | DIASTOLIC BLOOD PRESSURE: 68 MMHG | OXYGEN SATURATION: 97 % | TEMPERATURE: 98.9 F | WEIGHT: 179 LBS | HEART RATE: 98 BPM | HEIGHT: 65 IN

## 2023-12-01 DIAGNOSIS — R73.01 IMPAIRED FASTING BLOOD SUGAR: ICD-10-CM

## 2023-12-01 DIAGNOSIS — L30.9 ECZEMA, UNSPECIFIED TYPE: ICD-10-CM

## 2023-12-01 DIAGNOSIS — K21.9 GASTROESOPHAGEAL REFLUX DISEASE, UNSPECIFIED WHETHER ESOPHAGITIS PRESENT: ICD-10-CM

## 2023-12-01 DIAGNOSIS — R25.2 CRAMP AND SPASM: ICD-10-CM

## 2023-12-01 DIAGNOSIS — E55.9 VITAMIN D DEFICIENCY: ICD-10-CM

## 2023-12-01 DIAGNOSIS — E78.2 MIXED HYPERLIPIDEMIA: Primary | ICD-10-CM

## 2023-12-01 PROCEDURE — 99214 OFFICE O/P EST MOD 30 MIN: CPT | Performed by: INTERNAL MEDICINE

## 2023-12-01 NOTE — PROGRESS NOTES
Assessment/Plan:           1. Mixed hyperlipidemia  Comments:  10-year cardiac risk at 6.2% continue low-cholesterol diet. 2. Impaired fasting blood sugar  Comments:  Continue low-carb diabetic diet exercise and weight loss. 3. Vitamin D deficiency    4. Eczema, unspecified type  Comments:  As needed triamcinolone ointment recommended. 5. Cramp and spasm  Comments:  Increasing hydration and magnesium advised. 6. Gastroesophageal reflux disease, unspecified whether esophagitis present           1. Mixed hyperlipidemia         No problem-specific Assessment & Plan notes found for this encounter. Subjective:      Patient ID: Rola Black is a 81 y.o. female. HPI    The following portions of the patient's history were reviewed and updated as appropriate: She  has a past medical history of Allergic, Allergies, Arthritis, Breast cancer ([address]), Carpal tunnel syndrome on right, Chemotherapy induced neutropenia (05/23/2019), Gastroesophageal reflux disease (08/06/2020), GERD (gastroesophageal reflux disease), Lumbar disc disease, Trigger thumb, Vitamin D deficiency, and Wears glasses.   She   Patient Active Problem List    Diagnosis Date Noted    Other insomnia 05/01/2022    Osteopenia 03/05/2021    Gastroesophageal reflux disease 08/06/2020    Breast reconstruction deformity     Use of tamoxifen (Nolvadex) 06/17/2020    Bilateral carpal tunnel syndrome 01/27/2020    Osteoarthritis of multiple joints 01/27/2020    Drug-induced polyneuropathy ([address]) 01/27/2020    Arthritis 01/27/2020    Vitamin D deficiency 01/08/2020    Impaired fasting blood sugar 01/08/2020    Urinary tract infection with hematuria 11/10/2019    Dysuria 11/10/2019    Chemotherapy induced neutropenia  21/12/1803    Folliculitis 18/05/4177    BRCA negative 01/23/2019    Family history of breast cancer in sister 01/10/2019    Malignant neoplasm of lower-inner quadrant of left breast in female, estrogen receptor positive  01/02/2019 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    Thyroid nodule 12/15/2016    Elevated LFTs 10/26/2016    Nausea 10/26/2016    Atypical chest pain 10/24/2016    Epigastric pain 10/24/2016    Plantar fasciitis of right foot 10/20/2016     She  has a past surgical history that includes Thumb arthroscopy; pr edg us exam surgical alter stom duodenum/jejunum (N/A, 02/02/2017); pr fasciectomy plantar fascia partial spx (Right, 10/20/2016); Oophorectomy (Bilateral); US guided breast biopsy left complete (Left, 01/02/2019); Breast biopsy (Left, 01/02/2019); Colonoscopy; pr mastectomy simple complete (N/A, 02/08/2019); pr bx/exc lymph node open superficial (Left, 02/08/2019); pr tissue expander placement breast reconstruction (Bilateral, 02/08/2019); pr implnt bio implnt for soft tissue reinforcement (Bilateral, 02/08/2019); pr huong-implant capsulectomy breast complete (Bilateral, 09/27/2019); pr insj/rplcmt breast implant sep day mastectomy (Bilateral, 09/27/2019); pr revision of reconstructed breast (Bilateral, 08/06/2020); Liposuction w/ fat injection (Bilateral, 08/06/2020); Hysterectomy; [place] tooth extraction; and Upper gastrointestinal endoscopy. Her family history includes Brain cancer (age of onset: 67) in her mother; Breast cancer in her sister and sister; Breast cancer additional onset in her sister; Prostate cancer in her brother and father. She  reports that she has never smoked. She has never used smokeless tobacco. She reports that she does not currently use alcohol after a past usage of about 1.0 standard drink of alcohol per week. She reports that she does not use drugs.   Current Outpatient Medications   Medication Sig Dispense Refill    CALCIUM PO Take 1,200 mg by mouth daily      Cholecalciferol (VITAMIN D PO) Take 1.25 mg by mouth every 30 (thirty) days      clobetasol (TEMOVATE) 0.05 % ointment Apply topically 2 (two) times a day 45 g 0    Cyanocobalamin (VITAMIN B 12 PO) Take 500 mcg by mouth daily ergocalciferol (VITAMIN D2) 50,000 units Take 1 capsule (50,000 Units total) by mouth every 30 (thirty) days 3 capsule 3    fluticasone (FLONASE) 50 mcg/act nasal spray 1 spray into each nostril daily 9 mL 1    lidocaine (LIDODERM) 5 % Apply 1 patch topically over 12 hours daily Remove & Discard patch within 12 hours or as directed by MD 15 patch 1    Multiple Vitamins-Minerals (MULTIVITAL PO) Take by mouth daily       Omega-3 Fatty Acids (Fish Oil) 300 MG CAPS Take by mouth      sodium chloride (OCEAN) 0.65 % nasal spray 1 spray into each nostril every 2 (two) hours while awake 60 mL 1    tamoxifen (NOLVADEX) 20 mg tablet Take 1 tablet (20 mg total) by mouth daily 90 tablet 3    triamcinolone (KENALOG) 0.1 % ointment Apply topically 2 (two) times a day for 7 days (Patient taking differently: Apply topically if needed) 80 g 0    Na Sulfate-K Sulfate-Mg Sulf 17.5-3.13-1.6 GM/177ML SOLN Take 1 kit by mouth once for 1 dose (Patient not taking: Reported on 8/9/2022) 354 mL 0     No current facility-administered medications for this visit.      Current Outpatient Medications on File Prior to Visit   Medication Sig    CALCIUM PO Take 1,200 mg by mouth daily    Cholecalciferol (VITAMIN D PO) Take 1.25 mg by mouth every 30 (thirty) days    clobetasol (TEMOVATE) 0.05 % ointment Apply topically 2 (two) times a day    Cyanocobalamin (VITAMIN B 12 PO) Take 500 mcg by mouth daily     ergocalciferol (VITAMIN D2) 50,000 units Take 1 capsule (50,000 Units total) by mouth every 30 (thirty) days    fluticasone (FLONASE) 50 mcg/act nasal spray 1 spray into each nostril daily    lidocaine (LIDODERM) 5 % Apply 1 patch topically over 12 hours daily Remove & Discard patch within 12 hours or as directed by MD    Multiple Vitamins-Minerals (MULTIVITAL PO) Take by mouth daily     Omega-3 Fatty Acids (Fish Oil) 300 MG CAPS Take by mouth    sodium chloride (OCEAN) 0.65 % nasal spray 1 spray into each nostril every 2 (two) hours while awake tamoxifen (NOLVADEX) 20 mg tablet Take 1 tablet (20 mg total) by mouth daily    triamcinolone (KENALOG) 0.1 % ointment Apply topically 2 (two) times a day for 7 days (Patient taking differently: Apply topically if needed)    Na Sulfate-K Sulfate-Mg Sulf 17.5-3.13-1.6 GM/177ML SOLN Take 1 kit by mouth once for 1 dose (Patient not taking: Reported on 8/9/2022)     No current facility-administered medications on file prior to visit. There are no discontinued medications. She is allergic to latex and shellfish-derived products - food allergy. .    Review of Systems   Constitutional:  Negative for appetite change, chills, fatigue and fever. HENT:  Positive for congestion. Negative for sore throat and trouble swallowing. Eyes:  Negative for redness. Respiratory:  Negative for shortness of breath. Cardiovascular:  Negative for chest pain and palpitations. Gastrointestinal:  Negative for abdominal pain, constipation and diarrhea. Genitourinary:  Negative for dysuria and hematuria. Musculoskeletal:  Negative for back pain and neck pain. Skin:  Negative for rash. Neurological:  Negative for seizures, weakness and headaches. Hematological:  Negative for adenopathy. Psychiatric/Behavioral:  Negative for confusion. The patient is not nervous/anxious.           Objective:      /68 (BP Location: Left arm, Patient Position: Sitting, Cuff Size: Large)   Pulse 98   Temp 98.9 °F (37.2 °C) (Temporal)   Ht 5' 5" (1.651 m)   Wt 81.2 kg (179 lb)   SpO2 97%   BMI 29.79 kg/m²     Results Reviewed       None            Recent Results (from the past 1344 hour(s))   Urinalysis with microscopic    Collection Time: 11/09/23  7:09 AM   Result Value Ref Range    Color, UA Yellow     Clarity, UA Clear     Specific Gravity, UA 1.018 1.003 - 1.030    pH, UA 6.0 4.5, 5.0, 5.5, 6.0, 6.5, 7.0, 7.5, 8.0    Leukocytes, UA Negative Negative    Nitrite, UA Negative Negative    Protein, UA Negative Negative mg/dl Glucose, UA Negative Negative mg/dl    Ketones, UA Negative Negative mg/dl    Urobilinogen, UA <2.0 <2.0 mg/dl mg/dl    Bilirubin, UA Negative Negative    Occult Blood, UA Negative Negative    RBC, UA 1-2 None Seen, 1-2 /hpf    WBC, UA 1-2 None Seen, 1-2 /hpf    Epithelial Cells Occasional None Seen, Occasional /hpf    Bacteria, UA None Seen None Seen, Occasional /hpf   CBC and differential    Collection Time: 11/09/23  7:09 AM   Result Value Ref Range    WBC 5.63 4.31 - 10.16 Thousand/uL    RBC 4.93 3.81 - 5.12 Million/uL    Hemoglobin 14.1 11.5 - 15.4 g/dL    Hematocrit 44.4 34.8 - 46.1 %    MCV 90 82 - 98 fL    MCH 28.6 26.8 - 34.3 pg    MCHC 31.8 31.4 - 37.4 g/dL    RDW 13.2 11.6 - 15.1 %    MPV 10.5 8.9 - 12.7 fL    Platelets 378 299 - 757 Thousands/uL    nRBC 0 /100 WBCs    Neutrophils Relative 42 (L) 43 - 75 %    Immat GRANS % 0 0 - 2 %    Lymphocytes Relative 44 14 - 44 %    Monocytes Relative 7 4 - 12 %    Eosinophils Relative 6 0 - 6 %    Basophils Relative 1 0 - 1 %    Neutrophils Absolute 2.37 1.85 - 7.62 Thousands/µL    Immature Grans Absolute 0.01 0.00 - 0.20 Thousand/uL    Lymphocytes Absolute 2.45 0.60 - 4.47 Thousands/µL    Monocytes Absolute 0.40 0.17 - 1.22 Thousand/µL    Eosinophils Absolute 0.36 0.00 - 0.61 Thousand/µL    Basophils Absolute 0.04 0.00 - 0.10 Thousands/µL   Comprehensive metabolic panel    Collection Time: 11/09/23  7:09 AM   Result Value Ref Range    Sodium 140 135 - 147 mmol/L    Potassium 4.1 3.5 - 5.3 mmol/L    Chloride 107 96 - 108 mmol/L    CO2 30 21 - 32 mmol/L    ANION GAP 3 mmol/L    BUN 9 5 - 25 mg/dL    Creatinine 0.69 0.60 - 1.30 mg/dL    Glucose, Fasting 90 65 - 99 mg/dL    Calcium 9.4 8.4 - 10.2 mg/dL    AST 18 13 - 39 U/L    ALT 14 7 - 52 U/L    Alkaline Phosphatase 44 34 - 104 U/L    Total Protein 6.8 6.4 - 8.4 g/dL    Albumin 4.1 3.5 - 5.0 g/dL    Total Bilirubin 0.42 0.20 - 1.00 mg/dL    eGFR 88 ml/min/1.73sq m   Cancer antigen 27.29    Collection Time: 11/09/23 7:09 AM   Result Value Ref Range    CA 27-29 18.9 0.0 - 38.6 U/mL   Lipid panel    Collection Time: 11/09/23  7:09 AM   Result Value Ref Range    Cholesterol 205 (H) See Comment mg/dL    Triglycerides 167 (H) See Comment mg/dL    HDL, Direct 62 >=50 mg/dL    LDL Calculated 110 (H) 0 - 100 mg/dL    Non-HDL-Chol (CHOL-HDL) 143 mg/dl        Physical Exam  Constitutional:       General: She is not in acute distress. Appearance: Normal appearance. HENT:      Head: Normocephalic and atraumatic. Right Ear: Tympanic membrane normal.      Left Ear: Tympanic membrane normal.      Nose: Nose normal.      Mouth/Throat:      Mouth: Mucous membranes are moist.   Eyes:      Extraocular Movements: Extraocular movements intact. Pupils: Pupils are equal, round, and reactive to light. Cardiovascular:      Rate and Rhythm: Normal rate and regular rhythm. Pulses: Normal pulses. Heart sounds: Normal heart sounds. No murmur heard. No friction rub. Pulmonary:      Effort: Pulmonary effort is normal. No respiratory distress. Breath sounds: Normal breath sounds. No wheezing. Abdominal:      General: Abdomen is flat. Bowel sounds are normal. There is no distension. Palpations: Abdomen is soft. There is no mass. Tenderness: There is no abdominal tenderness. There is no guarding. Musculoskeletal:         General: Normal range of motion. Cervical back: Normal range of motion. Skin:     General: Skin is warm. Neurological:      General: No focal deficit present. Mental Status: She is alert and oriented to person, place, and time. Mental status is at baseline. Cranial Nerves: No cranial nerve deficit.    Psychiatric:         Mood and Affect: Mood normal.         Behavior: Behavior normal.

## 2023-12-14 ENCOUNTER — TELEPHONE (OUTPATIENT)
Dept: HEMATOLOGY ONCOLOGY | Facility: CLINIC | Age: 73
End: 2023-12-14

## 2023-12-14 NOTE — TELEPHONE ENCOUNTER
Appointment Confirmation   Who are you speaking with? Patient   If it is not the patient, are they listed on an active communication consent form? N/A   Which provider is the appointment scheduled with? Dr. Olegario Tai   When is the appointment scheduled? Please list date and time 12/21/23 @ 215   At which location is the appointment scheduled to take place? [place]   Did caller verbalize understanding of appointment details?  Yes

## 2023-12-18 ENCOUNTER — TELEPHONE (OUTPATIENT)
Dept: HEMATOLOGY ONCOLOGY | Facility: CLINIC | Age: 73
End: 2023-12-18

## 2023-12-18 NOTE — TELEPHONE ENCOUNTER
Patient Call    Who are you speaking with? Patient    If it is not the patient, are they listed on an active communication consent form? Yes   What is the reason for this call? Wants to speak to the nurse about her appt time as I advised next is July for an appt.   Does this require a call back? Yes   If a call back is required, please list best call back number [phone]   If a call back is required, advise that a message will be forwarded to their care team and someone will return their call as soon as possible.   Did you relay this information to the patient? Yes

## 2023-12-21 ENCOUNTER — OFFICE VISIT (OUTPATIENT)
Dept: SURGICAL ONCOLOGY | Facility: CLINIC | Age: 73
End: 2023-12-21
Payer: COMMERCIAL

## 2023-12-21 VITALS
HEIGHT: 65 IN | HEART RATE: 80 BPM | SYSTOLIC BLOOD PRESSURE: 110 MMHG | WEIGHT: 182.4 LBS | OXYGEN SATURATION: 97 % | BODY MASS INDEX: 30.39 KG/M2 | DIASTOLIC BLOOD PRESSURE: 70 MMHG | TEMPERATURE: 97.2 F

## 2023-12-21 DIAGNOSIS — C50.312 MALIGNANT NEOPLASM OF LOWER-INNER QUADRANT OF LEFT BREAST IN FEMALE, ESTROGEN RECEPTOR POSITIVE: Primary | ICD-10-CM

## 2023-12-21 DIAGNOSIS — Z79.810 USE OF TAMOXIFEN (NOLVADEX): ICD-10-CM

## 2023-12-21 DIAGNOSIS — Z13.71 BRCA NEGATIVE: ICD-10-CM

## 2023-12-21 DIAGNOSIS — Z17.0 MALIGNANT NEOPLASM OF LOWER-INNER QUADRANT OF LEFT BREAST IN FEMALE, ESTROGEN RECEPTOR POSITIVE: Primary | ICD-10-CM

## 2023-12-21 PROCEDURE — 99214 OFFICE O/P EST MOD 30 MIN: CPT | Performed by: SURGERY

## 2023-12-21 NOTE — PROGRESS NOTES
Surgical Oncology Follow Up       [address]  [facility] SURGICAL ONCOLOGY [place]  [address]  [facility] [address]    Martita Lawrence  1950  [id number]  [address]  [facility]GICAL ONCOLOGY [place]  [address] [place] [address]    Chief Complaint   Patient presents with    Follow-up       Assessment/Plan   Diagnoses and all orders for this visit:    Malignant neoplasm of lower-inner quadrant of left breast in female, estrogen receptor positive     BRCA negative    Use of tamoxifen (Nolvadex)        Advance Care Planning/Advance Directives:  Discussed disease status, cancer treatment plans and/or cancer treatment goals with the patient.     Oncology History:    Oncology History   Malignant neoplasm of lower-inner quadrant of left breast in female, estrogen receptor positive    1/2/2019 Biopsy    Left breast biopsy:  - Invasive ductal carcinoma  Grade 2  ER 90%  VA 40%  HER2 negative     1/2019 Genetic Testing    [employer]  APC, ZAC, AXIN2, BARD1, BMPR1A, BRCA1, BRCA2, BRIP1, CDH1, CDK4, CDKN2A, CHEK2, EPCAM (large rearrangment only), HOXB13 (sequencing only), GALNT12, MLH1, MSH2, MSH3 (excluding repetitive portions of exon 1), MSH6, MUTYH, NBN, NTHL1, PALB2, PMS2, PTEN, RAD51C, RAD51D, RNF43, RPS20, SMAD4, STK11, TP53, Sequencing was performed for select regions of POLE and POLD1, and large rearrangement analysis was performed for select regions of GREM1      Negative     2/8/2019 Surgery    Left mastectomy with sentinel lymph node biopsy  - clear margins   - 0/3 lymph nodes    Right prophylactic mastectomy, bilateral expander placement  Dr. Warner and Dr. Abreu     2/2019 Genomic Testing    Oncotype Dx: 25     4/2019 -  Chemotherapy    Adjuvant  Taxotere and Cytoxan once every 3 weeks for 4 cycles   Dr. Aguilar     4/11/2019 - 7/3/2019 Chemotherapy    pegfilgrastim (NEULASTA) subcutaneous injection 6 mg, 6 mg, Subcutaneous, Once, 4 of 4 cycles  Dose  modification: 4 mg (original dose 6 mg, Cycle 3)  Administration: 4 mg (5/24/2019), 4 mg (6/14/2019)  cyclophosphamide (CYTOXAN) 1,176 mg in sodium chloride 0.9 % 250 mL IVPB, 600 mg/m2 = 1,176 mg, Intravenous, Once, 4 of 4 cycles  Administration: 1,176 mg (5/23/2019), 1,176 mg (6/13/2019)  DOCEtaxel (TAXOTERE) 147 mg in sodium chloride 0.9 % 250 mL chemo infusion, 75 mg/m2 = 147 mg, Intravenous, Once, 4 of 4 cycles  Administration: 147 mg (5/23/2019), 147 mg (6/13/2019)     7/2019 -  Hormone Therapy    Femara 07/2019- 10/2019  Anstrozole 10/2019- 5/2020  Tamoxifen- 5/2020- present     9/27/2019 Surgery    Bilateral implant exhange     8/6/2020 Surgery    Bilateral fat grafting         History of Present Illness: Breast cancer follow-up, stated a few months ago she had some pain in the left reconstructed breast which has resolved, she now reports some itching in the upper outer right reconstructed breast, continues on tamoxifen  -Interval History: As noted    Review of Systems:  Review of Systems   Constitutional: Negative.  Negative for appetite change, fever and unexpected weight change.   HENT:  Positive for congestion. Negative for trouble swallowing.    Eyes: Negative.    Respiratory: Negative.  Negative for cough and shortness of breath.    Cardiovascular: Negative.  Negative for chest pain.   Gastrointestinal: Negative.  Negative for abdominal pain, nausea and vomiting.   Endocrine: Negative.    Genitourinary: Negative.  Negative for dysuria.   Musculoskeletal: Negative.  Negative for arthralgias and myalgias.   Skin:         Itching upper outer right reconstructed breast   Allergic/Immunologic: Negative.    Neurological: Negative.  Negative for headaches.   Hematological: Negative.  Negative for adenopathy. Does not bruise/bleed easily.   Psychiatric/Behavioral: Negative.         Patient Active Problem List   Diagnosis    Plantar fasciitis of right foot    Atypical chest pain    Epigastric pain    Elevated  LFTs    Nausea    Excessive VIP secretion    Thyroid nodule    Malignant neoplasm of lower-inner quadrant of left breast in female, estrogen receptor positive     Family history of breast cancer in sister    BRCA negative    Folliculitis    Chemotherapy induced neutropenia     Urinary tract infection with hematuria    Dysuria    Vitamin D deficiency    Impaired fasting blood sugar    Bilateral carpal tunnel syndrome    Osteoarthritis of multiple joints    Drug-induced polyneuropathy (HCC)    Arthritis    Use of tamoxifen (Nolvadex)    Gastroesophageal reflux disease    Breast reconstruction deformity    Osteopenia    Other insomnia     Past Medical History:   Diagnosis Date    Allergic     Allergies     Arthritis     Carpal tunnel syndrome on right     Chemotherapy induced neutropenia  05/23/2019    Gastroesophageal reflux disease 08/06/2020    GERD (gastroesophageal reflux disease)     Lumbar disc disease     Trigger thumb     right    Vitamin D deficiency     Wears glasses      Past Surgical History:   Procedure Laterality Date    BREAST BIOPSY Left 01/02/2019    invasive ductal carcinoma    COLONOSCOPY      HYSTERECTOMY      age 47 - abdominal    LIPOSUCTION W/ FAT INJECTION Bilateral 08/06/2020    Procedure: FAT GRAFTING;  Surgeon: Rayray Abreu MD;  Location: [facility];  Service: Plastics    OOPHORECTOMY Bilateral     age 47    OK BX/EXC LYMPH NODE OPEN SUPERFICIAL Left 02/08/2019    Procedure: BIOPSY LYMPH NODE SENTINEL;  Surgeon: Lisbeth Warner MD;  [facility]: [facility];  Service: Surgical Oncology    OK EDG US EXAM SURGICAL ALTER STOM DUODENUM/JEJUNUM N/A 02/02/2017    Procedure: RADIAL ENDOSCOPIC U/S;  Surgeon: Cosmo Grissom MD;  Location: [facility];  Service: Gastroenterology    OK FASCIECTOMY PLANTAR FASCIA PARTIAL SPX Right 10/20/2016    Procedure:  OPEN RELEASE FASCIA PLANTAR ,CUTTING BOTTOM OF ARCH,INSTEP ;  Surgeon: Rashel Shepherd DPM;  Location: [facility];  Service: Podiatry    OK IMPLNT BIO IMPLNT FOR  SOFT TISSUE REINFORCEMENT Bilateral 02/08/2019    Procedure: RECONSTRUCTION BREAST W/ IMPLANT;  Surgeon: Rayray Abreu MD;  [facility];  Service: Plastics    NE INSJ/RPLCMT BREAST IMPLANT SEP DAY MASTECTOMY Bilateral 09/27/2019    Procedure: IMPLANT EXPANDER EXCHANGE;  Surgeon: Rayray Abreu MD;  [facility];  Service: Plastics    NE MASTECTOMY SIMPLE COMPLETE N/A 02/08/2019    Procedure: LEFT breast mastectomy; RIGHT breast prophylactic mastectomy;  Surgeon: Lisbeth Warner MD;  [facility];  Service: Surgical Oncology    NE ZUNILDA-IMPLANT CAPSULECTOMY BREAST COMPLETE Bilateral 09/27/2019    Procedure: CAPSULECTOMY;  Surgeon: Rayray Abreu MD;  [facility];  Service: Plastics    NE REVISION OF RECONSTRUCTED BREAST Bilateral 08/06/2020    Procedure: BREAST LATERAL STANDING EXCISION SKIN DEFORMITIES POST RECON.;  Surgeon: Rayray Abreu MD;  [facility];  Service: Plastics    NE TISSUE EXPANDER PLACEMENT BREAST RECONSTRUCTION Bilateral 02/08/2019    Procedure: INSERTION/PLACEMENT TISSUE EXPANDER (EXCHANGE);  Surgeon: Rayray Abreu MD;  [facility];  Service: Plastics    THUMB ARTHROSCOPY      UPPER GASTROINTESTINAL ENDOSCOPY      US GUIDED BREAST BIOPSY LEFT COMPLETE Left 01/02/2019    WISDOM TOOTH EXTRACTION       Family History   Problem Relation Age of Onset    Brain cancer Mother 72    Prostate cancer Father 78    Breast cancer Sister 55    Breast cancer additional onset Sister 65    Breast cancer Sister 65    Prostate cancer Brother 58     Social History     Socioeconomic History    Marital status: Single     Spouse name: Not on file    Number of children: Not on file    Years of education: Not on file    Highest education level: Not on file   Occupational History    Not on file   Tobacco Use    Smoking status: Never    Smokeless tobacco: Never   Vaping Use    Vaping status: Never Used   Substance and Sexual Activity    Alcohol use: Not Currently     Alcohol/week: 1.0 standard  drink of alcohol     Types: 1 Glasses of wine per week    Drug use: No    Sexual activity: Never   Other Topics Concern    Not on file   Social History Narrative    Not on file     Social Determinants of Health     Financial Resource Strain: Not on file   Food Insecurity: Not on file   Transportation Needs: Not on file   Physical Activity: Not on file   Stress: Not on file   Social Connections: Not on file   Intimate Partner Violence: Not on file   Housing Stability: Not on file       Current Outpatient Medications:     CALCIUM PO, Take 1,200 mg by mouth daily, Disp: , Rfl:     Cholecalciferol (VITAMIN D PO), Take 1.25 mg by mouth every 30 (thirty) days, Disp: , Rfl:     clobetasol (TEMOVATE) 0.05 % ointment, Apply topically 2 (two) times a day, Disp: 45 g, Rfl: 0    Cyanocobalamin (VITAMIN B 12 PO), Take 500 mcg by mouth daily , Disp: , Rfl:     ergocalciferol (VITAMIN D2) 50,000 units, Take 1 capsule (50,000 Units total) by mouth every 30 (thirty) days, Disp: 3 capsule, Rfl: 3    fluticasone (FLONASE) 50 mcg/act nasal spray, 1 spray into each nostril daily, Disp: 9 mL, Rfl: 1    lidocaine (LIDODERM) 5 %, Apply 1 patch topically over 12 hours daily Remove & Discard patch within 12 hours or as directed by MD, Disp: 15 patch, Rfl: 1    Multiple Vitamins-Minerals (MULTIVITAL PO), Take by mouth daily , Disp: , Rfl:     Omega-3 Fatty Acids (Fish Oil) 300 MG CAPS, Take by mouth, Disp: , Rfl:     sodium chloride (OCEAN) 0.65 % nasal spray, 1 spray into each nostril every 2 (two) hours while awake, Disp: 60 mL, Rfl: 1    tamoxifen (NOLVADEX) 20 mg tablet, Take 1 tablet (20 mg total) by mouth daily, Disp: 90 tablet, Rfl: 3    Na Sulfate-K Sulfate-Mg Sulf 17.5-3.13-1.6 GM/177ML SOLN, Take 1 kit by mouth once for 1 dose (Patient not taking: Reported on 8/9/2022), Disp: 354 mL, Rfl: 0    triamcinolone (KENALOG) 0.1 % ointment, Apply topically 2 (two) times a day for 7 days (Patient taking differently: Apply topically if  needed), Disp: 80 g, Rfl: 0  Allergies   Allergen Reactions    Latex Anaphylaxis    Shellfish-Derived Products - Food Allergy Anaphylaxis and Other (See Comments)       The following portions of the patient's history were reviewed and updated as appropriate: allergies, current medications, past family history, past medical history, past social history, past surgical history, and problem list.        Vitals:    12/21/23 1450   BP: 110/70   Pulse: 80   Temp: (!) 97.2 °F (36.2 °C)   SpO2: 97%       Physical Exam  Constitutional:       General: She is not in acute distress.     Appearance: Normal appearance. She is well-developed.   HENT:      Head: Normocephalic and atraumatic.   Cardiovascular:      Heart sounds: Normal heart sounds.   Pulmonary:      Breath sounds: Normal breath sounds.   Chest:   Breasts:     Right: Skin change (mastectomy scar with implant) present. No swelling, bleeding, mass or tenderness.      Left: Skin change (mastectomy scar with implant) present. No swelling, bleeding, mass or tenderness.   Abdominal:      Palpations: Abdomen is soft.   Musculoskeletal:      Right lower leg: No edema.      Left lower leg: No edema.   Lymphadenopathy:      Upper Body:      Right upper body: No supraclavicular, axillary or pectoral adenopathy.      Left upper body: No supraclavicular, axillary or pectoral adenopathy.   Neurological:      Mental Status: She is alert and oriented to person, place, and time.   Psychiatric:         Mood and Affect: Mood normal.           Discussion/Summary: 73-year-old female status post bilateral mastectomy and reconstruction for left-sided invasive carcinoma.  This was ER positive and HER2 negative.  She had an elevated Oncotype and underwent adjuvant chemo.  She has been on hormone therapy.  Her genetic testing was negative.  There is no evidence of disease based on exam today.  We will see her again in 6 months in our survivorship clinic or sooner should the need arise.

## 2024-02-21 PROBLEM — N39.0 URINARY TRACT INFECTION WITH HEMATURIA: Status: RESOLVED | Noted: 2019-11-10 | Resolved: 2024-02-21

## 2024-02-21 PROBLEM — R31.9 URINARY TRACT INFECTION WITH HEMATURIA: Status: RESOLVED | Noted: 2019-11-10 | Resolved: 2024-02-21

## 2024-03-04 ENCOUNTER — TELEPHONE (OUTPATIENT)
Dept: ADMINISTRATIVE | Facility: OTHER | Age: 74
End: 2024-03-04

## 2024-03-04 ENCOUNTER — TELEPHONE (OUTPATIENT)
Dept: INTERNAL MEDICINE CLINIC | Facility: CLINIC | Age: 74
End: 2024-03-04

## 2024-03-04 ENCOUNTER — OFFICE VISIT (OUTPATIENT)
Dept: INTERNAL MEDICINE CLINIC | Facility: CLINIC | Age: 74
End: 2024-03-04
Payer: COMMERCIAL

## 2024-03-04 VITALS
HEART RATE: 96 BPM | SYSTOLIC BLOOD PRESSURE: 118 MMHG | DIASTOLIC BLOOD PRESSURE: 70 MMHG | HEIGHT: 65 IN | BODY MASS INDEX: 29.99 KG/M2 | WEIGHT: 180 LBS | OXYGEN SATURATION: 99 % | TEMPERATURE: 97.9 F

## 2024-03-04 DIAGNOSIS — M51.36 DDD (DEGENERATIVE DISC DISEASE), LUMBAR: Primary | ICD-10-CM

## 2024-03-04 DIAGNOSIS — C50.312 MALIGNANT NEOPLASM OF LOWER-INNER QUADRANT OF LEFT BREAST IN FEMALE, ESTROGEN RECEPTOR POSITIVE: ICD-10-CM

## 2024-03-04 DIAGNOSIS — M54.50 ACUTE MIDLINE LOW BACK PAIN WITHOUT SCIATICA: ICD-10-CM

## 2024-03-04 DIAGNOSIS — E55.9 VITAMIN D DEFICIENCY: ICD-10-CM

## 2024-03-04 DIAGNOSIS — Z17.0 MALIGNANT NEOPLASM OF LOWER-INNER QUADRANT OF LEFT BREAST IN FEMALE, ESTROGEN RECEPTOR POSITIVE: ICD-10-CM

## 2024-03-04 PROCEDURE — 99214 OFFICE O/P EST MOD 30 MIN: CPT | Performed by: INTERNAL MEDICINE

## 2024-03-04 RX ORDER — LIDOCAINE 50 MG/G
1 PATCH TOPICAL DAILY
Qty: 15 PATCH | Refills: 1 | Status: SHIPPED | OUTPATIENT
Start: 2024-03-04

## 2024-03-04 RX ORDER — NAPROXEN 500 MG/1
500 TABLET ORAL 2 TIMES DAILY WITH MEALS
Qty: 20 TABLET | Refills: 0 | Status: SHIPPED | OUTPATIENT
Start: 2024-03-04

## 2024-03-04 RX ORDER — ERGOCALCIFEROL 1.25 MG/1
50000 CAPSULE ORAL
Qty: 3 CAPSULE | Refills: 3 | Status: SHIPPED | OUTPATIENT
Start: 2024-03-04

## 2024-03-04 RX ORDER — METRONIDAZOLE 7.5 MG/G
GEL TOPICAL
COMMUNITY
Start: 2024-02-05

## 2024-03-04 NOTE — TELEPHONE ENCOUNTER
Upon review of the In Basket request we  Noted no update needed -  Mammogram - Breast Cancer Screening is not located in ./  exclusions are performed at the practice level.     Any additional questions or concerns should be emailed to the Practice Liaisons via the appropriate education email address, please do not reply via In Basket.    Thank you  Zara Oh

## 2024-03-04 NOTE — TELEPHONE ENCOUNTER
----- Message from Conchis Alaniz sent at 3/4/2024 10:37 AM EST -----  Patient had B/L mastectomy please remove the HM need from her chart  Thank You

## 2024-03-04 NOTE — TELEPHONE ENCOUNTER
The breast cancer survivor visit is What?? Who is responsible for getting that done?  Im sorry I'm confused today???

## 2024-03-18 ENCOUNTER — HOSPITAL ENCOUNTER (OUTPATIENT)
Dept: RADIOLOGY | Facility: HOSPITAL | Age: 74
Discharge: HOME | End: 2024-03-18
Payer: COMMERCIAL

## 2024-03-18 DIAGNOSIS — M51.36 DDD (DEGENERATIVE DISC DISEASE), LUMBAR: ICD-10-CM

## 2024-03-18 PROCEDURE — 72110 X-RAY EXAM L-2 SPINE 4/>VWS: CPT

## 2024-03-27 ENCOUNTER — TELEPHONE (OUTPATIENT)
Dept: HEMATOLOGY ONCOLOGY | Facility: CLINIC | Age: 74
End: 2024-03-27

## 2024-03-27 NOTE — TELEPHONE ENCOUNTER
Left message for pt to call [facility] regarding her appt scheduled on 5/16/24 with Dr Aguilar. That appt does need to be rescheduled due to provider scheduling conflict. Another attempt will be made to contact pt.

## 2024-03-27 NOTE — TELEPHONE ENCOUNTER
Appointment Change  Cancel, Reschedule, Change to Virtual      Who are you speaking with? Patient   If it is not the patient, is the caller listed on the communication consent form? N/A   Which provider is the appointment scheduled with? Dr. Aguilar   When was the original appointment scheduled?    Please list date and time 5/16/24 8:40 AM   At which location is the appointment scheduled to take place? [place]   Was the appointment rescheduled?     Was the appointment changed from an in person visit to a virtual visit?    If so, please list the details of the change. 5/17/24 8:40 AM   What is the reason for the appointment change? Provider requested scheduling change       Was STAR transport scheduled? No   Does STAR transport need to be scheduled for the new visit (if applicable) No   Does the patient need an infusion appointment rescheduled? No   Does the patient have an upcoming infusion appointment scheduled? If so, when? No   Is the patient undergoing chemotherapy? No   For appointments cancelled with less than 24 hours:  Was the no-show policy reviewed? Yes

## 2024-05-13 ENCOUNTER — APPOINTMENT (OUTPATIENT)
Dept: LAB | Facility: CLINIC | Age: 74
End: 2024-05-13
Payer: COMMERCIAL

## 2024-05-13 DIAGNOSIS — Z17.0 MALIGNANT NEOPLASM OF LOWER-INNER QUADRANT OF LEFT BREAST IN FEMALE, ESTROGEN RECEPTOR POSITIVE (HCC): ICD-10-CM

## 2024-05-13 DIAGNOSIS — C50.312 MALIGNANT NEOPLASM OF LOWER-INNER QUADRANT OF LEFT BREAST IN FEMALE, ESTROGEN RECEPTOR POSITIVE (HCC): ICD-10-CM

## 2024-05-13 LAB
ALBUMIN SERPL BCP-MCNC: 4.1 G/DL (ref 3.5–5)
ALP SERPL-CCNC: 40 U/L (ref 34–104)
ALT SERPL W P-5'-P-CCNC: 12 U/L (ref 7–52)
ANION GAP SERPL CALCULATED.3IONS-SCNC: 7 MMOL/L (ref 4–13)
AST SERPL W P-5'-P-CCNC: 19 U/L (ref 13–39)
BASOPHILS # BLD AUTO: 0.06 THOUSANDS/ÂΜL (ref 0–0.1)
BASOPHILS NFR BLD AUTO: 1 % (ref 0–1)
BILIRUB SERPL-MCNC: 0.45 MG/DL (ref 0.2–1)
BUN SERPL-MCNC: 13 MG/DL (ref 5–25)
CALCIUM SERPL-MCNC: 9.6 MG/DL (ref 8.4–10.2)
CHLORIDE SERPL-SCNC: 103 MMOL/L (ref 96–108)
CO2 SERPL-SCNC: 28 MMOL/L (ref 21–32)
CREAT SERPL-MCNC: 0.68 MG/DL (ref 0.6–1.3)
EOSINOPHIL # BLD AUTO: 0.34 THOUSAND/ÂΜL (ref 0–0.61)
EOSINOPHIL NFR BLD AUTO: 6 % (ref 0–6)
ERYTHROCYTE [DISTWIDTH] IN BLOOD BY AUTOMATED COUNT: 13.3 % (ref 11.6–15.1)
GFR SERPL CREATININE-BSD FRML MDRD: 88 ML/MIN/1.73SQ M
GLUCOSE P FAST SERPL-MCNC: 89 MG/DL (ref 65–99)
HCT VFR BLD AUTO: 45.1 % (ref 34.8–46.1)
HGB BLD-MCNC: 14.3 G/DL (ref 11.5–15.4)
IMM GRANULOCYTES # BLD AUTO: 0.01 THOUSAND/UL (ref 0–0.2)
IMM GRANULOCYTES NFR BLD AUTO: 0 % (ref 0–2)
LYMPHOCYTES # BLD AUTO: 2.99 THOUSANDS/ÂΜL (ref 0.6–4.47)
LYMPHOCYTES NFR BLD AUTO: 48 % (ref 14–44)
MCH RBC QN AUTO: 28.4 PG (ref 26.8–34.3)
MCHC RBC AUTO-ENTMCNC: 31.7 G/DL (ref 31.4–37.4)
MCV RBC AUTO: 90 FL (ref 82–98)
MONOCYTES # BLD AUTO: 0.46 THOUSAND/ÂΜL (ref 0.17–1.22)
MONOCYTES NFR BLD AUTO: 7 % (ref 4–12)
NEUTROPHILS # BLD AUTO: 2.34 THOUSANDS/ÂΜL (ref 1.85–7.62)
NEUTS SEG NFR BLD AUTO: 38 % (ref 43–75)
NRBC BLD AUTO-RTO: 0 /100 WBCS
PLATELET # BLD AUTO: 184 THOUSANDS/UL (ref 149–390)
PMV BLD AUTO: 10.1 FL (ref 8.9–12.7)
POTASSIUM SERPL-SCNC: 3.9 MMOL/L (ref 3.5–5.3)
PROT SERPL-MCNC: 6.9 G/DL (ref 6.4–8.4)
RBC # BLD AUTO: 5.03 MILLION/UL (ref 3.81–5.12)
SODIUM SERPL-SCNC: 138 MMOL/L (ref 135–147)
WBC # BLD AUTO: 6.2 THOUSAND/UL (ref 4.31–10.16)

## 2024-05-13 PROCEDURE — 85025 COMPLETE CBC W/AUTO DIFF WBC: CPT

## 2024-05-13 PROCEDURE — 36415 COLL VENOUS BLD VENIPUNCTURE: CPT

## 2024-05-13 PROCEDURE — 86300 IMMUNOASSAY TUMOR CA 15-3: CPT

## 2024-05-13 PROCEDURE — 80053 COMPREHEN METABOLIC PANEL: CPT

## 2024-05-14 LAB — CANCER AG27-29 SERPL-ACNC: 20.9 U/ML (ref 0–38.6)

## 2024-05-17 ENCOUNTER — OFFICE VISIT (OUTPATIENT)
Dept: HEMATOLOGY ONCOLOGY | Facility: CLINIC | Age: 74
End: 2024-05-17
Payer: COMMERCIAL

## 2024-05-17 VITALS
BODY MASS INDEX: 30.99 KG/M2 | SYSTOLIC BLOOD PRESSURE: 102 MMHG | HEIGHT: 65 IN | WEIGHT: 186 LBS | HEART RATE: 65 BPM | RESPIRATION RATE: 17 BRPM | DIASTOLIC BLOOD PRESSURE: 78 MMHG | OXYGEN SATURATION: 96 % | TEMPERATURE: 97.7 F

## 2024-05-17 DIAGNOSIS — G62.0 DRUG-INDUCED POLYNEUROPATHY (HCC): ICD-10-CM

## 2024-05-17 DIAGNOSIS — Z79.810 USE OF TAMOXIFEN (NOLVADEX): ICD-10-CM

## 2024-05-17 DIAGNOSIS — C50.312 MALIGNANT NEOPLASM OF LOWER-INNER QUADRANT OF LEFT BREAST IN FEMALE, ESTROGEN RECEPTOR POSITIVE (HCC): Primary | ICD-10-CM

## 2024-05-17 DIAGNOSIS — E04.1 THYROID NODULE: ICD-10-CM

## 2024-05-17 DIAGNOSIS — Z17.0 MALIGNANT NEOPLASM OF LOWER-INNER QUADRANT OF LEFT BREAST IN FEMALE, ESTROGEN RECEPTOR POSITIVE (HCC): Primary | ICD-10-CM

## 2024-05-17 DIAGNOSIS — E78.49 OTHER HYPERLIPIDEMIA: ICD-10-CM

## 2024-05-17 DIAGNOSIS — M19.90 ARTHRITIS: ICD-10-CM

## 2024-05-17 PROCEDURE — 99214 OFFICE O/P EST MOD 30 MIN: CPT | Performed by: INTERNAL MEDICINE

## 2024-05-17 RX ORDER — TAMOXIFEN CITRATE 20 MG/1
20 TABLET ORAL DAILY
Qty: 90 TABLET | Refills: 0 | Status: SHIPPED | OUTPATIENT
Start: 2024-05-17

## 2024-05-17 NOTE — PATIENT INSTRUCTIONS
Breast cancer specimen for breast cancer index.  Specimen number [id number].  Blood work prior to next visit in 6 months.  Renewed tamoxifen.

## 2024-05-17 NOTE — PROGRESS NOTES
HPI: Continuation of care for strongly positive ER, positive CT and negative HER2 ,stage I, 1.2 cm, grade 3 left breast cancer.  Positive lymphovascular invasion..  Negative 3 sentinel lymph nodes. Oncotype score was 25.  Breast cancer was diagnosed in 2019.  In February 2019 patient had bilateral mastectomies and left axillary lymph node sampling and reconstruction surgeries.  Patient had 4 cycles of adjuvant chemotherapy, combination of Taxotere and Cytoxan and Neulasta and that was followed by aromatase inhibitors in June 2019 and she had problems with all 3 aromatase inhibitors.  Lot of musculoskeletal symptoms.  Since May 2020 patient has been on tamoxifen and she has much less problem with musculoskeletal symptoms.  No hot flashes.  Has burning sensation in her feet, part of peripheral neuropathy.  Occasionally she has some tiredness  .  She has bilateral carpal tunnel . There is family history of breast cancer.  Patient states that she tested negative for BRCA .   .  History of high triglycerides and and that is being managed by her primary physician..  Patient is trying to eat healthy and stay active.  She states she is up-to-date with her medical checkups    Occasionally constipation and she had  colonoscopy in 2022.           Current Outpatient Medications:   •  CALCIUM PO, Take 1,200 mg by mouth daily, Disp: , Rfl:   •  Cholecalciferol (VITAMIN D PO), Take 1.25 mg by mouth every 30 (thirty) days, Disp: , Rfl:   •  clobetasol (TEMOVATE) 0.05 % ointment, Apply topically 2 (two) times a day, Disp: 45 g, Rfl: 0  •  Cyanocobalamin (VITAMIN B 12 PO), Take 500 mcg by mouth daily , Disp: , Rfl:   •  ergocalciferol (VITAMIN D2) 50,000 units, Take 1 capsule (50,000 Units total) by mouth every 30 (thirty) days, Disp: 3 capsule, Rfl: 3  •  lidocaine (LIDODERM) 5 %, Apply 1 patch topically over 12 hours daily Remove & Discard patch within 12 hours or as directed by MD, Disp: 15 patch, Rfl: 1  •  metroNIDAZOLE  (METROGEL) 0.75 % gel, , Disp: , Rfl:   •  Multiple Vitamins-Minerals (MULTIVITAL PO), Take by mouth daily , Disp: , Rfl:   •  naproxen (Naprosyn) 500 mg tablet, Take 1 tablet (500 mg total) by mouth 2 (two) times a day with meals, Disp: 20 tablet, Rfl: 0  •  Omega-3 Fatty Acids (Fish Oil) 300 MG CAPS, Take by mouth, Disp: , Rfl:   •  sodium chloride (OCEAN) 0.65 % nasal spray, 1 spray into each nostril every 2 (two) hours while awake, Disp: 60 mL, Rfl: 1  •  tamoxifen (NOLVADEX) 20 mg tablet, Take 1 tablet (20 mg total) by mouth daily, Disp: 90 tablet, Rfl: 0  •  tretinoin (RETIN-A) 0.025 % cream, , Disp: , Rfl:   •  fluticasone (FLONASE) 50 mcg/act nasal spray, 1 spray into each nostril daily (Patient not taking: Reported on 3/4/2024), Disp: 9 mL, Rfl: 1  •  Na Sulfate-K Sulfate-Mg Sulf 17.5-3.13-1.6 GM/177ML SOLN, Take 1 kit by mouth once for 1 dose (Patient not taking: Reported on 8/9/2022), Disp: 354 mL, Rfl: 0  •  triamcinolone (KENALOG) 0.1 % ointment, Apply topically 2 (two) times a day for 7 days (Patient taking differently: Apply topically if needed), Disp: 80 g, Rfl: 0    Allergies   Allergen Reactions   • Latex Anaphylaxis   • Shellfish-Derived Products - Food Allergy Anaphylaxis and Other (See Comments)       Oncology History   Malignant neoplasm of lower-inner quadrant of left breast in female, estrogen receptor positive (HCC)   1/2/2019 Biopsy    Left breast biopsy:  - Invasive ductal carcinoma  Grade 2  ER 90%  VA 40%  HER2 negative     1/2019 Genetic Testing    [facility]  APC, ZAC, AXIN2, BARD1, BMPR1A, BRCA1, BRCA2, BRIP1, CDH1, CDK4, CDKN2A, CHEK2, EPCAM (large rearrangment only), HOXB13 (sequencing only), GALNT12, MLH1, MSH2, MSH3 (excluding repetitive portions of exon 1), MSH6, MUTYH, NBN, NTHL1, PALB2, PMS2, PTEN, RAD51C, RAD51D, RNF43, RPS20, SMAD4, STK11, TP53, Sequencing was performed for select regions of POLE and POLD1, and large rearrangement analysis was performed for select regions of  "GREM1      Negative     2/8/2019 Surgery    Left mastectomy with sentinel lymph node biopsy  - clear margins   - 0/3 lymph nodes    Right prophylactic mastectomy, bilateral expander placement  Dr. Warner and Dr. Abreu     2/2019 Genomic Testing    Oncotype Dx: 25     4/2019 -  Chemotherapy    Adjuvant  Taxotere and Cytoxan once every 3 weeks for 4 cycles   Dr. Aguilar     4/11/2019 - 7/3/2019 Chemotherapy    pegfilgrastim (NEULASTA) subcutaneous injection 6 mg, 6 mg, Subcutaneous, Once, 4 of 4 cycles  Dose modification: 4 mg (original dose 6 mg, Cycle 3)  Administration: 4 mg (5/24/2019), 4 mg (6/14/2019)  cyclophosphamide (CYTOXAN) 1,176 mg in sodium chloride 0.9 % 250 mL IVPB, 600 mg/m2 = 1,176 mg, Intravenous, Once, 4 of 4 cycles  Administration: 1,176 mg (5/23/2019), 1,176 mg (6/13/2019)  DOCEtaxel (TAXOTERE) 147 mg in sodium chloride 0.9 % 250 mL chemo infusion, 75 mg/m2 = 147 mg, Intravenous, Once, 4 of 4 cycles  Administration: 147 mg (5/23/2019), 147 mg (6/13/2019)     7/2019 -  Hormone Therapy    Femara 07/2019- 10/2019  Anstrozole 10/2019- 5/2020  Tamoxifen- 5/2020- present     9/27/2019 Surgery    Bilateral implant exhange     8/6/2020 Surgery    Bilateral fat grafting         ROS:  05/18/24 Reviewed 12 systems: See symptoms in HPI:   Presently no other neurological, cardiac, pulmonary, GI and  symptoms other than listed in HPI. Other symptoms are in HPI  No other symptoms like fever, chills, bleeding, bone pains, skin rash, weight loss,  weakness, night sweats  claudication and gait problem. No frequent infections.  Not unusually sensitive to heat or cold. No swelling of the ankles. No swollen glands.  Patient is not anxious.        /78 (BP Location: Right arm, Patient Position: Sitting, Cuff Size: Adult)   Pulse 65   Temp 97.7 °F (36.5 °C)   Resp 17   Ht 5' 5\" (1.651 m)   Wt 84.4 kg (186 lb)   SpO2 96%   BMI 30.95 kg/m²     Physical Exam:    Patient is alert and oriented.  Patient is not in " distress.  Vital signs are above.  There is no icterus.  There is no oral thrush.  There is no palpable neck mass.  Lung fields are clear to percussion auscultation.  Heart rate is regular.  There is no palpable abdominal mass.  Soft and nontender abdomen.  There is no ascites.  There is no edema of the ankles.  There is no calf tenderness.  There is no focal neurological deficit.  There is no skin rash.  No lymphedema.  No palpable lymphadenopathy in the neck and axillary areas.       Patient is not anxious.  Performance status 0.     IMAGING:  IMPRESSION:     No nodule meets current ACR criteria for requiring biopsy or followup ultrasounds.           Reference: ACR Thyroid Imaging, Reporting and Data System (TI-RADS): White Paper of the ACR TI-RADS Committee. J AM Galdino Radiol 2017;14:587-595. (additional recommendations based on American Thyroid Association 2015 guidelines.)        Workstation performed: [id number]        Imaging    US thyroid (Order: [id number]) - 5/9/2023    RESULTS:  On 06/19/2021  LUMBAR SPINE L1-L4 :   BMD  0.938  gm/cm2   T-score -1.0         LEFT  TOTAL HIP:   BMD:  0.722  gm/cm2   T-score:  -1.8     LEFT  FEMORAL NECK:   BMD:  0.629  gm/cm2   T score: -2.0               IMPRESSION:  1.  Low bone mass (OSTEOPENIA). [Based on the left femoral neck]    CT coronary calcium score  Status: Final result     PACS Images     Show images for CT coronary calcium score  Study Result    Narrative & Impression   CT CORONARY ARTERY CALCIUM SCREENING WITHOUT INTRAVENOUS CONTRAST     INDICATION:  E78.2: Mixed hyperlipidemia. Assess for calcific coronary plaque.  Patient Age:  72 years  Patient Gender:  Female.     COMPARISON: CT 10/24/2016.     TECHNIQUE: Low radiation dose computed tomography of the heart was performed with EKG gating, suspended respiration, and without intravenous contrast.  This examination, like all CT scans performed in the [facility], was performed   utilizing  techniques to minimize radiation dose exposure, including the use of iterative reconstruction and automated exposure control.  Postprocessing was performed on a 3-D computer workstation to measure the amount of calcium in the coronary arteries.   Imaging was limited to the heart and the immediately adjacent lungs.     FINDINGS:     Coronary artery calcium score breakdown is as follows:     Left main coronary artery:  0  Left anterior descending coronary artery and diagonal branches:  1  Left circumflex coronary artery and left marginal branches:  0  Right coronary artery and right marginal branches:  0  Posterior descending coronary artery: 0     TOTAL coronary calcium score:  1     Calcium score PERCENTILE of age, race, and gender matched database participants in the Multi-Ethnic Study of Atherosclerosis (BLACK) trial:   50     HEART/GREAT VESSELS: No significant low radiation dose noncontrast CT abnormality of the heart.  No thoracic aortic aneurysm.     EXTRACARDIAC FINDINGS: No significant findings identified on limited small field of view low radiation dose noncontrast images of the chest at the level of the heart.     IMPRESSION:     Total coronary calcium score equals 1. For more useful information regarding the prognostic significance of the calcium score, please consult the calculator at the website [email or web address].              Workstation performed: [id number]        Imaging    CT coronary calcium score (Order: [id number]) - 8/23/2023  US thyroid  Status: Final result     PACS Images     Show images for US thyroid  Study Result    Narrative & Impression   THYROID ULTRASOUND     INDICATION:    E04.1: Nontoxic single thyroid nodule.     COMPARISON: February 3, 2020     TECHNIQUE:   Ultrasound of the thyroid was performed with a high frequency linear transducer in transverse and sagittal planes including volumetric imaging sweeps as well as traditional still imaging technique.      FINDINGS: Thyroid parenchyma is diffusely heterogeneous in echotexture and also hyperemic.     Right lobe: 5.1 x 1.6 x 1.7 cm. Volume 6.7 mL  Left lobe:  4.6 x 1.2 x 1.5 cm. Volume 4.2 mL  Isthmus: 0.2  cm.     Nodule #1.  Image 20.  RIGHT midgland nodule measuring 2.1 x 1.6 x 1.2 cm. No significant change  COMPOSITION:  1 point, mixed cystic and solid.  ECHOGENICITY:  1 point, hyperechoic or isoechoic.  SHAPE:  0 points, wider-than-tall.  MARGIN: 0 points, smooth.  ECHOGENIC FOCI:  0 points, none or large comet-tail artifacts.  TI-RADS Classification: TR 2 (2 points), Not suspious. No FNA.     There are additional nodules of lesser size and/or TI-RADS score.  These do not necessitate additional evaluation based on ACR criteria.           IMPRESSION:     No nodule meets current ACR criteria for requiring biopsy or followup ultrasounds.           Reference: ACR Thyroid Imaging, Reporting and Data System (TI-RADS): White Paper of the ACR TI-RADS Committee. J AM Galdino Radiol 2017;14:587-595. (additional recommendations based on American Thyroid Association 2015 guidelines.)        Workstation performed: [id number]        Imaging    US thyroid (Order: [id number]) - 5/9/2023      LABS:      Results for orders placed or performed in visit on 05/13/24   CBC and differential   Result Value Ref Range    WBC 6.20 4.31 - 10.16 Thousand/uL    RBC 5.03 3.81 - 5.12 Million/uL    Hemoglobin 14.3 11.5 - 15.4 g/dL    Hematocrit 45.1 34.8 - 46.1 %    MCV 90 82 - 98 fL    MCH 28.4 26.8 - 34.3 pg    MCHC 31.7 31.4 - 37.4 g/dL    RDW 13.3 11.6 - 15.1 %    MPV 10.1 8.9 - 12.7 fL    Platelets 184 149 - 390 Thousands/uL    nRBC 0 /100 WBCs    Segmented % 38 (L) 43 - 75 %    Immature Grans % 0 0 - 2 %    Lymphocytes % 48 (H) 14 - 44 %    Monocytes % 7 4 - 12 %    Eosinophils Relative 6 0 - 6 %    Basophils Relative 1 0 - 1 %    Absolute Neutrophils 2.34 1.85 - 7.62 Thousands/µL    Absolute Immature Grans 0.01 0.00 - 0.20 Thousand/uL     Absolute Lymphocytes 2.99 0.60 - 4.47 Thousands/µL    Absolute Monocytes 0.46 0.17 - 1.22 Thousand/µL    Eosinophils Absolute 0.34 0.00 - 0.61 Thousand/µL    Basophils Absolute 0.06 0.00 - 0.10 Thousands/µL   Comprehensive metabolic panel   Result Value Ref Range    Sodium 138 135 - 147 mmol/L    Potassium 3.9 3.5 - 5.3 mmol/L    Chloride 103 96 - 108 mmol/L    CO2 28 21 - 32 mmol/L    ANION GAP 7 4 - 13 mmol/L    BUN 13 5 - 25 mg/dL    Creatinine 0.68 0.60 - 1.30 mg/dL    Glucose, Fasting 89 65 - 99 mg/dL    Calcium 9.6 8.4 - 10.2 mg/dL    AST 19 13 - 39 U/L    ALT 12 7 - 52 U/L    Alkaline Phosphatase 40 34 - 104 U/L    Total Protein 6.9 6.4 - 8.4 g/dL    Albumin 4.1 3.5 - 5.0 g/dL    Total Bilirubin 0.45 0.20 - 1.00 mg/dL    eGFR 88 ml/min/1.73sq m   Cancer antigen 27.29   Result Value Ref Range    CA 27-29 20.9 0.0 - 38.6 U/mL     Labs, Imaging, & Other studies:   All pertinent labs and imaging studies were personally reviewed      Reviewed above test results  and discussed with patient.    Assessment and plan:      Continuation of care for strongly positive ER, positive IN and negative HER2 ,stage I, 1.2 cm, grade 3 left breast cancer.  Positive lymphovascular invasion..  Negative 3 sentinel lymph nodes. Oncotype score was 25.  Breast cancer was diagnosed in 2019.  In February 2019 patient had bilateral mastectomies and left axillary lymph node sampling and reconstruction surgeries.  Patient had 4 cycles of adjuvant chemotherapy, combination of Taxotere and Cytoxan and Neulasta and that was followed by aromatase inhibitors in June 2019 and she had problems with all 3 aromatase inhibitors.  Lot of musculoskeletal symptoms.  Since May 2020 patient has been on tamoxifen and she has much less problem with musculoskeletal symptoms.  No hot flashes.  Has burning sensation in her feet, part of peripheral neuropathy.  Occasionally she has some tiredness  .  She has bilateral carpal tunnel . There is family history of  breast cancer.  Patient states that she tested negative for BRCA .   .  History of high triglycerides and and that is being managed by her primary physician..  Patient is trying to eat healthy and stay active.  She states she is up-to-date with her medical checkups    Occasionally constipation and she had  colonoscopy in 2022.          Physical examination and test results are as recorded and discussed.  Breast cancer remains in remission and goal is cure from breast cancer.  Patient will continue to take tamoxifen  20 mg daily.  Patient will have BCI test to determine how long she has to stay on hormone therapy.  Patient agrees to have the test.  Patient is taking vitamin-D and calcium and is staying active   For thyroid nodule she goes to her endocrinologist.  No nodule meets the criteria for biopsy.  She follows with her breast surgeon for examination . All discussed in detail.  Questions answered.    Discussed the importance of eating healthy foods, staying active and health screening tests.  Patient is capable of self-care. Discussed precautions against coronavirus , flu  and other infections.   .  Patient will continue to follow with her primary physician and other consultants.    See diagnoses, orders and instructions.    1. Malignant neoplasm of lower-inner quadrant of left breast in female, estrogen receptor positive (HCC)    - CBC and differential; Future  - Comprehensive metabolic panel; Future  - Cancer antigen 27.29; Future    2. Drug-induced polyneuropathy (HCC)      3. Arthritis      4. Thyroid nodule      5. Use of tamoxifen (Nolvadex)      6. Other hyperlipidemia    Breast cancer specimen for breast cancer index.  Specimen number [id number].  Blood work prior to next visit in 6 months.  Renewed tamoxifen.     I used dictation device to dictate this note and there could be mistakes in the note and patient may contact my office for that          Patient voiced understanding and  agreed      Counseling / Coordination of Care    .  Provided counseling and support

## 2024-05-30 ENCOUNTER — LAB REQUISITION (OUTPATIENT)
Dept: LAB | Facility: HOSPITAL | Age: 74
End: 2024-05-30

## 2024-05-30 DIAGNOSIS — C50.312 MALIGNANT NEOPLASM OF LOWER-INNER QUADRANT OF LEFT FEMALE BREAST (HCC): ICD-10-CM

## 2024-06-05 ENCOUNTER — OFFICE VISIT (OUTPATIENT)
Dept: INTERNAL MEDICINE CLINIC | Facility: CLINIC | Age: 74
End: 2024-06-05
Payer: COMMERCIAL

## 2024-06-05 VITALS
SYSTOLIC BLOOD PRESSURE: 118 MMHG | DIASTOLIC BLOOD PRESSURE: 67 MMHG | TEMPERATURE: 97.4 F | HEART RATE: 94 BPM | HEIGHT: 65 IN | OXYGEN SATURATION: 98 % | BODY MASS INDEX: 30.66 KG/M2 | WEIGHT: 184 LBS

## 2024-06-05 DIAGNOSIS — M51.36 DDD (DEGENERATIVE DISC DISEASE), LUMBAR: ICD-10-CM

## 2024-06-05 DIAGNOSIS — E55.9 VITAMIN D DEFICIENCY: ICD-10-CM

## 2024-06-05 DIAGNOSIS — R53.83 OTHER FATIGUE: ICD-10-CM

## 2024-06-05 DIAGNOSIS — J02.9 PHARYNGITIS, UNSPECIFIED ETIOLOGY: ICD-10-CM

## 2024-06-05 DIAGNOSIS — R73.01 IMPAIRED FASTING BLOOD SUGAR: ICD-10-CM

## 2024-06-05 DIAGNOSIS — H61.22 CERUMEN DEBRIS ON TYMPANIC MEMBRANE OF LEFT EAR: ICD-10-CM

## 2024-06-05 DIAGNOSIS — Z00.8 EXAM FOR POPULATION SURVEY: ICD-10-CM

## 2024-06-05 DIAGNOSIS — J02.9 SORE THROAT: ICD-10-CM

## 2024-06-05 DIAGNOSIS — R09.82 POST-NASAL DRAINAGE: ICD-10-CM

## 2024-06-05 DIAGNOSIS — M79.2 NEUROPATHIC PAIN: Primary | ICD-10-CM

## 2024-06-05 LAB — S PYO AG THROAT QL: NEGATIVE

## 2024-06-05 PROCEDURE — 87880 STREP A ASSAY W/OPTIC: CPT | Performed by: INTERNAL MEDICINE

## 2024-06-05 PROCEDURE — 99215 OFFICE O/P EST HI 40 MIN: CPT | Performed by: INTERNAL MEDICINE

## 2024-06-05 RX ORDER — NAPROXEN 500 MG/1
500 TABLET ORAL AS NEEDED
Qty: 90 TABLET | Refills: 0 | Status: SHIPPED | OUTPATIENT
Start: 2024-06-05

## 2024-06-05 RX ORDER — ERGOCALCIFEROL 1.25 MG/1
50000 CAPSULE ORAL
Qty: 3 CAPSULE | Refills: 3 | Status: SHIPPED | OUTPATIENT
Start: 2024-06-05

## 2024-06-05 NOTE — PROGRESS NOTES
Assessment/Plan:           1. Neuropathic pain  Comments:  Patient did not have success with gabapentin.  Lidocaine patch discussed.  2. DDD (degenerative disc disease), lumbar  Comments:  Continue as needed naproxen.  Limited use advised.  Orders:  -     naproxen (Naprosyn) 500 mg tablet; Take 1 tablet (500 mg total) by mouth if needed for moderate pain  3. Vitamin D deficiency  -     ergocalciferol (VITAMIN D2) 50,000 units; Take 1 capsule (50,000 Units total) by mouth every 30 (thirty) days  4. Exam for population survey  -     Hemoglobin A1C; Future  -     Lipid panel; Future  5. Impaired fasting blood sugar  -     Urinalysis with microscopic; Future  6. Pharyngitis, unspecified etiology  Comments:  Likely viral warm salt water gargling discussed.  7. Post-nasal drainage  8. Cerumen debris on tympanic membrane of left ear  Comments:  Debrox drops advised.  Orders:  -     carbamide peroxide (DEBROX) 6.5 % otic solution; Administer 5 drops into the left ear daily at bedtime  9. Other fatigue  Comments:  Thyroid function will be checked.  Orders:  -     T4, free; Future  -     TSH, 3rd generation; Future  10. Sore throat  -     POCT rapid ANTIGEN strepA         1. DDD (degenerative disc disease), lumbar      2. Vitamin D deficiency         No problem-specific Assessment & Plan notes found for this encounter.           Subjective:      Patient ID: Martita Lawrence is a 73 y.o. female.    HPI    The following portions of the patient's history were reviewed and updated as appropriate: She  has a past medical history of Allergic, Allergies, Arthritis, Carpal tunnel syndrome on right, Chemotherapy induced neutropenia (HCC) (05/23/2019), Gastroesophageal reflux disease (08/06/2020), GERD (gastroesophageal reflux disease), Lumbar disc disease, Trigger thumb, Vitamin D deficiency, and Wears glasses.  She   Patient Active Problem List    Diagnosis Date Noted    Other insomnia 05/01/2022    Osteopenia 03/05/2021     Gastroesophageal reflux disease 08/06/2020    Breast reconstruction deformity     Use of tamoxifen (Nolvadex) 06/17/2020    Bilateral carpal tunnel syndrome 01/27/2020    Osteoarthritis of multiple joints 01/27/2020    Drug-induced polyneuropathy (HCC) 01/27/2020    Arthritis 01/27/2020    Vitamin D deficiency 01/08/2020    Impaired fasting blood sugar 01/08/2020    Dysuria 11/10/2019    Chemotherapy induced neutropenia (HCC) 05/23/2019    Folliculitis 05/17/2019    BRCA negative 01/23/2019    Family history of breast cancer in sister 01/10/2019    Malignant neoplasm of lower-inner quadrant of left breast in female, estrogen receptor positive (HCC) 01/02/2019    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    Thyroid nodule 12/15/2016    Elevated LFTs 10/26/2016    Nausea 10/26/2016    Atypical chest pain 10/24/2016    Epigastric pain 10/24/2016    Plantar fasciitis of right foot 10/20/2016     She  has a past surgical history that includes Thumb arthroscopy; pr edg us exam surgical alter stom duodenum/jejunum (N/A, 02/02/2017); pr fasciectomy plantar fascia partial spx (Right, 10/20/2016); Oophorectomy (Bilateral); US guided breast biopsy left complete (Left, 01/02/2019); Breast biopsy (Left, 01/02/2019); Colonoscopy; pr mastectomy simple complete (N/A, 02/08/2019); pr bx/exc lymph node open superficial (Left, 02/08/2019); pr tissue expander placement breast reconstruction (Bilateral, 02/08/2019); pr implnt bio implnt for soft tissue reinforcement (Bilateral, 02/08/2019); pr huong-implant capsulectomy breast complete (Bilateral, 09/27/2019); pr insj/rplcmt breast implant sep day mastectomy (Bilateral, 09/27/2019); pr revision of reconstructed breast (Bilateral, 08/06/2020); Liposuction w/ fat injection (Bilateral, 08/06/2020); Hysterectomy; [place] tooth extraction; and Upper gastrointestinal endoscopy.  Her family history includes Brain cancer (age of onset: 72) in her mother; Breast cancer (age of onset: 55) in her sister;  Breast cancer (age of onset: 65) in her sister; Breast cancer additional onset (age of onset: 65) in her sister; Prostate cancer (age of onset: 58) in her brother; Prostate cancer (age of onset: 78) in her father.  She  reports that she has never smoked. She has never been exposed to tobacco smoke. She has never used smokeless tobacco. She reports that she does not currently use alcohol after a past usage of about 1.0 standard drink of alcohol per week. She reports that she does not use drugs.  Current Outpatient Medications   Medication Sig Dispense Refill    CALCIUM PO Take 1,200 mg by mouth daily      carbamide peroxide (DEBROX) 6.5 % otic solution Administer 5 drops into the left ear daily at bedtime 15 mL 0    Cholecalciferol (VITAMIN D PO) Take 1.25 mg by mouth every 30 (thirty) days      clobetasol (TEMOVATE) 0.05 % ointment Apply topically 2 (two) times a day 45 g 0    Cyanocobalamin (VITAMIN B 12 PO) Take 500 mcg by mouth daily       ergocalciferol (VITAMIN D2) 50,000 units Take 1 capsule (50,000 Units total) by mouth every 30 (thirty) days 3 capsule 3    lidocaine (LIDODERM) 5 % Apply 1 patch topically over 12 hours daily Remove & Discard patch within 12 hours or as directed by MD 15 patch 1    metroNIDAZOLE (METROGEL) 0.75 % gel       Multiple Vitamins-Minerals (MULTIVITAL PO) Take by mouth daily       naproxen (Naprosyn) 500 mg tablet Take 1 tablet (500 mg total) by mouth if needed for moderate pain 90 tablet 0    Omega-3 Fatty Acids (Fish Oil) 300 MG CAPS Take by mouth      sodium chloride (OCEAN) 0.65 % nasal spray 1 spray into each nostril every 2 (two) hours while awake 60 mL 1    tamoxifen (NOLVADEX) 20 mg tablet Take 1 tablet (20 mg total) by mouth daily 90 tablet 0    triamcinolone (KENALOG) 0.1 % ointment Apply topically 2 (two) times a day for 7 days (Patient taking differently: Apply topically if needed) 80 g 0    fluticasone (FLONASE) 50 mcg/act nasal spray 1 spray into each nostril daily  (Patient not taking: Reported on 3/4/2024) 9 mL 1    Na Sulfate-K Sulfate-Mg Sulf 17.5-3.13-1.6 GM/177ML SOLN Take 1 kit by mouth once for 1 dose (Patient not taking: Reported on 8/9/2022) 354 mL 0    tretinoin (RETIN-A) 0.025 % cream  (Patient not taking: Reported on 6/5/2024)       No current facility-administered medications for this visit.     Current Outpatient Medications on File Prior to Visit   Medication Sig    CALCIUM PO Take 1,200 mg by mouth daily    Cholecalciferol (VITAMIN D PO) Take 1.25 mg by mouth every 30 (thirty) days    clobetasol (TEMOVATE) 0.05 % ointment Apply topically 2 (two) times a day    Cyanocobalamin (VITAMIN B 12 PO) Take 500 mcg by mouth daily     lidocaine (LIDODERM) 5 % Apply 1 patch topically over 12 hours daily Remove & Discard patch within 12 hours or as directed by MD    metroNIDAZOLE (METROGEL) 0.75 % gel     Multiple Vitamins-Minerals (MULTIVITAL PO) Take by mouth daily     Omega-3 Fatty Acids (Fish Oil) 300 MG CAPS Take by mouth    sodium chloride (OCEAN) 0.65 % nasal spray 1 spray into each nostril every 2 (two) hours while awake    tamoxifen (NOLVADEX) 20 mg tablet Take 1 tablet (20 mg total) by mouth daily    triamcinolone (KENALOG) 0.1 % ointment Apply topically 2 (two) times a day for 7 days (Patient taking differently: Apply topically if needed)    [DISCONTINUED] ergocalciferol (VITAMIN D2) 50,000 units Take 1 capsule (50,000 Units total) by mouth every 30 (thirty) days    [DISCONTINUED] naproxen (Naprosyn) 500 mg tablet Take 1 tablet (500 mg total) by mouth 2 (two) times a day with meals (Patient taking differently: Take 500 mg by mouth if needed)    fluticasone (FLONASE) 50 mcg/act nasal spray 1 spray into each nostril daily (Patient not taking: Reported on 3/4/2024)    Na Sulfate-K Sulfate-Mg Sulf 17.5-3.13-1.6 GM/177ML SOLN Take 1 kit by mouth once for 1 dose (Patient not taking: Reported on 8/9/2022)    tretinoin (RETIN-A) 0.025 % cream  (Patient not taking:  "Reported on 6/5/2024)     No current facility-administered medications on file prior to visit.     Medications Discontinued During This Encounter   Medication Reason    ergocalciferol (VITAMIN D2) 50,000 units Reorder    naproxen (Naprosyn) 500 mg tablet Reorder      She is allergic to latex and shellfish-derived products - food allergy..    Review of Systems   Constitutional:  Negative for appetite change, chills, fatigue and fever.   HENT:  Positive for sore throat. Negative for trouble swallowing.    Eyes:  Negative for redness.   Respiratory:  Negative for shortness of breath.    Cardiovascular:  Negative for chest pain and palpitations.   Gastrointestinal:  Negative for abdominal pain, constipation and diarrhea.   Genitourinary:  Negative for dysuria and hematuria.   Musculoskeletal:  Positive for arthralgias and myalgias. Negative for back pain and neck pain.   Skin:  Negative for rash.   Neurological:  Negative for seizures, weakness and headaches.        Neuropathic pain.   Hematological:  Negative for adenopathy.   Psychiatric/Behavioral:  Negative for confusion. The patient is not nervous/anxious.          Objective:      /67 (BP Location: Left arm, Patient Position: Sitting, Cuff Size: Large)   Pulse 94   Temp (!) 97.4 °F (36.3 °C) (Temporal)   Ht 5' 5\" (1.651 m)   Wt 83.5 kg (184 lb)   SpO2 98%   BMI 30.62 kg/m²     Results Reviewed       None            Recent Results (from the past 1344 hour(s))   CBC and differential    Collection Time: 05/13/24  6:34 AM   Result Value Ref Range    WBC 6.20 4.31 - 10.16 Thousand/uL    RBC 5.03 3.81 - 5.12 Million/uL    Hemoglobin 14.3 11.5 - 15.4 g/dL    Hematocrit 45.1 34.8 - 46.1 %    MCV 90 82 - 98 fL    MCH 28.4 26.8 - 34.3 pg    MCHC 31.7 31.4 - 37.4 g/dL    RDW 13.3 11.6 - 15.1 %    MPV 10.1 8.9 - 12.7 fL    Platelets 184 149 - 390 Thousands/uL    nRBC 0 /100 WBCs    Segmented % 38 (L) 43 - 75 %    Immature Grans % 0 0 - 2 %    Lymphocytes % 48 (H) " 14 - 44 %    Monocytes % 7 4 - 12 %    Eosinophils Relative 6 0 - 6 %    Basophils Relative 1 0 - 1 %    Absolute Neutrophils 2.34 1.85 - 7.62 Thousands/µL    Absolute Immature Grans 0.01 0.00 - 0.20 Thousand/uL    Absolute Lymphocytes 2.99 0.60 - 4.47 Thousands/µL    Absolute Monocytes 0.46 0.17 - 1.22 Thousand/µL    Eosinophils Absolute 0.34 0.00 - 0.61 Thousand/µL    Basophils Absolute 0.06 0.00 - 0.10 Thousands/µL   Comprehensive metabolic panel    Collection Time: 05/13/24  6:34 AM   Result Value Ref Range    Sodium 138 135 - 147 mmol/L    Potassium 3.9 3.5 - 5.3 mmol/L    Chloride 103 96 - 108 mmol/L    CO2 28 21 - 32 mmol/L    ANION GAP 7 4 - 13 mmol/L    BUN 13 5 - 25 mg/dL    Creatinine 0.68 0.60 - 1.30 mg/dL    Glucose, Fasting 89 65 - 99 mg/dL    Calcium 9.6 8.4 - 10.2 mg/dL    AST 19 13 - 39 U/L    ALT 12 7 - 52 U/L    Alkaline Phosphatase 40 34 - 104 U/L    Total Protein 6.9 6.4 - 8.4 g/dL    Albumin 4.1 3.5 - 5.0 g/dL    Total Bilirubin 0.45 0.20 - 1.00 mg/dL    eGFR 88 ml/min/1.73sq m   Cancer antigen 27.29    Collection Time: 05/13/24  6:34 AM   Result Value Ref Range    CA 27-29 20.9 0.0 - 38.6 U/mL   POCT rapid ANTIGEN strepA    Collection Time: 06/05/24 10:52 AM   Result Value Ref Range     RAPID STREP A Negative Negative        Physical Exam  Constitutional:       General: She is not in acute distress.     Appearance: Normal appearance. She is obese.   HENT:      Head: Normocephalic and atraumatic.      Left Ear: There is impacted cerumen.      Nose: Nose normal.      Mouth/Throat:      Mouth: Mucous membranes are moist.   Eyes:      Extraocular Movements: Extraocular movements intact.      Pupils: Pupils are equal, round, and reactive to light.   Cardiovascular:      Rate and Rhythm: Normal rate and regular rhythm.      Pulses: Normal pulses.      Heart sounds: Normal heart sounds. No murmur heard.     No friction rub.   Pulmonary:      Effort: Pulmonary effort is normal. No respiratory  distress.      Breath sounds: Normal breath sounds. No wheezing.   Abdominal:      General: Abdomen is flat. Bowel sounds are normal. There is no distension.      Palpations: Abdomen is soft. There is no mass.      Tenderness: There is no abdominal tenderness. There is no guarding.   Musculoskeletal:         General: Normal range of motion.   Neurological:      General: No focal deficit present.      Mental Status: She is alert and oriented to person, place, and time. Mental status is at baseline.      Cranial Nerves: No cranial nerve deficit.   Psychiatric:         Mood and Affect: Mood normal.         Behavior: Behavior normal.

## 2024-06-13 ENCOUNTER — ONCOLOGY SURVIVORSHIP (OUTPATIENT)
Dept: SURGICAL ONCOLOGY | Facility: CLINIC | Age: 74
End: 2024-06-13
Payer: COMMERCIAL

## 2024-06-13 VITALS
HEIGHT: 65 IN | BODY MASS INDEX: 30.49 KG/M2 | TEMPERATURE: 97.5 F | WEIGHT: 183 LBS | HEART RATE: 93 BPM | OXYGEN SATURATION: 95 % | DIASTOLIC BLOOD PRESSURE: 78 MMHG | SYSTOLIC BLOOD PRESSURE: 110 MMHG | RESPIRATION RATE: 16 BRPM

## 2024-06-13 DIAGNOSIS — Z17.0 MALIGNANT NEOPLASM OF LOWER-INNER QUADRANT OF LEFT BREAST IN FEMALE, ESTROGEN RECEPTOR POSITIVE (HCC): Primary | ICD-10-CM

## 2024-06-13 DIAGNOSIS — Z79.810 USE OF TAMOXIFEN (NOLVADEX): ICD-10-CM

## 2024-06-13 DIAGNOSIS — C50.312 MALIGNANT NEOPLASM OF LOWER-INNER QUADRANT OF LEFT BREAST IN FEMALE, ESTROGEN RECEPTOR POSITIVE (HCC): Primary | ICD-10-CM

## 2024-06-13 PROCEDURE — 99213 OFFICE O/P EST LOW 20 MIN: CPT | Performed by: NURSE PRACTITIONER

## 2024-06-13 NOTE — PROGRESS NOTES
Assessment/Plan:    Diagnoses and all orders for this visit:    Malignant neoplasm of lower-inner quadrant of left breast in female, estrogen receptor positive (HCC)    Use of tamoxifen (Nolvadex)    Patient is a 73-year-old female that was diagnosed with a left-sided breast cancer in January 2019.  Her pathology revealed invasive ductal carcinoma, ER 90%, MS 40%, HER2 negative.  She underwent genetic testing which was negative.  She underwent a left mastectomy and sentinel node biopsy and a right prophylactic mastectomy.  Her Oncotype score was 25.  She received adjuvant chemotherapy with TC x4.  She was unable to tolerate aromatase inhibitors and is therefore maintained on tamoxifen.  Follow-up breast cancer survivorship visit performed today.  Patient offers no new complaints today and there are no worrisome findings on today's clinical exam. She is up to date on colorectal cancer screening and osteoporosis screening (dxa scheduled next month).  She is now over 5 years from her diagnosis.  I will therefore plan to see her back in 1 year for a follow-up survivorship visit or sooner should the need arise.  She was instructed to contact us with any changes or concerns in the interim.  All of her questions were answered today.    REASON FOR VISIT:   Survivorship      Previous therapy:  Oncology History   Malignant neoplasm of lower-inner quadrant of left breast in female, estrogen receptor positive (HCC)   1/2/2019 Biopsy    Left breast biopsy:  - Invasive ductal carcinoma  Grade 2  ER 90%  MS 40%  HER2 negative     1/2019 Genetic Testing    [facility]  APC, ZAC, AXIN2, BARD1, BMPR1A, BRCA1, BRCA2, BRIP1, CDH1, CDK4, CDKN2A, CHEK2, EPCAM (large rearrangment only), HOXB13 (sequencing only), GALNT12, MLH1, MSH2, MSH3 (excluding repetitive portions of exon 1), MSH6, MUTYH, NBN, NTHL1, PALB2, PMS2, PTEN, RAD51C, RAD51D, RNF43, RPS20, SMAD4, STK11, TP53, Sequencing was performed for select regions of POLE and POLD1, and large  rearrangement analysis was performed for select regions of GREM1      Negative     2/8/2019 Surgery    Left mastectomy with sentinel lymph node biopsy  - clear margins   - 0/3 lymph nodes    Right prophylactic mastectomy, bilateral expander placement  Dr. Warner and Dr. Abreu     2/2019 Genomic Testing    Oncotype Dx: 25     4/2019 -  Chemotherapy    Adjuvant  Taxotere and Cytoxan once every 3 weeks for 4 cycles   Dr. Aguilar     4/11/2019 - 7/3/2019 Chemotherapy    pegfilgrastim (NEULASTA) subcutaneous injection 6 mg, 6 mg, Subcutaneous, Once, 4 of 4 cycles  Dose modification: 4 mg (original dose 6 mg, Cycle 3)  Administration: 4 mg (5/24/2019), 4 mg (6/14/2019)  cyclophosphamide (CYTOXAN) 1,176 mg in sodium chloride 0.9 % 250 mL IVPB, 600 mg/m2 = 1,176 mg, Intravenous, Once, 4 of 4 cycles  Administration: 1,176 mg (5/23/2019), 1,176 mg (6/13/2019)  DOCEtaxel (TAXOTERE) 147 mg in sodium chloride 0.9 % 250 mL chemo infusion, 75 mg/m2 = 147 mg, Intravenous, Once, 4 of 4 cycles  Administration: 147 mg (5/23/2019), 147 mg (6/13/2019)     7/2019 -  Hormone Therapy    Femara 07/2019- 10/2019  Anstrozole 10/2019- 5/2020  Tamoxifen- 5/2020- present     9/27/2019 Surgery    Bilateral implant exhange     8/6/2020 Surgery    Bilateral fat grafting           Patient ID: Martita Lawrence is a 73 y.o. female  Presenting today for a breast cancer survivorship follow-up visit.  She has not appreciated any changes on self-exam.  She denies persistent headaches, back pain or bone pain, cough or shortness of breath, abdominal pain.  She continues on tamoxifen but states that a breast cancer index has been sent out but has not yet resulted.          Review of Systems   Constitutional:  Negative for activity change, appetite change, chills, fatigue, fever and unexpected weight change.   Respiratory:  Negative for cough and shortness of breath.    Cardiovascular:  Negative for chest pain.   Gastrointestinal:  Negative for abdominal pain,  "constipation, diarrhea, nausea and vomiting.   Musculoskeletal:  Negative for arthralgias, back pain, gait problem and myalgias.   Skin:  Negative for color change and rash.   Neurological:  Negative for dizziness and headaches.   Hematological:  Negative for adenopathy.   Psychiatric/Behavioral:  Negative for agitation and confusion.    All other systems reviewed and are negative.      Objective:    Blood pressure 110/78, pulse 93, temperature 97.5 °F (36.4 °C), temperature source Temporal, resp. rate 16, height 5' 5\" (1.651 m), weight 83 kg (183 lb), SpO2 95%, not currently breastfeeding.  Body mass index is 30.45 kg/m².      Physical Exam  Vitals reviewed.   Constitutional:       General: She is not in acute distress.     Appearance: Normal appearance. She is well-developed. She is not diaphoretic.   HENT:      Head: Normocephalic and atraumatic.   Cardiovascular:      Rate and Rhythm: Normal rate and regular rhythm.      Heart sounds: Normal heart sounds.   Pulmonary:      Effort: Pulmonary effort is normal.      Breath sounds: Normal breath sounds.   Chest:      Comments: Bilateral reconstructed breast with implants present. No masses, skin changes or nodules.  Abdominal:      Palpations: Abdomen is soft. There is no mass.      Tenderness: There is no abdominal tenderness.   Musculoskeletal:         General: Normal range of motion.      Cervical back: Normal range of motion.   Lymphadenopathy:      Upper Body:      Right upper body: No supraclavicular or axillary adenopathy.      Left upper body: No supraclavicular or axillary adenopathy.   Skin:     General: Skin is warm and dry.      Findings: No rash.   Neurological:      General: No focal deficit present.      Mental Status: She is alert and oriented to person, place, and time.   Psychiatric:         Speech: Speech normal.             Discussed symptoms related to disease recurrence, Yes    Evaluated for late effects related to cancer treatment, " Yes    Screening current for cervical cancer, Yes, describe: n/a age    Screening current for colon cancer, Yes, describe: up to date    Cancer rehabilitation services addressed, No    Screening current for osteoporosis, Yes, describe: scheduled for dxa next month

## 2024-06-17 DIAGNOSIS — C50.312 MALIGNANT NEOPLASM OF LOWER-INNER QUADRANT OF LEFT BREAST IN FEMALE, ESTROGEN RECEPTOR POSITIVE (HCC): ICD-10-CM

## 2024-06-17 DIAGNOSIS — Z17.0 MALIGNANT NEOPLASM OF LOWER-INNER QUADRANT OF LEFT BREAST IN FEMALE, ESTROGEN RECEPTOR POSITIVE (HCC): ICD-10-CM

## 2024-06-17 LAB — SCAN RESULT: NORMAL

## 2024-06-17 RX ORDER — TAMOXIFEN CITRATE 20 MG/1
20 TABLET ORAL DAILY
Qty: 90 TABLET | Refills: 1 | Status: SHIPPED | OUTPATIENT
Start: 2024-06-17

## 2024-06-26 ENCOUNTER — APPOINTMENT (OUTPATIENT)
Dept: LAB | Facility: CLINIC | Age: 74
End: 2024-06-26
Payer: COMMERCIAL

## 2024-06-26 DIAGNOSIS — Z00.8 EXAM FOR POPULATION SURVEY: ICD-10-CM

## 2024-06-26 DIAGNOSIS — R73.01 IMPAIRED FASTING BLOOD SUGAR: ICD-10-CM

## 2024-06-26 DIAGNOSIS — R53.83 OTHER FATIGUE: ICD-10-CM

## 2024-06-26 LAB
BACTERIA UR QL AUTO: ABNORMAL /HPF
BILIRUB UR QL STRIP: NEGATIVE
CHOLEST SERPL-MCNC: 193 MG/DL
CLARITY UR: CLEAR
COLOR UR: YELLOW
EST. AVERAGE GLUCOSE BLD GHB EST-MCNC: 114 MG/DL
GLUCOSE UR STRIP-MCNC: NEGATIVE MG/DL
HBA1C MFR BLD: 5.6 %
HDLC SERPL-MCNC: 71 MG/DL
HGB UR QL STRIP.AUTO: NEGATIVE
KETONES UR STRIP-MCNC: NEGATIVE MG/DL
LDLC SERPL CALC-MCNC: 101 MG/DL (ref 0–100)
LEUKOCYTE ESTERASE UR QL STRIP: NEGATIVE
MUCOUS THREADS UR QL AUTO: ABNORMAL
NITRITE UR QL STRIP: NEGATIVE
NON-SQ EPI CELLS URNS QL MICRO: ABNORMAL /HPF
NONHDLC SERPL-MCNC: 122 MG/DL
PH UR STRIP.AUTO: 5.5 [PH]
PROT UR STRIP-MCNC: NEGATIVE MG/DL
RBC #/AREA URNS AUTO: ABNORMAL /HPF
SP GR UR STRIP.AUTO: 1.01 (ref 1–1.03)
T4 FREE SERPL-MCNC: 0.76 NG/DL (ref 0.61–1.12)
TRIGL SERPL-MCNC: 105 MG/DL
TSH SERPL DL<=0.05 MIU/L-ACNC: 2.15 UIU/ML (ref 0.45–4.5)
UROBILINOGEN UR STRIP-ACNC: <2 MG/DL
WBC #/AREA URNS AUTO: ABNORMAL /HPF

## 2024-06-26 PROCEDURE — 80061 LIPID PANEL: CPT

## 2024-06-26 PROCEDURE — 84439 ASSAY OF FREE THYROXINE: CPT

## 2024-06-26 PROCEDURE — 84443 ASSAY THYROID STIM HORMONE: CPT

## 2024-06-26 PROCEDURE — 83036 HEMOGLOBIN GLYCOSYLATED A1C: CPT

## 2024-06-26 PROCEDURE — 81001 URINALYSIS AUTO W/SCOPE: CPT

## 2024-06-26 PROCEDURE — 36415 COLL VENOUS BLD VENIPUNCTURE: CPT

## 2024-07-20 ENCOUNTER — HOSPITAL ENCOUNTER (OUTPATIENT)
Dept: RADIOLOGY | Age: 74
Discharge: HOME | End: 2024-07-20
Payer: COMMERCIAL

## 2024-07-20 DIAGNOSIS — M85.852 OSTEOPENIA OF NECK OF LEFT FEMUR: ICD-10-CM

## 2024-07-20 PROCEDURE — 77080 DXA BONE DENSITY AXIAL: CPT

## 2024-07-30 ENCOUNTER — OFFICE VISIT (OUTPATIENT)
Dept: INTERNAL MEDICINE CLINIC | Facility: CLINIC | Age: 74
End: 2024-07-30
Payer: COMMERCIAL

## 2024-07-30 VITALS
SYSTOLIC BLOOD PRESSURE: 112 MMHG | WEIGHT: 179 LBS | TEMPERATURE: 97.8 F | HEIGHT: 65 IN | DIASTOLIC BLOOD PRESSURE: 70 MMHG | HEART RATE: 80 BPM | OXYGEN SATURATION: 98 % | BODY MASS INDEX: 29.82 KG/M2

## 2024-07-30 DIAGNOSIS — M79.2 NEUROPATHIC PAIN: ICD-10-CM

## 2024-07-30 DIAGNOSIS — M79.10 MYALGIA: Primary | ICD-10-CM

## 2024-07-30 DIAGNOSIS — R43.9 OLFACTORY IMPAIRMENT: ICD-10-CM

## 2024-07-30 PROCEDURE — 99214 OFFICE O/P EST MOD 30 MIN: CPT | Performed by: INTERNAL MEDICINE

## 2024-07-30 RX ORDER — PREGABALIN 75 MG/1
75 CAPSULE ORAL 2 TIMES DAILY
Qty: 60 CAPSULE | Refills: 1 | Status: SHIPPED | OUTPATIENT
Start: 2024-07-30

## 2024-07-30 NOTE — PROGRESS NOTES
Assessment/Plan:           1. Myalgia  Comments:  Likely secondary to antiestrogen.  She will discuss with her oncologist  2. Neuropathic pain  Comments:  Trial of Lyrica advised.  Orders:  -     pregabalin (LYRICA) 75 mg capsule; Take 1 capsule (75 mg total) by mouth 2 (two) times a day  3. Olfactory impairment  Comments:  Etiology uncertain had 1 episode.  If it reoccurs she will see ENT.         1. Myalgia      2. Neuropathic pain    - pregabalin (LYRICA) 75 mg capsule; Take 1 capsule (75 mg total) by mouth 2 (two) times a day  Dispense: 60 capsule; Refill: 1    3. Olfactory impairment         No problem-specific Assessment & Plan notes found for this encounter.           Subjective:      Patient ID: Martita Lawrence is a 73 y.o. female.    HPI    The following portions of the patient's history were reviewed and updated as appropriate: She  has a past medical history of Allergic, Allergies, Arthritis, Carpal tunnel syndrome on right, Chemotherapy induced neutropenia (HCC) (05/23/2019), Gastroesophageal reflux disease (08/06/2020), GERD (gastroesophageal reflux disease), Lumbar disc disease, Trigger thumb, Vitamin D deficiency, and Wears glasses.  She   Patient Active Problem List    Diagnosis Date Noted    Other insomnia 05/01/2022    Osteopenia 03/05/2021    Gastroesophageal reflux disease 08/06/2020    Breast reconstruction deformity     Use of tamoxifen (Nolvadex) 06/17/2020    Bilateral carpal tunnel syndrome 01/27/2020    Osteoarthritis of multiple joints 01/27/2020    Drug-induced polyneuropathy (HCC) 01/27/2020    Arthritis 01/27/2020    Vitamin D deficiency 01/08/2020    Impaired fasting blood sugar 01/08/2020    Dysuria 11/10/2019    Chemotherapy induced neutropenia (HCC) 05/23/2019    Folliculitis 05/17/2019    BRCA negative 01/23/2019    Family history of breast cancer in sister 01/10/2019    Malignant neoplasm of lower-inner quadrant of left breast in female, estrogen receptor positive (HCC) 01/02/2019     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    Thyroid nodule 12/15/2016    Elevated LFTs 10/26/2016    Nausea 10/26/2016    Atypical chest pain 10/24/2016    Epigastric pain 10/24/2016    Plantar fasciitis of right foot 10/20/2016     She  has a past surgical history that includes Thumb arthroscopy; pr edg us exam surgical alter stom duodenum/jejunum (N/A, 02/02/2017); pr fasciectomy plantar fascia partial spx (Right, 10/20/2016); Oophorectomy (Bilateral); US guided breast biopsy left complete (Left, 01/02/2019); Breast biopsy (Left, 01/02/2019); Colonoscopy; pr mastectomy simple complete (N/A, 02/08/2019); pr bx/exc lymph node open superficial (Left, 02/08/2019); pr tissue expander placement breast reconstruction (Bilateral, 02/08/2019); pr implnt bio implnt for soft tissue reinforcement (Bilateral, 02/08/2019); pr huong-implant capsulectomy breast complete (Bilateral, 09/27/2019); pr insj/rplcmt breast implant sep day mastectomy (Bilateral, 09/27/2019); pr revision of reconstructed breast (Bilateral, 08/06/2020); Liposuction w/ fat injection (Bilateral, 08/06/2020); Hysterectomy; [place] tooth extraction; and Upper gastrointestinal endoscopy.  Her family history includes Brain cancer (age of onset: 72) in her mother; Breast cancer (age of onset: 55) in her sister; Breast cancer (age of onset: 65) in her sister; Breast cancer additional onset (age of onset: 65) in her sister; Prostate cancer (age of onset: 58) in her brother; Prostate cancer (age of onset: 78) in her father.  She  reports that she has never smoked. She has never been exposed to tobacco smoke. She has never used smokeless tobacco. She reports that she does not currently use alcohol after a past usage of about 1.0 standard drink of alcohol per week. She reports that she does not use drugs.  Current Outpatient Medications   Medication Sig Dispense Refill    CALCIUM PO Take 1,200 mg by mouth daily      Cholecalciferol (VITAMIN D PO) Take 1.25 mg by mouth  every 30 (thirty) days      clobetasol (TEMOVATE) 0.05 % ointment Apply topically 2 (two) times a day 45 g 0    Cyanocobalamin (VITAMIN B 12 PO) Take 500 mcg by mouth daily       ergocalciferol (VITAMIN D2) 50,000 units Take 1 capsule (50,000 Units total) by mouth every 30 (thirty) days 3 capsule 3    fluticasone (FLONASE) 50 mcg/act nasal spray 1 spray into each nostril daily 9 mL 1    lidocaine (LIDODERM) 5 % Apply 1 patch topically over 12 hours daily Remove & Discard patch within 12 hours or as directed by MD 15 patch 1    metroNIDAZOLE (METROGEL) 0.75 % gel       Multiple Vitamins-Minerals (MULTIVITAL PO) Take by mouth daily       naproxen (Naprosyn) 500 mg tablet Take 1 tablet (500 mg total) by mouth if needed for moderate pain 90 tablet 0    Omega-3 Fatty Acids (Fish Oil) 300 MG CAPS Take by mouth      pregabalin (LYRICA) 75 mg capsule Take 1 capsule (75 mg total) by mouth 2 (two) times a day 60 capsule 1    sodium chloride (OCEAN) 0.65 % nasal spray 1 spray into each nostril every 2 (two) hours while awake 60 mL 1    tamoxifen (NOLVADEX) 20 mg tablet Take 1 tablet (20 mg total) by mouth daily 90 tablet 1    carbamide peroxide (DEBROX) 6.5 % otic solution Administer 5 drops into the left ear daily at bedtime (Patient not taking: Reported on 6/13/2024) 15 mL 0    Na Sulfate-K Sulfate-Mg Sulf 17.5-3.13-1.6 GM/177ML SOLN Take 1 kit by mouth once for 1 dose (Patient not taking: Reported on 8/9/2022) 354 mL 0    tretinoin (RETIN-A) 0.025 % cream  (Patient not taking: Reported on 6/5/2024)      triamcinolone (KENALOG) 0.1 % ointment Apply topically 2 (two) times a day for 7 days (Patient not taking: Reported on 7/30/2024) 80 g 0     No current facility-administered medications for this visit.     Current Outpatient Medications on File Prior to Visit   Medication Sig    CALCIUM PO Take 1,200 mg by mouth daily    Cholecalciferol (VITAMIN D PO) Take 1.25 mg by mouth every 30 (thirty) days    clobetasol (TEMOVATE) 0.05  % ointment Apply topically 2 (two) times a day    Cyanocobalamin (VITAMIN B 12 PO) Take 500 mcg by mouth daily     ergocalciferol (VITAMIN D2) 50,000 units Take 1 capsule (50,000 Units total) by mouth every 30 (thirty) days    fluticasone (FLONASE) 50 mcg/act nasal spray 1 spray into each nostril daily    lidocaine (LIDODERM) 5 % Apply 1 patch topically over 12 hours daily Remove & Discard patch within 12 hours or as directed by MD    metroNIDAZOLE (METROGEL) 0.75 % gel     Multiple Vitamins-Minerals (MULTIVITAL PO) Take by mouth daily     naproxen (Naprosyn) 500 mg tablet Take 1 tablet (500 mg total) by mouth if needed for moderate pain    Omega-3 Fatty Acids (Fish Oil) 300 MG CAPS Take by mouth    sodium chloride (OCEAN) 0.65 % nasal spray 1 spray into each nostril every 2 (two) hours while awake    tamoxifen (NOLVADEX) 20 mg tablet Take 1 tablet (20 mg total) by mouth daily    carbamide peroxide (DEBROX) 6.5 % otic solution Administer 5 drops into the left ear daily at bedtime (Patient not taking: Reported on 6/13/2024)    Na Sulfate-K Sulfate-Mg Sulf 17.5-3.13-1.6 GM/177ML SOLN Take 1 kit by mouth once for 1 dose (Patient not taking: Reported on 8/9/2022)    tretinoin (RETIN-A) 0.025 % cream  (Patient not taking: Reported on 6/5/2024)    triamcinolone (KENALOG) 0.1 % ointment Apply topically 2 (two) times a day for 7 days (Patient not taking: Reported on 7/30/2024)     No current facility-administered medications on file prior to visit.     There are no discontinued medications.   She is allergic to latex and shellfish-derived products - food allergy..    Review of Systems   Constitutional:  Negative for appetite change, chills, fatigue and fever.   HENT:  Negative for sore throat and trouble swallowing.         Smelled onions for 1-2 minutes   Eyes:  Negative for redness.   Respiratory:  Negative for shortness of breath.    Cardiovascular:  Negative for chest pain and palpitations.   Gastrointestinal:  Negative  "for abdominal pain, constipation and diarrhea.   Genitourinary:  Negative for dysuria and hematuria.   Musculoskeletal:  Positive for arthralgias. Negative for back pain and neck pain.        Pain in legs and thigh   Skin:  Negative for rash.   Neurological:  Negative for seizures, weakness and headaches.   Hematological:  Negative for adenopathy.   Psychiatric/Behavioral:  Negative for confusion. The patient is not nervous/anxious.          Objective:      /70 (BP Location: Left arm, Patient Position: Sitting, Cuff Size: Standard)   Pulse 80   Temp 97.8 °F (36.6 °C) (Temporal)   Ht 5' 5\" (1.651 m)   Wt 81.2 kg (179 lb)   SpO2 98%   BMI 29.79 kg/m²     Results Reviewed       None            Recent Results (from the past 1344 hour(s))   POCT rapid ANTIGEN strepA    Collection Time: 06/05/24 10:52 AM   Result Value Ref Range     RAPID STREP A Negative Negative   T4, free    Collection Time: 06/26/24  7:04 AM   Result Value Ref Range    Free T4 0.76 0.61 - 1.12 ng/dL   TSH, 3rd generation    Collection Time: 06/26/24  7:04 AM   Result Value Ref Range    TSH 3RD GENERATON 2.155 0.450 - 4.500 uIU/mL   Hemoglobin A1C    Collection Time: 06/26/24  7:04 AM   Result Value Ref Range    Hemoglobin A1C 5.6 Normal 4.0-5.6%; PreDiabetic 5.7-6.4%; Diabetic >=6.5%; Glycemic control for adults with diabetes <7.0% %     mg/dl   Lipid panel    Collection Time: 06/26/24  7:04 AM   Result Value Ref Range    Cholesterol 193 See Comment mg/dL    Triglycerides 105 See Comment mg/dL    HDL, Direct 71 >=50 mg/dL    LDL Calculated 101 (H) 0 - 100 mg/dL    Non-HDL-Chol (CHOL-HDL) 122 mg/dl   Urinalysis with microscopic    Collection Time: 06/26/24  7:04 AM   Result Value Ref Range    Color, UA Yellow     Clarity, UA Clear     Specific Gravity, UA 1.011 1.003 - 1.030    pH, UA 5.5 4.5, 5.0, 5.5, 6.0, 6.5, 7.0, 7.5, 8.0    Leukocytes, UA Negative Negative    Nitrite, UA Negative Negative    Protein, UA Negative Negative mg/dl "    Glucose, UA Negative Negative mg/dl    Ketones, UA Negative Negative mg/dl    Urobilinogen, UA <2.0 <2.0 mg/dl mg/dl    Bilirubin, UA Negative Negative    Occult Blood, UA Negative Negative    RBC, UA 1-2 None Seen, 1-2 /hpf    WBC, UA 1-2 None Seen, 1-2 /hpf    Epithelial Cells None Seen None Seen, Occasional /hpf    Bacteria, UA None Seen None Seen, Occasional /hpf    MUCUS THREADS Occasional (A) None Seen        Physical Exam  Constitutional:       Appearance: Normal appearance. She is normal weight.   HENT:      Head: Normocephalic and atraumatic.      Nose: Nose normal.      Mouth/Throat:      Mouth: Mucous membranes are moist.   Eyes:      Extraocular Movements: Extraocular movements intact.      Pupils: Pupils are equal, round, and reactive to light.   Pulmonary:      Effort: Pulmonary effort is normal.   Abdominal:      Palpations: Abdomen is soft.   Musculoskeletal:         General: Normal range of motion.      Cervical back: Normal range of motion and neck supple.   Neurological:      General: No focal deficit present.      Mental Status: She is alert and oriented to person, place, and time. Mental status is at baseline.

## 2024-08-19 DIAGNOSIS — Z17.0 MALIGNANT NEOPLASM OF LOWER-INNER QUADRANT OF LEFT BREAST IN FEMALE, ESTROGEN RECEPTOR POSITIVE (HCC): ICD-10-CM

## 2024-08-19 DIAGNOSIS — C50.312 MALIGNANT NEOPLASM OF LOWER-INNER QUADRANT OF LEFT BREAST IN FEMALE, ESTROGEN RECEPTOR POSITIVE (HCC): ICD-10-CM

## 2024-08-19 NOTE — TELEPHONE ENCOUNTER
Reason for call:   [x] Refill   [] Prior Auth  [] Other:     Office:   [] PCP/Provider -   [x] Specialty/Provider -     Medication:     tamoxifen (NOLVADEX) 20 mg tablet       Dose/Frequency: Take 1 tablet (20 mg total) by mouth daily,     Quantity: 90 tablet     Pharmacy: [facility] - [place], [place] - [address] [phone]     Does the patient have enough for 3 days?   [] Yes   [x] No - Send as HP to POD

## 2024-08-21 RX ORDER — TAMOXIFEN CITRATE 20 MG/1
20 TABLET ORAL DAILY
Qty: 90 TABLET | Refills: 1 | Status: SHIPPED | OUTPATIENT
Start: 2024-08-21

## 2024-08-29 ENCOUNTER — OFFICE VISIT (OUTPATIENT)
Dept: INTERNAL MEDICINE CLINIC | Facility: CLINIC | Age: 74
End: 2024-08-29
Payer: COMMERCIAL

## 2024-08-29 ENCOUNTER — HOSPITAL ENCOUNTER (OUTPATIENT)
Dept: RADIOLOGY | Facility: HOSPITAL | Age: 74
Discharge: HOME | End: 2024-08-29
Payer: COMMERCIAL

## 2024-08-29 VITALS
HEART RATE: 97 BPM | DIASTOLIC BLOOD PRESSURE: 78 MMHG | WEIGHT: 177 LBS | BODY MASS INDEX: 29.49 KG/M2 | SYSTOLIC BLOOD PRESSURE: 122 MMHG | OXYGEN SATURATION: 97 % | HEIGHT: 65 IN | TEMPERATURE: 97.7 F

## 2024-08-29 DIAGNOSIS — M51.36 DDD (DEGENERATIVE DISC DISEASE), LUMBAR: ICD-10-CM

## 2024-08-29 DIAGNOSIS — M25.512 ACUTE PAIN OF LEFT SHOULDER: ICD-10-CM

## 2024-08-29 DIAGNOSIS — M85.852 OSTEOPENIA OF LEFT HIP: ICD-10-CM

## 2024-08-29 DIAGNOSIS — M75.82 ROTATOR CUFF TENDONITIS, LEFT: Primary | ICD-10-CM

## 2024-08-29 DIAGNOSIS — M75.82 ROTATOR CUFF TENDONITIS, LEFT: ICD-10-CM

## 2024-08-29 DIAGNOSIS — Z12.31 ENCOUNTER FOR SCREENING MAMMOGRAM FOR MALIGNANT NEOPLASM OF BREAST: ICD-10-CM

## 2024-08-29 PROCEDURE — 73030 X-RAY EXAM OF SHOULDER: CPT

## 2024-08-29 PROCEDURE — 99214 OFFICE O/P EST MOD 30 MIN: CPT | Performed by: INTERNAL MEDICINE

## 2024-08-29 RX ORDER — NAPROXEN 500 MG/1
500 TABLET ORAL 2 TIMES DAILY WITH MEALS
Qty: 20 TABLET | Refills: 0 | Status: SHIPPED | OUTPATIENT
Start: 2024-08-29

## 2024-08-29 RX ORDER — LIDOCAINE 50 MG/G
1 PATCH TOPICAL DAILY
Qty: 15 PATCH | Refills: 1 | Status: SHIPPED | OUTPATIENT
Start: 2024-08-29

## 2024-08-29 NOTE — PROGRESS NOTES
Assessment/Plan:             1. Rotator cuff tendonitis, left  -     naproxen (Naprosyn) 500 mg tablet; Take 1 tablet (500 mg total) by mouth 2 (two) times a day with meals  -     Cluster Labs limited; Future; Expected date: 08/29/2024  -     XR shoulder 2+ vw left; Future; Expected date: 08/29/2024  -     Ambulatory referral to Physical Therapy; Future  -     lidocaine (LIDODERM) 5 %; Apply 1 patch topically over 12 hours daily Remove & Discard patch within 12 hours or as directed by MD  2. Acute pain of left shoulder  -     lidocaine (LIDODERM) 5 %; Apply 1 patch topically over 12 hours daily Remove & Discard patch within 12 hours or as directed by MD  3. Encounter for screening mammogram for malignant neoplasm of breast  4. DDD (degenerative disc disease), lumbar  Comments:  Continue as needed naproxen.  Limited use advised.  Orders:  -     lidocaine (LIDODERM) 5 %; Apply 1 patch topically over 12 hours daily Remove & Discard patch within 12 hours or as directed by MD  5. Osteopenia of left hip  Comments:  calcium 1200 mg   a day,continue vit d and exercise discused         Subjective:      Patient ID: Martita Lawrence is a 73 y.o. female.    Left shoulder pain, no trauma, going on for few weeks, bone density done on 7/20/2024 discussed with her        The following portions of the patient's history were reviewed and updated as appropriate: She  has a past medical history of Allergic, Allergies, Arthritis, Carpal tunnel syndrome on right, Chemotherapy induced neutropenia (HCC) (05/23/2019), Gastroesophageal reflux disease (08/06/2020), GERD (gastroesophageal reflux disease), Lumbar disc disease, Trigger thumb, Vitamin D deficiency, and Wears glasses.  She   Patient Active Problem List    Diagnosis Date Noted    Other insomnia 05/01/2022    Osteopenia 03/05/2021    Gastroesophageal reflux disease 08/06/2020    Breast reconstruction deformity     Use of tamoxifen (Nolvadex) 06/17/2020    Bilateral carpal tunnel syndrome  01/27/2020    Osteoarthritis of multiple joints 01/27/2020    Drug-induced polyneuropathy (HCC) 01/27/2020    Arthritis 01/27/2020    Vitamin D deficiency 01/08/2020    Impaired fasting blood sugar 01/08/2020    Dysuria 11/10/2019    Chemotherapy induced neutropenia (HCC) 05/23/2019    Folliculitis 05/17/2019    BRCA negative 01/23/2019    Family history of breast cancer in sister 01/10/2019    Malignant neoplasm of lower-inner quadrant of left breast in female, estrogen receptor positive (HCC) 01/02/2019    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    Thyroid nodule 12/15/2016    Elevated LFTs 10/26/2016    Nausea 10/26/2016    Atypical chest pain 10/24/2016    Epigastric pain 10/24/2016    Plantar fasciitis of right foot 10/20/2016     She  has a past surgical history that includes Thumb arthroscopy; pr edg us exam surgical alter stom duodenum/jejunum (N/A, 02/02/2017); pr fasciectomy plantar fascia partial spx (Right, 10/20/2016); Oophorectomy (Bilateral); US guided breast biopsy left complete (Left, 01/02/2019); Breast biopsy (Left, 01/02/2019); Colonoscopy; pr mastectomy simple complete (N/A, 02/08/2019); pr bx/exc lymph node open superficial (Left, 02/08/2019); pr tissue expander placement breast reconstruction (Bilateral, 02/08/2019); pr implnt bio implnt for soft tissue reinforcement (Bilateral, 02/08/2019); pr huong-implant capsulectomy breast complete (Bilateral, 09/27/2019); pr insj/rplcmt breast implant sep day mastectomy (Bilateral, 09/27/2019); pr revision of reconstructed breast (Bilateral, 08/06/2020); Liposuction w/ fat injection (Bilateral, 08/06/2020); Hysterectomy; [place] tooth extraction; and Upper gastrointestinal endoscopy.  Her family history includes Brain cancer (age of onset: 72) in her mother; Breast cancer (age of onset: 55) in her sister; Breast cancer (age of onset: 65) in her sister; Breast cancer additional onset (age of onset: 65) in her sister; Prostate cancer (age of onset: 58) in her  brother; Prostate cancer (age of onset: 78) in her father.  She  reports that she has never smoked. She has never been exposed to tobacco smoke. She has never used smokeless tobacco. She reports that she does not currently use alcohol after a past usage of about 1.0 standard drink of alcohol per week. She reports that she does not use drugs.  Current Outpatient Medications   Medication Sig Dispense Refill    CALCIUM PO Take 1,200 mg by mouth daily      Cholecalciferol (VITAMIN D PO) Take 1.25 mg by mouth every 30 (thirty) days      clobetasol (TEMOVATE) 0.05 % ointment Apply topically 2 (two) times a day 45 g 0    Cyanocobalamin (VITAMIN B 12 PO) Take 500 mcg by mouth daily       ergocalciferol (VITAMIN D2) 50,000 units Take 1 capsule (50,000 Units total) by mouth every 30 (thirty) days 3 capsule 3    lidocaine (LIDODERM) 5 % Apply 1 patch topically over 12 hours daily Remove & Discard patch within 12 hours or as directed by MD 15 patch 1    Multiple Vitamins-Minerals (MULTIVITAL PO) Take by mouth daily       naproxen (Naprosyn) 500 mg tablet Take 1 tablet (500 mg total) by mouth 2 (two) times a day with meals 20 tablet 0    Omega-3 Fatty Acids (Fish Oil) 300 MG CAPS Take by mouth      sodium chloride (OCEAN) 0.65 % nasal spray 1 spray into each nostril every 2 (two) hours while awake 60 mL 1    tamoxifen (NOLVADEX) 20 mg tablet Take 1 tablet (20 mg total) by mouth daily 90 tablet 1    carbamide peroxide (DEBROX) 6.5 % otic solution Administer 5 drops into the left ear daily at bedtime (Patient not taking: Reported on 6/13/2024) 15 mL 0    fluticasone (FLONASE) 50 mcg/act nasal spray 1 spray into each nostril daily (Patient not taking: Reported on 8/29/2024) 9 mL 1    metroNIDAZOLE (METROGEL) 0.75 % gel  (Patient not taking: Reported on 8/29/2024)      Na Sulfate-K Sulfate-Mg Sulf 17.5-3.13-1.6 GM/177ML SOLN Take 1 kit by mouth once for 1 dose (Patient not taking: Reported on 8/9/2022) 354 mL 0    pregabalin  (LYRICA) 75 mg capsule Take 1 capsule (75 mg total) by mouth 2 (two) times a day (Patient not taking: Reported on 8/29/2024) 60 capsule 1    tretinoin (RETIN-A) 0.025 % cream  (Patient not taking: Reported on 6/5/2024)      triamcinolone (KENALOG) 0.1 % ointment Apply topically 2 (two) times a day for 7 days (Patient not taking: Reported on 8/29/2024) 80 g 0     No current facility-administered medications for this visit.     Current Outpatient Medications on File Prior to Visit   Medication Sig    CALCIUM PO Take 1,200 mg by mouth daily    Cholecalciferol (VITAMIN D PO) Take 1.25 mg by mouth every 30 (thirty) days    clobetasol (TEMOVATE) 0.05 % ointment Apply topically 2 (two) times a day    Cyanocobalamin (VITAMIN B 12 PO) Take 500 mcg by mouth daily     ergocalciferol (VITAMIN D2) 50,000 units Take 1 capsule (50,000 Units total) by mouth every 30 (thirty) days    Multiple Vitamins-Minerals (MULTIVITAL PO) Take by mouth daily     Omega-3 Fatty Acids (Fish Oil) 300 MG CAPS Take by mouth    sodium chloride (OCEAN) 0.65 % nasal spray 1 spray into each nostril every 2 (two) hours while awake    tamoxifen (NOLVADEX) 20 mg tablet Take 1 tablet (20 mg total) by mouth daily    [DISCONTINUED] lidocaine (LIDODERM) 5 % Apply 1 patch topically over 12 hours daily Remove & Discard patch within 12 hours or as directed by MD    [DISCONTINUED] naproxen (Naprosyn) 500 mg tablet Take 1 tablet (500 mg total) by mouth if needed for moderate pain    carbamide peroxide (DEBROX) 6.5 % otic solution Administer 5 drops into the left ear daily at bedtime (Patient not taking: Reported on 6/13/2024)    fluticasone (FLONASE) 50 mcg/act nasal spray 1 spray into each nostril daily (Patient not taking: Reported on 8/29/2024)    metroNIDAZOLE (METROGEL) 0.75 % gel  (Patient not taking: Reported on 8/29/2024)    Na Sulfate-K Sulfate-Mg Sulf 17.5-3.13-1.6 GM/177ML SOLN Take 1 kit by mouth once for 1 dose (Patient not taking: Reported on 8/9/2022)  "   pregabalin (LYRICA) 75 mg capsule Take 1 capsule (75 mg total) by mouth 2 (two) times a day (Patient not taking: Reported on 8/29/2024)    tretinoin (RETIN-A) 0.025 % cream  (Patient not taking: Reported on 6/5/2024)    triamcinolone (KENALOG) 0.1 % ointment Apply topically 2 (two) times a day for 7 days (Patient not taking: Reported on 8/29/2024)     No current facility-administered medications on file prior to visit.     She is allergic to latex and shellfish-derived products - food allergy..    Review of Systems   Constitutional:  Negative for chills and fever.   HENT:  Negative for congestion, ear pain and sore throat.    Eyes:  Negative for pain.   Respiratory:  Negative for cough and shortness of breath.    Cardiovascular:  Negative for chest pain and leg swelling.   Gastrointestinal:  Negative for abdominal pain, nausea and vomiting.   Endocrine: Negative for polyuria.   Genitourinary:  Negative for difficulty urinating, frequency and urgency.   Musculoskeletal:  Positive for arthralgias. Negative for back pain.   Skin:  Negative for rash.   Neurological:  Negative for weakness and headaches.   Psychiatric/Behavioral:  Negative for sleep disturbance. The patient is not nervous/anxious.          Objective:      /78 (BP Location: Left arm, Patient Position: Sitting, Cuff Size: Standard)   Pulse 97   Temp 97.7 °F (36.5 °C) (Temporal)   Ht 5' 5\" (1.651 m)   Wt 80.3 kg (177 lb)   SpO2 97%   BMI 29.45 kg/m²     No results found for this or any previous visit (from the past 1344 hour(s)).     Physical Exam  Constitutional:       Appearance: Normal appearance.   HENT:      Head: Normocephalic.      Right Ear: External ear normal.      Left Ear: External ear normal.      Nose: Nose normal. No congestion.      Mouth/Throat:      Mouth: Mucous membranes are moist.      Pharynx: Oropharynx is clear. No oropharyngeal exudate or posterior oropharyngeal erythema.   Eyes:      Extraocular Movements: " Extraocular movements intact.      Conjunctiva/sclera: Conjunctivae normal.   Cardiovascular:      Rate and Rhythm: Normal rate and regular rhythm.      Heart sounds: Normal heart sounds. No murmur heard.  Pulmonary:      Effort: Pulmonary effort is normal.      Breath sounds: Normal breath sounds. No wheezing or rales.   Abdominal:      General: Abdomen is flat. There is no distension.      Palpations: Abdomen is soft.      Tenderness: There is no abdominal tenderness.   Musculoskeletal:      Cervical back: Normal range of motion and neck supple.      Right lower leg: No edema.      Left lower leg: No edema.      Comments: Left shoulder exam-movement restricted and painful, minimal tenderness laterally,   Lymphadenopathy:      Cervical: No cervical adenopathy.   Skin:     General: Skin is warm.   Neurological:      General: No focal deficit present.      Mental Status: She is alert and oriented to person, place, and time.          Xray Chest 1 View- PORTABLE-Urgent

## 2024-08-30 ENCOUNTER — TELEPHONE (OUTPATIENT)
Age: 74
End: 2024-08-30

## 2024-08-30 ENCOUNTER — TELEPHONE (OUTPATIENT)
Dept: ADMINISTRATIVE | Facility: OTHER | Age: 74
End: 2024-08-30

## 2024-08-30 ENCOUNTER — TELEPHONE (OUTPATIENT)
Dept: INTERNAL MEDICINE CLINIC | Facility: CLINIC | Age: 74
End: 2024-08-30

## 2024-08-30 NOTE — TELEPHONE ENCOUNTER
Patient called Lidocaine patch not sent to pharmacy. Patient stated not covered by insurance will pay cash. Please release patient would like to  asap   49.5

## 2024-08-30 NOTE — TELEPHONE ENCOUNTER
----- Message from Conchis ARAUZ sent at 8/29/2024 10:25 AM EDT -----  Patient had a double mastectomy, Can we get the mammogram removed from her HM?

## 2024-08-31 NOTE — TELEPHONE ENCOUNTER
PA for Lidocaine Patches SUBMITTED     via    [x]CM-KEY: [id number]  []Surescripts-Case ID #   []Faxed to plan   []Other website   []Phone call Case ID #     Office notes sent, clinical questions answered. Awaiting determination    Turnaround time for your insurance to make a decision on your Prior Authorization can take 7-21 business days.

## 2024-08-31 NOTE — TELEPHONE ENCOUNTER
PA for Lidocaine Patches APPROVED     Date(s) approved       Case # [id number]    Patient advised by          [x]Memorandomhart Message  []Phone call   []LMOM  []L/M to call office as no active Communication consent on file  []Unable to leave detailed message as VM not approved on Communication consent       Pharmacy advised by    []Fax  [x]Phone call    Approval letter scanned into Media No one was not provided in approval. See attached screenshot.

## 2024-09-03 ENCOUNTER — TELEPHONE (OUTPATIENT)
Dept: INTERNAL MEDICINE CLINIC | Facility: CLINIC | Age: 74
End: 2024-09-03

## 2024-09-10 NOTE — LETTER
May 6, 2018     Patient: Yazmin Longo   YOB: 1950   Date of Visit: 5/6/2018       To Whom It May Concern: It is my medical opinion that Yazmin Longo may return to work on 05/08/2018  If you have any questions or concerns, please don't hesitate to call           Sincerely,        [facility]    CC: No Recipients
Render Note In Bullet Format When Appropriate: No
Application Tool (Optional): Liquid Nitrogen Sprayer
Duration Of Freeze Thaw-Cycle (Seconds): 1
Post-Care Instructions: I reviewed with the patient in detail post-care instructions. Patient is to wear sunprotection, and avoid picking at any of the treated lesions. Pt may apply Vaseline to crusted or scabbing areas.
Number Of Freeze-Thaw Cycles: 1 freeze-thaw cycle
Detail Level: Detailed
Show Applicator Variable?: Yes
Consent: The patient's consent was obtained including but not limited to risks of crusting, scabbing, blistering, scarring, darker or lighter pigmentary change, recurrence, incomplete removal and infection.

## 2024-09-12 ENCOUNTER — TELEPHONE (OUTPATIENT)
Age: 74
End: 2024-09-12

## 2024-09-12 ENCOUNTER — PATIENT MESSAGE (OUTPATIENT)
Dept: INTERNAL MEDICINE CLINIC | Facility: CLINIC | Age: 74
End: 2024-09-12

## 2024-09-12 ENCOUNTER — TELEPHONE (OUTPATIENT)
Dept: INTERNAL MEDICINE CLINIC | Facility: CLINIC | Age: 74
End: 2024-09-12

## 2024-09-12 ENCOUNTER — HOSPITAL ENCOUNTER (OUTPATIENT)
Dept: RADIOLOGY | Facility: HOSPITAL | Age: 74
Discharge: HOME | End: 2024-09-12
Attending: INTERNAL MEDICINE
Payer: COMMERCIAL

## 2024-09-12 DIAGNOSIS — M79.2 NEUROPATHIC PAIN: Primary | ICD-10-CM

## 2024-09-12 DIAGNOSIS — M75.82 ROTATOR CUFF TENDONITIS, LEFT: ICD-10-CM

## 2024-09-12 PROCEDURE — 76882 US LMTD JT/FCL EVL NVASC XTR: CPT

## 2024-09-12 RX ORDER — LIDOCAINE 50 MG/G
OINTMENT TOPICAL AS NEEDED
Qty: 50 G | Refills: 3 | Status: SHIPPED | OUTPATIENT
Start: 2024-09-12

## 2024-09-12 NOTE — TELEPHONE ENCOUNTER
PA for   lidocaine (XYLOCAINE) 5 % ointment  SUBMITTED     via    []CMM-KEY:   [x]Shaila-Case ID # [id number]  []Faxed to plan   []Other website   []Phone call Case ID #     Office notes sent, clinical questions answered. Awaiting determination    Turnaround time for your insurance to make a decision on your Prior Authorization can take 7-21 business days.

## 2024-09-12 NOTE — TELEPHONE ENCOUNTER
Patient called the RX Refill Line. Message is being forwarded to the office.     Patient called the Rxline again about a message that was left. After looking in the chart I advised the patient of the message in the chart that the cream has been sent but it will needs a prior authorization. Patient agreed and understands that the prior auth will be needed and started.      Please contact patient at [phone]

## 2024-09-12 NOTE — TELEPHONE ENCOUNTER
Patient called the RX Refill Line. Message is being forwarded to the office.     Patient is requesting lidocaine cream instead of the patches. Patient doesn't want any injections, states the patches are messy and the cream might be better. If possible she would like this to be sent over to [facility]     Please contact patient at [phone]

## 2024-09-12 NOTE — TELEPHONE ENCOUNTER
Martita is calling regarding the biopsy that Dr Aguilar did.  She states that she received a letter from her insurance company stating that the claim was entered incorrectly.  She would like someone to look into this and resubmit it.

## 2024-09-13 NOTE — TELEPHONE ENCOUNTER
Patient called back. Shared Cristel's note and told her medication is sent to the pharmacy and Prior Auth started.    YOUNG

## 2024-09-19 NOTE — TELEPHONE ENCOUNTER
PA for lidocaine (XYLOCAINE) 5 % ointment DENIED    Reason:(Screenshot if applicable)        Message sent to office clinical pool Yes    Denial letter scanned into Media No , snip above of denial reason    Appeal started No (Provider will need to decide if appeal is warranted and send clinical documentation to Prior Authorization Team for initiation.)    **Please follow up with your patient regarding denial and next steps**

## 2024-09-26 ENCOUNTER — TELEPHONE (OUTPATIENT)
Age: 74
End: 2024-09-26

## 2024-09-26 ENCOUNTER — OFFICE VISIT (OUTPATIENT)
Dept: INTERNAL MEDICINE CLINIC | Facility: CLINIC | Age: 74
End: 2024-09-26
Payer: COMMERCIAL

## 2024-09-26 VITALS
HEIGHT: 65 IN | TEMPERATURE: 97.8 F | DIASTOLIC BLOOD PRESSURE: 64 MMHG | WEIGHT: 173 LBS | HEART RATE: 75 BPM | BODY MASS INDEX: 28.82 KG/M2 | OXYGEN SATURATION: 98 % | SYSTOLIC BLOOD PRESSURE: 98 MMHG

## 2024-09-26 DIAGNOSIS — M77.8 TENDINITIS OF LEFT SHOULDER: Primary | ICD-10-CM

## 2024-09-26 DIAGNOSIS — J04.10 TRACHEITIS: ICD-10-CM

## 2024-09-26 PROCEDURE — 99214 OFFICE O/P EST MOD 30 MIN: CPT | Performed by: INTERNAL MEDICINE

## 2024-09-26 RX ORDER — DOXYCYCLINE 100 MG/1
100 CAPSULE ORAL DAILY
Qty: 5 CAPSULE | Refills: 0 | Status: SHIPPED | OUTPATIENT
Start: 2024-09-26 | End: 2024-10-01

## 2024-09-26 NOTE — PROGRESS NOTES
Assessment/Plan:           1. Tendinitis of left shoulder  Comments:  Conservative measures for tendinitis discussed.  2. Tracheitis  Comments:  Tylenol and hydration advised.  Doxycycline prescribed.  Orders:  -     doxycycline hyclate (VIBRAMYCIN) 100 mg capsule; Take 1 capsule (100 mg total) by mouth in the morning for 5 days With food         1. Tendinitis of left shoulder         No problem-specific Assessment & Plan notes found for this encounter.           Subjective:      Patient ID: Martita Lawrence is a 73 y.o. female.    HPI    The following portions of the patient's history were reviewed and updated as appropriate: She  has a past medical history of Allergic, Allergies, Arthritis, Carpal tunnel syndrome on right, Chemotherapy induced neutropenia (HCC) (05/23/2019), Gastroesophageal reflux disease (08/06/2020), GERD (gastroesophageal reflux disease), Lumbar disc disease, Trigger thumb, Vitamin D deficiency, and Wears glasses.  She   Patient Active Problem List    Diagnosis Date Noted    Other insomnia 05/01/2022    Osteopenia 03/05/2021    Gastroesophageal reflux disease 08/06/2020    Breast reconstruction deformity     Use of tamoxifen (Nolvadex) 06/17/2020    Bilateral carpal tunnel syndrome 01/27/2020    Osteoarthritis of multiple joints 01/27/2020    Drug-induced polyneuropathy (HCC) 01/27/2020    Arthritis 01/27/2020    Vitamin D deficiency 01/08/2020    Impaired fasting blood sugar 01/08/2020    Dysuria 11/10/2019    Chemotherapy induced neutropenia (HCC) 05/23/2019    Folliculitis 05/17/2019    BRCA negative 01/23/2019    Family history of breast cancer in sister 01/10/2019    Malignant neoplasm of lower-inner quadrant of left breast in female, estrogen receptor positive (HCC) 01/02/2019    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    Thyroid nodule 12/15/2016    Elevated LFTs 10/26/2016    Nausea 10/26/2016    Atypical chest pain 10/24/2016    Epigastric pain 10/24/2016    Plantar fasciitis of right foot  10/20/2016     She  has a past surgical history that includes Thumb arthroscopy; pr edg us exam surgical alter stom duodenum/jejunum (N/A, 02/02/2017); pr fasciectomy plantar fascia partial spx (Right, 10/20/2016); Oophorectomy (Bilateral); US guided breast biopsy left complete (Left, 01/02/2019); Breast biopsy (Left, 01/02/2019); Colonoscopy; pr mastectomy simple complete (N/A, 02/08/2019); pr bx/exc lymph node open superficial (Left, 02/08/2019); pr tissue expander placement breast reconstruction (Bilateral, 02/08/2019); pr implnt bio implnt for soft tissue reinforcement (Bilateral, 02/08/2019); pr huong-implant capsulectomy breast complete (Bilateral, 09/27/2019); pr insj/rplcmt breast implant sep day mastectomy (Bilateral, 09/27/2019); pr revision of reconstructed breast (Bilateral, 08/06/2020); Liposuction w/ fat injection (Bilateral, 08/06/2020); Hysterectomy; [place] tooth extraction; and Upper gastrointestinal endoscopy.  Her family history includes Brain cancer (age of onset: 72) in her mother; Breast cancer (age of onset: 55) in her sister; Breast cancer (age of onset: 65) in her sister; Breast cancer additional onset (age of onset: 65) in her sister; Prostate cancer (age of onset: 58) in her brother; Prostate cancer (age of onset: 78) in her father.  She  reports that she has never smoked. She has never been exposed to tobacco smoke. She has never used smokeless tobacco. She reports that she does not currently use alcohol after a past usage of about 1.0 standard drink of alcohol per week. She reports that she does not use drugs.  Current Outpatient Medications   Medication Sig Dispense Refill    CALCIUM PO Take 1,200 mg by mouth daily      Cholecalciferol (VITAMIN D PO) Take 1.25 mg by mouth every 30 (thirty) days      clobetasol (TEMOVATE) 0.05 % ointment Apply topically 2 (two) times a day 45 g 0    Cyanocobalamin (VITAMIN B 12 PO) Take 500 mcg by mouth daily       doxycycline hyclate (VIBRAMYCIN) 100 mg  capsule Take 1 capsule (100 mg total) by mouth in the morning for 5 days With food 5 capsule 0    ergocalciferol (VITAMIN D2) 50,000 units Take 1 capsule (50,000 Units total) by mouth every 30 (thirty) days 3 capsule 3    lidocaine (XYLOCAINE) 5 % ointment Apply topically as needed for mild pain 50 g 3    Multiple Vitamins-Minerals (MULTIVITAL PO) Take by mouth daily       naproxen (Naprosyn) 500 mg tablet Take 1 tablet (500 mg total) by mouth 2 (two) times a day with meals 20 tablet 0    Omega-3 Fatty Acids (Fish Oil) 300 MG CAPS Take by mouth      sodium chloride (OCEAN) 0.65 % nasal spray 1 spray into each nostril every 2 (two) hours while awake 60 mL 1    tamoxifen (NOLVADEX) 20 mg tablet Take 1 tablet (20 mg total) by mouth daily 90 tablet 1    carbamide peroxide (DEBROX) 6.5 % otic solution Administer 5 drops into the left ear daily at bedtime (Patient not taking: Reported on 6/13/2024) 15 mL 0    fluticasone (FLONASE) 50 mcg/act nasal spray 1 spray into each nostril daily (Patient not taking: Reported on 8/29/2024) 9 mL 1    metroNIDAZOLE (METROGEL) 0.75 % gel  (Patient not taking: Reported on 8/29/2024)      Na Sulfate-K Sulfate-Mg Sulf 17.5-3.13-1.6 GM/177ML SOLN Take 1 kit by mouth once for 1 dose (Patient not taking: Reported on 8/9/2022) 354 mL 0    pregabalin (LYRICA) 75 mg capsule Take 1 capsule (75 mg total) by mouth 2 (two) times a day (Patient not taking: Reported on 8/29/2024) 60 capsule 1    tretinoin (RETIN-A) 0.025 % cream  (Patient not taking: Reported on 6/5/2024)      triamcinolone (KENALOG) 0.1 % ointment Apply topically 2 (two) times a day for 7 days (Patient not taking: Reported on 8/29/2024) 80 g 0     No current facility-administered medications for this visit.     Current Outpatient Medications on File Prior to Visit   Medication Sig    CALCIUM PO Take 1,200 mg by mouth daily    Cholecalciferol (VITAMIN D PO) Take 1.25 mg by mouth every 30 (thirty) days    clobetasol (TEMOVATE) 0.05 %  ointment Apply topically 2 (two) times a day    Cyanocobalamin (VITAMIN B 12 PO) Take 500 mcg by mouth daily     ergocalciferol (VITAMIN D2) 50,000 units Take 1 capsule (50,000 Units total) by mouth every 30 (thirty) days    lidocaine (XYLOCAINE) 5 % ointment Apply topically as needed for mild pain    Multiple Vitamins-Minerals (MULTIVITAL PO) Take by mouth daily     naproxen (Naprosyn) 500 mg tablet Take 1 tablet (500 mg total) by mouth 2 (two) times a day with meals    Omega-3 Fatty Acids (Fish Oil) 300 MG CAPS Take by mouth    sodium chloride (OCEAN) 0.65 % nasal spray 1 spray into each nostril every 2 (two) hours while awake    tamoxifen (NOLVADEX) 20 mg tablet Take 1 tablet (20 mg total) by mouth daily    carbamide peroxide (DEBROX) 6.5 % otic solution Administer 5 drops into the left ear daily at bedtime (Patient not taking: Reported on 6/13/2024)    fluticasone (FLONASE) 50 mcg/act nasal spray 1 spray into each nostril daily (Patient not taking: Reported on 8/29/2024)    metroNIDAZOLE (METROGEL) 0.75 % gel  (Patient not taking: Reported on 8/29/2024)    Na Sulfate-K Sulfate-Mg Sulf 17.5-3.13-1.6 GM/177ML SOLN Take 1 kit by mouth once for 1 dose (Patient not taking: Reported on 8/9/2022)    pregabalin (LYRICA) 75 mg capsule Take 1 capsule (75 mg total) by mouth 2 (two) times a day (Patient not taking: Reported on 8/29/2024)    tretinoin (RETIN-A) 0.025 % cream  (Patient not taking: Reported on 6/5/2024)    triamcinolone (KENALOG) 0.1 % ointment Apply topically 2 (two) times a day for 7 days (Patient not taking: Reported on 8/29/2024)     No current facility-administered medications on file prior to visit.     There are no discontinued medications.   She is allergic to latex and shellfish-derived products - food allergy..    Review of Systems   Constitutional:  Negative for appetite change, chills, fatigue and fever.   HENT:  Negative for sore throat and trouble swallowing.    Eyes:  Negative for redness.  "  Respiratory:  Positive for cough. Negative for shortness of breath.    Cardiovascular:  Negative for chest pain and palpitations.   Gastrointestinal:  Negative for abdominal pain, constipation and diarrhea.   Genitourinary:  Negative for dysuria and hematuria.   Musculoskeletal:  Positive for arthralgias and myalgias. Negative for back pain and neck pain.   Skin:  Negative for rash.   Neurological:  Negative for seizures, weakness and headaches.   Hematological:  Negative for adenopathy.   Psychiatric/Behavioral:  Negative for confusion. The patient is not nervous/anxious.          Objective:      BP 98/64 (BP Location: Left arm, Patient Position: Sitting, Cuff Size: Standard)   Pulse 75   Temp 97.8 °F (36.6 °C) (Temporal)   Ht 5' 5\" (1.651 m)   Wt 78.5 kg (173 lb)   SpO2 98%   BMI 28.79 kg/m²     Results Reviewed       None            No results found for this or any previous visit (from the past 1344 hour(s)).     Physical Exam  Constitutional:       Appearance: Normal appearance. She is normal weight.   HENT:      Head: Normocephalic and atraumatic.      Nose: Nose normal.      Mouth/Throat:      Mouth: Mucous membranes are moist.   Eyes:      Extraocular Movements: Extraocular movements intact.      Pupils: Pupils are equal, round, and reactive to light.   Pulmonary:      Effort: Pulmonary effort is normal.   Abdominal:      Palpations: Abdomen is soft.   Musculoskeletal:      Cervical back: Normal range of motion and neck supple.      Comments: Pain with abduction of left shoulder   Neurological:      General: No focal deficit present.      Mental Status: She is alert and oriented to person, place, and time. Mental status is at baseline.         "

## 2024-09-26 NOTE — TELEPHONE ENCOUNTER
Patient called in and stated she hasn't felt well for the last two weeks and asked for antibiotics.  She said she has wrist pain, shoulder and feet pain and her whole body feels sore.  She now has a sore throat, is tired and sleepy.  She wanted to come in today but will only see Dr. Young.  Please advise.

## 2024-10-08 ENCOUNTER — OFFICE VISIT (OUTPATIENT)
Dept: INTERNAL MEDICINE CLINIC | Facility: CLINIC | Age: 74
End: 2024-10-08
Payer: COMMERCIAL

## 2024-10-08 VITALS
HEIGHT: 65 IN | SYSTOLIC BLOOD PRESSURE: 118 MMHG | DIASTOLIC BLOOD PRESSURE: 76 MMHG | WEIGHT: 169 LBS | OXYGEN SATURATION: 95 % | TEMPERATURE: 97.6 F | HEART RATE: 102 BPM | BODY MASS INDEX: 28.16 KG/M2

## 2024-10-08 DIAGNOSIS — C50.312 MALIGNANT NEOPLASM OF LOWER-INNER QUADRANT OF LEFT BREAST IN FEMALE, ESTROGEN RECEPTOR POSITIVE (HCC): ICD-10-CM

## 2024-10-08 DIAGNOSIS — M79.10 MYALGIA: ICD-10-CM

## 2024-10-08 DIAGNOSIS — Z17.0 MALIGNANT NEOPLASM OF LOWER-INNER QUADRANT OF LEFT BREAST IN FEMALE, ESTROGEN RECEPTOR POSITIVE (HCC): ICD-10-CM

## 2024-10-08 DIAGNOSIS — M75.82 ROTATOR CUFF TENDONITIS, LEFT: Primary | ICD-10-CM

## 2024-10-08 DIAGNOSIS — E55.9 VITAMIN D DEFICIENCY: ICD-10-CM

## 2024-10-08 PROCEDURE — 99214 OFFICE O/P EST MOD 30 MIN: CPT | Performed by: INTERNAL MEDICINE

## 2024-10-08 RX ORDER — CELECOXIB 200 MG/1
200 CAPSULE ORAL DAILY
Qty: 30 CAPSULE | Refills: 0 | Status: SHIPPED | OUTPATIENT
Start: 2024-10-08 | End: 2024-11-07

## 2024-10-08 RX ORDER — CELECOXIB 200 MG/1
200 CAPSULE ORAL DAILY
Qty: 30 CAPSULE | Refills: 0 | Status: SHIPPED | OUTPATIENT
Start: 2024-10-08 | End: 2024-10-08

## 2024-10-08 NOTE — PROGRESS NOTES
Assessment/Plan:           1. Rotator cuff tendonitis, left  Comments:  Imaging of left shoulder did not show any tear.  Continue conservative management.  2. Vitamin D deficiency  3. Myalgia  Comments:  Etiology not clear.  May be multifactorial continue to monitor.  Orders:  -     celecoxib (CeleBREX) 200 mg capsule; Take 1 capsule (200 mg total) by mouth daily  4. Malignant neoplasm of lower-inner quadrant of left breast in female, estrogen receptor positive (HCC)  Comments:  Following with hematology oncology and is on tamoxifen.         1. Rotator cuff tendonitis, left      2. Vitamin D deficiency         No problem-specific Assessment & Plan notes found for this encounter.           Subjective:      Patient ID: Martita Lawrence is a 73 y.o. female.    HPI    The following portions of the patient's history were reviewed and updated as appropriate: She  has a past medical history of Allergic, Allergies, Arthritis, Carpal tunnel syndrome on right, Chemotherapy induced neutropenia (HCC) (05/23/2019), Gastroesophageal reflux disease (08/06/2020), GERD (gastroesophageal reflux disease), Lumbar disc disease, Trigger thumb, Vitamin D deficiency, and Wears glasses.  She   Patient Active Problem List    Diagnosis Date Noted    Other insomnia 05/01/2022    Osteopenia 03/05/2021    Gastroesophageal reflux disease 08/06/2020    Breast reconstruction deformity     Use of tamoxifen (Nolvadex) 06/17/2020    Bilateral carpal tunnel syndrome 01/27/2020    Osteoarthritis of multiple joints 01/27/2020    Drug-induced polyneuropathy (HCC) 01/27/2020    Arthritis 01/27/2020    Vitamin D deficiency 01/08/2020    Impaired fasting blood sugar 01/08/2020    Dysuria 11/10/2019    Chemotherapy induced neutropenia (HCC) 05/23/2019    Folliculitis 05/17/2019    BRCA negative 01/23/2019    Family history of breast cancer in sister 01/10/2019    Malignant neoplasm of lower-inner quadrant of left breast in female, estrogen receptor positive  (HCC) 01/02/2019    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    Thyroid nodule 12/15/2016    Elevated LFTs 10/26/2016    Nausea 10/26/2016    Atypical chest pain 10/24/2016    Epigastric pain 10/24/2016    Plantar fasciitis of right foot 10/20/2016     She  has a past surgical history that includes Thumb arthroscopy; pr edg us exam surgical alter stom duodenum/jejunum (N/A, 02/02/2017); pr fasciectomy plantar fascia partial spx (Right, 10/20/2016); Oophorectomy (Bilateral); US guided breast biopsy left complete (Left, 01/02/2019); Breast biopsy (Left, 01/02/2019); Colonoscopy; pr mastectomy simple complete (N/A, 02/08/2019); pr bx/exc lymph node open superficial (Left, 02/08/2019); pr tissue expander placement breast reconstruction (Bilateral, 02/08/2019); pr implnt bio implnt for soft tissue reinforcement (Bilateral, 02/08/2019); pr huong-implant capsulectomy breast complete (Bilateral, 09/27/2019); pr insj/rplcmt breast implant sep day mastectomy (Bilateral, 09/27/2019); pr revision of reconstructed breast (Bilateral, 08/06/2020); Liposuction w/ fat injection (Bilateral, 08/06/2020); Hysterectomy; [place] tooth extraction; and Upper gastrointestinal endoscopy.  Her family history includes Brain cancer (age of onset: 72) in her mother; Breast cancer (age of onset: 55) in her sister; Breast cancer (age of onset: 65) in her sister; Breast cancer additional onset (age of onset: 65) in her sister; Prostate cancer (age of onset: 58) in her brother; Prostate cancer (age of onset: 78) in her father.  She  reports that she has never smoked. She has never been exposed to tobacco smoke. She has never used smokeless tobacco. She reports that she does not currently use alcohol after a past usage of about 1.0 standard drink of alcohol per week. She reports that she does not use drugs.  Current Outpatient Medications   Medication Sig Dispense Refill    CALCIUM PO Take 1,200 mg by mouth daily      celecoxib (CeleBREX) 200 mg capsule Take  1 capsule (200 mg total) by mouth daily 30 capsule 0    Cholecalciferol (VITAMIN D PO) Take 1.25 mg by mouth every 30 (thirty) days      clobetasol (TEMOVATE) 0.05 % ointment Apply topically 2 (two) times a day 45 g 0    Cyanocobalamin (VITAMIN B 12 PO) Take 500 mcg by mouth daily       ergocalciferol (VITAMIN D2) 50,000 units Take 1 capsule (50,000 Units total) by mouth every 30 (thirty) days 3 capsule 3    lidocaine (XYLOCAINE) 5 % ointment Apply topically as needed for mild pain 50 g 3    Multiple Vitamins-Minerals (MULTIVITAL PO) Take by mouth daily       Omega-3 Fatty Acids (Fish Oil) 300 MG CAPS Take by mouth      sodium chloride (OCEAN) 0.65 % nasal spray 1 spray into each nostril every 2 (two) hours while awake 60 mL 1    tamoxifen (NOLVADEX) 20 mg tablet Take 1 tablet (20 mg total) by mouth daily 90 tablet 1    carbamide peroxide (DEBROX) 6.5 % otic solution Administer 5 drops into the left ear daily at bedtime (Patient not taking: Reported on 6/13/2024) 15 mL 0    fluticasone (FLONASE) 50 mcg/act nasal spray 1 spray into each nostril daily (Patient not taking: Reported on 8/29/2024) 9 mL 1    metroNIDAZOLE (METROGEL) 0.75 % gel  (Patient not taking: Reported on 8/29/2024)      Na Sulfate-K Sulfate-Mg Sulf 17.5-3.13-1.6 GM/177ML SOLN Take 1 kit by mouth once for 1 dose (Patient not taking: Reported on 8/9/2022) 354 mL 0    pregabalin (LYRICA) 75 mg capsule Take 1 capsule (75 mg total) by mouth 2 (two) times a day (Patient not taking: Reported on 8/29/2024) 60 capsule 1    tretinoin (RETIN-A) 0.025 % cream  (Patient not taking: Reported on 6/5/2024)      triamcinolone (KENALOG) 0.1 % ointment Apply topically 2 (two) times a day for 7 days (Patient not taking: Reported on 8/29/2024) 80 g 0     No current facility-administered medications for this visit.     Current Outpatient Medications on File Prior to Visit   Medication Sig    CALCIUM PO Take 1,200 mg by mouth daily    Cholecalciferol (VITAMIN D PO) Take  1.25 mg by mouth every 30 (thirty) days    clobetasol (TEMOVATE) 0.05 % ointment Apply topically 2 (two) times a day    Cyanocobalamin (VITAMIN B 12 PO) Take 500 mcg by mouth daily     ergocalciferol (VITAMIN D2) 50,000 units Take 1 capsule (50,000 Units total) by mouth every 30 (thirty) days    lidocaine (XYLOCAINE) 5 % ointment Apply topically as needed for mild pain    Multiple Vitamins-Minerals (MULTIVITAL PO) Take by mouth daily     Omega-3 Fatty Acids (Fish Oil) 300 MG CAPS Take by mouth    sodium chloride (OCEAN) 0.65 % nasal spray 1 spray into each nostril every 2 (two) hours while awake    tamoxifen (NOLVADEX) 20 mg tablet Take 1 tablet (20 mg total) by mouth daily    [DISCONTINUED] naproxen (Naprosyn) 500 mg tablet Take 1 tablet (500 mg total) by mouth 2 (two) times a day with meals    carbamide peroxide (DEBROX) 6.5 % otic solution Administer 5 drops into the left ear daily at bedtime (Patient not taking: Reported on 6/13/2024)    fluticasone (FLONASE) 50 mcg/act nasal spray 1 spray into each nostril daily (Patient not taking: Reported on 8/29/2024)    metroNIDAZOLE (METROGEL) 0.75 % gel  (Patient not taking: Reported on 8/29/2024)    Na Sulfate-K Sulfate-Mg Sulf 17.5-3.13-1.6 GM/177ML SOLN Take 1 kit by mouth once for 1 dose (Patient not taking: Reported on 8/9/2022)    pregabalin (LYRICA) 75 mg capsule Take 1 capsule (75 mg total) by mouth 2 (two) times a day (Patient not taking: Reported on 8/29/2024)    tretinoin (RETIN-A) 0.025 % cream  (Patient not taking: Reported on 6/5/2024)    triamcinolone (KENALOG) 0.1 % ointment Apply topically 2 (two) times a day for 7 days (Patient not taking: Reported on 8/29/2024)     No current facility-administered medications on file prior to visit.     Medications Discontinued During This Encounter   Medication Reason    naproxen (Naprosyn) 500 mg tablet     celecoxib (CeleBREX) 200 mg capsule       She is allergic to latex and shellfish-derived products - food  "allergy..    Review of Systems   Constitutional:  Negative for appetite change, chills, fatigue and fever.   HENT:  Negative for sore throat and trouble swallowing.    Eyes:  Negative for redness.   Respiratory:  Negative for shortness of breath.    Cardiovascular:  Negative for chest pain and palpitations.   Gastrointestinal:  Negative for abdominal pain, constipation and diarrhea.   Genitourinary:  Negative for dysuria and hematuria.   Musculoskeletal:  Positive for arthralgias and myalgias. Negative for back pain and neck pain.   Skin:  Negative for rash.   Neurological:  Negative for seizures, weakness and headaches.   Hematological:  Negative for adenopathy.   Psychiatric/Behavioral:  Negative for confusion. The patient is not nervous/anxious.          Objective:      /76 (BP Location: Left arm, Patient Position: Sitting, Cuff Size: Standard)   Pulse 102   Temp 97.6 °F (36.4 °C) (Temporal)   Ht 5' 5\" (1.651 m)   Wt 76.7 kg (169 lb)   SpO2 95%   BMI 28.12 kg/m²     Results Reviewed       None            No results found for this or any previous visit (from the past 1344 hour(s)).     Physical Exam  Constitutional:       General: She is not in acute distress.     Appearance: Normal appearance.   HENT:      Head: Normocephalic and atraumatic.      Nose: Nose normal.      Mouth/Throat:      Mouth: Mucous membranes are moist.   Eyes:      Extraocular Movements: Extraocular movements intact.      Pupils: Pupils are equal, round, and reactive to light.   Cardiovascular:      Rate and Rhythm: Normal rate and regular rhythm.      Pulses: Normal pulses.      Heart sounds: Normal heart sounds. No murmur heard.     No friction rub.   Pulmonary:      Effort: Pulmonary effort is normal. No respiratory distress.      Breath sounds: Normal breath sounds. No wheezing.   Abdominal:      General: Abdomen is flat. Bowel sounds are normal. There is no distension.      Palpations: Abdomen is soft. There is no mass.      " Tenderness: There is no abdominal tenderness. There is no guarding.   Musculoskeletal:         General: Normal range of motion.      Cervical back: Neck supple.   Neurological:      General: No focal deficit present.      Mental Status: She is alert and oriented to person, place, and time. Mental status is at baseline.      Cranial Nerves: No cranial nerve deficit.   Psychiatric:         Mood and Affect: Mood normal.         Behavior: Behavior normal.

## 2024-10-16 ENCOUNTER — TELEPHONE (OUTPATIENT)
Age: 74
End: 2024-10-16

## 2024-10-16 DIAGNOSIS — M25.519 CHRONIC SHOULDER PAIN, UNSPECIFIED LATERALITY: Primary | ICD-10-CM

## 2024-10-16 DIAGNOSIS — G89.29 CHRONIC SHOULDER PAIN, UNSPECIFIED LATERALITY: Primary | ICD-10-CM

## 2024-10-16 NOTE — TELEPHONE ENCOUNTER
Patient called to inquire about the shoulder shot the Dr Young had recommended for her at her last visit. Her left shoulder pain is not getting any better. Patient was wondering where she can go to get that shot. Please advise and contact pt.

## 2024-10-18 ENCOUNTER — OFFICE VISIT (OUTPATIENT)
Dept: OBGYN CLINIC | Facility: CLINIC | Age: 74
End: 2024-10-18
Payer: COMMERCIAL

## 2024-10-18 VITALS
SYSTOLIC BLOOD PRESSURE: 100 MMHG | DIASTOLIC BLOOD PRESSURE: 94 MMHG | BODY MASS INDEX: 28.16 KG/M2 | WEIGHT: 169 LBS | HEART RATE: 76 BPM | HEIGHT: 65 IN

## 2024-10-18 DIAGNOSIS — M75.22 BICEPS TENDONITIS ON LEFT: ICD-10-CM

## 2024-10-18 DIAGNOSIS — M75.02 ADHESIVE CAPSULITIS OF LEFT SHOULDER: Primary | ICD-10-CM

## 2024-10-18 DIAGNOSIS — M19.012 PRIMARY OSTEOARTHRITIS OF LEFT SHOULDER: ICD-10-CM

## 2024-10-18 DIAGNOSIS — M75.82 ROTATOR CUFF TENDONITIS, LEFT: ICD-10-CM

## 2024-10-18 PROCEDURE — 99204 OFFICE O/P NEW MOD 45 MIN: CPT | Performed by: STUDENT IN AN ORGANIZED HEALTH CARE EDUCATION/TRAINING PROGRAM

## 2024-10-18 RX ORDER — MELOXICAM 15 MG/1
15 TABLET ORAL DAILY
Qty: 30 TABLET | Refills: 2 | Status: SHIPPED | OUTPATIENT
Start: 2024-10-18 | End: 2025-01-16

## 2024-10-18 NOTE — PATIENT INSTRUCTIONS
Take meloxicam 15 mg daily with food or water as needed for pain / inflammation  Take tylenol 1000 mg every 8 hours as needed for pain  Follow-up in 6 weeks

## 2024-10-18 NOTE — PROGRESS NOTES
Initial Orthopedic Sports Medicine Office Visit    Assessment:  Martita Lawrence is a 73 y.o. RHD female  who presents to the office with left shoulder pain and limited active and passive ROM secondary to adhesive capsulitis.    Plan:  In the office we had a long discussion regarding the nature of frozen shoulder and the treatments used in its management.   We discussed the fact that recovery of her motion may take up to 12-18 months.  I believe that she is currently in the frozen phase.  Prior to any intervention I am going to obtain an MRI of her left shoulder to rule out any significant structural pathology.  Martita Lawrence will return to the office following the MRI for a review of its findings and discussion of her treatment options.  Discussed after MRI we can consider injections of the left shoulder if persistent pain and inflammation without significant rotator cuff pathology.  At this time we will refer patient to physical therapy to begin working on range of motion of the left shoulder.Referral provided today . Discussed meloxicam for relief secondary to underlying pain and inflammation.  Discussed side effects and risks of medication .Prescription sent to pharmacy today .Directions on taking the medication provided to patient today .she verbalized her understanding of the treatment plan.  All of her questions have been answered in detail.        Assessment & Plan  Adhesive capsulitis of left shoulder    Orders:    Ambulatory Referral to Orthopedic Surgery    Ambulatory Referral to Physical Therapy; Future    MRI shoulder left wo contrast; Future    meloxicam (Mobic) 15 mg tablet; Take 1 tablet (15 mg total) by mouth daily    Rotator cuff tendonitis, left    Orders:    MRI shoulder left wo contrast; Future    Biceps tendonitis on left    Orders:    MRI shoulder left wo contrast; Future    Primary osteoarthritis of left shoulder              Chief Complaint: Left Shoulder Pain and Limited Motion    History  of Present Illness:  Martita Lawrence is a 73 y.o. right hand dominant female  with a history of diabetes who presents to the office for evaluation of her  left shoulder. She has had shoulder pain for the last year without a specific traumatic event.  During the last 2-3 months she has noticed that her range of motion has progressively worsened. She has activity related shoulder pain localizing to the anterosuperior and anterolateral aspects of her  shoulder. She reports difficulty with overhead activity and intermittent aching pain at night making sleeping on her left side difficult.  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 She has tried Naprosyn and Celebrex without relief. Despite this treatment she has had no improvement. She presents to the office for evaluation and treatment recommendation.     PMHx:   Past Medical History:   Diagnosis Date    Allergic     Allergies     Arthritis     Carpal tunnel syndrome on right     Chemotherapy induced neutropenia (HCC) 05/23/2019    Gastroesophageal reflux disease 08/06/2020    GERD (gastroesophageal reflux disease)     Lumbar disc disease     Trigger thumb     right    Vitamin D deficiency     Wears glasses      PSHx:   Past Surgical History:   Procedure Laterality Date    BREAST BIOPSY Left 01/02/2019    invasive ductal carcinoma    COLONOSCOPY      HYSTERECTOMY      age 47 - abdominal    LIPOSUCTION W/ FAT INJECTION Bilateral 08/06/2020    Procedure: FAT GRAFTING;  Surgeon: Rayray Abreu MD;  Location: [facility];  Service: Plastics    OOPHORECTOMY Bilateral     age 47    FL BX/EXC LYMPH NODE OPEN SUPERFICIAL Left 02/08/2019    Procedure: BIOPSY LYMPH NODE SENTINEL;  Surgeon: Lisbeth Warner MD;  [facility]: [facility];  Service: Surgical Oncology    FL EDG US EXAM SURGICAL ALTER STOM DUODENUM/JEJUNUM N/A 02/02/2017    Procedure: RADIAL ENDOSCOPIC U/S;  Surgeon: Cosmo Grissom MD;   Location: [facility];  Service: Gastroenterology    TX FASCIECTOMY PLANTAR FASCIA PARTIAL SPX Right 10/20/2016    Procedure:  OPEN RELEASE FASCIA PLANTAR ,CUTTING BOTTOM OF ARCH,INSTEP ;  Surgeon: Rashel Shepherd DPM;  [facility];  Service: Podiatry    TX IMPLNT BIO IMPLNT FOR SOFT TISSUE REINFORCEMENT Bilateral 02/08/2019    Procedure: RECONSTRUCTION BREAST W/ IMPLANT;  Surgeon: Rayray Abreu MD;  [facility];  Service: Plastics    TX INSJ/RPLCMT BREAST IMPLANT SEP DAY MASTECTOMY Bilateral 09/27/2019    Procedure: IMPLANT EXPANDER EXCHANGE;  Surgeon: Rayray Abreu MD;  [facility];  Service: Plastics    TX MASTECTOMY SIMPLE COMPLETE N/A 02/08/2019    Procedure: LEFT breast mastectomy; RIGHT breast prophylactic mastectomy;  Surgeon: Lisbeth Warner MD;  [facility];  Service: Surgical Oncology    TX ZUNILDA-IMPLANT CAPSULECTOMY BREAST COMPLETE Bilateral 09/27/2019    Procedure: CAPSULECTOMY;  Surgeon: Rayray Abreu MD;  [facility];  Service: Plastics    TX REVISION OF RECONSTRUCTED BREAST Bilateral 08/06/2020    Procedure: BREAST LATERAL STANDING EXCISION SKIN DEFORMITIES POST RECON.;  Surgeon: Rayray Abreu MD;  [facility];  Service: Plastics    TX TISSUE EXPANDER PLACEMENT BREAST RECONSTRUCTION Bilateral 02/08/2019    Procedure: INSERTION/PLACEMENT TISSUE EXPANDER (EXCHANGE);  Surgeon: Rayray Abreu MD;  [facility];  Service: Plastics    THUMB ARTHROSCOPY      UPPER GASTROINTESTINAL ENDOSCOPY      US GUIDED BREAST BIOPSY LEFT COMPLETE Left 01/02/2019    WISDOM TOOTH EXTRACTION       Medications:   Current Outpatient Medications on File Prior to Visit   Medication Sig Dispense Refill    CALCIUM PO Take 1,200 mg by mouth daily      celecoxib (CeleBREX) 200 mg capsule Take 1 capsule (200 mg total) by mouth daily 30 capsule 0    Cholecalciferol (VITAMIN D PO) Take 1.25 mg by mouth every 30 (thirty) days      clobetasol (TEMOVATE) 0.05 % ointment Apply topically 2 (two)  times a day 45 g 0    Cyanocobalamin (VITAMIN B 12 PO) Take 500 mcg by mouth daily       ergocalciferol (VITAMIN D2) 50,000 units Take 1 capsule (50,000 Units total) by mouth every 30 (thirty) days 3 capsule 3    lidocaine (XYLOCAINE) 5 % ointment Apply topically as needed for mild pain 50 g 3    metroNIDAZOLE (METROGEL) 0.75 % gel       Multiple Vitamins-Minerals (MULTIVITAL PO) Take by mouth daily       Omega-3 Fatty Acids (Fish Oil) 300 MG CAPS Take by mouth      pregabalin (LYRICA) 75 mg capsule Take 1 capsule (75 mg total) by mouth 2 (two) times a day 60 capsule 1    sodium chloride (OCEAN) 0.65 % nasal spray 1 spray into each nostril every 2 (two) hours while awake 60 mL 1    tamoxifen (NOLVADEX) 20 mg tablet Take 1 tablet (20 mg total) by mouth daily 90 tablet 1    carbamide peroxide (DEBROX) 6.5 % otic solution Administer 5 drops into the left ear daily at bedtime (Patient not taking: Reported on 6/13/2024) 15 mL 0    fluticasone (FLONASE) 50 mcg/act nasal spray 1 spray into each nostril daily (Patient not taking: Reported on 8/29/2024) 9 mL 1    Na Sulfate-K Sulfate-Mg Sulf 17.5-3.13-1.6 GM/177ML SOLN Take 1 kit by mouth once for 1 dose (Patient not taking: Reported on 8/9/2022) 354 mL 0    tretinoin (RETIN-A) 0.025 % cream  (Patient not taking: Reported on 6/5/2024)      triamcinolone (KENALOG) 0.1 % ointment Apply topically 2 (two) times a day for 7 days (Patient not taking: Reported on 8/29/2024) 80 g 0     No current facility-administered medications on file prior to visit.     Allergies:    Allergies   Allergen Reactions    Latex Anaphylaxis    Shellfish-Derived Products - Food Allergy Anaphylaxis and Other (See Comments)      Social History: Single white female . she consumes alcohol socially and denies tobacco use.  Family History:    Family History   Problem Relation Age of Onset    Brain cancer Mother 72    Prostate cancer Father 78    Breast cancer Sister 55    Breast cancer additional onset  Sister 65    Breast cancer Sister 65    Prostate cancer Brother 58      Review of systems: ROS is negative other than that noted in the HPI.  Constitutional: Negative for fatigue and fever.   HENT: Negative for sore throat.    Respiratory: Negative for shortness of breath.    Cardiovascular: Negative for chest pain.   Gastrointestinal: Negative for abdominal pain.   Endocrine: Negative for cold intolerance and heat intolerance.   Genitourinary: Negative for flank pain.   Musculoskeletal: Negative for back pain.   Skin: Negative for rash.   Allergic/Immunologic: Negative for immunocompromised state.   Neurological: Negative for dizziness.   Psychiatric/Behavioral: Negative for agitation.     Physical Examination:  Martita Lawrence is a well developed female in no distress.     Shoulder focused exam:        LEFT    Scapula Atrophy  Negative     Winging  Negative     Protraction  Negative    Rotator cuff SS  3/5     IS  5/5     SubS  5/5    ROM FF  150     ER0  45     ER90  70     IR90  50     IRb  L3    TTP: AC  Positive     Biceps  Positive     SC Joint  Negative     Scapular Spine  Negative     Proximal Humerus  Negative    Special Tests: O'Briens  Positive     Cross body Adduction  Positive     Speeds   Positive     Denton's  Positive     Neer  Negative     Posey  Negative     Bear Hug  Negative    Instability: Apprehension & relocation  not tested     Load & shift  not tested    Other: Crank  Negative                 UE NV Exam: +2 Radial pulses bilaterally  Sensation intact to light touch C5-T1 bilaterally, Radial/median/ulnar nerve motor intact      Bilateral elbow, wrist, and and forearm ROM full, painless with passive ROM, no ttp or crepitance throughout extremities below shoulder joint    Cervical ROM is full without pain, numbness or tingling     Radiographic Imaging: I personally reviewed and interpreted 3 views of her Left shoulder including AP, Grashey, scapular Y which were taken at an outside office and  reviewed with the patient in detail.  The shoulder is reduced.  There is mild evidence of glenohumeral arthritis.  There is Mild DJD of her AC joint.  No bony pathology is noted to be present. I reviewed the radiology report and agree with their findings.    All imaging discussed and reviewed with patient

## 2024-10-18 NOTE — ASSESSMENT & PLAN NOTE
Orders:    Ambulatory Referral to Orthopedic Surgery    Ambulatory Referral to Physical Therapy; Future    MRI shoulder left wo contrast; Future    meloxicam (Mobic) 15 mg tablet; Take 1 tablet (15 mg total) by mouth daily

## 2024-10-24 ENCOUNTER — HOSPITAL ENCOUNTER (OUTPATIENT)
Dept: RADIOLOGY | Facility: HOSPITAL | Age: 74
Discharge: HOME | End: 2024-10-24
Payer: COMMERCIAL

## 2024-10-24 DIAGNOSIS — M75.82 ROTATOR CUFF TENDONITIS, LEFT: ICD-10-CM

## 2024-10-24 DIAGNOSIS — M75.22 BICEPS TENDONITIS ON LEFT: ICD-10-CM

## 2024-10-24 DIAGNOSIS — M75.02 ADHESIVE CAPSULITIS OF LEFT SHOULDER: ICD-10-CM

## 2024-10-24 PROCEDURE — 73221 MRI JOINT UPR EXTREM W/O DYE: CPT

## 2024-11-01 ENCOUNTER — OFFICE VISIT (OUTPATIENT)
Dept: OBGYN CLINIC | Facility: CLINIC | Age: 74
End: 2024-11-01
Payer: COMMERCIAL

## 2024-11-01 VITALS
SYSTOLIC BLOOD PRESSURE: 113 MMHG | BODY MASS INDEX: 28.16 KG/M2 | HEART RATE: 79 BPM | HEIGHT: 65 IN | WEIGHT: 169 LBS | DIASTOLIC BLOOD PRESSURE: 91 MMHG

## 2024-11-01 DIAGNOSIS — M75.82 ROTATOR CUFF TENDONITIS, LEFT: ICD-10-CM

## 2024-11-01 DIAGNOSIS — M75.02 ADHESIVE CAPSULITIS OF LEFT SHOULDER: Primary | ICD-10-CM

## 2024-11-01 PROCEDURE — 99213 OFFICE O/P EST LOW 20 MIN: CPT | Performed by: STUDENT IN AN ORGANIZED HEALTH CARE EDUCATION/TRAINING PROGRAM

## 2024-11-01 NOTE — PROGRESS NOTES
Orthopedic Sports Medicine Follow up Visit    Assessment:  Martita Lawrence is a 73 y.o. RHD female  who presents to the office for follow of left shoulder adhesive capsulitis with underlying rotator cuff and biceps tendonitis.     Plan:  Discussed MRI findings of the left shoulder at length with patient in the office today.  All underlying pathologies as consistent with physical exam findings explained in length to the patient.  Discussed steroid injection in the office today secondary to underlying adhesive capsulitis to limit inflammation.  Discussed side effects and risk of injection.  Patient expressed understanding.  An ultrasound-guided injection of the intra-articular space of the left glenohumeral joint was performed today as well as a injection to the subacromial space of the left shoulder secondary to underlying pathologies.  Patient tolerated procedure well without complications.  Patient noted about 95% relief after steroid injections.  Discussed continuing with physical therapy to continue to improve range of motion of the left shoulder.  Also discussed continued use of meloxicam as needed for persistent pain and inflammation.    Conservative treatment:  PT for ROM and strengthening to shoulder, rotator cuff, scapular stabilizers.  Discussed physical therapy will be important to obtain proper range of motion of the left shoulder.  Referral was placed at initial visit.  Discussed continued use of activity modification as well as rest as needed for persistent pain and inflammation.  Discussed continued use of meloxicam as needed.  Patient expressed understanding.  Agreeable.      Imaging:    All imaging from today was reviewed by myself and explained to the patient.  MRI left shoulder was reviewed at length with patient in the office today.  MRI revealed rotator cuff tendinitis and biceps tendinitis without significant tear.      Injection:    The risks and benefits of the injection (which include but are  not limited to: infection, bleeding,damage to nerve/artery, need for further intervention), as well as the risks and benefits of all alternative treatments were explained and understood.  The patient elected to proceed with injection. The procedure was done with aseptic technique, and the patient tolerated the procedure well with no complications.  Patient notes about 95% relief with steroid injection in the office today.      Surgery:     No surgery is recommended at this point, continue with conservative treatment plan as noted.      Follow up:  Follow-up as needed for persistent pain and inflammation of the left shoulder      Assessment & Plan  Adhesive capsulitis of left shoulder    Orders:    Ambulatory Referral to Physical Therapy; Future    Rotator cuff tendonitis, left    Orders:    Ambulatory Referral to Physical Therapy; Future         Chief Complaint: Left Shoulder Pain and Limited Motion    History of Present Illness:  Martita Lawrence is a 73 y.o. right hand dominant female  with a history of diabetes who presents to the office for follow-up of left shoulder pain secondary to underlying adhesive capsulitis.  Patient states she has not began physical therapy as she wanted to review MRI and discuss with treatment team before beginning physical therapy.Patient notes use of Meloxicam with moderate relief of pain but has not been taking it daily. Patient presents to the office today to review MRI of the left shoulder.     PMHx:   Past Medical History:   Diagnosis Date    Allergic     Allergies     Arthritis     Carpal tunnel syndrome on right     Chemotherapy induced neutropenia (HCC) 05/23/2019    Gastroesophageal reflux disease 08/06/2020    GERD (gastroesophageal reflux disease)     Lumbar disc disease     Trigger thumb     right    Vitamin D deficiency     Wears glasses      PSHx:   Past Surgical History:   Procedure Laterality Date    BREAST BIOPSY Left 01/02/2019    invasive ductal carcinoma     COLONOSCOPY      HYSTERECTOMY      age 47 - abdominal    LIPOSUCTION W/ FAT INJECTION Bilateral 08/06/2020    Procedure: FAT GRAFTING;  Surgeon: Rayray Abreu MD;  Location: [facility];  Service: Plastics    OOPHORECTOMY Bilateral     age 47    TX BX/EXC LYMPH NODE OPEN SUPERFICIAL Left 02/08/2019    Procedure: BIOPSY LYMPH NODE SENTINEL;  Surgeon: Lisbeth Warner MD;  [facility];  Service: Surgical Oncology    TX EDG US EXAM SURGICAL ALTER STOM DUODENUM/JEJUNUM N/A 02/02/2017    Procedure: RADIAL ENDOSCOPIC U/S;  Surgeon: Cosmo Grissom MD;  Location: [facility];  Service: Gastroenterology    TX FASCIECTOMY PLANTAR FASCIA PARTIAL SPX Right 10/20/2016    Procedure:  OPEN RELEASE FASCIA PLANTAR ,CUTTING BOTTOM OF ARCH,INSTEP ;  Surgeon: Rashel Shepherd DPM;  [facility];  Service: Podiatry    TX IMPLNT BIO IMPLNT FOR SOFT TISSUE REINFORCEMENT Bilateral 02/08/2019    Procedure: RECONSTRUCTION BREAST W/ IMPLANT;  Surgeon: Rayray Abreu MD;  [facility];  Service: Plastics    TX INSJ/RPLCMT BREAST IMPLANT SEP DAY MASTECTOMY Bilateral 09/27/2019    Procedure: IMPLANT EXPANDER EXCHANGE;  Surgeon: Rayray Abreu MD;  [facility];  Service: Plastics    TX MASTECTOMY SIMPLE COMPLETE N/A 02/08/2019    Procedure: LEFT breast mastectomy; RIGHT breast prophylactic mastectomy;  Surgeon: Lisbeth Warner MD;  [facility];  Service: Surgical Oncology    TX ZUNILDA-IMPLANT CAPSULECTOMY BREAST COMPLETE Bilateral 09/27/2019    Procedure: CAPSULECTOMY;  Surgeon: Rayray Abreu MD;  [facility];  Service: Plastics    TX REVISION OF RECONSTRUCTED BREAST Bilateral 08/06/2020    Procedure: BREAST LATERAL STANDING EXCISION SKIN DEFORMITIES POST RECON.;  Surgeon: Rayray Abreu MD;  [facility];  Service: Plastics    TX TISSUE EXPANDER PLACEMENT BREAST RECONSTRUCTION Bilateral 02/08/2019    Procedure: INSERTION/PLACEMENT TISSUE EXPANDER (EXCHANGE);  Surgeon: Rayray Abreu MD;  [facility];  Service:  Plastics    THUMB ARTHROSCOPY      UPPER GASTROINTESTINAL ENDOSCOPY      US GUIDED BREAST BIOPSY LEFT COMPLETE Left 01/02/2019    WISDOM TOOTH EXTRACTION       Medications:   Current Outpatient Medications on File Prior to Visit   Medication Sig Dispense Refill    CALCIUM PO Take 1,200 mg by mouth daily      carbamide peroxide (DEBROX) 6.5 % otic solution Administer 5 drops into the left ear daily at bedtime (Patient not taking: Reported on 6/13/2024) 15 mL 0    celecoxib (CeleBREX) 200 mg capsule Take 1 capsule (200 mg total) by mouth daily 30 capsule 0    Cholecalciferol (VITAMIN D PO) Take 1.25 mg by mouth every 30 (thirty) days      clobetasol (TEMOVATE) 0.05 % ointment Apply topically 2 (two) times a day 45 g 0    Cyanocobalamin (VITAMIN B 12 PO) Take 500 mcg by mouth daily       ergocalciferol (VITAMIN D2) 50,000 units Take 1 capsule (50,000 Units total) by mouth every 30 (thirty) days 3 capsule 3    fluticasone (FLONASE) 50 mcg/act nasal spray 1 spray into each nostril daily (Patient not taking: Reported on 8/29/2024) 9 mL 1    lidocaine (XYLOCAINE) 5 % ointment Apply topically as needed for mild pain 50 g 3    meloxicam (Mobic) 15 mg tablet Take 1 tablet (15 mg total) by mouth daily 30 tablet 2    metroNIDAZOLE (METROGEL) 0.75 % gel       Multiple Vitamins-Minerals (MULTIVITAL PO) Take by mouth daily       Na Sulfate-K Sulfate-Mg Sulf 17.5-3.13-1.6 GM/177ML SOLN Take 1 kit by mouth once for 1 dose (Patient not taking: Reported on 8/9/2022) 354 mL 0    Omega-3 Fatty Acids (Fish Oil) 300 MG CAPS Take by mouth      pregabalin (LYRICA) 75 mg capsule Take 1 capsule (75 mg total) by mouth 2 (two) times a day 60 capsule 1    sodium chloride (OCEAN) 0.65 % nasal spray 1 spray into each nostril every 2 (two) hours while awake 60 mL 1    tamoxifen (NOLVADEX) 20 mg tablet Take 1 tablet (20 mg total) by mouth daily 90 tablet 1    tretinoin (RETIN-A) 0.025 % cream  (Patient not taking: Reported on 6/5/2024)       triamcinolone (KENALOG) 0.1 % ointment Apply topically 2 (two) times a day for 7 days (Patient not taking: Reported on 8/29/2024) 80 g 0     No current facility-administered medications on file prior to visit.     Allergies:    Allergies   Allergen Reactions    Latex Anaphylaxis    Shellfish-Derived Products - Food Allergy Anaphylaxis and Other (See Comments)      Social History: Single white female . she consumes alcohol socially and denies tobacco use.  Family History:    Family History   Problem Relation Age of Onset    Brain cancer Mother 72    Prostate cancer Father 78    Breast cancer Sister 55    Breast cancer additional onset Sister 65    Breast cancer Sister 65    Prostate cancer Brother 58      Review of systems: ROS is negative other than that noted in the HPI.  Constitutional: Negative for fatigue and fever.   HENT: Negative for sore throat.    Respiratory: Negative for shortness of breath.    Cardiovascular: Negative for chest pain.   Gastrointestinal: Negative for abdominal pain.   Endocrine: Negative for cold intolerance and heat intolerance.   Genitourinary: Negative for flank pain.   Musculoskeletal: Negative for back pain.   Skin: Negative for rash.   Allergic/Immunologic: Negative for immunocompromised state.   Neurological: Negative for dizziness.   Psychiatric/Behavioral: Negative for agitation.     Physical Examination:      Vitals:    11/01/24 0900   BP: 113/91   Pulse: 79       General Appearance: The patient is a well developed, well nourished female  in no apparent distress.  Orientation:  The patient is alert and interactive.  Oriented to time, place and person.  Mood and Affect:  The patient has normal mood and affect.    Shoulder focused exam:        LEFT    Scapula Atrophy  Negative     Winging  Negative     Protraction  Negative    Rotator cuff SS  5/5     IS  5/5     SubS  5/5    AROM FF  160     ER0  35     IRb  L-3    PROM FF  170     ER0  50    TTP: AC  Negative     Biceps   Positive      SC Joint  Negative      Scapular Spine  Negative     Proximal Humerus  Negative    Special Tests: O'Briens  Not tested     Cross body Adduction  Not tested      Speeds   Not tested      Denton's  Not tested      Posey  Not tested      Bear Hug  Not tested     Instability: Apprehension & relocation  not tested     Load & shift  not tested      UE NV Exam: +2 Radial pulses bilaterally  Sensation intact to light touch C5-T1 bilaterally, Radial/median/ulnar nerve motor intact    Bilateral elbow, wrist, and and forearm ROM full, painless with passive ROM, no ttp or crepitance throughout extremities below shoulder joint    Cervical ROM is full without pain, numbness or tingling     Radiographic Imaging: I have personally reviewed and interpreted MRI left shoulder reveals mild tendinitis of the biceps tendon and mild tendinitis of the rotator cuff tendon.  No major rotator cuff tear. MRI reviewed at length with patient in the office today.  She expressed understanding of the above findings.

## 2024-11-22 ENCOUNTER — EVALUATION (OUTPATIENT)
Dept: PHYSICAL THERAPY | Facility: OTHER | Age: 74
End: 2024-11-22
Payer: COMMERCIAL

## 2024-11-22 DIAGNOSIS — M75.02 ADHESIVE CAPSULITIS OF LEFT SHOULDER: Primary | ICD-10-CM

## 2024-11-22 DIAGNOSIS — M75.82 ROTATOR CUFF TENDONITIS, LEFT: ICD-10-CM

## 2024-11-22 PROCEDURE — 97112 NEUROMUSCULAR REEDUCATION: CPT

## 2024-11-22 PROCEDURE — 97161 PT EVAL LOW COMPLEX 20 MIN: CPT

## 2024-11-22 NOTE — PROGRESS NOTES
PT Evaluation     Today's date: 2024  Patient name: Martita Lawrence  : 1950  MRN: [id number]  Referring provider: Natalio Adame MD  Dx:   Encounter Diagnosis     ICD-10-CM    1. Adhesive capsulitis of left shoulder  M75.02 Ambulatory Referral to Physical Therapy      2. Rotator cuff tendonitis, left  M75.82 Ambulatory Referral to Physical Therapy                     Assessment  Impairments: abnormal coordination, abnormal muscle firing, abnormal or restricted ROM, abnormal movement, impaired balance, impaired physical strength, lacks appropriate home exercise program, pain with function, poor posture  and unable to perform ADL  Symptom irritability: low    Assessment details: Martita Lawrence is a pleasant 73 y.o. female who presents with acute shoulder pain without ALLISON.  Patient's greatest concerns are fear of not being able to stay active. Pt states a couple of months ago she began having shoulder pain without ALLISON. She had a lot of pain and was limited in range of motion but pushed through to be able to perform all ADLs, work, and care for her household. She received an injection which took away most of her pain and mobility deficits. At this time, patient only has pain with reaching forcefully into abduction. She sleeps on her side and has been careful not to lay on her shoulder for too long, but is able to without pain. Denies numbness and tingling in the L UE. Pt is RHD. She was given Meloxicam which also helped with her pain. Pt has a history of breast cancer 6 years ago and is being followed regularly. 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 Pt goals are to be able to sleep more on her L side and to prevent the issue from happening again.     The primary movement problem is L shoulder weakness and hypomobility at end range resulting in pain and stiffness and limiting her ability to sleep on her L side and perform shoulder abduction. Pt may be experiencing  these symptoms and objective findings based on clinical presentation of adhesive capsulitis alleviated by injection. No further referral is necessary at this time based upon examination results. Pt would benefit from skilled physical therapy services to address current deficits, improve quality of life, and restore PLOF with transition to home exercise program when appropriate. We discussed patient doing HEP only at this time, as she does not feel functionally limited, and does not have significant deficits on exam. She feels confident performing HEP on her own and will contact me if symptoms worsen. Positive prognostic indicators include positive attitude towards recovery, alleviation of symptoms with injection, and low symptom irritability. Please contact me if you have any questions or recommendations. Thank you for the opportunity to share in [facility].      Primary Impairments:  1) decreased L shoulder strength  2) decreased postural control  3) pain with L shoulder abduction    Function Based Goals:  Patient will be independent with HEP upon discharge.  Patient will be able to independently manage symptoms upon discharge.  Patient will resume prior level of function and home ADLs with minimal to no symptoms upon discharge.  Patient will be able to lay on L shoulder for the entire night without symptoms upon discharge.  Patient will be able to lay in bed and reach across without symptoms upon discharge.    Impairment Based Goals:  Patient will increase FIR ROM of L shoulder in 3 weeks.  Patient will increase L shoulder abduction and flexion strength by at least 1/2 MMT grade in 3 weeks.        Understanding of Dx/Px/POC: good     Prognosis: good    Plan  Patient would benefit from: skilled physical therapy  Referral necessary: No  Planned modality interventions: cryotherapy, electrical stimulation/Russian stimulation, thermotherapy: hydrocollator packs, TENS, low level laser therapy and  traction    Planned therapy interventions: abdominal trunk stabilization, activity modification, balance, body mechanics training, breathing training, coordination, flexibility, functional ROM exercises, home exercise program, therapeutic exercise, therapeutic activities, stretching, strengthening, postural training, patient education, neuromuscular re-education, motor coordination training, massage, manual therapy, joint mobilization, IASTM, kinesiology taping, Olivares taping and nerve gliding    Duration in weeks: 12  Treatment plan discussed with: patient  Plan details: HEP only at this time    Subjective Evaluation    History of Present Illness          Not a recurrent problem   Quality of life: good    Patient Goals  Patient goals for therapy: increased strength, independence with ADLs/IADLs, return to sport/leisure activities, increased motion, improved balance and decreased pain    Pain  Current pain ratin  At best pain ratin  At worst pain ratin  Location: L shoulder  Quality: dull ache and sharp  Relieving factors: relaxation, rest and medications  Exacerbated by: abduction.  Progression: improved    Social Support  Lives with: young children and adult children (granddaughter (25), great grandchild)    Employment status: working (rehab aide)  Hand dominance: right      Diagnostic Tests  X-ray: normal  MRI studies: abnormal (Mild distal supra/infras tendinosis, minimal subacromial/subdeltoid bursitis, mild bicipital tendinosis and tenosynovitis)  Treatments  Previous treatment: injection treatment      Objective     General Comments:      Shoulder Comments   Shoulder AROM:  Flexion: full ROM compared to R, no p!  Abduction: full ROM compared to R, slight p! At end range  ER at side: full, no p!  PAULA: T4 B, no p!  FIR:  R  T7     L  T9, p!    Cervical AROM:near full in all directions, no p! Or asymmetry side to side    Shoulder MMT:  Flexion:  R  4+/5    L  4/5     Abduction: R  4+/5    L  4-/5,  "p!  IR: 5/5 B, no p!  ER: 5/5 B, no p!    Posture: FHP, rounded shoulders             Precautions: latex allergy  [id number]    POC expires Unit limit Auth Expiration date PT/OT + Visit Limit?   2/13/25 BOMN N/a BOMN                               Manuals 1 - 11/22  FOTO: 65                                                                Neuro Re-Ed             Shoulder iso - flex, abd 5\"x15            Scap squeezes 5\"x20                                                                             Ther Ex             FIR stretch 10\"x10                                                                                                       Ther Activity             Education HEP, posture correction                         Gait Training                                       Modalities                                            "

## 2024-12-03 ENCOUNTER — OFFICE VISIT (OUTPATIENT)
Dept: INTERNAL MEDICINE CLINIC | Facility: CLINIC | Age: 74
End: 2024-12-03
Payer: COMMERCIAL

## 2024-12-03 VITALS
WEIGHT: 184 LBS | SYSTOLIC BLOOD PRESSURE: 109 MMHG | HEART RATE: 70 BPM | HEIGHT: 65 IN | OXYGEN SATURATION: 99 % | TEMPERATURE: 97.6 F | DIASTOLIC BLOOD PRESSURE: 58 MMHG | BODY MASS INDEX: 30.66 KG/M2

## 2024-12-03 DIAGNOSIS — M75.02 ADHESIVE CAPSULITIS OF LEFT SHOULDER: Primary | ICD-10-CM

## 2024-12-03 DIAGNOSIS — Z00.01 ENCOUNTER FOR GENERAL ADULT MEDICAL EXAMINATION WITH ABNORMAL FINDINGS: ICD-10-CM

## 2024-12-03 DIAGNOSIS — Z23 ENCOUNTER FOR IMMUNIZATION: ICD-10-CM

## 2024-12-03 DIAGNOSIS — M79.2 NEUROPATHIC PAIN: ICD-10-CM

## 2024-12-03 PROCEDURE — 90677 PCV20 VACCINE IM: CPT

## 2024-12-03 PROCEDURE — 90471 IMMUNIZATION ADMIN: CPT

## 2024-12-03 PROCEDURE — 99396 PREV VISIT EST AGE 40-64: CPT | Performed by: INTERNAL MEDICINE

## 2024-12-03 PROCEDURE — 99213 OFFICE O/P EST LOW 20 MIN: CPT | Performed by: INTERNAL MEDICINE

## 2024-12-03 NOTE — PROGRESS NOTES
Adult Annual Physical  Name: Martita Lawrence      : 1950      MRN: [id number]  Encounter Provider: Anuj Young MD  Encounter Date: 12/3/2024   Encounter department: [facility] INTERNAL MEDICINE [facility]    Assessment & Plan  Adhesive capsulitis of left shoulder    Orders:    Nerve Stimulator (TENS Therapy Pain Relief) LEATHA; Use 1 Device in the morning    Neuropathic pain         Encounter for immunization    Orders:    Pneumococcal Conjugate Vaccine 20-valent (Pcv20)    Encounter for general adult medical examination with abnormal findings           1. Adhesive capsulitis of left shoulder  Nerve Stimulator (TENS Therapy Pain Relief) LEATHA    Continue physical therapy.      2. Neuropathic pain      Dramatic treatment discussed.      3. Encounter for immunization  Pneumococcal Conjugate Vaccine 20-valent (Pcv20)      4. Encounter for general adult medical examination with abnormal findings           Immunizations and preventive care screenings were discussed with patient today. Appropriate education was printed on patient's after visit summary.    Counseling:  Exercise: the importance of regular exercise/physical activity was discussed. Recommend exercise 3-5 times per week for at least 30 minutes.          History of Present Illness     Adult Annual Physical:  Patient presents for annual physical.     Diet and Physical Activity:  - Diet/Nutrition: well balanced diet and consuming 3-5 servings of fruits/vegetables daily.  - Exercise: walking.    Depression Screening:  - PHQ-2 Score: 0    General Health:  - Sleep: 4-6 hours of sleep on average.  - Hearing: normal hearing bilateral ears.  - Vision: no vision problems and goes for regular eye exams. cataract  - Dental: regular dental visits and brushes teeth twice daily.    /GYN Health:  - Follows with GYN: no.   - History of STDs: no    Advanced Care Planning:  - Has an advanced directive?: no    - Has a durable medical POA?: no    - ACP  document given to patient?: no      Review of Systems   Constitutional:  Negative for appetite change, chills, fatigue and fever.   HENT:  Negative for sore throat and trouble swallowing.    Eyes:  Negative for redness.   Respiratory:  Negative for shortness of breath.    Cardiovascular:  Negative for chest pain and palpitations.   Gastrointestinal:  Negative for abdominal pain, constipation and diarrhea.   Genitourinary:  Negative for dysuria and hematuria.   Musculoskeletal:  Positive for arthralgias. Negative for back pain and neck pain.   Skin:  Negative for rash.   Neurological:  Negative for seizures, weakness and headaches.   Hematological:  Negative for adenopathy.   Psychiatric/Behavioral:  Negative for confusion. The patient is not nervous/anxious.      Pertinent Medical History         Medical History Reviewed by provider this encounter:  Tobacco  Allergies  Meds  Problems  Med Hx  Surg Hx  Fam Hx     .  Current Outpatient Medications on File Prior to Visit   Medication Sig Dispense Refill    CALCIUM PO Take 1,200 mg by mouth daily      Cholecalciferol (VITAMIN D PO) Take 1.25 mg by mouth every 30 (thirty) days      clobetasol (TEMOVATE) 0.05 % ointment Apply topically 2 (two) times a day 45 g 0    Cyanocobalamin (VITAMIN B 12 PO) Take 500 mcg by mouth daily       ergocalciferol (VITAMIN D2) 50,000 units Take 1 capsule (50,000 Units total) by mouth every 30 (thirty) days 3 capsule 3    lidocaine (XYLOCAINE) 5 % ointment Apply topically as needed for mild pain 50 g 3    meloxicam (Mobic) 15 mg tablet Take 1 tablet (15 mg total) by mouth daily (Patient taking differently: Take 15 mg by mouth if needed) 30 tablet 2    metroNIDAZOLE (METROGEL) 0.75 % gel       Multiple Vitamins-Minerals (MULTIVITAL PO) Take by mouth daily       Omega-3 Fatty Acids (Fish Oil) 300 MG CAPS Take by mouth      sodium chloride (OCEAN) 0.65 % nasal spray 1 spray into each nostril every 2 (two) hours while awake 60 mL 1     "tamoxifen (NOLVADEX) 20 mg tablet Take 1 tablet (20 mg total) by mouth daily 90 tablet 1    celecoxib (CeleBREX) 200 mg capsule Take 1 capsule (200 mg total) by mouth daily 30 capsule 0    [DISCONTINUED] carbamide peroxide (DEBROX) 6.5 % otic solution Administer 5 drops into the left ear daily at bedtime (Patient not taking: Reported on 6/13/2024) 15 mL 0    [DISCONTINUED] fluticasone (FLONASE) 50 mcg/act nasal spray 1 spray into each nostril daily (Patient not taking: Reported on 8/29/2024) 9 mL 1    [DISCONTINUED] Na Sulfate-K Sulfate-Mg Sulf 17.5-3.13-1.6 GM/177ML SOLN Take 1 kit by mouth once for 1 dose (Patient not taking: Reported on 8/9/2022) 354 mL 0    [DISCONTINUED] pregabalin (LYRICA) 75 mg capsule Take 1 capsule (75 mg total) by mouth 2 (two) times a day (Patient not taking: Reported on 12/3/2024) 60 capsule 1    [DISCONTINUED] tretinoin (RETIN-A) 0.025 % cream  (Patient not taking: Reported on 6/5/2024)      [DISCONTINUED] triamcinolone (KENALOG) 0.1 % ointment Apply topically 2 (two) times a day for 7 days (Patient not taking: Reported on 8/29/2024) 80 g 0     No current facility-administered medications on file prior to visit.      Social History     Tobacco Use    Smoking status: Never     Passive exposure: Never    Smokeless tobacco: Never   Vaping Use    Vaping status: Never Used   Substance and Sexual Activity    Alcohol use: Not Currently     Alcohol/week: 1.0 standard drink of alcohol     Types: 1 Glasses of wine per week    Drug use: No    Sexual activity: Never       Objective   /58 (BP Location: Right arm, Patient Position: Sitting, Cuff Size: Large)   Pulse 70   Temp 97.6 °F (36.4 °C) (Temporal)   Ht 5' 5\" (1.651 m)   Wt 83.5 kg (184 lb)   SpO2 99%   BMI 30.62 kg/m²     Physical Exam  Vitals and nursing note reviewed.   Constitutional:       General: She is not in acute distress.     Appearance: She is well-developed.   HENT:      Head: Normocephalic and atraumatic.      Ears:    "   Comments: Some cerumen present.  Eyes:      Conjunctiva/sclera: Conjunctivae normal.   Cardiovascular:      Rate and Rhythm: Normal rate and regular rhythm.      Heart sounds: No murmur heard.  Pulmonary:      Effort: Pulmonary effort is normal. No respiratory distress.      Breath sounds: Normal breath sounds.   Abdominal:      Palpations: Abdomen is soft.      Tenderness: There is no abdominal tenderness.   Musculoskeletal:         General: No swelling.      Cervical back: Neck supple.   Skin:     General: Skin is warm and dry.      Capillary Refill: Capillary refill takes less than 2 seconds.   Neurological:      Mental Status: She is alert.   Psychiatric:         Mood and Affect: Mood normal.

## 2024-12-03 NOTE — PROGRESS NOTES
Assessment/Plan:           1. Adhesive capsulitis of left shoulder         1. Adhesive capsulitis of left shoulder (Primary)         No problem-specific Assessment & Plan notes found for this encounter.           Subjective:      Patient ID: Martita Lawrence is a 73 y.o. female.    HPI    The following portions of the patient's history were reviewed and updated as appropriate: She  has a past medical history of Allergic, Allergies, Arthritis, Carpal tunnel syndrome on right, Chemotherapy induced neutropenia (HCC) (05/23/2019), Gastroesophageal reflux disease (08/06/2020), GERD (gastroesophageal reflux disease), Lumbar disc disease, Trigger thumb, Vitamin D deficiency, and Wears glasses.  She   Patient Active Problem List    Diagnosis Date Noted    Adhesive capsulitis of left shoulder 10/18/2024    Rotator cuff tendonitis, left 10/18/2024    Biceps tendonitis on left 10/18/2024    Primary osteoarthritis of left shoulder 10/18/2024    Other insomnia 05/01/2022    Osteopenia 03/05/2021    Gastroesophageal reflux disease 08/06/2020    Breast reconstruction deformity     Use of tamoxifen (Nolvadex) 06/17/2020    Bilateral carpal tunnel syndrome 01/27/2020    Osteoarthritis of multiple joints 01/27/2020    Drug-induced polyneuropathy (HCC) 01/27/2020    Arthritis 01/27/2020    Vitamin D deficiency 01/08/2020    Impaired fasting blood sugar 01/08/2020    Dysuria 11/10/2019    Chemotherapy induced neutropenia (HCC) 05/23/2019    Folliculitis 05/17/2019    BRCA negative 01/23/2019    Family history of breast cancer in sister 01/10/2019    Malignant neoplasm of lower-inner quadrant of left breast in female, estrogen receptor positive (HCC) 01/02/2019    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    Thyroid nodule 12/15/2016    Elevated LFTs 10/26/2016    Nausea 10/26/2016    Atypical chest pain 10/24/2016    Epigastric pain 10/24/2016    Plantar fasciitis of right foot 10/20/2016     She  has a past surgical history that includes  Thumb arthroscopy; pr edg us exam surgical alter stom duodenum/jejunum (N/A, 02/02/2017); pr fasciectomy plantar fascia partial spx (Right, 10/20/2016); Oophorectomy (Bilateral); US guided breast biopsy left complete (Left, 01/02/2019); Breast biopsy (Left, 01/02/2019); Colonoscopy; pr mastectomy simple complete (N/A, 02/08/2019); pr bx/exc lymph node open superficial (Left, 02/08/2019); pr tissue expander placement breast reconstruction (Bilateral, 02/08/2019); pr implnt bio implnt for soft tissue reinforcement (Bilateral, 02/08/2019); pr houng-implant capsulectomy breast complete (Bilateral, 09/27/2019); pr insj/rplcmt breast implant sep day mastectomy (Bilateral, 09/27/2019); pr revision of reconstructed breast (Bilateral, 08/06/2020); Liposuction w/ fat injection (Bilateral, 08/06/2020); Hysterectomy; [place] tooth extraction; and Upper gastrointestinal endoscopy.  Her family history includes Brain cancer (age of onset: 72) in her mother; Breast cancer (age of onset: 55) in her sister; Breast cancer (age of onset: 65) in her sister; Breast cancer additional onset (age of onset: 65) in her sister; Prostate cancer (age of onset: 58) in her brother; Prostate cancer (age of onset: 78) in her father.  She  reports that she has never smoked. She has never been exposed to tobacco smoke. She has never used smokeless tobacco. She reports that she does not currently use alcohol after a past usage of about 1.0 standard drink of alcohol per week. She reports that she does not use drugs.  Current Outpatient Medications   Medication Sig Dispense Refill    CALCIUM PO Take 1,200 mg by mouth daily      Cholecalciferol (VITAMIN D PO) Take 1.25 mg by mouth every 30 (thirty) days      clobetasol (TEMOVATE) 0.05 % ointment Apply topically 2 (two) times a day 45 g 0    Cyanocobalamin (VITAMIN B 12 PO) Take 500 mcg by mouth daily       ergocalciferol (VITAMIN D2) 50,000 units Take 1 capsule (50,000 Units total) by mouth every 30  (thirty) days 3 capsule 3    lidocaine (XYLOCAINE) 5 % ointment Apply topically as needed for mild pain 50 g 3    meloxicam (Mobic) 15 mg tablet Take 1 tablet (15 mg total) by mouth daily (Patient taking differently: Take 15 mg by mouth if needed) 30 tablet 2    metroNIDAZOLE (METROGEL) 0.75 % gel       Multiple Vitamins-Minerals (MULTIVITAL PO) Take by mouth daily       Omega-3 Fatty Acids (Fish Oil) 300 MG CAPS Take by mouth      sodium chloride (OCEAN) 0.65 % nasal spray 1 spray into each nostril every 2 (two) hours while awake 60 mL 1    tamoxifen (NOLVADEX) 20 mg tablet Take 1 tablet (20 mg total) by mouth daily 90 tablet 1    carbamide peroxide (DEBROX) 6.5 % otic solution Administer 5 drops into the left ear daily at bedtime (Patient not taking: Reported on 6/13/2024) 15 mL 0    celecoxib (CeleBREX) 200 mg capsule Take 1 capsule (200 mg total) by mouth daily 30 capsule 0    fluticasone (FLONASE) 50 mcg/act nasal spray 1 spray into each nostril daily (Patient not taking: Reported on 8/29/2024) 9 mL 1    Na Sulfate-K Sulfate-Mg Sulf 17.5-3.13-1.6 GM/177ML SOLN Take 1 kit by mouth once for 1 dose (Patient not taking: Reported on 8/9/2022) 354 mL 0    pregabalin (LYRICA) 75 mg capsule Take 1 capsule (75 mg total) by mouth 2 (two) times a day (Patient not taking: Reported on 12/3/2024) 60 capsule 1    tretinoin (RETIN-A) 0.025 % cream  (Patient not taking: Reported on 6/5/2024)      triamcinolone (KENALOG) 0.1 % ointment Apply topically 2 (two) times a day for 7 days (Patient not taking: Reported on 8/29/2024) 80 g 0     No current facility-administered medications for this visit.     Current Outpatient Medications on File Prior to Visit   Medication Sig    CALCIUM PO Take 1,200 mg by mouth daily    Cholecalciferol (VITAMIN D PO) Take 1.25 mg by mouth every 30 (thirty) days    clobetasol (TEMOVATE) 0.05 % ointment Apply topically 2 (two) times a day    Cyanocobalamin (VITAMIN B 12 PO) Take 500 mcg by mouth daily   "   ergocalciferol (VITAMIN D2) 50,000 units Take 1 capsule (50,000 Units total) by mouth every 30 (thirty) days    lidocaine (XYLOCAINE) 5 % ointment Apply topically as needed for mild pain    meloxicam (Mobic) 15 mg tablet Take 1 tablet (15 mg total) by mouth daily (Patient taking differently: Take 15 mg by mouth if needed)    metroNIDAZOLE (METROGEL) 0.75 % gel     Multiple Vitamins-Minerals (MULTIVITAL PO) Take by mouth daily     Omega-3 Fatty Acids (Fish Oil) 300 MG CAPS Take by mouth    sodium chloride (OCEAN) 0.65 % nasal spray 1 spray into each nostril every 2 (two) hours while awake    tamoxifen (NOLVADEX) 20 mg tablet Take 1 tablet (20 mg total) by mouth daily    carbamide peroxide (DEBROX) 6.5 % otic solution Administer 5 drops into the left ear daily at bedtime (Patient not taking: Reported on 6/13/2024)    celecoxib (CeleBREX) 200 mg capsule Take 1 capsule (200 mg total) by mouth daily    fluticasone (FLONASE) 50 mcg/act nasal spray 1 spray into each nostril daily (Patient not taking: Reported on 8/29/2024)    Na Sulfate-K Sulfate-Mg Sulf 17.5-3.13-1.6 GM/177ML SOLN Take 1 kit by mouth once for 1 dose (Patient not taking: Reported on 8/9/2022)    pregabalin (LYRICA) 75 mg capsule Take 1 capsule (75 mg total) by mouth 2 (two) times a day (Patient not taking: Reported on 12/3/2024)    tretinoin (RETIN-A) 0.025 % cream  (Patient not taking: Reported on 6/5/2024)    triamcinolone (KENALOG) 0.1 % ointment Apply topically 2 (two) times a day for 7 days (Patient not taking: Reported on 8/29/2024)     No current facility-administered medications on file prior to visit.     There are no discontinued medications.   She is allergic to latex and shellfish-derived products - food allergy..    Review of Systems      Objective:      /58 (BP Location: Right arm, Patient Position: Sitting, Cuff Size: Large)   Pulse 70   Temp 97.6 °F (36.4 °C) (Temporal)   Ht 5' 5\" (1.651 m)   Wt 83.5 kg (184 lb)   SpO2 99%   " BMI 30.62 kg/m²     Results Reviewed       None            No results found for this or any previous visit (from the past 8 weeks).     Physical Exam

## 2024-12-04 ENCOUNTER — TELEPHONE (OUTPATIENT)
Dept: HEMATOLOGY ONCOLOGY | Facility: CLINIC | Age: 74
End: 2024-12-04

## 2024-12-04 NOTE — TELEPHONE ENCOUNTER
Left message for patient in regards to obtaining labwork prior to upcoming appointment with Dr. Aguilar on 12/9.  Advised patient labs are non fasting and in system.  Advised patient to call office back if they are unable to obtain labwork prior to appointment.  [employer] number provided.

## 2024-12-06 ENCOUNTER — APPOINTMENT (OUTPATIENT)
Dept: LAB | Facility: CLINIC | Age: 74
End: 2024-12-06
Payer: COMMERCIAL

## 2024-12-06 DIAGNOSIS — C50.312 MALIGNANT NEOPLASM OF LOWER-INNER QUADRANT OF LEFT BREAST IN FEMALE, ESTROGEN RECEPTOR POSITIVE (HCC): ICD-10-CM

## 2024-12-06 DIAGNOSIS — Z17.0 MALIGNANT NEOPLASM OF LOWER-INNER QUADRANT OF LEFT BREAST IN FEMALE, ESTROGEN RECEPTOR POSITIVE (HCC): ICD-10-CM

## 2024-12-06 LAB
ALBUMIN SERPL BCG-MCNC: 4.1 G/DL (ref 3.5–5)
ALP SERPL-CCNC: 41 U/L (ref 34–104)
ALT SERPL W P-5'-P-CCNC: 14 U/L (ref 7–52)
ANION GAP SERPL CALCULATED.3IONS-SCNC: 7 MMOL/L (ref 4–13)
AST SERPL W P-5'-P-CCNC: 19 U/L (ref 13–39)
BASOPHILS # BLD AUTO: 0.03 THOUSANDS/ÂΜL (ref 0–0.1)
BASOPHILS NFR BLD AUTO: 1 % (ref 0–1)
BILIRUB SERPL-MCNC: 0.44 MG/DL (ref 0.2–1)
BUN SERPL-MCNC: 12 MG/DL (ref 5–25)
CALCIUM SERPL-MCNC: 9.6 MG/DL (ref 8.4–10.2)
CHLORIDE SERPL-SCNC: 102 MMOL/L (ref 96–108)
CO2 SERPL-SCNC: 30 MMOL/L (ref 21–32)
CREAT SERPL-MCNC: 0.69 MG/DL (ref 0.6–1.3)
EOSINOPHIL # BLD AUTO: 0.44 THOUSAND/ÂΜL (ref 0–0.61)
EOSINOPHIL NFR BLD AUTO: 8 % (ref 0–6)
ERYTHROCYTE [DISTWIDTH] IN BLOOD BY AUTOMATED COUNT: 13.2 % (ref 11.6–15.1)
GFR SERPL CREATININE-BSD FRML MDRD: 87 ML/MIN/1.73SQ M
GLUCOSE P FAST SERPL-MCNC: 81 MG/DL (ref 65–99)
HCT VFR BLD AUTO: 42.8 % (ref 34.8–46.1)
HGB BLD-MCNC: 13.9 G/DL (ref 11.5–15.4)
IMM GRANULOCYTES # BLD AUTO: 0.01 THOUSAND/UL (ref 0–0.2)
IMM GRANULOCYTES NFR BLD AUTO: 0 % (ref 0–2)
LYMPHOCYTES # BLD AUTO: 1.91 THOUSANDS/ÂΜL (ref 0.6–4.47)
LYMPHOCYTES NFR BLD AUTO: 35 % (ref 14–44)
MCH RBC QN AUTO: 28.3 PG (ref 26.8–34.3)
MCHC RBC AUTO-ENTMCNC: 32.5 G/DL (ref 31.4–37.4)
MCV RBC AUTO: 87 FL (ref 82–98)
MONOCYTES # BLD AUTO: 0.48 THOUSAND/ÂΜL (ref 0.17–1.22)
MONOCYTES NFR BLD AUTO: 9 % (ref 4–12)
NEUTROPHILS # BLD AUTO: 2.62 THOUSANDS/ÂΜL (ref 1.85–7.62)
NEUTS SEG NFR BLD AUTO: 47 % (ref 43–75)
NRBC BLD AUTO-RTO: 0 /100 WBCS
PLATELET # BLD AUTO: 183 THOUSANDS/UL (ref 149–390)
PMV BLD AUTO: 9.3 FL (ref 8.9–12.7)
POTASSIUM SERPL-SCNC: 3.6 MMOL/L (ref 3.5–5.3)
PROT SERPL-MCNC: 7 G/DL (ref 6.4–8.4)
RBC # BLD AUTO: 4.91 MILLION/UL (ref 3.81–5.12)
SODIUM SERPL-SCNC: 139 MMOL/L (ref 135–147)
WBC # BLD AUTO: 5.49 THOUSAND/UL (ref 4.31–10.16)

## 2024-12-06 PROCEDURE — 36415 COLL VENOUS BLD VENIPUNCTURE: CPT

## 2024-12-06 PROCEDURE — 80053 COMPREHEN METABOLIC PANEL: CPT

## 2024-12-06 PROCEDURE — 86300 IMMUNOASSAY TUMOR CA 15-3: CPT

## 2024-12-06 PROCEDURE — 85025 COMPLETE CBC W/AUTO DIFF WBC: CPT

## 2024-12-07 LAB — CANCER AG27-29 SERPL-ACNC: 26.1 U/ML (ref 0–38.6)

## 2024-12-09 ENCOUNTER — OFFICE VISIT (OUTPATIENT)
Dept: HEMATOLOGY ONCOLOGY | Facility: CLINIC | Age: 74
End: 2024-12-09
Payer: COMMERCIAL

## 2024-12-09 VITALS
WEIGHT: 178 LBS | DIASTOLIC BLOOD PRESSURE: 80 MMHG | TEMPERATURE: 97.3 F | RESPIRATION RATE: 18 BRPM | BODY MASS INDEX: 29.66 KG/M2 | HEIGHT: 65 IN | OXYGEN SATURATION: 98 % | HEART RATE: 80 BPM | SYSTOLIC BLOOD PRESSURE: 110 MMHG

## 2024-12-09 DIAGNOSIS — C50.312 MALIGNANT NEOPLASM OF LOWER-INNER QUADRANT OF LEFT BREAST IN FEMALE, ESTROGEN RECEPTOR POSITIVE (HCC): Primary | ICD-10-CM

## 2024-12-09 DIAGNOSIS — G62.0 DRUG-INDUCED POLYNEUROPATHY (HCC): ICD-10-CM

## 2024-12-09 DIAGNOSIS — E04.1 THYROID NODULE: ICD-10-CM

## 2024-12-09 DIAGNOSIS — E78.49 OTHER HYPERLIPIDEMIA: ICD-10-CM

## 2024-12-09 DIAGNOSIS — Z17.0 MALIGNANT NEOPLASM OF LOWER-INNER QUADRANT OF LEFT BREAST IN FEMALE, ESTROGEN RECEPTOR POSITIVE (HCC): Primary | ICD-10-CM

## 2024-12-09 DIAGNOSIS — Z79.810 USE OF TAMOXIFEN (NOLVADEX): ICD-10-CM

## 2024-12-09 DIAGNOSIS — M19.90 ARTHRITIS: ICD-10-CM

## 2024-12-09 PROCEDURE — 99214 OFFICE O/P EST MOD 30 MIN: CPT | Performed by: INTERNAL MEDICINE

## 2024-12-09 NOTE — PATIENT INSTRUCTIONS
Blood work and ultrasound thyroid prior to next visit in 6 months.  Patient will continue taking tamoxifen.

## 2024-12-11 ENCOUNTER — TELEPHONE (OUTPATIENT)
Age: 74
End: 2024-12-11

## 2024-12-11 NOTE — PROGRESS NOTES
Name: Martita Lawrence      : 1950      MRN: [id number]  Encounter Provider: Deep Aguilar MD  Encounter Date: 2024   Encounter department: [facility] [place]  :  Assessment & Plan  Malignant neoplasm of lower-inner quadrant of left breast in female, estrogen receptor positive (HCC)  On adjuvant tamoxifen and she is tolerating that well.  No evidence of recurrent or metastatic disease from breast cancer at this time.  Orders:  •  CBC and differential; Future  •  Comprehensive metabolic panel; Future  •  Cancer antigen 27.29; Future    Drug-induced polyneuropathy (HCC)  Secondary to chemotherapy.  Neuropathy is slowly improving.       Arthritis  She is managing.       Thyroid nodule  Being monitored.  Orders:  •  US thyroid; Future    Use of tamoxifen (Nolvadex)  Not having any major side effects to tamoxifen.  Could not tolerate AI.       Other hyperlipidemia  Being managed by PCP.     Blood work and ultrasound thyroid prior to next visit in 6 months.  Patient will continue taking tamoxifen.  Reviewed history, examination and test results and management plan with the patient.  Discussed eating healthy foods and staying active and health screening test.  Goal is cure from breast cancer.  Patient is capable of self-care.  Patient will continue to follow-up with her PCP and other consultants.  Provided counseling and support.  I used a dictation device to dictate this note and there could be mistakes in my note and for that patient may contact my office.      History of Present Illness   Chief Complaint   Patient presents with   • Follow-up   Martita Lawrence is a 73 y.o. female .  In  patient was diagnosed to have strongly positive ER, positive IN and negative HER2 ,stage I, 1.2 cm, grade 3 left breast cancer.  Positive lymphovascular invasion..  Negative 3 sentinel lymph nodes. Oncotype score was 25.   In 2019 patient had bilateral mastectomies and left  axillary lymph node sampling and reconstruction surgeries.  Patient had 4 cycles of adjuvant chemotherapy, combination of Taxotere and Cytoxan and Neulasta and that was followed by aromatase inhibitors in June 2019 and she had problems with all 3 aromatase inhibitors.  Lot of musculoskeletal symptoms.  Since May 2020 patient has been on tamoxifen and she has much less problem with musculoskeletal symptoms.  No hot flashes.  Has burning sensation in her feet, part of peripheral neuropathy.  Occasionally she has some tiredness  .  She has bilateral carpal tunnel . She is having shoulder pain and had MRI scan and is following with a specialist. There is family history of breast cancer.  Patient states that she tested negative for BRCA .   .  History of high triglycerides and and that is being managed by her primary physician..  Patient is trying to eat healthy and stay active.  She states she is up-to-date with her medical checkups    Occasionally constipation and she had  colonoscopy in 2022    Pertinent Medical History   In 2019 patient was diagnosed to have strongly positive ER, positive GA and negative HER2 ,stage I, 1.2 cm, grade 3 left breast cancer.  Positive lymphovascular invasion..  Negative 3 sentinel lymph nodes. Oncotype score was 25.   In February 2019 patient had bilateral mastectomies and left axillary lymph node sampling and reconstruction surgeries.  Patient had 4 cycles of adjuvant chemotherapy, combination of Taxotere and Cytoxan and Neulasta and that was followed by aromatase inhibitors in June 2019 and she had problems with all 3 aromatase inhibitors.  Lot of musculoskeletal symptoms.  Since May 2020 patient has been on tamoxifen and she has much less problem with musculoskeletal symptoms.  No hot flashes.  Has burning sensation in her feet, part of peripheral neuropathy.  Occasionally she has some tiredness  .  She has bilateral carpal tunnel .  She is having shoulder pain and had MRI scan and is  following with a specialist.  There is family history of breast cancer.  Patient states that she tested negative for BRCA .   .  History of high triglycerides and and that is being managed by her primary physician..  Patient is trying to eat healthy and stay active.  She states she is up-to-date with her medical checkups    Occasionally constipation and she had  colonoscopy in 2022   Oncology History   Oncology History   Malignant neoplasm of lower-inner quadrant of left breast in female, estrogen receptor positive (HCC)   1/2/2019 Biopsy    Left breast biopsy:  - Invasive ductal carcinoma  Grade 2  ER 90%  WI 40%  HER2 negative     1/2019 Genetic Testing    [facility]  APC, ZAC, AXIN2, BARD1, BMPR1A, BRCA1, BRCA2, BRIP1, CDH1, CDK4, CDKN2A, CHEK2, EPCAM (large rearrangment only), HOXB13 (sequencing only), GALNT12, MLH1, MSH2, MSH3 (excluding repetitive portions of exon 1), MSH6, MUTYH, NBN, NTHL1, PALB2, PMS2, PTEN, RAD51C, RAD51D, RNF43, RPS20, SMAD4, STK11, TP53, Sequencing was performed for select regions of POLE and POLD1, and large rearrangement analysis was performed for select regions of GREM1      Negative     2/8/2019 Surgery    Left mastectomy with sentinel lymph node biopsy  - clear margins   - 0/3 lymph nodes    Right prophylactic mastectomy, bilateral expander placement  Dr. Warner and Dr. Abreu     2/2019 Genomic Testing    Oncotype Dx: 25     4/2019 -  Chemotherapy    Adjuvant  Taxotere and Cytoxan once every 3 weeks for 4 cycles   Dr. Aguilar     4/11/2019 - 7/3/2019 Chemotherapy    pegfilgrastim (NEULASTA) subcutaneous injection 6 mg, 6 mg, Subcutaneous, Once, 4 of 4 cycles  Dose modification: 4 mg (original dose 6 mg, Cycle 3)  Administration: 4 mg (5/24/2019), 4 mg (6/14/2019)  cyclophosphamide (CYTOXAN) 1,176 mg in sodium chloride 0.9 % 250 mL IVPB, 600 mg/m2 = 1,176 mg, Intravenous, Once, 4 of 4 cycles  Administration: 1,176 mg (5/23/2019), 1,176 mg (6/13/2019)  DOCEtaxel (TAXOTERE) 147 mg in sodium  "chloride 0.9 % 250 mL chemo infusion, 75 mg/m2 = 147 mg, Intravenous, Once, 4 of 4 cycles  Administration: 147 mg (5/23/2019), 147 mg (6/13/2019)     7/2019 -  Hormone Therapy    Femara 07/2019- 10/2019  Anstrozole 10/2019- 5/2020  Tamoxifen- 5/2020- present     9/27/2019 Surgery    Bilateral implant exhange     8/6/2020 Surgery    Bilateral fat grafting        Review of Systems  Reviewed 12 systems.  Symptoms are as above in HPI and medical history.  No other symptoms from other systems.      Objective   /80 (BP Location: Right arm, Patient Position: Sitting, Cuff Size: Adult)   Pulse 80   Temp (!) 97.3 °F (36.3 °C) (Temporal)   Resp 18   Ht 5' 5\" (1.651 m)   Wt 80.7 kg (178 lb)   SpO2 98%   BMI 29.62 kg/m²     ECOG   1  Physical Exam  Constitutional:       Appearance: Normal appearance.      Comments: Overweight.   HENT:      Head: Normocephalic and atraumatic.   Eyes:      General: No scleral icterus.  Cardiovascular:      Rate and Rhythm: Normal rate and regular rhythm.      Heart sounds: Normal heart sounds. No murmur heard.  Pulmonary:      Effort: Pulmonary effort is normal. No respiratory distress.      Breath sounds: Normal breath sounds. No rhonchi or rales.   Abdominal:      General: Abdomen is flat. There is no distension.      Palpations: Abdomen is soft. There is no mass.      Tenderness: There is no abdominal tenderness.   Musculoskeletal:      Cervical back: Normal range of motion.      Right lower leg: No edema.      Left lower leg: No edema.      Comments: Somewhat restricted movements at the left shoulder.  No lymphedema.  No palpable lymph nodes in the axillary areas.  No clubbing.  No calf tenderness.   Lymphadenopathy:      Cervical: No cervical adenopathy.   Skin:     Findings: No bruising or rash.   Neurological:      General: No focal deficit present.      Mental Status: She is alert and oriented to person, place, and time.      Cranial Nerves: No cranial nerve deficit.      " Motor: No weakness.      Coordination: Coordination normal.      Gait: Gait normal.   Psychiatric:         Mood and Affect: Mood normal.         Behavior: Behavior normal.         Judgment: Judgment normal.         Labs: I have reviewed the following labs:  Lab Results   Component Value Date/Time    WBC 5.49 12/06/2024 07:10 AM    RBC 4.91 12/06/2024 07:10 AM    Hemoglobin 13.9 12/06/2024 07:10 AM    Hematocrit 42.8 12/06/2024 07:10 AM    MCV 87 12/06/2024 07:10 AM    MCH 28.3 12/06/2024 07:10 AM    RDW 13.2 12/06/2024 07:10 AM    Platelets 183 12/06/2024 07:10 AM    Segmented % 47 12/06/2024 07:10 AM    Lymphocytes % 35 12/06/2024 07:10 AM    Monocytes % 9 12/06/2024 07:10 AM    Eosinophils Relative 8 (H) 12/06/2024 07:10 AM    Basophils Relative 1 12/06/2024 07:10 AM    Immature Grans % 0 12/06/2024 07:10 AM    Absolute Neutrophils 2.62 12/06/2024 07:10 AM     Lab Results   Component Value Date/Time    Potassium 3.6 12/06/2024 07:10 AM    Chloride 102 12/06/2024 07:10 AM    CO2 30 12/06/2024 07:10 AM    BUN 12 12/06/2024 07:10 AM    Creatinine 0.69 12/06/2024 07:10 AM    Glucose, Fasting 81 12/06/2024 07:10 AM    Calcium 9.6 12/06/2024 07:10 AM    AST 19 12/06/2024 07:10 AM    ALT 14 12/06/2024 07:10 AM    Alkaline Phosphatase 41 12/06/2024 07:10 AM    Total Protein 7.0 12/06/2024 07:10 AM    Albumin 4.1 12/06/2024 07:10 AM    Total Bilirubin 0.44 12/06/2024 07:10 AM    eGFR 87 12/06/2024 07:10 AM     MPRESSION:     Mild distal supraspinatus and infraspinatus tendinosis. No full-thickness rotator cuff tear.     Minimal subacromial/subdeltoid bursitis.     Mild bicipital tendinosis and tenosynovitis.        Workstation performed: [id number]        Imaging    MRI shoulder left wo contrast (Order: [id number]) - 10/24/2024

## 2024-12-11 NOTE — ASSESSMENT & PLAN NOTE
On adjuvant tamoxifen and she is tolerating that well.  No evidence of recurrent or metastatic disease from breast cancer at this time.  Orders:  •  CBC and differential; Future  •  Comprehensive metabolic panel; Future  •  Cancer antigen 27.29; Future

## 2024-12-12 ENCOUNTER — OFFICE VISIT (OUTPATIENT)
Dept: OBGYN CLINIC | Facility: CLINIC | Age: 74
End: 2024-12-12
Payer: COMMERCIAL

## 2024-12-12 VITALS — HEIGHT: 65 IN | WEIGHT: 179 LBS | BODY MASS INDEX: 29.82 KG/M2

## 2024-12-12 DIAGNOSIS — M75.82 ROTATOR CUFF TENDONITIS, LEFT: ICD-10-CM

## 2024-12-12 DIAGNOSIS — M75.02 ADHESIVE CAPSULITIS OF LEFT SHOULDER: Primary | ICD-10-CM

## 2024-12-12 DIAGNOSIS — M75.22 BICEPS TENDONITIS ON LEFT: ICD-10-CM

## 2024-12-12 PROCEDURE — 99214 OFFICE O/P EST MOD 30 MIN: CPT | Performed by: STUDENT IN AN ORGANIZED HEALTH CARE EDUCATION/TRAINING PROGRAM

## 2024-12-12 RX ORDER — MELOXICAM 15 MG/1
15 TABLET ORAL DAILY
Qty: 30 TABLET | Refills: 2 | Status: SHIPPED | OUTPATIENT
Start: 2024-12-12

## 2024-12-12 NOTE — PROGRESS NOTES
Ortho Sports Medicine Shoulder Follow Up Visit     Assesment:   73 y.o. female left shoulder adhesive capsulitis and rotator cuff tendinitis, improving    Plan:    We had a lengthy discussion during which we reviewed her imaging, exam, diagnosis, and treatment options. She has had a favorable response to the injection. We discussed that some mild flare ups of pain were possible with PT sessions, however it was important to maintain her strength and motion with continued exercises.  We discussed conservative treatments including physical therapy, activity modification, anti-inflammatories, RICE, and injections.  We also discussed surgical options.  After lengthy discussion, the patient elected to continue with a course of conservative treatment.  She will continue with a home exercise program to work on rotator cuff and periscapular stabilizer strengthening as well as capsular stretching and range of motion exercises based on what she learned at PT. She will follow up as needed. All of her questions were answered in detail. Shewas encouraged to reach out via [facility] if shehas any questions or concerns.     Conservative treatment:    Ice to shoulder 1-2 times daily, for 20 minutes at a time.  PT for ROM and strengthening to shoulder, rotator cuff, scapular stabilizers.  Home exercise program for shoulder, including ROM and strenthening.  Instructions given to patient of what exercises to perform.  Let pain guide return to activities.      Imaging:    All imaging from today was reviewed by myself and explained to the patient.       Injection:    No Injection planned at this time.      Surgery:     No surgery is recommended at this point, continue with conservative treatment plan as noted.      Follow up:    No follow-ups on file.      Chief Complaint   Patient presents with    Left Shoulder - Follow-up         History of Present Illness:    The patient is returns for follow up of her left shoulder.  Since the prior  visit, She reports significant improvement.     Pain is improved by rest, ice, NSAIDS, physical therapy, and injection.  Pain is aggravated by overhead activity.       The patient denies weakness.       The patient has tried rest, ice, NSAIDS, physical therapy, and injection.        Shoulder Surgical History:  None    Past Medical, Social and Family History:  Past Medical History:   Diagnosis Date    Allergic     Allergies     Arthritis     Carpal tunnel syndrome on right     Chemotherapy induced neutropenia (HCC) 05/23/2019    Gastroesophageal reflux disease 08/06/2020    GERD (gastroesophageal reflux disease)     Lumbar disc disease     Trigger thumb     right    Vitamin D deficiency     Wears glasses      Past Surgical History:   Procedure Laterality Date    BREAST BIOPSY Left 01/02/2019    invasive ductal carcinoma    COLONOSCOPY      HYSTERECTOMY      age 47 - abdominal    LIPOSUCTION W/ FAT INJECTION Bilateral 08/06/2020    Procedure: FAT GRAFTING;  Surgeon: Rayray Abreu MD;  Location: [facility];  Service: Plastics    OOPHORECTOMY Bilateral     age 47    NH BX/EXC LYMPH NODE OPEN SUPERFICIAL Left 02/08/2019    Procedure: BIOPSY LYMPH NODE SENTINEL;  Surgeon: Lisbeth Warner MD;  Location: [facility];  Service: Surgical Oncology    NH EDG US EXAM SURGICAL ALTER STOM DUODENUM/JEJUNUM N/A 02/02/2017    Procedure: RADIAL ENDOSCOPIC U/S;  Surgeon: Cosmo Grissom MD;  Location: [facility];  Service: Gastroenterology    NH FASCIECTOMY PLANTAR FASCIA PARTIAL SPX Right 10/20/2016    Procedure:  OPEN RELEASE FASCIA PLANTAR ,CUTTING BOTTOM OF ARCH,INSTEP ;  Surgeon: Rashel Shepherd DPM;  Location: [facility];  Service: Podiatry    NH IMPLNT BIO IMPLNT FOR SOFT TISSUE REINFORCEMENT Bilateral 02/08/2019    Procedure: RECONSTRUCTION BREAST W/ IMPLANT;  Surgeon: Rayray Abreu MD;  [facility];  Service: Plastics    NH INSJ/RPLCMT BREAST IMPLANT SEP DAY MASTECTOMY Bilateral 09/27/2019    Procedure: IMPLANT EXPANDER EXCHANGE;   Surgeon: Rayray Abreu MD;  [facility]: [facility];  Service: Plastics    NC MASTECTOMY SIMPLE COMPLETE N/A 02/08/2019    Procedure: LEFT breast mastectomy; RIGHT breast prophylactic mastectomy;  Surgeon: Lisbeth Warner MD;  Location: [facility];  Service: Surgical Oncology    NC ZUNILDA-IMPLANT CAPSULECTOMY BREAST COMPLETE Bilateral 09/27/2019    Procedure: CAPSULECTOMY;  Surgeon: Rayray Abreu MD;  [facility]: [facility];  Service: Plastics    NC REVISION OF RECONSTRUCTED BREAST Bilateral 08/06/2020    Procedure: BREAST LATERAL STANDING EXCISION SKIN DEFORMITIES POST RECON.;  Surgeon: Rayray Abreu MD;  [facility];  Service: Plastics    NC TISSUE EXPANDER PLACEMENT BREAST RECONSTRUCTION Bilateral 02/08/2019    Procedure: INSERTION/PLACEMENT TISSUE EXPANDER (EXCHANGE);  Surgeon: Rayray Abreu MD;  [facility]: [facility];  Service: Plastics    THUMB ARTHROSCOPY      UPPER GASTROINTESTINAL ENDOSCOPY      US GUIDED BREAST BIOPSY LEFT COMPLETE Left 01/02/2019    WISDOM TOOTH EXTRACTION       Allergies   Allergen Reactions    Latex Anaphylaxis    Shellfish-Derived Products - Food Allergy Anaphylaxis and Other (See Comments)     Current Outpatient Medications on File Prior to Visit   Medication Sig Dispense Refill    CALCIUM PO Take 1,200 mg by mouth daily      celecoxib (CeleBREX) 200 mg capsule Take 1 capsule (200 mg total) by mouth daily 30 capsule 0    Cholecalciferol (VITAMIN D PO) Take 1.25 mg by mouth every 30 (thirty) days      clobetasol (TEMOVATE) 0.05 % ointment Apply topically 2 (two) times a day 45 g 0    Cyanocobalamin (VITAMIN B 12 PO) Take 500 mcg by mouth daily       ergocalciferol (VITAMIN D2) 50,000 units Take 1 capsule (50,000 Units total) by mouth every 30 (thirty) days 3 capsule 3    lidocaine (XYLOCAINE) 5 % ointment Apply topically as needed for mild pain 50 g 3    meloxicam (Mobic) 15 mg tablet Take 1 tablet (15 mg total) by mouth daily (Patient taking differently: Take 15 mg by mouth if needed (taking PRN))  "30 tablet 2    metroNIDAZOLE (METROGEL) 0.75 % gel       Multiple Vitamins-Minerals (MULTIVITAL PO) Take by mouth daily       Nerve Stimulator (TENS Therapy Pain Relief) LEATHA Use 1 Device in the morning 1 each 0    Omega-3 Fatty Acids (Fish Oil) 300 MG CAPS Take by mouth      sodium chloride (OCEAN) 0.65 % nasal spray 1 spray into each nostril every 2 (two) hours while awake 60 mL 1    tamoxifen (NOLVADEX) 20 mg tablet Take 1 tablet (20 mg total) by mouth daily 90 tablet 1     No current facility-administered medications on file prior to visit.     Social History     Socioeconomic History    Marital status: Single     Spouse name: Not on file    Number of children: Not on file    Years of education: Not on file    Highest education level: Not on file   Occupational History    Not on file   Tobacco Use    Smoking status: Never     Passive exposure: Never    Smokeless tobacco: Never   Vaping Use    Vaping status: Never Used   Substance and Sexual Activity    Alcohol use: Not Currently     Alcohol/week: 1.0 standard drink of alcohol     Types: 1 Glasses of wine per week    Drug use: No    Sexual activity: Never   Other Topics Concern    Not on file   Social History Narrative    Not on file     Social Drivers of Health     Financial Resource Strain: Not on file   Food Insecurity: Not on file   Transportation Needs: Not on file   Physical Activity: Not on file   Stress: Not on file   Social Connections: Not on file   Intimate Partner Violence: Not on file   Housing Stability: Not on file       I have reviewed the past medical, surgical, social and family history, medications and allergies as documented in the EMR.    Review of systems: ROS is negative other than that noted in the HPI.  Constitutional: Negative for fatigue and fever.      Physical Exam:    Height 5' 5\" (1.651 m), weight 81.2 kg (179 lb), not currently breastfeeding.    General/Constitutional: NAD, well developed, well nourished  HENT: Normocephalic, " atraumatic  CV: Intact distal pulses, regular rate  Resp: No respiratory distress or labored breathing  GI: Soft and non-tender   Lymphatic: No lymphadenopathy palpated  Neuro: Alert and Oriented x 3, no focal deficits  Psych: Normal mood, normal affect, normal judgement, normal behavior  Skin: Warm, dry, no rashes, no erythema      Shoulder focused exam:             LEFT     Scapula Atrophy   Negative       Winging   Negative       Protraction   Negative     Rotator cuff SS   5/5       IS   5/5       SubS   5/5     AROM FF   160       ER0   35       IRb   L-3     PROM FF   170       ER0   50     TTP: AC   Negative       Biceps   Negative       SC Joint   Negative        Scapular Spine   Negative       Proximal Humerus   Negative     Special Tests: [place]riens   Not tested       Cross body Adduction   Not tested        Speeds    Not tested        Denton's   Not tested        Posey   Not tested        Bear Hug   Not tested      Instability: Apprehension & relocation   not tested       Load & shift   not tested        UE NV Exam: +2 Radial pulses bilaterally  Sensation intact to light touch C5-T1 bilaterally, Radial/median/ulnar nerve motor intact    Cervical ROM is full without pain, numbness or tingling      Shoulder Imaging    No imaging was performed today

## 2025-05-15 ENCOUNTER — HOSPITAL ENCOUNTER (OUTPATIENT)
Dept: RADIOLOGY | Facility: HOSPITAL | Age: 75
Discharge: HOME | End: 2025-05-15
Attending: INTERNAL MEDICINE
Payer: COMMERCIAL

## 2025-05-15 DIAGNOSIS — E04.1 THYROID NODULE: ICD-10-CM

## 2025-05-15 PROCEDURE — 76536 US EXAM OF HEAD AND NECK: CPT

## 2025-06-18 ENCOUNTER — TELEPHONE (OUTPATIENT)
Age: 75
End: 2025-06-18

## 2025-06-18 NOTE — TELEPHONE ENCOUNTER
Pt called in to cancel her survivorship appointment with [facility] tomorrow, pt does not want to reschedule. FYI to [facility] team. Appt is sang.

## 2025-06-25 ENCOUNTER — OFFICE VISIT (OUTPATIENT)
Dept: INTERNAL MEDICINE CLINIC | Facility: CLINIC | Age: 75
End: 2025-06-25
Payer: COMMERCIAL

## 2025-06-25 VITALS
SYSTOLIC BLOOD PRESSURE: 112 MMHG | HEART RATE: 90 BPM | WEIGHT: 178.4 LBS | DIASTOLIC BLOOD PRESSURE: 78 MMHG | HEIGHT: 65 IN | OXYGEN SATURATION: 98 % | BODY MASS INDEX: 29.72 KG/M2 | TEMPERATURE: 98.7 F

## 2025-06-25 DIAGNOSIS — R53.83 OTHER FATIGUE: ICD-10-CM

## 2025-06-25 DIAGNOSIS — L30.9 ECZEMA, UNSPECIFIED TYPE: Primary | ICD-10-CM

## 2025-06-25 DIAGNOSIS — Z00.8 EXAM FOR POPULATION SURVEY: ICD-10-CM

## 2025-06-25 DIAGNOSIS — E55.9 VITAMIN D DEFICIENCY: ICD-10-CM

## 2025-06-25 DIAGNOSIS — Z00.00 WELL ADULT EXAM: ICD-10-CM

## 2025-06-25 PROCEDURE — 99214 OFFICE O/P EST MOD 30 MIN: CPT | Performed by: INTERNAL MEDICINE

## 2025-06-25 RX ORDER — ERGOCALCIFEROL 1.25 MG/1
50000 CAPSULE, LIQUID FILLED ORAL
Qty: 3 CAPSULE | Refills: 3 | Status: SHIPPED | OUTPATIENT
Start: 2025-06-25

## 2025-06-25 RX ORDER — CLOBETASOL PROPIONATE 0.5 MG/G
OINTMENT TOPICAL 2 TIMES DAILY
Qty: 45 G | Refills: 0 | Status: SHIPPED | OUTPATIENT
Start: 2025-06-25

## 2025-06-25 NOTE — PROGRESS NOTES
Assessment/Plan:           1. Eczema, unspecified type  Comments:  Resume clobetasol application.  Orders:  -     clobetasol (TEMOVATE) 0.05 % ointment; Apply topically 2 (two) times a day  2. Vitamin D deficiency  -     ergocalciferol (VITAMIN D2) 50,000 units; Take 1 capsule (50,000 Units total) by mouth every 30 (thirty) days  3. Well adult exam  -     TSH, 3rd generation; Future  -     Urinalysis with microscopic; Future  4. Exam for population survey  -     Hemoglobin A1C; Future  -     Lipid panel; Future  5. Other fatigue  Comments:  Etiology is not clear.  Will check blood work  Orders:  -     T4, free; Future         1. Eczema, unspecified type (Primary)      2. Vitamin D deficiency      3. Well adult exam      4. Exam for population survey         No problem-specific Assessment & Plan notes found for this encounter.           Subjective:      Patient ID: Martita Lawrence is a 74 y.o. female.    HPI    The following portions of the patient's history were reviewed and updated as appropriate: She  has a past medical history of Allergic, Allergies, Arthritis, Carpal tunnel syndrome on right, Chemotherapy induced neutropenia (HCC) (05/23/2019), Gastroesophageal reflux disease (08/06/2020), GERD (gastroesophageal reflux disease), Lumbar disc disease, Trigger thumb, Vitamin D deficiency, and Wears glasses.  She   Patient Active Problem List    Diagnosis Date Noted    Adhesive capsulitis of left shoulder 10/18/2024    Rotator cuff tendonitis, left 10/18/2024    Biceps tendonitis on left 10/18/2024    Primary osteoarthritis of left shoulder 10/18/2024    Other insomnia 05/01/2022    Osteopenia 03/05/2021    Gastroesophageal reflux disease 08/06/2020    Breast reconstruction deformity     Use of tamoxifen (Nolvadex) 06/17/2020    Bilateral carpal tunnel syndrome 01/27/2020    Osteoarthritis of multiple joints 01/27/2020    Drug-induced polyneuropathy (HCC) 01/27/2020    Arthritis 01/27/2020    Vitamin D deficiency  01/08/2020    Impaired fasting blood sugar 01/08/2020    Dysuria 11/10/2019    Chemotherapy induced neutropenia (HCC) 05/23/2019    Folliculitis 05/17/2019    BRCA negative 01/23/2019    Family history of breast cancer in sister 01/10/2019    Malignant neoplasm of lower-inner quadrant of left breast in female, estrogen receptor positive (HCC) 01/02/2019    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    Thyroid nodule 12/15/2016    Elevated LFTs 10/26/2016    Nausea 10/26/2016    Atypical chest pain 10/24/2016    Epigastric pain 10/24/2016    Plantar fasciitis of right foot 10/20/2016     She  has a past surgical history that includes Thumb arthroscopy; pr edg us exam surgical alter stom duodenum/jejunum (N/A, 02/02/2017); pr fasciectomy plantar fascia partial spx (Right, 10/20/2016); Oophorectomy (Bilateral); US guided breast biopsy left complete (Left, 01/02/2019); Breast biopsy (Left, 01/02/2019); Colonoscopy; pr mastectomy simple complete (N/A, 02/08/2019); pr bx/exc lymph node open superficial (Left, 02/08/2019); pr tissue expander placement breast reconstruction (Bilateral, 02/08/2019); pr implnt bio implnt for soft tissue reinforcement (Bilateral, 02/08/2019); pr huong-implant capsulectomy breast complete (Bilateral, 09/27/2019); pr insj/rplcmt breast implant sep day mastectomy (Bilateral, 09/27/2019); pr revision of reconstructed breast (Bilateral, 08/06/2020); Liposuction w/ fat injection (Bilateral, 08/06/2020); Hysterectomy; [place] tooth extraction; and Upper gastrointestinal endoscopy.  Her family history includes Brain cancer (age of onset: 72) in her mother; Breast cancer (age of onset: 55) in her sister; Breast cancer (age of onset: 65) in her sister; Breast cancer additional onset (age of onset: 65) in her sister; Prostate cancer (age of onset: 58) in her brother; Prostate cancer (age of onset: 78) in her father.  She  reports that she has never smoked. She has never been exposed to tobacco smoke. She has never  used smokeless tobacco. She reports that she does not currently use alcohol after a past usage of about 1.0 standard drink of alcohol per week. She reports that she does not use drugs.  Current Outpatient Medications   Medication Sig Dispense Refill    CALCIUM PO Take 1,200 mg by mouth in the morning.      Cholecalciferol (VITAMIN D PO) Take 1.25 mg by mouth every 30 (thirty) days      clobetasol (TEMOVATE) 0.05 % ointment Apply topically 2 (two) times a day 45 g 0    Cyanocobalamin (VITAMIN B 12 PO) Take 500 mcg by mouth in the morning.      ergocalciferol (VITAMIN D2) 50,000 units Take 1 capsule (50,000 Units total) by mouth every 30 (thirty) days 3 capsule 3    lidocaine (XYLOCAINE) 5 % ointment Apply topically as needed for mild pain 50 g 3    Multiple Vitamins-Minerals (MULTIVITAL PO) Take by mouth in the morning.      Omega-3 Fatty Acids (Fish Oil) 300 MG CAPS Take by mouth      sodium chloride (OCEAN) 0.65 % nasal spray 1 spray into each nostril every 2 (two) hours while awake (Patient taking differently: 1 spray into each nostril as needed) 60 mL 1    tamoxifen (NOLVADEX) 20 mg tablet Take 1 tablet (20 mg total) by mouth daily 90 tablet 1    celecoxib (CeleBREX) 200 mg capsule Take 1 capsule (200 mg total) by mouth daily 30 capsule 0    meloxicam (Mobic) 15 mg tablet Take 1 tablet (15 mg total) by mouth daily (Patient taking differently: Take 15 mg by mouth if needed (taking PRN)) 30 tablet 2    meloxicam (Mobic) 15 mg tablet Take 1 tablet (15 mg total) by mouth daily 30 tablet 2    metroNIDAZOLE (METROGEL) 0.75 % gel       Nerve Stimulator (TENS Therapy Pain Relief) LEATHA Use 1 Device in the morning 1 each 0     No current facility-administered medications for this visit.     Current Outpatient Medications on File Prior to Visit   Medication Sig    CALCIUM PO Take 1,200 mg by mouth in the morning.    Cholecalciferol (VITAMIN D PO) Take 1.25 mg by mouth every 30 (thirty) days    Cyanocobalamin (VITAMIN B 12  PO) Take 500 mcg by mouth in the morning.    lidocaine (XYLOCAINE) 5 % ointment Apply topically as needed for mild pain    Multiple Vitamins-Minerals (MULTIVITAL PO) Take by mouth in the morning.    Omega-3 Fatty Acids (Fish Oil) 300 MG CAPS Take by mouth    sodium chloride (OCEAN) 0.65 % nasal spray 1 spray into each nostril every 2 (two) hours while awake (Patient taking differently: 1 spray into each nostril as needed)    tamoxifen (NOLVADEX) 20 mg tablet Take 1 tablet (20 mg total) by mouth daily    [DISCONTINUED] clobetasol (TEMOVATE) 0.05 % ointment Apply topically 2 (two) times a day    [DISCONTINUED] ergocalciferol (VITAMIN D2) 50,000 units Take 1 capsule (50,000 Units total) by mouth every 30 (thirty) days    celecoxib (CeleBREX) 200 mg capsule Take 1 capsule (200 mg total) by mouth daily    meloxicam (Mobic) 15 mg tablet Take 1 tablet (15 mg total) by mouth daily (Patient taking differently: Take 15 mg by mouth if needed (taking PRN))    meloxicam (Mobic) 15 mg tablet Take 1 tablet (15 mg total) by mouth daily    metroNIDAZOLE (METROGEL) 0.75 % gel     Nerve Stimulator (TENS Therapy Pain Relief) LEATHA Use 1 Device in the morning     No current facility-administered medications on file prior to visit.     Medications Discontinued During This Encounter   Medication Reason    clobetasol (TEMOVATE) 0.05 % ointment Reorder    ergocalciferol (VITAMIN D2) 50,000 units Reorder      She is allergic to latex and shellfish-derived products - food allergy..    Review of Systems   Constitutional:  Negative for appetite change, chills, fatigue and fever.   HENT:  Negative for sore throat and trouble swallowing.    Eyes:  Negative for redness.   Respiratory:  Negative for shortness of breath.    Cardiovascular:  Negative for chest pain and palpitations.   Gastrointestinal:  Negative for abdominal pain, constipation and diarrhea.   Genitourinary:  Negative for dysuria and hematuria.   Musculoskeletal:  Positive for  "arthralgias. Negative for back pain and neck pain.   Skin:  Negative for rash.   Neurological:  Negative for seizures, weakness and headaches.   Hematological:  Negative for adenopathy.   Psychiatric/Behavioral:  Negative for confusion. The patient is not nervous/anxious.          Objective:      /78 (BP Location: Left arm, Patient Position: Sitting, Cuff Size: Standard)   Pulse 90   Temp 98.7 °F (37.1 °C) (Temporal)   Ht 5' 5\" (1.651 m)   Wt 80.9 kg (178 lb 6.4 oz)   SpO2 98%   BMI 29.69 kg/m²     Results Reviewed       None            No results found for this or any previous visit (from the past 8 weeks).     Physical Exam  Constitutional:       General: She is not in acute distress.     Appearance: Normal appearance.   HENT:      Head: Normocephalic and atraumatic.      Nose: Nose normal.      Mouth/Throat:      Mouth: Mucous membranes are moist.     Eyes:      Extraocular Movements: Extraocular movements intact.      Pupils: Pupils are equal, round, and reactive to light.       Cardiovascular:      Rate and Rhythm: Normal rate and regular rhythm.      Pulses: Normal pulses.      Heart sounds: Normal heart sounds. No murmur heard.     No friction rub.   Pulmonary:      Effort: Pulmonary effort is normal. No respiratory distress.      Breath sounds: Normal breath sounds. No wheezing.   Abdominal:      General: Abdomen is flat. Bowel sounds are normal. There is no distension.      Palpations: Abdomen is soft. There is no mass.      Tenderness: There is no abdominal tenderness. There is no guarding.     Musculoskeletal:         General: Normal range of motion.      Cervical back: Neck supple.     Neurological:      General: No focal deficit present.      Mental Status: She is alert and oriented to person, place, and time. Mental status is at baseline.      Cranial Nerves: No cranial nerve deficit.     Psychiatric:         Mood and Affect: Mood normal.         Behavior: Behavior normal.         "

## 2025-07-03 ENCOUNTER — NURSE TRIAGE (OUTPATIENT)
Age: 75
End: 2025-07-03

## 2025-07-03 NOTE — TELEPHONE ENCOUNTER
"REASON FOR CONVERSATION: Breast Mass    SYMPTOMS: reports new pea size lump found a few months ago, now lump is size of a marble and is hard and is painful.     OTHER HEALTH INFORMATION: left breast cancer.     PROTOCOL DISPOSITION: See Within 3 Days in Office    CARE ADVICE PROVIDED: appointment scheduled for evaluation.     PRACTICE FOLLOW-UP: none.       Reason for Disposition   Breast lump    Answer Assessment - Initial Assessment Questions  1. SYMPTOM: \"What's the main symptom you're concerned about?\"  (e.g., lump, nipple discharge, pain, rash )      Lump in left armpit.     2. LOCATION: \"Where is the lump located?\"      Left armpit     3. ONSET: \"When did lump  start?\"      Onset pea size lump a few months ago and for past month marble size     4. PRIOR HISTORY: \"Do you have any history of prior problems with your breasts?\" (e.g., breast cancer, breast implant, fibrocystic breast disease)      Left breast cancer with bilateral mastectomy    5. CAUSE: \"What do you think is causing this symptom?\"      Unsure    6. OTHER SYMPTOMS: \"Do you have any other symptoms?\" (e.g., fever, breast pain, nipple discharge, redness or rash)      Painful upon palpation    Protocols used: Breast Symptoms-Adult-[place]    "

## 2025-07-08 ENCOUNTER — OFFICE VISIT (OUTPATIENT)
Dept: SURGICAL ONCOLOGY | Facility: CLINIC | Age: 75
End: 2025-07-08
Payer: COMMERCIAL

## 2025-07-08 VITALS
WEIGHT: 184 LBS | OXYGEN SATURATION: 98 % | DIASTOLIC BLOOD PRESSURE: 70 MMHG | HEART RATE: 80 BPM | TEMPERATURE: 97.7 F | SYSTOLIC BLOOD PRESSURE: 128 MMHG | BODY MASS INDEX: 30.66 KG/M2 | HEIGHT: 65 IN

## 2025-07-08 DIAGNOSIS — L02.91 ABSCESS: ICD-10-CM

## 2025-07-08 DIAGNOSIS — L02.91 ABSCESS: Primary | ICD-10-CM

## 2025-07-08 DIAGNOSIS — Z17.0 MALIGNANT NEOPLASM OF LOWER-INNER QUADRANT OF LEFT BREAST IN FEMALE, ESTROGEN RECEPTOR POSITIVE (HCC): ICD-10-CM

## 2025-07-08 DIAGNOSIS — C50.312 MALIGNANT NEOPLASM OF LOWER-INNER QUADRANT OF LEFT BREAST IN FEMALE, ESTROGEN RECEPTOR POSITIVE (HCC): ICD-10-CM

## 2025-07-08 PROCEDURE — 87205 SMEAR GRAM STAIN: CPT

## 2025-07-08 PROCEDURE — 87077 CULTURE AEROBIC IDENTIFY: CPT

## 2025-07-08 PROCEDURE — 87186 SC STD MICRODIL/AGAR DIL: CPT

## 2025-07-08 PROCEDURE — 87070 CULTURE OTHR SPECIMN AEROBIC: CPT

## 2025-07-08 PROCEDURE — 99214 OFFICE O/P EST MOD 30 MIN: CPT

## 2025-07-08 PROCEDURE — 10060 I&D ABSCESS SIMPLE/SINGLE: CPT

## 2025-07-08 RX ORDER — SULFAMETHOXAZOLE AND TRIMETHOPRIM 800; 160 MG/1; MG/1
1 TABLET ORAL EVERY 12 HOURS SCHEDULED
Qty: 20 TABLET | Refills: 0 | Status: SHIPPED | OUTPATIENT
Start: 2025-07-08 | End: 2025-07-18

## 2025-07-08 NOTE — LETTER
2025     Lisbeth Warner MD  [address] [address]    Patient: Martita Lawrence   YOB: 1950   Date of Visit: 2025       Dear MD Teresa iWlks, RN:    Thank you for referring Martita Lawrence to me for evaluation. Below are my notes for this consultation.    If you have questions, please do not hesitate to call me. I look forward to following your patient along with you.         Sincerely,        BELLA Mensah        CC: ZENY Camilo CRNP  2025  2:48 PM  Sign when Signing Visit  Name: Martita Lawrence      : 1950      MRN: [id number]  Encounter Provider: BELLA Mensah  Encounter Date: 2025   Encounter department: [facility] [place]  :  Assessment & Plan  Abscess  The patient has a superficial abscess in the left axilla.  This was drained today and sent for culture.  I will start her on Bactrim, and have advised her to call if symptoms worsen, or if abscess seems to reform.  I have instructed her to remove the packing tomorrow before her shower, and to use firm pressure in the shower to encourage further drainage.  She should continue using warm compresses several times daily.    Orders:  •  Body fluid culture and Gram stain; Future  •  sulfamethoxazole-trimethoprim (BACTRIM DS) 800-160 mg per tablet; Take 1 tablet by mouth every 12 (twelve) hours for 10 days    Malignant neoplasm of lower-inner quadrant of left breast in female, estrogen receptor positive (HCC)  There is no evidence of disease recurrence at this time. Lump in left axilla appears to be an abscess, and I do not appreciate any adenopathy.           History of Present Illness  Martita Lawrence is a 74 y.o. year old female who presents today for a painful lump in her left armpit.  She states she first noticed a small bump several months ago, but states it was not painful and did not  seem to change for quite a long time. Last week, she noticed this became very tender, and the small bump become much larger. She denies any fever or chills but does report malaise and a headache. She states she tried applying warm compresses without improvement.        Oncology History  Cancer Staging   Malignant neoplasm of lower-inner quadrant of left breast in female, estrogen receptor positive (HCC)  Staging form: Breast, AJCC 8th Edition  - Clinical: Stage IA (cT1, cN0, cM0, G3, ER: Positive, NY: Positive, HER2: Negative) - Signed by Lisbeth Warner MD on 1/10/2019  Method of lymph node assessment: Clinical  Histologic grading system: 3 grade system  Laterality: Left  - Pathologic: Stage IA (pT1c, pN0(sn), cM0, G3, ER+, NY+, HER2-) - Signed by Lisbeth Warner MD on 2/26/2019  Neoadjuvant therapy: No  Method of lymph node assessment: [place] lymph node biopsy  Histologic grading system: 3 grade system  Laterality: Left  Oncology History   Malignant neoplasm of lower-inner quadrant of left breast in female, estrogen receptor positive (HCC)   1/2/2019 Biopsy    Left breast biopsy:  - Invasive ductal carcinoma  Grade 2  ER 90%  NY 40%  HER2 negative     1/2019 Genetic Testing    [facility]  APC, ZAC, AXIN2, BARD1, BMPR1A, BRCA1, BRCA2, BRIP1, CDH1, CDK4, CDKN2A, CHEK2, EPCAM (large rearrangment only), HOXB13 (sequencing only), GALNT12, MLH1, MSH2, MSH3 (excluding repetitive portions of exon 1), MSH6, MUTYH, NBN, NTHL1, PALB2, PMS2, PTEN, RAD51C, RAD51D, RNF43, RPS20, SMAD4, STK11, TP53, Sequencing was performed for select regions of POLE and POLD1, and large rearrangement analysis was performed for select regions of GREM1      Negative     2/8/2019 Surgery    Left mastectomy with sentinel lymph node biopsy  - clear margins   - 0/3 lymph nodes    Right prophylactic mastectomy, bilateral expander placement  Dr. Warner and Dr. Abreu     2/2019 Genomic Testing    Oncotype Dx: 25     4/2019 -  Chemotherapy    Adjuvant  Taxotere  "and Cytoxan once every 3 weeks for 4 cycles   Dr. Aguilar     4/11/2019 - 7/3/2019 Chemotherapy    pegfilgrastim (NEULASTA) subcutaneous injection 6 mg, 6 mg, Subcutaneous, Once, 4 of 4 cycles  Dose modification: 4 mg (original dose 6 mg, Cycle 3)  Administration: 4 mg (5/24/2019), 4 mg (6/14/2019)  cyclophosphamide (CYTOXAN) 1,176 mg in sodium chloride 0.9 % 250 mL IVPB, 600 mg/m2 = 1,176 mg, Intravenous, Once, 4 of 4 cycles  Administration: 1,176 mg (5/23/2019), 1,176 mg (6/13/2019)  DOCEtaxel (TAXOTERE) 147 mg in sodium chloride 0.9 % 250 mL chemo infusion, 75 mg/m2 = 147 mg, Intravenous, Once, 4 of 4 cycles  Administration: 147 mg (5/23/2019), 147 mg (6/13/2019)     7/2019 -  Hormone Therapy    Femara 07/2019- 10/2019  Anstrozole 10/2019- 5/2020  Tamoxifen- 5/2020- present     9/27/2019 Surgery    Bilateral implant exhange     8/6/2020 Surgery    Bilateral fat grafting          Review of Systems A complete review of systems is negative other than that noted above in the HPI.           Objective  /70   Pulse 80   Temp 97.7 °F (36.5 °C)   Ht 5' 5\" (1.651 m)   Wt 83.5 kg (184 lb)   SpO2 98%   BMI 30.62 kg/m²     Pain Screening:  Pain Score:   9  ECOG    Physical Exam  Vitals reviewed.   Constitutional:       General: She is not in acute distress.     Appearance: Normal appearance. She is not ill-appearing or toxic-appearing.     Cardiovascular:      Rate and Rhythm: Normal rate.   Pulmonary:      Effort: Pulmonary effort is normal.   Chest:      Comments: Left reconstructed breast is smooth, without any skin changes or nodules present. In the left axilla, there is a fluctuant collection with overlying erythema.  This is extremely tender on exam.  I do not appreciate any adenopathy.    Musculoskeletal:         General: Normal range of motion.      Cervical back: Normal range of motion and neck supple.   Lymphadenopathy:      Cervical: No cervical adenopathy.      Upper Body:      Right upper body: No " "supraclavicular adenopathy.      Left upper body: No supraclavicular or axillary adenopathy.     Skin:     General: Skin is warm and dry.     Neurological:      General: No focal deficit present.      Mental Status: She is alert and oriented to person, place, and time.     Psychiatric:         Mood and Affect: Mood normal.         Behavior: Behavior normal.         Thought Content: Thought content normal.         Judgment: Judgment normal.        Incision and drain    Date/Time: 7/8/2025 2:00 PM    Performed by: BELLA Mensah  Authorized by: BELLA Mensah    Universal Protocol:  procedure performed by consultantConsent: Verbal consent obtained. Written consent obtained  Risks and benefits: risks, benefits and alternatives were discussed  Consent given by: patient  Time out: Immediately prior to procedure a \"time out\" was called to verify the correct patient, procedure, equipment, support staff and site/side marked as required.  Patient understanding: patient states understanding of the procedure being performed  Patient consent: the patient's understanding of the procedure matches consent given  Patient identity confirmed: verbally with patient    Patient location:  Clinic  Location:     Type:  Abscess    Location: left axilla.  Pre-procedure details:     Skin preparation:  Betadine  Anesthesia (see MAR for exact dosages):     Anesthesia method:  Local infiltration    Local anesthetic:  Lidocaine 2% WITH epi (1cc)  Procedure details:     Incision types:  Stab incision    Scalpel blade:  11    Incision depth:  Superficial    Wound management:  Probed and deloculated and irrigated with saline    Drainage:  Purulent    Drainage amount:  Moderate    Wound treatment:  Packing placed    Packing materials:  1/4 in iodoform gauze  Post-procedure details:     Patient tolerance of procedure:  Tolerated well, no immediate complications  Comments:      Specimen sent for culture              "

## 2025-07-08 NOTE — PROGRESS NOTES
Name: Martiat Lawrence      : 1950      MRN: [id number]  Encounter Provider: BELLA Mensah  Encounter Date: 2025   Encounter department: [facility]  :  Assessment & Plan  Abscess  The patient has a superficial abscess in the left axilla.  This was drained today and sent for culture.  I will start her on Bactrim, and have advised her to call if symptoms worsen, or if abscess seems to reform.  I have instructed her to remove the packing tomorrow before her shower, and to use firm pressure in the shower to encourage further drainage.  She should continue using warm compresses several times daily.    Orders:  •  Body fluid culture and Gram stain; Future  •  sulfamethoxazole-trimethoprim (BACTRIM DS) 800-160 mg per tablet; Take 1 tablet by mouth every 12 (twelve) hours for 10 days  •  Incision and drain    Malignant neoplasm of lower-inner quadrant of left breast in female, estrogen receptor positive (HCC)  There is no evidence of disease recurrence at this time. Lump in left axilla appears to be an abscess, and I do not appreciate any adenopathy.           History of Present Illness   Martita Lawrence is a 74 y.o. year old female who presents today for a painful lump in her left armpit.  She states she first noticed a small bump several months ago, but states it was not painful and did not seem to change for quite a long time. Last week, she noticed this became very tender, and the small bump become much larger. She denies any fever or chills but does report malaise and a headache. She states she tried applying warm compresses without improvement.        Oncology History   Cancer Staging   Malignant neoplasm of lower-inner quadrant of left breast in female, estrogen receptor positive (HCC)  Staging form: Breast, AJCC 8th Edition  - Clinical: Stage IA (cT1, cN0, cM0, G3, ER: Positive, WV: Positive, HER2: Negative) - Signed by Lisbeth Warner MD on  1/10/2019  Method of lymph node assessment: Clinical  Histologic grading system: 3 grade system  Laterality: Left  - Pathologic: Stage IA (pT1c, pN0(sn), cM0, G3, ER+, WY+, HER2-) - Signed by Lisbeth Warner MD on 2/26/2019  Neoadjuvant therapy: No  Method of lymph node assessment: [place] lymph node biopsy  Histologic grading system: 3 grade system  Laterality: Left  Oncology History   Malignant neoplasm of lower-inner quadrant of left breast in female, estrogen receptor positive (HCC)   1/2/2019 Biopsy    Left breast biopsy:  - Invasive ductal carcinoma  Grade 2  ER 90%  WY 40%  HER2 negative     1/2019 Genetic Testing    [facility]  APC, ZAC, AXIN2, BARD1, BMPR1A, BRCA1, BRCA2, BRIP1, CDH1, CDK4, CDKN2A, CHEK2, EPCAM (large rearrangment only), HOXB13 (sequencing only), GALNT12, MLH1, MSH2, MSH3 (excluding repetitive portions of exon 1), MSH6, MUTYH, NBN, NTHL1, PALB2, PMS2, PTEN, RAD51C, RAD51D, RNF43, RPS20, SMAD4, STK11, TP53, Sequencing was performed for select regions of POLE and POLD1, and large rearrangement analysis was performed for select regions of GREM1      Negative     2/8/2019 Surgery    Left mastectomy with sentinel lymph node biopsy  - clear margins   - 0/3 lymph nodes    Right prophylactic mastectomy, bilateral expander placement  Dr. Warner and Dr. Abreu     2/2019 Genomic Testing    Oncotype Dx: 25     4/2019 -  Chemotherapy    Adjuvant  Taxotere and Cytoxan once every 3 weeks for 4 cycles   Dr. Aguilar     4/11/2019 - 7/3/2019 Chemotherapy    pegfilgrastim (NEULASTA) subcutaneous injection 6 mg, 6 mg, Subcutaneous, Once, 4 of 4 cycles  Dose modification: 4 mg (original dose 6 mg, Cycle 3)  Administration: 4 mg (5/24/2019), 4 mg (6/14/2019)  cyclophosphamide (CYTOXAN) 1,176 mg in sodium chloride 0.9 % 250 mL IVPB, 600 mg/m2 = 1,176 mg, Intravenous, Once, 4 of 4 cycles  Administration: 1,176 mg (5/23/2019), 1,176 mg (6/13/2019)  DOCEtaxel (TAXOTERE) 147 mg in sodium chloride 0.9 % 250 mL chemo infusion,  "75 mg/m2 = 147 mg, Intravenous, Once, 4 of 4 cycles  Administration: 147 mg (5/23/2019), 147 mg (6/13/2019)     7/2019 -  Hormone Therapy    Femara 07/2019- 10/2019  Anstrozole 10/2019- 5/2020  Tamoxifen- 5/2020- present     9/27/2019 Surgery    Bilateral implant exhange     8/6/2020 Surgery    Bilateral fat grafting        Review of Systems A complete review of systems is negative other than that noted above in the HPI.           Objective   /70   Pulse 80   Temp 97.7 °F (36.5 °C)   Ht 5' 5\" (1.651 m)   Wt 83.5 kg (184 lb)   SpO2 98%   BMI 30.62 kg/m²     Pain Screening:  Pain Score:   9  ECOG    Physical Exam  Vitals reviewed.   Constitutional:       General: She is not in acute distress.     Appearance: Normal appearance. She is not ill-appearing or toxic-appearing.     Cardiovascular:      Rate and Rhythm: Normal rate.   Pulmonary:      Effort: Pulmonary effort is normal.   Chest:      Comments: Left reconstructed breast is smooth, without any skin changes or nodules present. In the left axilla, there is a fluctuant collection with overlying erythema.  This is extremely tender on exam.  I do not appreciate any adenopathy.    Musculoskeletal:         General: Normal range of motion.      Cervical back: Normal range of motion and neck supple.   Lymphadenopathy:      Cervical: No cervical adenopathy.      Upper Body:      Right upper body: No supraclavicular adenopathy.      Left upper body: No supraclavicular or axillary adenopathy.     Skin:     General: Skin is warm and dry.     Neurological:      General: No focal deficit present.      Mental Status: She is alert and oriented to person, place, and time.     Psychiatric:         Mood and Affect: Mood normal.         Behavior: Behavior normal.         Thought Content: Thought content normal.         Judgment: Judgment normal.        Incision and drain    Date/Time: 7/8/2025 2:00 PM    Performed by: BELLA Mensah  Authorized by: Cinthia Greenberg " "BELLA Gil    Universal Protocol:  procedure performed by consultantConsent: Verbal consent obtained. Written consent obtained  Risks and benefits: risks, benefits and alternatives were discussed  Consent given by: patient  Time out: Immediately prior to procedure a \"time out\" was called to verify the correct patient, procedure, equipment, support staff and site/side marked as required.  Patient understanding: patient states understanding of the procedure being performed  Patient consent: the patient's understanding of the procedure matches consent given  Patient identity confirmed: verbally with patient    Patient location:  Clinic  Location:     Type:  Abscess    Location: left axilla.  Pre-procedure details:     Skin preparation:  Betadine  Anesthesia (see MAR for exact dosages):     Anesthesia method:  Local infiltration    Local anesthetic:  Lidocaine 2% WITH epi (1cc)  Procedure details:     Incision types:  Stab incision    Scalpel blade:  11    Incision depth:  Superficial    Wound management:  Probed and deloculated and irrigated with saline    Drainage:  Purulent    Drainage amount:  Moderate    Wound treatment:  Packing placed    Packing materials:  1/4 in iodoform gauze  Post-procedure details:     Patient tolerance of procedure:  Tolerated well, no immediate complications  Comments:      Specimen sent for culture              "

## 2025-07-08 NOTE — TELEPHONE ENCOUNTER
Patient calling in, stated that she was waiting at the \[facility]\"" pharmacy and they are stating they did not receive the bactrim script, please send it back over or call them to confirm they received, and then inform the patient at [phone]

## 2025-07-08 NOTE — ASSESSMENT & PLAN NOTE
There is no evidence of disease recurrence at this time. Lump in left axilla appears to be an abscess, and I do not appreciate any adenopathy.

## 2025-07-08 NOTE — ASSESSMENT & PLAN NOTE
The patient has a superficial abscess in the left axilla.  This was drained today and sent for culture.  I will start her on Bactrim, and have advised her to call if symptoms worsen, or if abscess seems to reform.  I have instructed her to remove the packing tomorrow before her shower, and to use firm pressure in the shower to encourage further drainage.  She should continue using warm compresses several times daily.    Orders:  •  Body fluid culture and Gram stain; Future  •  sulfamethoxazole-trimethoprim (BACTRIM DS) 800-160 mg per tablet; Take 1 tablet by mouth every 12 (twelve) hours for 10 days  •  Incision and drain

## 2025-07-09 RX ORDER — SULFAMETHOXAZOLE AND TRIMETHOPRIM 800; 160 MG/1; MG/1
1 TABLET ORAL EVERY 12 HOURS SCHEDULED
Qty: 20 TABLET | Refills: 0 | OUTPATIENT
Start: 2025-07-09 | End: 2025-07-19

## 2025-07-10 LAB
BACTERIA SPEC BFLD CULT: ABNORMAL
GRAM STN SPEC: ABNORMAL
GRAM STN SPEC: ABNORMAL

## 2025-07-11 ENCOUNTER — TELEPHONE (OUTPATIENT)
Dept: SURGICAL ONCOLOGY | Facility: CLINIC | Age: 75
End: 2025-07-11

## 2025-07-11 ENCOUNTER — TELEPHONE (OUTPATIENT)
Dept: HEMATOLOGY ONCOLOGY | Facility: CLINIC | Age: 75
End: 2025-07-11

## 2025-07-11 NOTE — TELEPHONE ENCOUNTER
Left message for patient in regards to obtaining labwork prior to upcoming appointment with Dr. Aguilar on 7/18.  Advised patient labs are non fasting and in system.  Advised patient to call office back if they are unable to obtain labwork prior to appointment.  [facility] number provided.

## 2025-07-11 NOTE — TELEPHONE ENCOUNTER
Attempted to call patient to check in after appointment with Cinthia Gil on 07/08/2025. Patient currently on Bactrim for abscess.    Provided office call-back number with any questions or concerns.

## 2025-07-15 ENCOUNTER — APPOINTMENT (OUTPATIENT)
Dept: LAB | Facility: CLINIC | Age: 75
End: 2025-07-15
Attending: INTERNAL MEDICINE
Payer: COMMERCIAL

## 2025-07-15 DIAGNOSIS — Z00.8 EXAM FOR POPULATION SURVEY: ICD-10-CM

## 2025-07-15 DIAGNOSIS — R53.83 OTHER FATIGUE: ICD-10-CM

## 2025-07-15 DIAGNOSIS — C50.312 MALIGNANT NEOPLASM OF LOWER-INNER QUADRANT OF LEFT BREAST IN FEMALE, ESTROGEN RECEPTOR POSITIVE (HCC): ICD-10-CM

## 2025-07-15 DIAGNOSIS — Z00.00 WELL ADULT EXAM: ICD-10-CM

## 2025-07-15 DIAGNOSIS — Z17.0 MALIGNANT NEOPLASM OF LOWER-INNER QUADRANT OF LEFT BREAST IN FEMALE, ESTROGEN RECEPTOR POSITIVE (HCC): ICD-10-CM

## 2025-07-15 LAB
ALBUMIN SERPL BCG-MCNC: 3.8 G/DL (ref 3.5–5)
ALP SERPL-CCNC: 50 U/L (ref 34–104)
ALT SERPL W P-5'-P-CCNC: 13 U/L (ref 7–52)
ANION GAP SERPL CALCULATED.3IONS-SCNC: 8 MMOL/L (ref 4–13)
AST SERPL W P-5'-P-CCNC: 19 U/L (ref 13–39)
BACTERIA UR QL AUTO: ABNORMAL /HPF
BASOPHILS # BLD AUTO: 0.06 THOUSANDS/ÂΜL (ref 0–0.1)
BASOPHILS NFR BLD AUTO: 1 % (ref 0–1)
BILIRUB SERPL-MCNC: 0.47 MG/DL (ref 0.2–1)
BILIRUB UR QL STRIP: NEGATIVE
BUN SERPL-MCNC: 13 MG/DL (ref 5–25)
CALCIUM SERPL-MCNC: 9.1 MG/DL (ref 8.4–10.2)
CHLORIDE SERPL-SCNC: 102 MMOL/L (ref 96–108)
CHOLEST SERPL-MCNC: 201 MG/DL (ref ?–200)
CLARITY UR: CLEAR
CO2 SERPL-SCNC: 26 MMOL/L (ref 21–32)
COLOR UR: ABNORMAL
CREAT SERPL-MCNC: 0.8 MG/DL (ref 0.6–1.3)
EOSINOPHIL # BLD AUTO: 0.27 THOUSAND/ÂΜL (ref 0–0.61)
EOSINOPHIL NFR BLD AUTO: 5 % (ref 0–6)
ERYTHROCYTE [DISTWIDTH] IN BLOOD BY AUTOMATED COUNT: 13.2 % (ref 11.6–15.1)
EST. AVERAGE GLUCOSE BLD GHB EST-MCNC: 114 MG/DL
GFR SERPL CREATININE-BSD FRML MDRD: 73 ML/MIN/1.73SQ M
GLUCOSE P FAST SERPL-MCNC: 86 MG/DL (ref 65–99)
GLUCOSE UR STRIP-MCNC: NEGATIVE MG/DL
HBA1C MFR BLD: 5.6 %
HCT VFR BLD AUTO: 42.2 % (ref 34.8–46.1)
HDLC SERPL-MCNC: 67 MG/DL
HGB BLD-MCNC: 13.8 G/DL (ref 11.5–15.4)
HGB UR QL STRIP.AUTO: NEGATIVE
IMM GRANULOCYTES # BLD AUTO: 0.01 THOUSAND/UL (ref 0–0.2)
IMM GRANULOCYTES NFR BLD AUTO: 0 % (ref 0–2)
KETONES UR STRIP-MCNC: NEGATIVE MG/DL
LDLC SERPL CALC-MCNC: 109 MG/DL (ref 0–100)
LEUKOCYTE ESTERASE UR QL STRIP: ABNORMAL
LYMPHOCYTES # BLD AUTO: 2.29 THOUSANDS/ÂΜL (ref 0.6–4.47)
LYMPHOCYTES NFR BLD AUTO: 42 % (ref 14–44)
MCH RBC QN AUTO: 28.5 PG (ref 26.8–34.3)
MCHC RBC AUTO-ENTMCNC: 32.7 G/DL (ref 31.4–37.4)
MCV RBC AUTO: 87 FL (ref 82–98)
MONOCYTES # BLD AUTO: 0.42 THOUSAND/ÂΜL (ref 0.17–1.22)
MONOCYTES NFR BLD AUTO: 8 % (ref 4–12)
NEUTROPHILS # BLD AUTO: 2.45 THOUSANDS/ÂΜL (ref 1.85–7.62)
NEUTS SEG NFR BLD AUTO: 44 % (ref 43–75)
NITRITE UR QL STRIP: NEGATIVE
NON-SQ EPI CELLS URNS QL MICRO: ABNORMAL /HPF
NONHDLC SERPL-MCNC: 134 MG/DL
NRBC BLD AUTO-RTO: 0 /100 WBCS
PH UR STRIP.AUTO: 5 [PH]
PLATELET # BLD AUTO: 187 THOUSANDS/UL (ref 149–390)
PMV BLD AUTO: 9.8 FL (ref 8.9–12.7)
POTASSIUM SERPL-SCNC: 4.3 MMOL/L (ref 3.5–5.3)
PROT SERPL-MCNC: 6.9 G/DL (ref 6.4–8.4)
PROT UR STRIP-MCNC: NEGATIVE MG/DL
RBC # BLD AUTO: 4.84 MILLION/UL (ref 3.81–5.12)
RBC #/AREA URNS AUTO: ABNORMAL /HPF
SODIUM SERPL-SCNC: 136 MMOL/L (ref 135–147)
SP GR UR STRIP.AUTO: 1.01 (ref 1–1.03)
T4 FREE SERPL-MCNC: 0.68 NG/DL (ref 0.61–1.12)
TRIGL SERPL-MCNC: 127 MG/DL (ref ?–150)
TSH SERPL DL<=0.05 MIU/L-ACNC: 3.11 UIU/ML (ref 0.45–4.5)
UROBILINOGEN UR STRIP-ACNC: <2 MG/DL
WBC # BLD AUTO: 5.5 THOUSAND/UL (ref 4.31–10.16)
WBC #/AREA URNS AUTO: ABNORMAL /HPF

## 2025-07-15 PROCEDURE — 83036 HEMOGLOBIN GLYCOSYLATED A1C: CPT

## 2025-07-15 PROCEDURE — 85025 COMPLETE CBC W/AUTO DIFF WBC: CPT

## 2025-07-15 PROCEDURE — 84439 ASSAY OF FREE THYROXINE: CPT

## 2025-07-15 PROCEDURE — 84443 ASSAY THYROID STIM HORMONE: CPT

## 2025-07-15 PROCEDURE — 86300 IMMUNOASSAY TUMOR CA 15-3: CPT

## 2025-07-15 PROCEDURE — 36415 COLL VENOUS BLD VENIPUNCTURE: CPT

## 2025-07-15 PROCEDURE — 80061 LIPID PANEL: CPT

## 2025-07-15 PROCEDURE — 80053 COMPREHEN METABOLIC PANEL: CPT

## 2025-07-15 PROCEDURE — 81001 URINALYSIS AUTO W/SCOPE: CPT

## 2025-07-16 LAB — CANCER AG27-29 SERPL-ACNC: 19.3 U/ML (ref 0–38.6)

## 2025-07-18 ENCOUNTER — TELEPHONE (OUTPATIENT)
Dept: HEMATOLOGY ONCOLOGY | Facility: CLINIC | Age: 75
End: 2025-07-18

## 2025-07-18 NOTE — TELEPHONE ENCOUNTER
Left msg for pt regarding missed follow up appt today.  Provided [facility] phone# [phone] to call and reschedule

## 2025-07-21 ENCOUNTER — OFFICE VISIT (OUTPATIENT)
Dept: HEMATOLOGY ONCOLOGY | Facility: CLINIC | Age: 75
End: 2025-07-21
Payer: COMMERCIAL

## 2025-07-21 VITALS
HEIGHT: 65 IN | SYSTOLIC BLOOD PRESSURE: 110 MMHG | DIASTOLIC BLOOD PRESSURE: 78 MMHG | RESPIRATION RATE: 17 BRPM | BODY MASS INDEX: 30.49 KG/M2 | HEART RATE: 94 BPM | TEMPERATURE: 97.8 F | WEIGHT: 183 LBS | OXYGEN SATURATION: 97 %

## 2025-07-21 DIAGNOSIS — Z17.0 MALIGNANT NEOPLASM OF LOWER-INNER QUADRANT OF LEFT BREAST IN FEMALE, ESTROGEN RECEPTOR POSITIVE (HCC): ICD-10-CM

## 2025-07-21 DIAGNOSIS — E04.1 THYROID NODULE: ICD-10-CM

## 2025-07-21 DIAGNOSIS — G62.0 DRUG-INDUCED POLYNEUROPATHY (HCC): ICD-10-CM

## 2025-07-21 DIAGNOSIS — C50.312 MALIGNANT NEOPLASM OF LOWER-INNER QUADRANT OF LEFT BREAST IN FEMALE, ESTROGEN RECEPTOR POSITIVE (HCC): ICD-10-CM

## 2025-07-21 DIAGNOSIS — Z79.810 USE OF TAMOXIFEN (NOLVADEX): ICD-10-CM

## 2025-07-21 DIAGNOSIS — M19.90 ARTHRITIS: Primary | ICD-10-CM

## 2025-07-21 DIAGNOSIS — L02.91 ABSCESS: Primary | ICD-10-CM

## 2025-07-21 DIAGNOSIS — E78.49 OTHER HYPERLIPIDEMIA: ICD-10-CM

## 2025-07-21 PROCEDURE — 99214 OFFICE O/P EST MOD 30 MIN: CPT | Performed by: INTERNAL MEDICINE

## 2025-07-21 RX ORDER — TAMOXIFEN CITRATE 20 MG/1
20 TABLET ORAL DAILY
Qty: 90 TABLET | Refills: 3 | Status: SHIPPED | OUTPATIENT
Start: 2025-07-21

## 2025-07-21 NOTE — ASSESSMENT & PLAN NOTE
Breast cancer in the lower inner quadrant of left breast was diagnosed in 2019, stage I, 1.2 cm, grade 3, positive lymphovascular invasion and a -3 sentinel lymph nodes.  Strongly positive ER and VA and negative HER2   Oncotype score was 25. In February 2019 patient had bilateral mastectomies and left axillary lymph node sampling and reconstruction surgeries.  Patient had 4 cycles of adjuvant chemotherapy, combination of Taxotere and Cytoxan and Neulasta and that was followed by aromatase inhibitors in June 2019 and she had problems with all 3 aromatase inhibitors.  Lot of musculoskeletal symptoms.  Since May 2020 patient has been on tamoxifen and she has much less problem with musculoskeletal symptoms.  No hot flashes.   Orders:    tamoxifen (NOLVADEX) 20 mg tablet; Take 1 tablet (20 mg total) by mouth daily

## 2025-07-21 NOTE — PROGRESS NOTES
Name: Martita Lawrence      : 1950      MRN: [id number]  Encounter Provider: Deep Aguilar MD  Encounter Date: 2025   Encounter department: [facility] [place]  :  Assessment & Plan  Malignant neoplasm of lower-inner quadrant of left breast in female, estrogen receptor positive (HCC)  Breast cancer in the lower inner quadrant of left breast was diagnosed in , stage I, 1.2 cm, grade 3, positive lymphovascular invasion and a -3 sentinel lymph nodes.  Strongly positive ER and SD and negative HER2   Oncotype score was 25. In 2019 patient had bilateral mastectomies and left axillary lymph node sampling and reconstruction surgeries.  Patient had 4 cycles of adjuvant chemotherapy, combination of Taxotere and Cytoxan and Neulasta and that was followed by aromatase inhibitors in 2019 and she had problems with all 3 aromatase inhibitors.  Lot of musculoskeletal symptoms.  Since May 2020 patient has been on tamoxifen and she has much less problem with musculoskeletal symptoms.  No hot flashes.   Orders:    tamoxifen (NOLVADEX) 20 mg tablet; Take 1 tablet (20 mg total) by mouth daily    Arthritis  Manageable arthritis.       Thyroid nodule  Being monitored with ultrasound.       Use of tamoxifen (Nolvadex)  She plans to have extended adjuvant hormone therapy  for 10 years based on [facility] study.       Other hyperlipidemia  She will discuss and follow-up with her primary physician.       Drug-induced polyneuropathy (HCC)  Grade 1 peripheral neuropathy in her feet secondary to chemotherapy and neuropathy is improving.     See diagnoses, orders and instructions above.  Goal is cure from breast cancer.  Patient is capable of self-care.  All discussed in detail.  Questions answered.  I suggested  eating healthy foods, staying active but to avoid falls and trauma.  Suggested health screening tests. Patient to continue to follow-up with primary physician and other  consultants.  Provided counseling and support.  I used a dictation device to dictate this note and there could be mistakes in my note and for that patient may contact my office.        Renewed tamoxifen.  Blood work prior to next visit in 6 months.    History of Present Illness   Chief Complaint   Patient presents with    Follow-up   Breast cancer in the lower inner quadrant of left breast was diagnosed in 2019, stage I, 1.2 cm, grade 3, positive lymphovascular invasion and a -3 sentinel lymph nodes.  Strongly positive ER and HI and negative HER2   Oncotype score was 25. In February 2019 patient had bilateral mastectomies and left axillary lymph node sampling and reconstruction surgeries.  Patient had 4 cycles of adjuvant chemotherapy, combination of Taxotere and Cytoxan and Neulasta and that was followed by aromatase inhibitors in June 2019 and she had problems with all 3 aromatase inhibitors.  Lot of musculoskeletal symptoms.  Since May 2020 patient has been on tamoxifen and she has much less problem with musculoskeletal symptoms.  No hot flashes.   Recent history of abscess in left axilla that was lanced by surgical oncology nurse practitioner and patient was given Bactrim for 10 days.  Nurse practitioner Aaron stopped by to see the patient and will refer her to general surgeon.  History of high triglycerides and and that is being managed by her primary physician..  Patient is trying to eat healthy and stay active.  She states she is up-to-date with her medical checkups    Occasionally constipation and she had  colonoscopy in 2022  History of bilateral carpal tunnel.  Family history of breast cancer.  Patient tested negative for BRCA.  Oncology History   Cancer Staging   Malignant neoplasm of lower-inner quadrant of left breast in female, estrogen receptor positive (HCC)  Staging form: Breast, AJCC 8th Edition  - Clinical: Stage IA (cT1, cN0, cM0, G3, ER: Positive, HI: Positive, HER2: Negative) - Signed by Lisbeth  MD Alana on 1/10/2019  Method of lymph node assessment: Clinical  Histologic grading system: 3 grade system  Laterality: Left  - Pathologic: Stage IA (pT1c, pN0(sn), cM0, G3, ER+, OK+, HER2-) - Signed by Lisbeth Warner MD on 2/26/2019  Neoadjuvant therapy: No  Method of lymph node assessment: [place] lymph node biopsy  Histologic grading system: 3 grade system  Laterality: Left  Oncology History   Malignant neoplasm of lower-inner quadrant of left breast in female, estrogen receptor positive (HCC)   1/2/2019 Biopsy    Left breast biopsy:  - Invasive ductal carcinoma  Grade 2  ER 90%  OK 40%  HER2 negative     1/2019 Genetic Testing    [facility]  APC, ZAC, AXIN2, BARD1, BMPR1A, BRCA1, BRCA2, BRIP1, CDH1, CDK4, CDKN2A, CHEK2, EPCAM (large rearrangment only), HOXB13 (sequencing only), GALNT12, MLH1, MSH2, MSH3 (excluding repetitive portions of exon 1), MSH6, MUTYH, NBN, NTHL1, PALB2, PMS2, PTEN, RAD51C, RAD51D, RNF43, RPS20, SMAD4, STK11, TP53, Sequencing was performed for select regions of POLE and POLD1, and large rearrangement analysis was performed for select regions of GREM1      Negative     2/8/2019 Surgery    Left mastectomy with sentinel lymph node biopsy  - clear margins   - 0/3 lymph nodes    Right prophylactic mastectomy, bilateral expander placement  Dr. Warner and Dr. Abreu     2/2019 Genomic Testing    Oncotype Dx: 25     4/2019 -  Chemotherapy    Adjuvant  Taxotere and Cytoxan once every 3 weeks for 4 cycles   Dr. Aguilar     4/11/2019 - 7/3/2019 Chemotherapy    pegfilgrastim (NEULASTA) subcutaneous injection 6 mg, 6 mg, Subcutaneous, Once, 4 of 4 cycles  Dose modification: 4 mg (original dose 6 mg, Cycle 3)  Administration: 4 mg (5/24/2019), 4 mg (6/14/2019)  cyclophosphamide (CYTOXAN) 1,176 mg in sodium chloride 0.9 % 250 mL IVPB, 600 mg/m2 = 1,176 mg, Intravenous, Once, 4 of 4 cycles  Administration: 1,176 mg (5/23/2019), 1,176 mg (6/13/2019)  DOCEtaxel (TAXOTERE) 147 mg in sodium chloride 0.9 % 250 mL  "chemo infusion, 75 mg/m2 = 147 mg, Intravenous, Once, 4 of 4 cycles  Administration: 147 mg (5/23/2019), 147 mg (6/13/2019)     7/2019 -  Hormone Therapy    Femara 07/2019- 10/2019  Anstrozole 10/2019- 5/2020  Tamoxifen- 5/2020- present     9/27/2019 Surgery    Bilateral implant exhange     8/6/2020 Surgery    Bilateral fat grafting        Pertinent Medical History   See details in HPI.  07/21/25:      Review of Systems  Reviewed 12 systems.  Symptoms are in HPI.  No other neurological, cardiac, pulmonary, GI and  symptoms other than listed in HPI.  No other symptoms other than listed in HPI.      Objective   /78 (BP Location: Right arm, Patient Position: Sitting, Cuff Size: Adult)   Pulse 94   Temp 97.8 °F (36.6 °C) (Temporal)   Resp 17   Ht 5' 5\" (1.651 m)   Wt 83 kg (183 lb)   SpO2 97%   BMI 30.45 kg/m²     Pain Screening:  Pain Score:   5  ECOG   1  Physical Exam  Vitals reviewed.   Constitutional:       General: She is not in acute distress.     Appearance: Normal appearance. She is not ill-appearing.   HENT:      Head: Normocephalic and atraumatic.      Mouth/Throat:      Mouth: Mucous membranes are moist.      Comments: No thrush.    Eyes:      General: No scleral icterus.        Right eye: No discharge.         Left eye: No discharge.      Conjunctiva/sclera: Conjunctivae normal.       Cardiovascular:      Rate and Rhythm: Normal rate and regular rhythm.      Pulses: Normal pulses.      Heart sounds: Normal heart sounds. No murmur heard.  Pulmonary:      Effort: Pulmonary effort is normal. No respiratory distress.      Breath sounds: Normal breath sounds. No wheezing, rhonchi or rales.   Abdominal:      General: Abdomen is flat. There is no distension.      Palpations: Abdomen is soft. There is no mass.      Tenderness: There is no abdominal tenderness.      Comments: No ascites.      Musculoskeletal:         General: No swelling or tenderness. Normal range of motion.      Cervical back: " Normal range of motion. No rigidity or tenderness.      Right lower leg: No edema.      Left lower leg: No edema.      Comments: No calf tenderness.   Lymphadenopathy:      Cervical: No cervical adenopathy.      Upper Body:      Right upper body: No supraclavicular or axillary adenopathy.      Left upper body: No supraclavicular or axillary adenopathy.     Skin:     General: Skin is warm.      Coloration: Skin is not jaundiced or pale.      Findings: No bruising or rash.      Nails: There is no clubbing.      Comments: Some scarring in left axilla     Neurological:      General: No focal deficit present.      Mental Status: She is alert and oriented to person, place, and time.      Cranial Nerves: No cranial nerve deficit.      Motor: No weakness.      Coordination: Coordination normal.      Gait: Gait normal.     Psychiatric:         Mood and Affect: Mood normal.         Behavior: Behavior normal.         Thought Content: Thought content normal.         Judgment: Judgment normal.     No lymphedema.    Labs: I have reviewed the following labs:  Lab Results   Component Value Date/Time    WBC 5.50 07/15/2025 07:42 AM    RBC 4.84 07/15/2025 07:42 AM    Hemoglobin 13.8 07/15/2025 07:42 AM    Hematocrit 42.2 07/15/2025 07:42 AM    MCV 87 07/15/2025 07:42 AM    MCH 28.5 07/15/2025 07:42 AM    RDW 13.2 07/15/2025 07:42 AM    Platelets 187 07/15/2025 07:42 AM    Segmented % 44 07/15/2025 07:42 AM    Lymphocytes % 42 07/15/2025 07:42 AM    Monocytes % 8 07/15/2025 07:42 AM    Eosinophils Relative 5 07/15/2025 07:42 AM    Basophils Relative 1 07/15/2025 07:42 AM    Immature Grans % 0 07/15/2025 07:42 AM    Absolute Neutrophils 2.45 07/15/2025 07:42 AM     Lab Results   Component Value Date/Time    Potassium 4.3 07/15/2025 07:42 AM    Chloride 102 07/15/2025 07:42 AM    CO2 26 07/15/2025 07:42 AM    BUN 13 07/15/2025 07:42 AM    Creatinine 0.80 07/15/2025 07:42 AM    Glucose, Fasting 86 07/15/2025 07:42 AM    Calcium 9.1  07/15/2025 07:42 AM    AST 19 07/15/2025 07:42 AM    ALT 13 07/15/2025 07:42 AM    Alkaline Phosphatase 50 07/15/2025 07:42 AM    Total Protein 6.9 07/15/2025 07:42 AM    Albumin 3.8 07/15/2025 07:42 AM    Total Bilirubin 0.47 07/15/2025 07:42 AM    eGFR 73 07/15/2025 07:42 AM     Component  Ref Range & Units (hover) 7/15/25  7:42 AM 6/26/24  7:04 AM 5/12/22  7:06 AM 7/15/21  7:04 AM 1/17/20  7:10 AM 1/3/19  9:35 AM 1/3/18  8:21 AM   Free T4 0.68 0.76 CM 0.99 R, CM 0.92 R, CM 1.06 R, CM 0.92 R, CM 0.97 R, CM   Comment: Specimens with biotin concentrations > 10 ng/mL can lead to significant (> 10%) positive bias in result.              Component  Ref Range & Units (hover) 7/15/25  7:42 AM 12/6/24  7:10 AM 5/13/24  6:34 AM 11/9/23  7:09 AM   CA 27-29 19.3 26.1 CM 20.9 CM 18.9 CM   Comment: Siemens Centaur Immunochemiluminometric Methodology (ICMA)  Values obtained with different assay methods or kits cannot be used  interchangeably. Results cannot be interpreted as absolute evidence  of the presence or absence of malignant disease.           Body fluid culture and Gram stain  Status: Final result      Contains abnormal data Body fluid culture and Gram stain  Order: [id number]   Status: Final result       Next appt: 01/22/2026 at 02:00 PM in Internal Medicine (Anuj Young MD)       Dx: Abscess    Test Result Released: Yes (seen)    Specimen Information: Other; Body Fluid   0 Result Notes  Body Fluid Culture, Sterile 2+ Growth of Proteus mirabilis Abnormal    This organism has been edited. The previous result was Non lactose fermenting gram negative alla on 7/9/2025 at 1043 EDT.            GRAM STAIN RESULT 3+ Polys   No bacteria seen           Susceptibility     Proteus mirabilis    GIULIA    Ampicillin ($$) <=8.00 ug/ml Susceptible    Aztreonam ($$$) <=4 ug/ml Susceptible    Cefazolin ($) <=2.00 ug/ml Susceptible    Ciprofloxacin ($) 1.00 ug/ml Resistant    Gentamicin ($$) <=2 ug/ml Susceptible    Levofloxacin ($) 1.00  ug/ml Intermediate    Tetracycline >8 ug/ml Resistant    Trimethoprim + Sulfamethoxazole ($$$) <=0.5/9.5 u... Susceptible    ZID Performed Yes                 Specimen Collected: 07/08/25  2:20 PM Last Resulted: 07/10/25  9:54 AM       US thyroid  Status: Final result     PACS Images - GE     Show images for US thyroid  PACS Images - Sectra     Show images for US thyroid  Study Result    Narrative & Impression   THYROID ULTRASOUND     INDICATION: E04.1: Nontoxic single thyroid nodule.     COMPARISON: Multiple prior exams, most recent 5/9/2023.     TECHNIQUE: Ultrasound of the thyroid was performed with a high frequency linear transducer in transverse and sagittal planes including volumetric imaging sweeps as well as traditional still imaging technique.     FINDINGS:  Thyroid texture: Normal homogeneous smooth echotexture.     Right lobe: 4.5 x 1.5 x 1.5 cm. Volume 4.9 mL  Left lobe: 4.9 x 1.1 x 1.6 cm. Volume 3.9 mL  Isthmus: 0.1 cm.     No change in multiple cystic and spongiform nodules, which do not meet current ACR criteria for biopsy or follow-up.     IMPRESSION:     No nodule meets current ACR criteria for requiring biopsy or follow-up ultrasounds.           Reference: ACR Thyroid Imaging, Reporting and Data System (TI-RADS): White Paper of the ACR TI-RADS Committee. J AM Galdino Radiol 2017;14:587-595. Additional recommendations based on American Thyroid Association 2015 guidelines.        Workstation performed: [id number]        Imaging    US thyroid (Order: [id number]) - 5/15/2025    DXA bone density spine hip and pelvis  Status: Final result     PACS Images - GE     Show images for DXA bone density spine hip and pelvis  PACS Images - Sectra     Show images for DXA bone density spine hip and pelvis  Study Result    Narrative & Impression   DXA SCAN     CLINICAL HISTORY: 73-year-old postmenopausal female.  OTHER RISK FACTORS: None.     PHARMACOLOGIC THERAPY FOR OSTEOPOROSIS: None.     TECHNIQUE: Bone  densitometry was performed using a Hologic Horizon A bone densitometer.  Regions of interest appear properly placed.     COMPARISON: 6/19/2021.     RESULTS:     LUMBAR SPINE  Level: L1-L4:  BMD: 0.972 gm/cm2  T-score: -0.7        LEFT TOTAL HIP:  BMD: 0.720 gm/cm2  T-score: -1.8     LEFT FEMORAL NECK:  BMD: 0.599 gm/cm2  T score: -2.3        IMPRESSION:     1. Low bone mass (osteopenia).     2.  Since a DXA study from 6/19/2021, there has been:  No statistically significant change in bone mineral density at any of the evaluated skeletal sites.           3.  The 10 year risk of hip fracture is 1.7% with the 10 year risk of major osteoporotic fracture being 7.5% as calculated by the [employer] fracture risk assessment tool (FRAX, which is based on data generated by the WHO Collaborating   Robert for Metabolic Bone Diseases).     4.  The current NOF guidelines recommend treating patients with a T-score of -2.5 or less in the lumbar spine or hips, or in post-menopausal women and men over the age of 50 with low bone mass (osteopenia) and a FRAX 10 year risk score of >3% for hip   fracture and/or >20% for major osteoporotic fracture.     5.  The NOF recommends follow-up DXA in 1-2 years after initiating therapy for osteoporosis and every 2 years thereafter. More frequent evaluation is appropriate for patients with conditions associated with rapid bone loss, such as glucocorticoid   therapy. The interval between DXA screenings may be longer for individuals without major risk factors and initial T-score in the normal or upper low bone mass range.     The FRAX algorithm has certain limitations:  -FRAX has not been validated in patients currently or previously treated with pharmacotherapy for osteoporosis.  In such patients, clinical judgment must be exercised in interpreting FRAX scores.  -Prior hip, vertebral and humeral fragility fractures appear to confer greater risk of subsequent fracture than fractures at  other sites (this is especially true for individuals with severe vertebral fractures), but quantification of this incremental   risk is not possible with FRAX.  -FRAX underestimates fracture risk in patients with history of multiple fragility fractures.  -FRAX may underestimate fracture risk in patients with history of frequent falls.  -It is not appropriate to use FRAX to monitor treatment response.        WHO CLASSIFICATION:  Normal (a T-score of -1.0 or higher)  Low bone mineral density (a T-score of less than -1.0 but higher than -2.5)  Osteoporosis (a T-score of -2.5 or less)  Severe osteoporosis (a T-score of -2.5 or less with a fragility fracture)        LEAST SIGNIFICANT CHANGE (AT 95% C.I):     Lumbar spine 0.036 g/cm2; 2.2%  Total hip: 0.022 g/cm2; 2.6%  Forearm: 0.017 g/cm2; 3.0%.     Workstation performed: [id number]        Imaging    DXA bone density spine hip and pelvis (Order: [id number]) - 7/20/2024

## 2025-07-22 NOTE — ASSESSMENT & PLAN NOTE
Grade 1 peripheral neuropathy in her feet secondary to chemotherapy and neuropathy is improving.     See diagnoses, orders and instructions above.  Goal is cure from breast cancer.  Patient is capable of self-care.  All discussed in detail.  Questions answered.  I suggested  eating healthy foods, staying active but to avoid falls and trauma.  Suggested health screening tests. Patient to continue to follow-up with primary physician and other consultants.  Provided counseling and support.  I used a dictation device to dictate this note and there could be mistakes in my note and for that patient may contact my office.

## 2025-08-07 ENCOUNTER — OFFICE VISIT (OUTPATIENT)
Dept: SURGERY | Facility: CLINIC | Age: 75
End: 2025-08-07
Payer: COMMERCIAL

## 2025-08-14 ENCOUNTER — TELEPHONE (OUTPATIENT)
Age: 75
End: 2025-08-14

## (undated) DEVICE — SOFT SILICONE HYDROCELLULAR SACRUM DRESSING WITH LOCK AWAY LAYER: Brand: ALLEVYN LIFE SACRUM (LARGE) PACK OF 10

## (undated) DEVICE — HIGH PROFILE, HP 700CC GEL SIZER: Brand: MENTOR MEMORYGEL RESTERILIZABLE GEL SIZER

## (undated) DEVICE — ELECTRODE BLADE MOD  E-Z CLEAN 6.5IN -0014M

## (undated) DEVICE — ADHESIVE SKIN HIGH VISCOSITY EXOFIN 1ML

## (undated) DEVICE — KERLIX BANDAGE ROLL: Brand: KERLIX

## (undated) DEVICE — BAG DECANTER

## (undated) DEVICE — SKIN MARKER DUAL TIP WITH RULER CAP, FLEXIBLE RULER AND LABELS: Brand: DEVON

## (undated) DEVICE — TOWEL SURG XR DETECT GREEN STRL RFD

## (undated) DEVICE — DRAPE SHEET THREE QUARTER

## (undated) DEVICE — BETHLEHEM UNIVERSAL LAPAROTOMY: Brand: CARDINAL HEALTH

## (undated) DEVICE — INTENDED FOR TISSUE SEPARATION, AND OTHER PROCEDURES THAT REQUIRE A SHARP SURGICAL BLADE TO PUNCTURE OR CUT.: Brand: BARD-PARKER SAFETY BLADES SIZE 10, STERILE

## (undated) DEVICE — SUT VICRYL 2-0 SH 27 IN UNDYED J417H

## (undated) DEVICE — 2000CC GUARDIAN II: Brand: GUARDIAN

## (undated) DEVICE — GLOVE INDICATOR PI UNDERGLOVE SZ 7.5 BLUE

## (undated) DEVICE — CHLORAPREP HI-LITE 26ML ORANGE

## (undated) DEVICE — SUT MONOCRYL 3-0 PS-2 27 IN Y427H

## (undated) DEVICE — MEDI-VAC YANKAUER SUCTION HANDLE W/BULBOUS AND CONTROL VENT: Brand: CARDINAL HEALTH

## (undated) DEVICE — 60 ML SYRINGE,TOOMEY TYPE: Brand: MONOJECT

## (undated) DEVICE — GLOVE INDICATOR PI UNDERGLOVE SZ 7 BLUE

## (undated) DEVICE — PREP PAD BNS: Brand: CONVERTORS

## (undated) DEVICE — DRAIN JACKSON PRATT 15FR: Brand: CARDINAL HEALTH

## (undated) DEVICE — SUT PDS II 2-0 CT-2 27 IN Z333H

## (undated) DEVICE — GLOVE PI ULTRA TOUCH SZ.7.5

## (undated) DEVICE — SYRINGE CATH TIP 50ML

## (undated) DEVICE — DRESSING ALLEVYN LIFE HEEL 25 X 25.2CM

## (undated) DEVICE — INTENDED FOR TISSUE SEPARATION, AND OTHER PROCEDURES THAT REQUIRE A SHARP SURGICAL BLADE TO PUNCTURE OR CUT.: Brand: BARD-PARKER ® CARBON RIB-BACK BLADES

## (undated) DEVICE — SUT PDS II 0 CT-1 36 IN Z346H

## (undated) DEVICE — BRA SURGICAL SZ 2XL (42-45)

## (undated) DEVICE — NEEDLE 25G X 1 1/2

## (undated) DEVICE — PLUMEPEN PRO 10FT

## (undated) DEVICE — SYRINGE 3ML LL

## (undated) DEVICE — PENCIL ELECTROSURG E-Z CLEAN -0035H

## (undated) DEVICE — JACKSON-PRATT 100CC BULB RESERVOIR: Brand: CARDINAL HEALTH

## (undated) DEVICE — 450 ML BOTTLE OF 0.05% CHLORHEXIDINE GLUCONATE IN 99.95% STERILE WATER FOR IRRIGATION, USP AND APPLICATOR.: Brand: IRRISEPT ANTIMICROBIAL WOUND LAVAGE

## (undated) DEVICE — DRAPE EQUIPMENT RF WAND

## (undated) DEVICE — BOWL: 16OZ PEELPOUCH 75/CS 16/PLT: Brand: MEDEGEN MEDICAL PRODUCTS, LLC

## (undated) DEVICE — SUT PDS II 2-0 CT-1 27 IN Z259H

## (undated) DEVICE — 3M™ STERI-STRIP™ COMPOUND BENZOIN TINCTURE 40 BAGS/CARTON 4 CARTONS/CASE C1544: Brand: 3M™ STERI-STRIP™

## (undated) DEVICE — BRA SURGICAL SZ XLGE (39-42)

## (undated) DEVICE — SUT MONOCRYL 4-0 PS-2 27 IN Y426H

## (undated) DEVICE — SUT STRATAFIX SPIRAL 4-0 PGA/PCL 30 X 30 CM SXMD2B409

## (undated) DEVICE — DRESSING BIOPATCH ANTIMICROBIAL 1 IN DISC

## (undated) DEVICE — SUPER SPONGES,MEDIUM: Brand: DERMACEA

## (undated) DEVICE — ASTOUND STANDARD SURGICAL GOWN, XL: Brand: CONVERTORS

## (undated) DEVICE — UNDYED MONOFILAMENT (POLYDIOXANONE), ABSORBABLE SURGICAL SUTURE: Brand: PDS

## (undated) DEVICE — TUBING SUCTION 5MM X 12 FT

## (undated) DEVICE — PROXIMATE SKIN STAPLERS (35 WIDE) CONTAINS 35 STAINLESS STEEL STAPLES (FIXED HEAD): Brand: PROXIMATE

## (undated) DEVICE — GLOVE SRG BIOGEL 7.5

## (undated) DEVICE — ADHESIVE SKN CLSR HISTOACRYL FLEX 0.5ML LF

## (undated) DEVICE — SMOKE EVAC BOVIE PENCIL BUTTON

## (undated) DEVICE — BULB SYRINGE,IRRIGATION WITH PROTECTIVE CAP: Brand: DOVER

## (undated) DEVICE — CHEST/BREAST DRAPE: Brand: CONVERTORS

## (undated) DEVICE — SUT ETHILON 3-0 PS-1 18 IN 1663G

## (undated) DEVICE — SUT PDS II 2-0 SH 27 IN Z317H

## (undated) DEVICE — ELECTRODE BLADE MOD E-Z CLEAN  2.75IN 7CM -0012AM

## (undated) DEVICE — ELECTROSURGICAL DEVICE HOLSTER;FOR USE WITH MAXIMUM PEAK VOLTAGE OF 4000 V: Brand: FORCE TRIVERSE

## (undated) DEVICE — NEEDLE SPINAL 22G X 3.5IN  QUINCKE

## (undated) DEVICE — SYRINGE 20ML LL

## (undated) DEVICE — DRAPE PROBE NEO-PROBE/ULTRASOUND

## (undated) DEVICE — 3M™ STERI-STRIP™ REINFORCED ADHESIVE SKIN CLOSURES, R1547, 1/2 IN X 4 IN (12 MM X 100 MM), 6 STRIPS/ENVELOPE: Brand: 3M™ STERI-STRIP™

## (undated) DEVICE — SUT VICRYL 3-0 RB-1 27 IN J215H

## (undated) DEVICE — SUT MONOCRYL 3-0 SH 27 IN Y416H

## (undated) DEVICE — CAUTERY TIP POLISHER: Brand: DEVON

## (undated) DEVICE — SCD SEQUENTIAL COMPRESSION COMFORT SLEEVE MEDIUM KNEE LENGTH: Brand: KENDALL SCD

## (undated) DEVICE — BETHLEHEM UNIVERSAL MINOR GEN: Brand: CARDINAL HEALTH

## (undated) DEVICE — JP CHAN DRN SIL HUBLESS 15FR W/TRO: Brand: CARDINAL HEALTH

## (undated) DEVICE — SUT PLAIN 5-0 PC-1 18 IN 1915G

## (undated) DEVICE — INTENDED FOR TISSUE SEPARATION, AND OTHER PROCEDURES THAT REQUIRE A SHARP SURGICAL BLADE TO PUNCTURE OR CUT.: Brand: BARD-PARKER SAFETY BLADES SIZE 15, STERILE

## (undated) DEVICE — GAUZE SPONGES,16 PLY: Brand: CURITY

## (undated) DEVICE — SAFETY PINS KIT: Brand: CARDINAL HEALTH

## (undated) DEVICE — SYRINGE 50ML LL

## (undated) DEVICE — LIGACLIP MCA MULTIPLE CLIP APPLIERS, 20 MEDIUM CLIPS: Brand: LIGACLIP

## (undated) DEVICE — FUNNEL E KELLER 2 DELIVERY DEVICE

## (undated) DEVICE — 10FR FRAZIER SUCTION HANDLE: Brand: CARDINAL HEALTH

## (undated) DEVICE — 3000CC GUARDIAN II: Brand: GUARDIAN

## (undated) DEVICE — NEEDLE 18 G X 1 1/2

## (undated) DEVICE — SPONGE LAP 18 X 18 IN STRL RFD

## (undated) DEVICE — SUT VICRYL PLUS 3-0 RB-1 CR/8 18 IN VCP713D

## (undated) DEVICE — PROVE COVER: Brand: UNBRANDED

## (undated) DEVICE — GLOVE SRG BIOGEL 6

## (undated) DEVICE — SUT SILK 2-0 SH 30 IN K833H

## (undated) DEVICE — STERI STRIP 1/2 INCH

## (undated) DEVICE — GLOVE PI ULTRA TOUCH SZ.7.0